# Patient Record
Sex: MALE | Race: WHITE | Employment: OTHER | URBAN - METROPOLITAN AREA
[De-identification: names, ages, dates, MRNs, and addresses within clinical notes are randomized per-mention and may not be internally consistent; named-entity substitution may affect disease eponyms.]

---

## 2017-01-11 PROBLEM — J30.1 ALLERGIC RHINITIS DUE TO POLLEN: Status: ACTIVE | Noted: 2017-01-11

## 2017-01-12 PROBLEM — Z00.00 ROUTINE HEALTH MAINTENANCE: Status: ACTIVE | Noted: 2017-01-12

## 2017-01-19 ENCOUNTER — APPOINTMENT (OUTPATIENT)
Dept: GENERAL RADIOLOGY | Age: 67
End: 2017-01-19
Payer: COMMERCIAL

## 2017-01-19 ENCOUNTER — HOSPITAL ENCOUNTER (EMERGENCY)
Age: 67
Discharge: HOME | End: 2017-01-19
Payer: COMMERCIAL

## 2017-01-19 VITALS
SYSTOLIC BLOOD PRESSURE: 124 MMHG | TEMPERATURE: 98.4 F | HEIGHT: 66 IN | BODY MASS INDEX: 23.95 KG/M2 | OXYGEN SATURATION: 98 % | RESPIRATION RATE: 18 BRPM | WEIGHT: 149 LBS | HEART RATE: 77 BPM | DIASTOLIC BLOOD PRESSURE: 74 MMHG

## 2017-01-19 DIAGNOSIS — I10 ESSENTIAL HYPERTENSION, BENIGN: ICD-10-CM

## 2017-01-19 DIAGNOSIS — I48.0 PAROXYSMAL ATRIAL FIBRILLATION WITH RVR (HCC): Primary | ICD-10-CM

## 2017-01-19 PROBLEM — R91.1 LUNG NODULE, SOLITARY: Status: ACTIVE | Noted: 2017-01-19

## 2017-01-19 LAB
ALBUMIN SERPL BCP-MCNC: 4 G/DL (ref 3.2–4.6)
ALBUMIN/GLOB SERPL: 1.3 {RATIO} (ref 1.2–3.5)
ALP SERPL-CCNC: 52 U/L (ref 50–136)
ALT SERPL-CCNC: 35 U/L (ref 12–65)
ANION GAP BLD CALC-SCNC: 10 MMOL/L (ref 7–16)
AST SERPL W P-5'-P-CCNC: 30 U/L (ref 15–37)
ATRIAL RATE: 357 BPM
BASOPHILS # BLD AUTO: 0 K/UL (ref 0–0.2)
BASOPHILS # BLD: 0 % (ref 0–2)
BILIRUB SERPL-MCNC: 0.6 MG/DL (ref 0.2–1.1)
BUN SERPL-MCNC: 12 MG/DL (ref 8–23)
CALCIUM SERPL-MCNC: 8.6 MG/DL (ref 8.3–10.4)
CALCULATED P AXIS, ECG09: -98 DEGREES
CALCULATED R AXIS, ECG10: -27 DEGREES
CALCULATED T AXIS, ECG11: 55 DEGREES
CHLORIDE SERPL-SCNC: 105 MMOL/L (ref 98–107)
CO2 SERPL-SCNC: 28 MMOL/L (ref 21–32)
CREAT SERPL-MCNC: 1.1 MG/DL (ref 0.8–1.5)
DIAGNOSIS, 93000: NORMAL
DIASTOLIC BP, ECG02: NORMAL MMHG
DIFFERENTIAL METHOD BLD: ABNORMAL
EOSINOPHIL # BLD: 0.1 K/UL (ref 0–0.8)
EOSINOPHIL NFR BLD: 1 % (ref 0.5–7.8)
ERYTHROCYTE [DISTWIDTH] IN BLOOD BY AUTOMATED COUNT: 14.1 % (ref 11.9–14.6)
GLOBULIN SER CALC-MCNC: 3 G/DL (ref 2.3–3.5)
GLUCOSE SERPL-MCNC: 98 MG/DL (ref 65–100)
HCT VFR BLD AUTO: 54 % (ref 41.1–50.3)
HGB BLD-MCNC: 18.6 G/DL (ref 13.6–17.2)
IMM GRANULOCYTES # BLD: 0 K/UL (ref 0–0.5)
IMM GRANULOCYTES NFR BLD AUTO: 0.1 % (ref 0–5)
LYMPHOCYTES # BLD AUTO: 13 % (ref 13–44)
LYMPHOCYTES # BLD: 0.9 K/UL (ref 0.5–4.6)
MAGNESIUM SERPL-MCNC: 2.1 MG/DL (ref 1.8–2.4)
MCH RBC QN AUTO: 31.4 PG (ref 26.1–32.9)
MCHC RBC AUTO-ENTMCNC: 34.4 G/DL (ref 31.4–35)
MCV RBC AUTO: 91.2 FL (ref 79.6–97.8)
MONOCYTES # BLD: 0.6 K/UL (ref 0.1–1.3)
MONOCYTES NFR BLD AUTO: 8 % (ref 4–12)
NEUTS SEG # BLD: 5.8 K/UL (ref 1.7–8.2)
NEUTS SEG NFR BLD AUTO: 78 % (ref 43–78)
P-R INTERVAL, ECG05: NORMAL MS
PLATELET # BLD AUTO: 135 K/UL (ref 150–450)
PMV BLD AUTO: 10.8 FL (ref 10.8–14.1)
POTASSIUM SERPL-SCNC: 3.7 MMOL/L (ref 3.5–5.1)
PROT SERPL-MCNC: 7 G/DL (ref 6.3–8.2)
Q-T INTERVAL, ECG07: 280 MS
QRS DURATION, ECG06: 90 MS
QTC CALCULATION (BEZET), ECG08: 392 MS
RBC # BLD AUTO: 5.92 M/UL (ref 4.23–5.67)
SODIUM SERPL-SCNC: 143 MMOL/L (ref 136–145)
SYSTOLIC BP, ECG01: NORMAL MMHG
TROPONIN I SERPL-MCNC: <0.02 NG/ML (ref 0.02–0.05)
VENTRICULAR RATE, ECG03: 118 BPM
WBC # BLD AUTO: 7.5 K/UL (ref 4.3–11.1)

## 2017-01-19 PROCEDURE — 85025 COMPLETE CBC W/AUTO DIFF WBC: CPT

## 2017-01-19 PROCEDURE — 74011000258 HC RX REV CODE- 258

## 2017-01-19 PROCEDURE — 74011000250 HC RX REV CODE- 250

## 2017-01-19 PROCEDURE — 83735 ASSAY OF MAGNESIUM: CPT

## 2017-01-19 PROCEDURE — 99285 EMERGENCY DEPT VISIT HI MDM: CPT

## 2017-01-19 PROCEDURE — 96365 THER/PROPH/DIAG IV INF INIT: CPT

## 2017-01-19 PROCEDURE — 96366 THER/PROPH/DIAG IV INF ADDON: CPT

## 2017-01-19 PROCEDURE — 80053 COMPREHEN METABOLIC PANEL: CPT

## 2017-01-19 PROCEDURE — 74011250637 HC RX REV CODE- 250/637

## 2017-01-19 PROCEDURE — 71010 XR CHEST PORT: CPT

## 2017-01-19 PROCEDURE — 84484 ASSAY OF TROPONIN QUANT: CPT

## 2017-01-19 PROCEDURE — 93005 ELECTROCARDIOGRAM TRACING: CPT

## 2017-01-19 RX ORDER — DILTIAZEM HYDROCHLORIDE 5 MG/ML
20 INJECTION INTRAVENOUS
Status: DISCONTINUED | OUTPATIENT
Start: 2017-01-19 | End: 2017-01-19 | Stop reason: HOSPADM

## 2017-01-19 RX ORDER — METOPROLOL TARTRATE 25 MG/1
25 TABLET, FILM COATED ORAL
Status: COMPLETED | OUTPATIENT
Start: 2017-01-19 | End: 2017-01-19

## 2017-01-19 RX ORDER — DILTIAZEM HYDROCHLORIDE 5 MG/ML
INJECTION INTRAVENOUS
Status: COMPLETED
Start: 2017-01-19 | End: 2017-01-19

## 2017-01-19 RX ORDER — METOPROLOL SUCCINATE 25 MG/1
25 TABLET, EXTENDED RELEASE ORAL DAILY
Qty: 30 TAB | Refills: 5 | Status: SHIPPED | OUTPATIENT
Start: 2017-01-19 | End: 2017-02-06

## 2017-01-19 RX ADMIN — METOPROLOL TARTRATE 25 MG: 25 TABLET ORAL at 14:48

## 2017-01-19 RX ADMIN — SODIUM CHLORIDE 7.5 MG/HR: 900 INJECTION, SOLUTION INTRAVENOUS at 13:03

## 2017-01-19 RX ADMIN — APIXABAN 5 MG: 5 TABLET, FILM COATED ORAL at 14:49

## 2017-01-19 RX ADMIN — DILTIAZEM HYDROCHLORIDE 20 MG: 5 INJECTION INTRAVENOUS at 13:01

## 2017-01-19 NOTE — ED NOTES
While patient was urinating patient hr increased to 170 contacted MD Myles for 20mg cardizem and cardizem drip,

## 2017-01-19 NOTE — ED TRIAGE NOTES
Per ems patient was being evaluated by MD Srivastava Fees pain\" patient felt his heart rate increase, and a fluttering in his chest. Patient denies chest pain/sob/discomfort found hr 161, vagal maneuvers unsuccessful at Md office. Patient A+Ox4 at this time, patient appears to be in no distress at this time hr is 130 irregular.        12 adenosine  20 Cardizem, a-flutter to 82

## 2017-02-14 ENCOUNTER — APPOINTMENT (OUTPATIENT)
Dept: MRI IMAGING | Age: 67
End: 2017-02-14
Attending: SURGERY
Payer: MEDICARE

## 2017-02-14 ENCOUNTER — HOSPITAL ENCOUNTER (OUTPATIENT)
Dept: MRI IMAGING | Age: 67
Discharge: HOME | End: 2017-02-14
Attending: SURGERY
Payer: MEDICARE

## 2017-02-14 DIAGNOSIS — R22.41 MASS OF THIGH, RIGHT: ICD-10-CM

## 2017-02-14 DIAGNOSIS — M25.551 PAIN OF RIGHT HIP JOINT: ICD-10-CM

## 2017-02-14 PROCEDURE — A9577 INJ MULTIHANCE: HCPCS | Performed by: SURGERY

## 2017-02-14 PROCEDURE — 74011250636 HC RX REV CODE- 250/636: Performed by: SURGERY

## 2017-02-14 PROCEDURE — 73720 MRI LWR EXTREMITY W/O&W/DYE: CPT

## 2017-02-14 RX ORDER — SODIUM CHLORIDE 0.9 % (FLUSH) 0.9 %
10 SYRINGE (ML) INJECTION
Status: COMPLETED | OUTPATIENT
Start: 2017-02-14 | End: 2017-02-14

## 2017-02-14 RX ADMIN — GADOBENATE DIMEGLUMINE 8 ML: 529 INJECTION, SOLUTION INTRAVENOUS at 18:06

## 2017-02-14 RX ADMIN — Medication 10 ML: at 18:06

## 2017-02-22 PROBLEM — I48.0 PAROXYSMAL ATRIAL FIBRILLATION (HCC): Status: ACTIVE | Noted: 2017-02-22

## 2017-03-03 PROBLEM — Z92.89 H/O DIAGNOSTIC TESTS: Status: ACTIVE | Noted: 2017-03-03

## 2017-08-02 PROCEDURE — 88305 TISSUE EXAM BY PATHOLOGIST: CPT | Performed by: FAMILY MEDICINE

## 2017-08-03 ENCOUNTER — HOSPITAL ENCOUNTER (OUTPATIENT)
Dept: LAB | Age: 67
Discharge: HOME | End: 2017-08-03

## 2017-08-06 PROBLEM — D72.810 LYMPHOPENIA: Status: ACTIVE | Noted: 2017-08-06

## 2017-08-11 ENCOUNTER — HOSPITAL ENCOUNTER (OUTPATIENT)
Dept: ULTRASOUND IMAGING | Age: 67
Discharge: HOME | End: 2017-08-11
Attending: FAMILY MEDICINE
Payer: MEDICARE

## 2017-08-11 DIAGNOSIS — Z13.6 ENCOUNTER FOR ABDOMINAL AORTIC ANEURYSM (AAA) SCREENING: ICD-10-CM

## 2017-08-11 DIAGNOSIS — Z87.891 FORMER HEAVY CIGARETTE SMOKER (20-39 PER DAY): ICD-10-CM

## 2017-08-11 PROCEDURE — 93978 VASCULAR STUDY: CPT

## 2017-09-01 PROBLEM — R80.9 MICROALBUMINURIA: Status: ACTIVE | Noted: 2017-09-01

## 2018-01-05 ENCOUNTER — HOSPITAL ENCOUNTER (OUTPATIENT)
Dept: CT IMAGING | Age: 68
Discharge: HOME | End: 2018-01-05
Attending: INTERNAL MEDICINE
Payer: MEDICARE

## 2018-01-05 DIAGNOSIS — I48.0 PAROXYSMAL ATRIAL FIBRILLATION (HCC): ICD-10-CM

## 2018-01-05 DIAGNOSIS — I48.91 ATRIAL FIBRILLATION (HCC): ICD-10-CM

## 2018-01-05 LAB — CREAT BLD-MCNC: NORMAL MG/DL

## 2018-01-05 PROCEDURE — 75572 CT HRT W/3D IMAGE: CPT

## 2018-01-05 PROCEDURE — 74011000258 HC RX REV CODE- 258: Performed by: INTERNAL MEDICINE

## 2018-01-05 PROCEDURE — 82565 ASSAY OF CREATININE: CPT | Performed by: INTERNAL MEDICINE

## 2018-01-05 PROCEDURE — 74011636320 HC RX REV CODE- 636/320: Performed by: INTERNAL MEDICINE

## 2018-01-05 RX ORDER — SODIUM CHLORIDE 0.9 % (FLUSH) 0.9 %
10 SYRINGE (ML) INJECTION
Status: COMPLETED | OUTPATIENT
Start: 2018-01-05 | End: 2018-01-05

## 2018-01-05 RX ADMIN — SODIUM CHLORIDE 100 ML: 900 INJECTION, SOLUTION INTRAVENOUS at 09:01

## 2018-01-05 RX ADMIN — Medication 10 ML: at 09:01

## 2018-01-05 RX ADMIN — IOPAMIDOL 125 ML: 755 INJECTION, SOLUTION INTRAVENOUS at 09:01

## 2018-01-11 PROBLEM — R74.8 ELEVATED CREATINE KINASE: Status: ACTIVE | Noted: 2018-01-11

## 2018-01-11 PROBLEM — J30.89 CHRONIC NONSEASONAL ALLERGIC RHINITIS DUE TO POLLEN: Status: ACTIVE | Noted: 2017-01-11

## 2018-01-18 ENCOUNTER — HOSPITAL ENCOUNTER (OUTPATIENT)
Dept: MRI IMAGING | Age: 68
Discharge: HOME | End: 2018-01-18
Attending: FAMILY MEDICINE
Payer: MEDICARE

## 2018-01-18 DIAGNOSIS — M47.22 CERVICAL SPONDYLOSIS WITH MYELOPATHY AND RADICULOPATHY: ICD-10-CM

## 2018-01-18 DIAGNOSIS — M47.12 CERVICAL SPONDYLOSIS WITH MYELOPATHY AND RADICULOPATHY: ICD-10-CM

## 2018-01-18 PROBLEM — M50.30 DDD (DEGENERATIVE DISC DISEASE), CERVICAL: Status: ACTIVE | Noted: 2018-01-18

## 2018-01-18 PROCEDURE — 72141 MRI NECK SPINE W/O DYE: CPT

## 2018-01-23 ENCOUNTER — APPOINTMENT (OUTPATIENT)
Dept: GENERAL RADIOLOGY | Age: 68
DRG: 310 | End: 2018-01-23
Attending: EMERGENCY MEDICINE
Payer: MEDICARE

## 2018-01-23 ENCOUNTER — HOSPITAL ENCOUNTER (INPATIENT)
Age: 68
LOS: 3 days | Discharge: HOME | DRG: 310 | End: 2018-01-26
Attending: EMERGENCY MEDICINE | Admitting: INTERNAL MEDICINE
Payer: MEDICARE

## 2018-01-23 DIAGNOSIS — I48.0 PAROXYSMAL ATRIAL FIBRILLATION (HCC): ICD-10-CM

## 2018-01-23 DIAGNOSIS — I48.3 TYPICAL ATRIAL FLUTTER (HCC): Primary | ICD-10-CM

## 2018-01-23 DIAGNOSIS — I48.4 ATYPICAL ATRIAL FLUTTER (HCC): ICD-10-CM

## 2018-01-23 PROBLEM — I48.92 ATRIAL FLUTTER (HCC): Status: ACTIVE | Noted: 2018-01-23

## 2018-01-23 LAB
ALBUMIN SERPL-MCNC: 3.1 G/DL (ref 3.2–4.6)
ALBUMIN/GLOB SERPL: 0.9 {RATIO} (ref 1.2–3.5)
ALP SERPL-CCNC: 50 U/L (ref 50–136)
ALT SERPL-CCNC: 30 U/L (ref 12–65)
ANION GAP SERPL CALC-SCNC: 10 MMOL/L (ref 7–16)
AST SERPL-CCNC: 32 U/L (ref 15–37)
BASOPHILS # BLD: 0 K/UL (ref 0–0.2)
BASOPHILS NFR BLD: 0 % (ref 0–2)
BILIRUB SERPL-MCNC: 0.8 MG/DL (ref 0.2–1.1)
BUN SERPL-MCNC: 16 MG/DL (ref 8–23)
CALCIUM SERPL-MCNC: 7.9 MG/DL (ref 8.3–10.4)
CHLORIDE SERPL-SCNC: 105 MMOL/L (ref 98–107)
CO2 SERPL-SCNC: 24 MMOL/L (ref 21–32)
CREAT SERPL-MCNC: 1.07 MG/DL (ref 0.8–1.5)
DIFFERENTIAL METHOD BLD: ABNORMAL
EOSINOPHIL # BLD: 0 K/UL (ref 0–0.8)
EOSINOPHIL NFR BLD: 0 % (ref 0.5–7.8)
ERYTHROCYTE [DISTWIDTH] IN BLOOD BY AUTOMATED COUNT: 14 % (ref 11.9–14.6)
FLUAV AG NPH QL IA: NEGATIVE
FLUBV AG NPH QL IA: NEGATIVE
GLOBULIN SER CALC-MCNC: 3.6 G/DL (ref 2.3–3.5)
GLUCOSE SERPL-MCNC: 95 MG/DL (ref 65–100)
HCT VFR BLD AUTO: 48 % (ref 41.1–50.3)
HGB BLD-MCNC: 16.8 G/DL (ref 13.6–17.2)
IMM GRANULOCYTES # BLD: 0.1 K/UL (ref 0–0.5)
IMM GRANULOCYTES NFR BLD AUTO: 0 % (ref 0–5)
LACTATE BLD-SCNC: 1.4 MMOL/L (ref 0.5–1.9)
LYMPHOCYTES # BLD: 0.6 K/UL (ref 0.5–4.6)
LYMPHOCYTES NFR BLD: 4 % (ref 13–44)
MCH RBC QN AUTO: 32.2 PG (ref 26.1–32.9)
MCHC RBC AUTO-ENTMCNC: 35 G/DL (ref 31.4–35)
MCV RBC AUTO: 92 FL (ref 79.6–97.8)
MONOCYTES # BLD: 1.3 K/UL (ref 0.1–1.3)
MONOCYTES NFR BLD: 9 % (ref 4–12)
NEUTS SEG # BLD: 13.1 K/UL (ref 1.7–8.2)
NEUTS SEG NFR BLD: 87 % (ref 43–78)
PLATELET # BLD AUTO: 144 K/UL (ref 150–450)
PMV BLD AUTO: 10 FL (ref 10.8–14.1)
POTASSIUM SERPL-SCNC: 5.1 MMOL/L (ref 3.5–5.1)
PROCALCITONIN SERPL-MCNC: 0.2 NG/ML
PROT SERPL-MCNC: 6.7 G/DL (ref 6.3–8.2)
RBC # BLD AUTO: 5.22 M/UL (ref 4.23–5.67)
SODIUM SERPL-SCNC: 139 MMOL/L (ref 136–145)
TROPONIN I BLD-MCNC: 0.02 NG/ML (ref 0.02–0.05)
WBC # BLD AUTO: 15.1 K/UL (ref 4.3–11.1)

## 2018-01-23 PROCEDURE — 93005 ELECTROCARDIOGRAM TRACING: CPT | Performed by: EMERGENCY MEDICINE

## 2018-01-23 PROCEDURE — 74011250637 HC RX REV CODE- 250/637: Performed by: EMERGENCY MEDICINE

## 2018-01-23 PROCEDURE — 87804 INFLUENZA ASSAY W/OPTIC: CPT | Performed by: EMERGENCY MEDICINE

## 2018-01-23 PROCEDURE — 80053 COMPREHEN METABOLIC PANEL: CPT | Performed by: EMERGENCY MEDICINE

## 2018-01-23 PROCEDURE — 83605 ASSAY OF LACTIC ACID: CPT

## 2018-01-23 PROCEDURE — 71045 X-RAY EXAM CHEST 1 VIEW: CPT

## 2018-01-23 PROCEDURE — 84145 PROCALCITONIN (PCT): CPT | Performed by: EMERGENCY MEDICINE

## 2018-01-23 PROCEDURE — 85025 COMPLETE CBC W/AUTO DIFF WBC: CPT | Performed by: EMERGENCY MEDICINE

## 2018-01-23 PROCEDURE — 84484 ASSAY OF TROPONIN QUANT: CPT

## 2018-01-23 PROCEDURE — 74011000250 HC RX REV CODE- 250: Performed by: INTERNAL MEDICINE

## 2018-01-23 PROCEDURE — 65660000000 HC RM CCU STEPDOWN

## 2018-01-23 PROCEDURE — 74011000258 HC RX REV CODE- 258: Performed by: INTERNAL MEDICINE

## 2018-01-23 PROCEDURE — 74011250637 HC RX REV CODE- 250/637: Performed by: INTERNAL MEDICINE

## 2018-01-23 PROCEDURE — 74011250636 HC RX REV CODE- 250/636: Performed by: EMERGENCY MEDICINE

## 2018-01-23 PROCEDURE — 99285 EMERGENCY DEPT VISIT HI MDM: CPT | Performed by: EMERGENCY MEDICINE

## 2018-01-23 RX ORDER — IBUTILIDE FUMARATE 0.1 MG/ML
1 INJECTION, SOLUTION INTRAVENOUS ONCE
Status: COMPLETED | OUTPATIENT
Start: 2018-01-23 | End: 2018-01-23

## 2018-01-23 RX ORDER — SODIUM CHLORIDE 0.9 % (FLUSH) 0.9 %
5-10 SYRINGE (ML) INJECTION EVERY 8 HOURS
Status: DISCONTINUED | OUTPATIENT
Start: 2018-01-23 | End: 2018-01-26 | Stop reason: HOSPADM

## 2018-01-23 RX ORDER — ACETAMINOPHEN 325 MG/1
650 TABLET ORAL
Status: COMPLETED | OUTPATIENT
Start: 2018-01-23 | End: 2018-01-23

## 2018-01-23 RX ORDER — SODIUM CHLORIDE 0.9 % (FLUSH) 0.9 %
5-10 SYRINGE (ML) INJECTION AS NEEDED
Status: DISCONTINUED | OUTPATIENT
Start: 2018-01-23 | End: 2018-01-26 | Stop reason: HOSPADM

## 2018-01-23 RX ORDER — LISINOPRIL 5 MG/1
10 TABLET ORAL DAILY
Status: DISCONTINUED | OUTPATIENT
Start: 2018-01-24 | End: 2018-01-26 | Stop reason: HOSPADM

## 2018-01-23 RX ORDER — DILTIAZEM HYDROCHLORIDE 5 MG/ML
10 INJECTION INTRAVENOUS ONCE
Status: COMPLETED | OUTPATIENT
Start: 2018-01-23 | End: 2018-01-23

## 2018-01-23 RX ADMIN — APIXABAN 5 MG: 5 TABLET, FILM COATED ORAL at 21:21

## 2018-01-23 RX ADMIN — DILTIAZEM HYDROCHLORIDE 10 MG: 5 INJECTION INTRAVENOUS at 22:25

## 2018-01-23 RX ADMIN — IBUTILIDE FUMARATE 1 MG: 0.1 INJECTION, SOLUTION INTRAVENOUS at 22:12

## 2018-01-23 RX ADMIN — DILTIAZEM HYDROCHLORIDE 2.5 MG/HR: 5 INJECTION INTRAVENOUS at 23:18

## 2018-01-23 RX ADMIN — Medication 5 ML: at 22:00

## 2018-01-23 RX ADMIN — ACETAMINOPHEN 650 MG: 325 TABLET, FILM COATED ORAL at 19:05

## 2018-01-23 NOTE — ED TRIAGE NOTES
Pt went to PCP office not feeling well, for fever and body aches, did EKG after pt's vitals were taken at 165bpm, saw he was in SVT, pt has hx of SVT. Has an ablation sched for 2/2/18. EMS gave 12mg of adenocard, pt rate went down to 70bpm then back up, gave another 12 of adenocard, went down and back up to 150, then gave 20mg of cardizem. Pt arrives in aflutter at 115.

## 2018-01-23 NOTE — ED PROVIDER NOTES
HPI Comments: 57-year-old male w/ PMHx of SVT who presents from PCP office with \"not feeling well\" with complaint of subjective fever and body aches since yesterday. EKG obtained at the PCPs office with heart rate 165 and patient reportedly in SVT. Patient has ablation scheduled for 2/2/2018. EMS gave 12mg of Adenocard, w/ HR decreasing down to 70bpm then back up. Pt given another 12mg of Adenocard, HR went down and back up to 150, then gave 20mg IV Cardizem. Pt arrives in aflutter w/ HR of 115. Denies CP, SOB, nausea, vomiting, sore throat, back pain, neck pain, dysuria, hematuria, diarrhea, productive cough. Patient is a 74 y.o. male presenting with SVT. The history is provided by the patient. No  was used. SVT    This is a new problem. The current episode started 1 to 2 hours ago. The problem has not changed since onset. The problem occurs constantly. The problem is associated with nothing. Associated symptoms include a fever and lower extremity edema. Pertinent negatives include no diaphoresis, no malaise/fatigue, no numbness, no chest pain, no exertional chest pressure, no irregular heartbeat, no near-syncope, no orthopnea, no PND, no syncope, no abdominal pain, no nausea, no vomiting, no headaches, no back pain, no leg pain, no dizziness, no weakness, no cough, no hemoptysis, no shortness of breath and no sputum production. Risk factors include hypertension and male gender. Treatments tried: Adenosine 24mg, Diltiazem 20mg  The treatment provided no relief. Past Medical History:   Diagnosis Date    Asthma, moderate persistent, well-controlled 5/30/2013    Chronic left shoulder pain 5/30/2013    Pain radiates from his neck    DDD (degenerative disc disease), cervical 1/18/2018    MRI: 1/2018 - multilevel DDD and facet arthritis w/ moderate bilateral foraminal narrowing at C6-7 and moderate right at C5-6.     Essential hypertension, benign 5/30/2013    Hypercholesteremia 12/7/2015    Hypogonadism, testicular 5/30/2013    Removal of right testicle on 11/1/2010[facility]    Lumbosacral radiculopathy at L5 4/17/2016    Lung nodule, solitary 1/19/2017    Chronic - benign w/u    Lymphopenia 8/6/2017    Microalbuminuria 9/1/2017    Prediabetes 04/17/2016    Vitamin D deficiency 6/2/2015       Past Surgical History:   Procedure Laterality Date    HX COLONOSCOPY  4/03    normal    HX LUMBAR DISKECTOMY  1989    L5 discectomy    HX LUMBAR DISKECTOMY  1983    L4    HX UROLOGICAL  11/1/2010    testicular, right removed d/t  severe infection         Family History:   Problem Relation Age of Onset    Cancer Father      leukemia    Hypertension Mother     Cancer Mother      breast    Cancer Sister      colon       Social History     Social History    Marital status:      Spouse name: N/A    Number of children: N/A    Years of education: N/A     Occupational History    Not on file. Social History Main Topics    Smoking status: Former Smoker    Smokeless tobacco: Never Used      Comment: smoked cigerattes from age 27 to 47    Alcohol use Yes      Comment: occ drinks wine    Drug use: Not on file    Sexual activity: Not on file     Other Topics Concern    Not on file     Social History Narrative         ALLERGIES: Review of patient's allergies indicates no known allergies. Review of Systems   Constitutional: Positive for chills and fever. Negative for diaphoresis and malaise/fatigue. HENT: Negative for congestion, facial swelling and sore throat. Respiratory: Negative for cough, hemoptysis, sputum production and shortness of breath. Cardiovascular: Negative for chest pain, palpitations, orthopnea, leg swelling, syncope, PND and near-syncope. Gastrointestinal: Negative for abdominal pain, constipation, nausea and vomiting. Genitourinary: Negative for dysuria and hematuria. Musculoskeletal: Positive for myalgias. Negative for back pain, joint swelling and neck pain. Skin: Negative for rash and wound. Neurological: Negative for dizziness, weakness, numbness and headaches. Psychiatric/Behavioral: Negative for confusion. Vitals:    01/23/18 1808   BP: 149/78   Pulse: (!) 115   Resp: 18   Temp: 100.2 °F (37.9 °C)   SpO2: 93%   Weight: 71.2 kg (157 lb)   Height: 5' 9\" (1.753 m)            Physical Exam   Constitutional: He is oriented to person, place, and time. He appears well-developed and well-nourished. HENT:   Head: Normocephalic. Mouth/Throat: Oropharynx is clear and moist. No oropharyngeal exudate. Eyes: Conjunctivae and EOM are normal. Pupils are equal, round, and reactive to light. Neck: Normal range of motion. No JVD present. No tracheal deviation present. Cardiovascular: Regular rhythm, normal heart sounds and intact distal pulses. Exam reveals no friction rub. No murmur heard. Tachycardic. Radial pulses 2+ and equal bilaterally. Pulmonary/Chest: Effort normal and breath sounds normal. No respiratory distress. He has no wheezes. He has no rales. He exhibits no tenderness. Abdominal: Soft. Bowel sounds are normal. He exhibits no distension. There is no tenderness. There is no rebound. Musculoskeletal: Normal range of motion. He exhibits no edema, tenderness or deformity. Neurological: He is alert and oriented to person, place, and time. No cranial nerve deficit. Coordination normal.   Skin: Skin is warm and dry. Nursing note and vitals reviewed. MDM  Number of Diagnoses or Management Options  Typical atrial flutter [facility]): new and requires workup  Diagnosis management comments: Pt given 1 mg Corvert IV w/ no improvement. Pt remains in [address] [facility] [address]. Cards to admit.        Amount and/or Complexity of Data Reviewed  Clinical lab tests: ordered and reviewed  Tests in the radiology section of CPT®: ordered and reviewed  Tests in the medicine section of CPT®: ordered and reviewed  Review and summarize past medical records: yes  Independent visualization of images, tracings, or specimens: yes    Risk of Complications, Morbidity, and/or Mortality  Presenting problems: moderate  Diagnostic procedures: moderate  Management options: moderate    Patient Progress  Patient progress: stable    ED Course   Comment By Time   CXR IMPRESSION: No acute abnormality. Gary Snell MD 01/23 7941   Cards contacted. Recommend administration of Corvert 1mg IV and reassess.  Gary Snell MD 01/23 9870       EKG  Date/Time: 1/23/2018 6:16 PM  Performed by: Francisca Bourgeois, [address] [place] [address]  Authorized by: Alfa Hinkle     ECG reviewed by ED Physician in the absence of a cardiologist: yes    Rate:     ECG rate:  117    ECG rate assessment: tachycardic    Rhythm:     Rhythm: atrial flutter    Ectopy:     Ectopy: none    QRS:     QRS axis:  Normal    QRS intervals:  Normal  Conduction:     Conduction: normal    ST segments:     ST segments:  Normal  T waves:     T waves: normal

## 2018-01-23 NOTE — IP AVS SNAPSHOT
[address] [facility] 
[phone] Patient: Isaura Ibarra MRN: [id number] XAY:4[birth year]6 A check jessica indicates which time of day the medication should be taken. My Medications START taking these medications Instructions Each Dose to Equal  
 Morning Noon Evening Bedtime  
 amiodarone 200 mg tablet Commonly known as:  CORDARONE Your next dose is:  1- Take 1 Tab by mouth two (2) times a day. 200 mg  
    
   
   
  
   
  
 metoprolol succinate 25 mg XL tablet Commonly known as:  TOPROL-XL Your next dose is:  1- Take 1 Tab by mouth daily. 25 mg CONTINUE taking these medications Instructions Each Dose to Equal  
 Morning Noon Evening Bedtime  
 apixaban 5 mg tablet Commonly known as:  Sebastian Pore Your next dose is:  1- Take 1 Tab by mouth two (2) times a day. 5 mg DEPO-TESTOSTERONE 200 mg/mL injection Generic drug:  testosterone cypionate Your next dose is:  As at home 440 mg by IntraMUSCular route Once every 2 weeks. 440 mg  
    
   
   
   
  
 dilTIAZem  mg ER capsule Commonly known as:  CARDIZEM CD Your next dose is:  1- Take 1 Cap by mouth daily. 180 mg  
    
  
   
   
   
  
 fluticasone 50 mcg/actuation nasal spray Commonly known as:  Olaf Cushing 2 Sprays by Both Nostrils route daily. 2 Spray  
    
   
   
   
  
 fluticasone-salmeterol 250-50 mcg/dose diskus inhaler Commonly known as:  ADVAIR DISKUS INHALE 1 DOSE BY MOUTH TWICE DAILY. RINSE MOUTH AFTER USE  
     
   
   
   
  
 lisinopril 10 mg tablet Commonly known as:  Rheta Isidro Your next dose is:  1- TAKE ONE TABLET BY MOUTH ONE TIME DAILY MULTIPLE VITAMIN PO Take  by mouth. OMEGA 3 PO Take  by mouth. PROAIR HFA 90 mcg/actuation inhaler Generic drug:  albuterol Take  by inhalation. sildenafil (antihypertensive) 20 mg tablet Commonly known as:  REVATIO Your next dose is:  As neded Take 20 mg by mouth daily as needed. 20 mg  
    
   
   
   
  
 ZyrTEC 10 mg Cap Generic drug:  Cetirizine Take  by mouth. STOP taking these medications   
 dronedarone Tab tablet Commonly known as:  Alberto Bell Where to Get Your Medications Information on where to get these meds will be given to you by the nurse or doctor. ! Ask your nurse or doctor about these medications  
  amiodarone 200 mg tablet  
 metoprolol succinate 25 mg XL tablet

## 2018-01-23 NOTE — IP AVS SNAPSHOT
[address] [facility] 
 
 
 [address] [place] [address] 
[phone] Patient: Joe Rousseau MRN: [id number] CDA:1[birth year]001 About your hospitalization You were admitted on:  January 23, 2018 You last received care in the:  [facility] 3 TELEMETRY You were discharged on:  January 26, 2018 Why you were hospitalized Your primary diagnosis was:  Atrial Flutter (Hcc) Your diagnoses also included:  Fever, Unknown Origin Follow-up Information Follow up With Details Comments Contact Info Ross Jenkins MD  A referal was sent to this Rheumatologist. call if you do not hear from them, thanks. [address] [facility] [address] 
[phone] Sergei Ventura MD  As needed [address] 187 [facility] 
[phone] [facility] CARDIOLOGY  Ablation scheduled for 2/2 [address] [place] [address] [id number] [facility] 
[phone] Your Scheduled Appointments Friday February 02, 2018  8:00 AM EST  
SC A-FIB ABLATION with [facility] EP/CATH 4 ALL EVENTS, [facility] EP/CATH EVELYN  
D CARDIAC CATH LAB ([address] [place] [address]) [address]  
[phone] Friday February 02, 2018 CARDIAC ABLATION with Blaise Torres MD  
[facility] SURGERY ([address] [place] [address]) [address] [place] [address]  
[phone] Discharge Orders None A check jessica indicates which time of day the medication should be taken. My Medications START taking these medications Instructions Each Dose to Equal  
 Morning Noon Evening Bedtime  
 amiodarone 200 mg tablet Commonly known as:  CORDARONE Your next dose is:  1- Take 1 Tab by mouth two (2) times a day. 200 mg  
    
   
   
  
   
  
 metoprolol succinate 25 mg XL tablet Commonly known as:  TOPROL-XL Your next dose is:  1- Take 1 Tab by mouth daily. 25 mg CONTINUE taking these medications Instructions Each Dose to Equal  
 Morning Noon Evening Bedtime  
 apixaban 5 mg tablet Commonly known as:  Jennifer Spence Your next dose is:  1- Take 1 Tab by mouth two (2) times a day. 5 mg DEPO-TESTOSTERONE 200 mg/mL injection Generic drug:  testosterone cypionate Your next dose is:  As at home 440 mg by IntraMUSCular route Once every 2 weeks. 440 mg  
    
   
   
   
  
 dilTIAZem  mg ER capsule Commonly known as:  CARDIZEM CD Your next dose is:  1- Take 1 Cap by mouth daily. 180 mg  
    
  
   
   
   
  
 fluticasone 50 mcg/actuation nasal spray Commonly known as:  Boyd Oats 2 Sprays by Both Nostrils route daily. 2 Spray  
    
   
   
   
  
 fluticasone-salmeterol 250-50 mcg/dose diskus inhaler Commonly known as:  ADVAIR DISKUS INHALE 1 DOSE BY MOUTH TWICE DAILY. RINSE MOUTH AFTER USE  
     
   
   
   
  
 lisinopril 10 mg tablet Commonly known as:  Freddy Laith Your next dose is:  1- TAKE ONE TABLET BY MOUTH ONE TIME DAILY MULTIPLE VITAMIN PO Take  by mouth. OMEGA 3 PO Take  by mouth. PROAIR HFA 90 mcg/actuation inhaler Generic drug:  albuterol Take  by inhalation. sildenafil (antihypertensive) 20 mg tablet Commonly known as:  REVATIO Your next dose is:  As neded Take 20 mg by mouth daily as needed. 20 mg  
    
   
   
   
  
 ZyrTEC 10 mg Cap Generic drug:  Cetirizine Take  by mouth. STOP taking these medications   
 dronedarone Tab tablet Commonly known as:  Isela José Where to Get Your Medications Information on where to get these meds will be given to you by the nurse or doctor. ! Ask your nurse or doctor about these medications  
  amiodarone 200 mg tablet  
 metoprolol succinate 25 mg XL tablet Discharge Instructions Electrophysiology Study and Catheter Ablation: Before Your Procedure What is an electrophysiology study and catheter ablation? An electrophysiology study is a test to see if there is a problem with your heart rhythm and to find out how to fix it. It is also called an EPS. Sometimes a procedure called catheter ablation is done during an EPS. This procedure destroys (ablates) small areas of your heart that are causing your heart rhythm problem. The doctor puts plastic tubes called catheters into blood vessels in your groin, arm, or neck. He or she then uses an X-ray machine to guide long wires through the tubes to your heart. The doctor can use these wires to record your heart's electrical signals. If the doctor thinks your problem can be fixed with ablation, he or she can use the wires to destroy a small part of your heart tissue. This is most often done with radio waves. You will probably be awake during the procedure. But you could be asleep. The doctor will give you medicines to help you feel relaxed and to numb the areas where the catheters go in. An EPS and ablation can take 2 to 6 hours. In rare cases, it can take longer. If you have EPS only and you do not need more treatment, you may go home the same day. But if you also have ablation, you may stay overnight in the hospital. How long you stay in the hospital depends on the type of ablation you have. Do not exercise hard or lift anything heavy for a week. You may be able to go back to work and to your normal routine in 1 or 2 days. Follow-up care is a key part of your treatment and safety. Be sure to make and go to all appointments, and call your doctor if you are having problems. It's also a good idea to know your test results and keep a list of the medicines you take. What happens before the procedure? ?Preparing for the procedure ? · Understand exactly what procedure is planned, along with the risks, benefits, and other options. · Tell your doctors ALL the medicines, vitamins, supplements, and herbal remedies you take. Some of these can increase the risk of bleeding or interact with anesthesia. ? · If you take blood thinners, such as warfarin (Coumadin), clopidogrel (Plavix), or aspirin, be sure to talk to your doctor. He or she will tell you if you should stop taking these medicines before your procedure. Make sure that you understand exactly what your doctor wants you to do.  
? · Your doctor will tell you which medicines to take or stop before your procedure. You may need to stop taking certain medicines a week or more before the procedure. So talk to your doctor as soon as you can.  
? · If you have an advance directive, let your doctor know. It may include a living will and a durable power of  for health care. Bring a copy to the hospital. If you don't have one, you may want to prepare one. It lets your doctor and loved ones know your health care wishes. Doctors advise that everyone prepare these papers before any type of surgery or procedure. Procedures can be stressful. This information will help you understand what you can expect. And it will help you safely prepare for your procedure. What happens on the day of the procedure? · Follow the instructions exactly about when to stop eating and drinking. If you don't, your procedure may be canceled. If your doctor told you to take your medicines on the day of the procedure, take them with only a sip of water. ? · Take a bath or shower before you come in for your procedure. Do not apply lotions, perfumes, deodorants, or nail polish. ? · Take off all jewelry and piercings. And take out contact lenses, if you wear them. ? At the hospital or surgery center · Bring a picture ID. ? · You will be kept comfortable and safe by your anesthesia provider. The anesthesia may make you sleep. Or it may just numb the area being worked on. ? · EPS by itself may take 1 to 3 hours. But if you also have ablation, the whole procedure may take 2 to 6 hours. ? · After the procedure, pressure will be applied to the area where the catheter was put in your blood vessel. Then the area may be covered with a bandage or a compression device. This will prevent bleeding. ? · Nurses will check your heart rate and blood pressure. The nurse will also check the catheter site for bleeding. ? · If the catheter was put in your groin, you will need to lie still and keep your leg straight for several hours. The nurse may put a weighted bag on your leg to keep it still. ? · If the catheter was put in your arm, you may be able to sit up and get out of bed right away. But you will need to keep your arm still for at least 1 hour. ? · You may have a bruise or a small lump where the catheter was put in your blood vessel. This is normal and will go away. Going home · Be sure you have someone to drive you home. Anesthesia and pain medicine make it unsafe for you to drive. ? · You will be given more specific instructions about recovering from your procedure. They will cover things like diet, wound care, follow-up care, driving, and getting back to your normal routine. When should you call your doctor? · You have questions or concerns. ? · You don't understand how to prepare for your procedure. ? · You become ill before the procedure (such as fever, flu, or a cold). ? · You need to reschedule or have changed your mind about having the procedure. Where can you learn more? Go to http://jose juan-trudi.info/. Enter P943 in the search box to learn more about \"Electrophysiology Study and Catheter Ablation: Before Your Procedure. \" Current as of: September 21, 2016 Content Version: 11.4 © 9254-3960 [employer]. Care instructions adapted under license by [employer] (which disclaims liability or warranty for this information). If you have questions about a medical condition or this instruction, always ask your healthcare professional. [address] any warranty or liability for your use of this information. Cardiac Arrhythmia: Care Instructions Your Care Instructions A cardiac arrhythmia is a change in the normal rhythm of the heart. Your heart may beat too fast or too slow or beat with an irregular or skipping rhythm. A change in the heart's rhythm may feel like a really strong heartbeat or a fluttering in your chest. A severe heart rhythm problem can keep the body from getting the blood it needs. This can result in shortness of breath, lightheadedness, and fainting. You may take medicine to treat your condition. Your doctor may recommend a pacemaker or recommend catheter ablation to destroy small parts of the heart that are causing a rhythm problem. Another possible treatment is an implantable cardioverter-defibrillator (ICD). An ICD is a device that gives the heart a shock to return the heart to a normal rhythm. Follow-up care is a key part of your treatment and safety. Be sure to make and go to all appointments, and call your doctor if you are having problems. It's also a good idea to know your test results and keep a list of the medicines you take. How can you care for yourself at home? ?General care ? · Be safe with medicines. Take your medicines exactly as prescribed. Call your doctor if you think you are having a problem with your medicine. You will get more details on the specific medicines your doctor prescribes. ? · If you received a pacemaker or an ICD, you will get a fact sheet about it. ? · Wear medical alert jewelry that says you have an abnormal heart rhythm. You can buy this at most drugstores. ? Lifestyle changes ? · Eat a heart-healthy diet. ? · Stay at a healthy weight. Lose weight if you need to. ? · Avoid nicotine, too much alcohol, and illegal drugs (meth, speed, and cocaine). Also, get enough sleep and do not overeat. ? · Ask your doctor whether you can take over-the-counter medicines (such as decongestants). These can make your heart beat fast.  
? · Talk to your doctor about any limits to activities, such as driving, or tasks where you use power tools or ladders. Activity ? · Start light exercise if your doctor says you can. Even a small amount will help you get stronger, have more energy, and manage your stress. ? · Get regular exercise. Try for 30 minutes on most days of the week. Ask your doctor what level of exercise is safe for you. If activity is not likely to cause health problems, you probably do not have limits on the type or level of activity that you can do. You may want to walk, swim, bike, or do other activities. ? · When you exercise, watch for signs that your heart is working too hard. You are pushing too hard if you cannot talk while you exercise. If you become short of breath or dizzy or have chest pain, sit down and rest.  
? · Check your pulse daily. Place two fingers on the artery at the palm side of your wrist, in line with your thumb. If your heartbeat seems uneven, talk to your doctor. When should you call for help? Call 911 anytime you think you may need emergency care. For example, call if: 
? · You have symptoms of sudden heart failure. These may include: ¨ Severe trouble breathing. ¨ A fast or irregular heartbeat. ¨ Coughing up pink, foamy mucus. ¨ You passed out. ? · You have signs of a stroke. These include: 
¨ Sudden numbness, paralysis, or weakness in your face, arm, or leg, especially on only one side of your body. ¨ New problems with walking or balance. ¨ Sudden vision changes. ¨ Drooling or slurred speech. ¨ New problems speaking or understanding simple statements, or feeling confused. ¨ A sudden, severe headache that is different from past headaches. ?Call your doctor now or seek immediate medical care if: 
? · You have new or changed symptoms of heart failure, such as: ¨ New or increased shortness of breath. ¨ New or worse swelling in your legs, ankles, or feet. ¨ Sudden weight gain, such as more than 2 to 3 pounds in a day or 5 pounds in a week. (Your doctor may suggest a different range of weight gain.) ¨ Feeling dizzy or lightheaded or like you may faint. ¨ Feeling so tired or weak that you cannot do your usual activities. ¨ Not sleeping well. Shortness of breath wakes you at night. You need extra pillows to prop yourself up to breathe easier. ? Watch closely for changes in your health, and be sure to contact your doctor if: 
? · You do not get better as expected. Where can you learn more? Go to http://jose juan-trudi.info/. Enter F812 in the search box to learn more about \"Cardiac Arrhythmia: Care Instructions. \" Current as of: September 21, 2016 Content Version: 11.4 © 2790-5856 [employer]. Care instructions adapted under license by [employer] (which disclaims liability or warranty for this information). If you have questions about a medical condition or this instruction, always ask your healthcare professional. [place] [address] any warranty or liability for your use of this information. [employer] Announcement We are excited to announce that we are making your provider's discharge notes available to you in [employer]. You will see these notes when they are completed and signed by the physician that discharged you from your recent hospital stay.   If you have any questions or concerns about any information you see in [employer], please call the [employer] Department where you were seen or reach out to your Primary Care Provider for more information about your plan of care. Introducing [facility] & HEALTH SERVICES! Dear Rose Crocker: Thank you for requesting a [employer] account. Our records indicate that you already have an active [employer] account. You can access your account anytime at [email or web address] Did you know that you can access your hospital and ER discharge instructions at any time in [employer]? You can also review all of your test results from your hospital stay or ER visit. Additional Information If you have questions, please visit the Frequently Asked Questions section of the [employer] website at [email or web address] Remember, [employer] is NOT to be used for urgent needs. For medical emergencies, dial 911. Now available from your iPhone and Android! Unresulted Labs-Please follow up with your PCP about these lab tests Order Current Status JAK2 MUTATION ANALYSIS In process CULTURE, BLOOD Preliminary result CULTURE, BLOOD Preliminary result Providers Seen During Your Hospitalization Provider Specialty Primary office phone [facility] MD [facility] [phone] Edna Tapia MD Cardiology [phone] Your Primary Care Physician (PCP) Primary Care Physician Office Phone Office Fax John More [phone] You are allergic to the following No active allergies Recent Documentation Height Weight BMI Smoking Status 1.753 m 67.9 kg 22.12 kg/m2 Former Smoker Emergency Contacts Name Discharge Info Relation Home Work Mobile [address] [facility] CAREGIVER [3] Spouse [3] [phone] Patient Belongings  The following personal items are in your possession at time of discharge: 
  Dental Appliances: Partials, Lowers, Uppers  Visual Aid: Glasses Home Medications: None   Jewelry: Watch, Ring, With patient  Clothing: Belt, Footwear, Shirt, Pants    Other Valuables: Wallet, With patient Please provide this summary of care documentation to your next provider. Signatures-by signing, you are acknowledging that this After Visit Summary has been reviewed with you and you have received a copy. Patient Signature:  ____________________________________________________________ Date:  ____________________________________________________________  
  
Ani Mealing Provider Signature:  ____________________________________________________________ Date:  ____________________________________________________________

## 2018-01-24 ENCOUNTER — APPOINTMENT (OUTPATIENT)
Dept: GENERAL RADIOLOGY | Age: 68
DRG: 310 | End: 2018-01-24
Attending: INTERNAL MEDICINE
Payer: MEDICARE

## 2018-01-24 PROBLEM — R50.9 FEVER, UNKNOWN ORIGIN: Status: ACTIVE | Noted: 2018-01-24

## 2018-01-24 LAB
APPEARANCE UR: CLEAR
ATRIAL RATE: 312 BPM
ATRIAL RATE: 312 BPM
ATRIAL RATE: 80 BPM
BASOPHILS # BLD: 0.1 K/UL (ref 0–0.2)
BASOPHILS NFR BLD: 0 % (ref 0–2)
BILIRUB UR QL: NEGATIVE
CALCULATED P AXIS, ECG09: -91 DEGREES
CALCULATED P AXIS, ECG09: -99 DEGREES
CALCULATED P AXIS, ECG09: 49 DEGREES
CALCULATED R AXIS, ECG10: -3 DEGREES
CALCULATED R AXIS, ECG10: 11 DEGREES
CALCULATED R AXIS, ECG10: 5 DEGREES
CALCULATED T AXIS, ECG11: 20 DEGREES
CALCULATED T AXIS, ECG11: 31 DEGREES
CALCULATED T AXIS, ECG11: 9 DEGREES
COLOR UR: YELLOW
CRP SERPL HS-MCNC: 96.2 MG/L
DIAGNOSIS, 93000: NORMAL
DIFFERENTIAL METHOD BLD: ABNORMAL
EOSINOPHIL # BLD: 0.1 K/UL (ref 0–0.8)
EOSINOPHIL NFR BLD: 1 % (ref 0.5–7.8)
ERYTHROCYTE [DISTWIDTH] IN BLOOD BY AUTOMATED COUNT: 14.2 % (ref 11.9–14.6)
ERYTHROCYTE [SEDIMENTATION RATE] IN BLOOD: 12 MM/HR (ref 0–20)
GLUCOSE UR STRIP.AUTO-MCNC: NEGATIVE MG/DL
HCT VFR BLD AUTO: 50.4 % (ref 41.1–50.3)
HGB BLD-MCNC: 17.2 G/DL (ref 13.6–17.2)
HGB UR QL STRIP: NEGATIVE
IMM GRANULOCYTES # BLD: 0.1 K/UL (ref 0–0.5)
IMM GRANULOCYTES NFR BLD AUTO: 0 % (ref 0–5)
KETONES UR QL STRIP.AUTO: NEGATIVE MG/DL
LEUKOCYTE ESTERASE UR QL STRIP.AUTO: NEGATIVE
LYMPHOCYTES # BLD: 0.7 K/UL (ref 0.5–4.6)
LYMPHOCYTES NFR BLD: 5 % (ref 13–44)
MCH RBC QN AUTO: 31.6 PG (ref 26.1–32.9)
MCHC RBC AUTO-ENTMCNC: 34.1 G/DL (ref 31.4–35)
MCV RBC AUTO: 92.6 FL (ref 79.6–97.8)
MONOCYTES # BLD: 1.2 K/UL (ref 0.1–1.3)
MONOCYTES NFR BLD: 9 % (ref 4–12)
NEUTS SEG # BLD: 10.7 K/UL (ref 1.7–8.2)
NEUTS SEG NFR BLD: 85 % (ref 43–78)
NITRITE UR QL STRIP.AUTO: NEGATIVE
P-R INTERVAL, ECG05: 156 MS
PH UR STRIP: 7.5 [PH] (ref 5–9)
PLATELET # BLD AUTO: 144 K/UL (ref 150–450)
PMV BLD AUTO: 9.5 FL (ref 10.8–14.1)
PROT UR STRIP-MCNC: ABNORMAL MG/DL
Q-T INTERVAL, ECG07: 274 MS
Q-T INTERVAL, ECG07: 350 MS
Q-T INTERVAL, ECG07: 406 MS
QRS DURATION, ECG06: 114 MS
QRS DURATION, ECG06: 94 MS
QRS DURATION, ECG06: 96 MS
QTC CALCULATION (BEZET), ECG08: 382 MS
QTC CALCULATION (BEZET), ECG08: 399 MS
QTC CALCULATION (BEZET), ECG08: 468 MS
RBC # BLD AUTO: 5.44 M/UL (ref 4.23–5.67)
SP GR UR REFRACTOMETRY: 1.01 (ref 1–1.02)
UROBILINOGEN UR QL STRIP.AUTO: 1 EU/DL (ref 0.2–1)
VENTRICULAR RATE, ECG03: 117 BPM
VENTRICULAR RATE, ECG03: 78 BPM
VENTRICULAR RATE, ECG03: 80 BPM
WBC # BLD AUTO: 12.8 K/UL (ref 4.3–11.1)

## 2018-01-24 PROCEDURE — 77010033678 HC OXYGEN DAILY

## 2018-01-24 PROCEDURE — 85652 RBC SED RATE AUTOMATED: CPT | Performed by: NURSE PRACTITIONER

## 2018-01-24 PROCEDURE — 85025 COMPLETE CBC W/AUTO DIFF WBC: CPT | Performed by: NURSE PRACTITIONER

## 2018-01-24 PROCEDURE — 93005 ELECTROCARDIOGRAM TRACING: CPT | Performed by: INTERNAL MEDICINE

## 2018-01-24 PROCEDURE — 86141 C-REACTIVE PROTEIN HS: CPT | Performed by: NURSE PRACTITIONER

## 2018-01-24 PROCEDURE — 81003 URINALYSIS AUTO W/O SCOPE: CPT | Performed by: INTERNAL MEDICINE

## 2018-01-24 PROCEDURE — 86038 ANTINUCLEAR ANTIBODIES: CPT | Performed by: NURSE PRACTITIONER

## 2018-01-24 PROCEDURE — 87040 BLOOD CULTURE FOR BACTERIA: CPT | Performed by: NURSE PRACTITIONER

## 2018-01-24 PROCEDURE — 74011000250 HC RX REV CODE- 250: Performed by: INTERNAL MEDICINE

## 2018-01-24 PROCEDURE — 94760 N-INVAS EAR/PLS OXIMETRY 1: CPT

## 2018-01-24 PROCEDURE — 74011250637 HC RX REV CODE- 250/637: Performed by: INTERNAL MEDICINE

## 2018-01-24 PROCEDURE — 81270 JAK2 GENE: CPT | Performed by: INTERNAL MEDICINE

## 2018-01-24 PROCEDURE — 65660000000 HC RM CCU STEPDOWN

## 2018-01-24 PROCEDURE — 71046 X-RAY EXAM CHEST 2 VIEWS: CPT

## 2018-01-24 PROCEDURE — 82668 ASSAY OF ERYTHROPOIETIN: CPT | Performed by: INTERNAL MEDICINE

## 2018-01-24 PROCEDURE — 36415 COLL VENOUS BLD VENIPUNCTURE: CPT | Performed by: INTERNAL MEDICINE

## 2018-01-24 PROCEDURE — 74011000258 HC RX REV CODE- 258: Performed by: INTERNAL MEDICINE

## 2018-01-24 PROCEDURE — 94640 AIRWAY INHALATION TREATMENT: CPT

## 2018-01-24 PROCEDURE — 74011250637 HC RX REV CODE- 250/637: Performed by: NURSE PRACTITIONER

## 2018-01-24 PROCEDURE — 87040 BLOOD CULTURE FOR BACTERIA: CPT | Performed by: INTERNAL MEDICINE

## 2018-01-24 RX ORDER — METOPROLOL TARTRATE 50 MG/1
50 TABLET ORAL 2 TIMES DAILY
Status: DISCONTINUED | OUTPATIENT
Start: 2018-01-24 | End: 2018-01-25

## 2018-01-24 RX ORDER — ACETAMINOPHEN 325 MG/1
650 TABLET ORAL
Status: DISCONTINUED | OUTPATIENT
Start: 2018-01-24 | End: 2018-01-26 | Stop reason: HOSPADM

## 2018-01-24 RX ORDER — BUDESONIDE 0.5 MG/2ML
500 INHALANT ORAL
Status: DISCONTINUED | OUTPATIENT
Start: 2018-01-24 | End: 2018-01-26 | Stop reason: HOSPADM

## 2018-01-24 RX ORDER — LEVALBUTEROL INHALATION SOLUTION 1.25 MG/3ML
1.25 SOLUTION RESPIRATORY (INHALATION)
Status: DISCONTINUED | OUTPATIENT
Start: 2018-01-24 | End: 2018-01-26 | Stop reason: HOSPADM

## 2018-01-24 RX ORDER — DILTIAZEM HYDROCHLORIDE 180 MG/1
180 CAPSULE, COATED, EXTENDED RELEASE ORAL DAILY
Status: DISCONTINUED | OUTPATIENT
Start: 2018-01-24 | End: 2018-01-26 | Stop reason: HOSPADM

## 2018-01-24 RX ORDER — ALBUTEROL SULFATE 0.83 MG/ML
2.5 SOLUTION RESPIRATORY (INHALATION)
Status: DISCONTINUED | OUTPATIENT
Start: 2018-01-24 | End: 2018-01-24

## 2018-01-24 RX ORDER — DILTIAZEM HYDROCHLORIDE 180 MG/1
180 CAPSULE, COATED, EXTENDED RELEASE ORAL DAILY
Status: DISCONTINUED | OUTPATIENT
Start: 2018-01-25 | End: 2018-01-24

## 2018-01-24 RX ADMIN — DRONEDARONE 400 MG: 400 TABLET, FILM COATED ORAL at 09:09

## 2018-01-24 RX ADMIN — BUDESONIDE 500 MCG: 0.5 INHALANT RESPIRATORY (INHALATION) at 22:01

## 2018-01-24 RX ADMIN — ACETAMINOPHEN 650 MG: 325 TABLET, FILM COATED ORAL at 11:48

## 2018-01-24 RX ADMIN — APIXABAN 5 MG: 5 TABLET, FILM COATED ORAL at 21:48

## 2018-01-24 RX ADMIN — ACETAMINOPHEN 650 MG: 325 TABLET, FILM COATED ORAL at 21:48

## 2018-01-24 RX ADMIN — Medication 10 ML: at 09:09

## 2018-01-24 RX ADMIN — LEVALBUTEROL HYDROCHLORIDE 1.25 MG: 1.25 SOLUTION RESPIRATORY (INHALATION) at 22:01

## 2018-01-24 RX ADMIN — ACETAMINOPHEN 650 MG: 325 TABLET, FILM COATED ORAL at 02:45

## 2018-01-24 RX ADMIN — DILTIAZEM HYDROCHLORIDE 5 MG/HR: 5 INJECTION INTRAVENOUS at 12:50

## 2018-01-24 RX ADMIN — DRONEDARONE 400 MG: 400 TABLET, FILM COATED ORAL at 17:59

## 2018-01-24 RX ADMIN — LEVALBUTEROL HYDROCHLORIDE 1.25 MG: 1.25 SOLUTION RESPIRATORY (INHALATION) at 13:29

## 2018-01-24 RX ADMIN — DILTIAZEM HYDROCHLORIDE 180 MG: 180 CAPSULE, COATED, EXTENDED RELEASE ORAL at 11:48

## 2018-01-24 RX ADMIN — LISINOPRIL 10 MG: 5 TABLET ORAL at 09:08

## 2018-01-24 RX ADMIN — APIXABAN 5 MG: 5 TABLET, FILM COATED ORAL at 09:08

## 2018-01-24 RX ADMIN — METOPROLOL TARTRATE 50 MG: 50 TABLET ORAL at 17:59

## 2018-01-24 NOTE — CONSULTS
Consult   NAME:  Trace Senior   Age:  74 y.o.  :   1950   MRN:   [id number]  PCP: Tacos Anthony MD  Treatment Team: Attending Provider: Maya Kauffman MD; Consulting Provider: Whitney Sewell MD  HPI:   Mr. Michel Pineda is a 73 yo male who was admitted by Cardiology for aflutter. He initially presented to his PCP office for evaluation of fever and suspected flu. He was found to be tachycardic, EKG performed which showed a-flutter. Is currently on Cardizem gtt. Plans for Ablation in the near future. Hospitalist consulted for fever. Work up in ED 18 revealed flu negative, lactic acid 1.4, leukocytosis with WBC 15.6, CXR without acute findings. Pt reports that approx 1 1/2 weeks ago he came home from work with rigors and chills, went to bed and woke up in the AM \"perfectly fine\". He also reports 2 week hx of joint pain, muscle pain, \"my skin hurting when I touch it. \".  C/O urinary urgency and diarrhea X4 days (4-5 loose stools daily). Denies cough, SOB, CP, n/v, constipation, abd pain, nasal congestion, sore throat, HA, ear pain. Today afebrile. Last dose of tylenol last night 1900. Results summary of Diagnostic Studies/Procedures copied from within [facility] EMR:         Complete ROS done and is as stated in HPI or otherwise negative  Past Medical History:   Diagnosis Date    Asthma, moderate persistent, well-controlled 2013    Chronic left shoulder pain 2013    Pain radiates from his neck    DDD (degenerative disc disease), cervical 2018    MRI: 2018 - multilevel DDD and facet arthritis w/ moderate bilateral foraminal narrowing at C6-7 and moderate right at C5-6.     Essential hypertension, benign 2013    Hypercholesteremia 2015    Hypogonadism, testicular 2013    Removal of right testicle on 2010; urologist Alfredito Jefferson of [facility], gives testosterone injections    Lumbosacral radiculopathy at L5 2016    Lung nodule, solitary 2017    Chronic - benign w/u    Lymphopenia 2017    Microalbuminuria 2017    Prediabetes 2016    Vitamin D deficiency 2015      Past Surgical History:   Procedure Laterality Date    HX COLONOSCOPY      normal    HX LUMBAR DISKECTOMY      L5 discectomy    HX LUMBAR DISKECTOMY      L4    HX UROLOGICAL  2010    testicular, right removed d/t  severe infection      No Known Allergies   Social History   Substance Use Topics    Smoking status: Former Smoker    Smokeless tobacco: Never Used      Comment: smoked cigerattes from age 27 to 47    Alcohol use Yes      Comment: occ drinks wine      Family History   Problem Relation Age of Onset    Cancer Father      leukemia    Hypertension Mother     Cancer Mother      breast    Cancer Sister      colon        Objective:     Visit Vitals    /71 (BP 1 Location: Right arm, BP Patient Position: At rest)    Pulse 81    Temp 98.5 °F (36.9 °C)    Resp 18    Ht 5' 9\" (1.753 m)    Wt 70.4 kg (155 lb 4.8 oz)    SpO2 94%    BMI 22.93 kg/m2      Temp (24hrs), Av.5 °F (37.5 °C), Min:98.5 °F (36.9 °C), Max:100.8 °F (38.2 °C)    Oxygen Therapy  O2 Sat (%): 94 % (18 0440)  Pulse via Oximetry: 78 beats per minute (18 2223)  O2 Device: Room air (18 2300)  Physical Exam:  General:    Alert, cooperative, no distress, appears stated age. Head:   Normocephalic, without obvious abnormality, atraumatic. Nose:  Nares normal. No drainage or sinus tenderness. Lungs:   CTA, resp even and nonlabored  Heart:  Irregular rhythm, normal rate. No edema  Abdomen:   Soft, non-tender. Not distended. Bowel sounds normal. No masses  Extremities: No cyanosis. Moves ext spontaneously  Skin:     Texture, turgor normal. No rashes or lesions. Not Jaundiced  Neurologic: Alert and oriented X4.  No focal deficits      Data Review:   Recent Results (from the past 24 hour(s))   AMB POC COMPLETE CBC,AUTOMATED ENTER    Collection Time: 01/23/18  4:14 PM   Result Value Ref Range    WBC (POC) 18.5 (A) 4.5 - 11.0 10^3/ul    RBC (POC) 5.49 4.60 - 6.20 10^6/ul    HGB (POC) 16.9 13.5 - 18.0 g/dL    HCT (POC) 49.0 40.0 - 54.0 %    MCV (POC) 89.2 80.0 - 94.0 fL    MCH (POC) 30.7 26.0 - 33.0 pg    MCHC (POC) 34.5 31.0 - 36.0 g/dL    RDW (POC) 14.3 11.0 - 16.0 %    PLATELET (POC) 252 579 - 440 10^3/ul    MPV (POC) 6.9 (A) 7.4 - 10.4 fL    LYMPHOCYTES (POC) 9.1 (A) 20.0 - 45.0 %    ABS. LYMPHS (POC) 1.7 1.2 - 3.4 10^3/ul    MONOCYTES (POC) 4.8 1.0 - 13.0 %    ABS. MONOS (POC) 0.9 (A) 0.1 - 0.6 10^3/ul    GRANULOCYTES (POC) 86.1 (A) 42.2 - 75.2 %    ABS. GRANS (POC) 15.9 (A) 1.4 - 6.5 10^3/ul   EKG, 12 LEAD, INITIAL    Collection Time: 01/23/18  6:09 PM   Result Value Ref Range    Ventricular Rate 117 BPM    Atrial Rate 312 BPM    QRS Duration 96 ms    Q-T Interval 274 ms    QTC Calculation (Bezet) 382 ms    Calculated P Axis -91 degrees    Calculated R Axis -3 degrees    Calculated T Axis 20 degrees    Diagnosis       !! AGE AND GENDER SPECIFIC ECG ANALYSIS !!   Atrial flutter with variable A-V block  Possible Anterior infarct , age undetermined  Abnormal ECG  When compared with ECG of 19-JAN-2017 12:53,  Borderline criteria for Anterior infarct are now Present  ST elevation now present in Inferior leads  ST elevation has replaced ST depression in Anterior leads  T wave inversion now evident in Inferior leads  Confirmed by [facility] CHEYENNE AGUAYO (), LIYA SANCHEZ ([id number]) on 1/24/2018 6:55:33 AM     CBC WITH AUTOMATED DIFF    Collection Time: 01/23/18  6:14 PM   Result Value Ref Range    WBC 15.1 (H) 4.3 - 11.1 K/uL    RBC 5.22 4.23 - 5.67 M/uL    HGB 16.8 13.6 - 17.2 g/dL    HCT 48.0 41.1 - 50.3 %    MCV 92.0 79.6 - 97.8 FL    MCH 32.2 26.1 - 32.9 PG    MCHC 35.0 31.4 - 35.0 g/dL    RDW 14.0 11.9 - 14.6 %    PLATELET 004 (L) 159 - 450 K/uL    MPV 10.0 (L) 10.8 - 14.1 FL    DF AUTOMATED      NEUTROPHILS 87 (H) 43 - 78 %    LYMPHOCYTES 4 (L) 13 - 44 %    MONOCYTES 9 4.0 - 12.0 %    EOSINOPHILS 0 (L) 0.5 - 7.8 %    BASOPHILS 0 0.0 - 2.0 %    IMMATURE GRANULOCYTES 0 0.0 - 5.0 %    ABS. NEUTROPHILS 13.1 (H) 1.7 - 8.2 K/UL    ABS. LYMPHOCYTES 0.6 0.5 - 4.6 K/UL    ABS. MONOCYTES 1.3 0.1 - 1.3 K/UL    ABS. EOSINOPHILS 0.0 0.0 - 0.8 K/UL    ABS. BASOPHILS 0.0 0.0 - 0.2 K/UL    ABS. IMM. GRANS. 0.1 0.0 - 0.5 K/UL   METABOLIC PANEL, COMPREHENSIVE    Collection Time: 01/23/18  6:14 PM   Result Value Ref Range    Sodium 139 136 - 145 mmol/L    Potassium 5.1 3.5 - 5.1 mmol/L    Chloride 105 98 - 107 mmol/L    CO2 24 21 - 32 mmol/L    Anion gap 10 7 - 16 mmol/L    Glucose 95 65 - 100 mg/dL    BUN 16 8 - 23 MG/DL    Creatinine 1.07 0.8 - 1.5 MG/DL    GFR est AA >60 >60 ml/min/1.73m2    GFR est non-AA >60 >60 ml/min/1.73m2    Calcium 7.9 (L) 8.3 - 10.4 MG/DL    Bilirubin, total 0.8 0.2 - 1.1 MG/DL    ALT (SGPT) 30 12 - 65 U/L    AST (SGOT) 32 15 - 37 U/L    Alk.  phosphatase 50 50 - 136 U/L    Protein, total 6.7 6.3 - 8.2 g/dL    Albumin 3.1 (L) 3.2 - 4.6 g/dL    Globulin 3.6 (H) 2.3 - 3.5 g/dL    A-G Ratio 0.9 (L) 1.2 - 3.5     PROCALCITONIN    Collection Time: 01/23/18  6:14 PM   Result Value Ref Range    Procalcitonin 0.2 ng/mL   INFLUENZA A & B AG (RAPID TEST)    Collection Time: 01/23/18  6:15 PM   Result Value Ref Range    Influenza A Ag NEGATIVE  NEG      Influenza B Ag NEGATIVE  NEG     POC TROPONIN-I    Collection Time: 01/23/18  6:16 PM   Result Value Ref Range    Troponin-I (POC) 0.02 0.02 - 0.05 ng/ml   POC LACTIC ACID    Collection Time: 01/23/18  6:18 PM   Result Value Ref Range    Lactic Acid (POC) 1.4 0.5 - 1.9 mmol/L   EKG, 12 LEAD, SUBSEQUENT    Collection Time: 01/23/18 10:12 PM   Result Value Ref Range    Ventricular Rate 80 BPM    Atrial Rate 80 BPM    P-R Interval 156 ms    QRS Duration 94 ms    Q-T Interval 406 ms    QTC Calculation (Bezet) 468 ms    Calculated P Axis 49 degrees    Calculated R Axis 11 degrees    Calculated T Axis 31 degrees Diagnosis       !! AGE AND GENDER SPECIFIC ECG ANALYSIS !! Sinus rhythm with occasional Premature ventricular complexes and Possible   Premature atrial complexes with Aberrant  conduction  Possible Anterior infarct (cited on or before 23-JAN-2018)  Abnormal ECG  When compared with ECG of 23-JAN-2018 18:09,  Sinus rhythm has replaced Atrial flutter  ST no longer elevated in Inferior leads  Confirmed by Lindsay Crane MD (), LIYA SANCHEZ ([id number]) on 1/24/2018 6:58:04 AM         Assessment and Plan: Active Hospital Problems    Diagnosis Date Noted    Atrial flutter ([address] 01/23/2018     Aflutter: On Cardizem gtt. Cardiology primary. Plans for ablation in the near future    Fever of unknown origin: check UA, BC X2, CBC, CXR. Influenza negative in ED on admission. Notes, labs, VS, diagnostic testing reviewed  Time spent with pt 20 min  Case discussed with pt, care team, wife at bedside, Dr. Edward Hdz  Thank you for this consult. We will follow along with you.        Jean-Pierre Fox NP

## 2018-01-24 NOTE — H&P
[address]  HISTORY AND PHYSICAL      Name:Emilia HARRIS  MR#: [id number]  : 1950  ACCOUNT #: [de-identified]   ADMIT DATE: 2018    PRIMARY CARE PHYSICIAN:  DR. Cuauhtemoc MARIE    CHIEF COMPLAINT:  Diffuse body aches, pain, chills, fever, headache. HISTORY OF PRESENT ILLNESS:  The patient is a 72-year-old  male with history of paroxysmal atrial fibrillation/flutter, who has recently been evaluated by Dr. Latoya Castro and cardiac ablation has been scheduled in the near future. The patient presented to his family [de-identified] office today for evaluation of suspected influenza. The patient describes new onset fever, chills, malaise, fatigue, headache. He states his symptoms began approximately 6:00 p.m. on the evening of 2018. He states he was checked for influenza, which was negative in his physician's office, but he was noted to have tachycardia with a resting pulse of 150. EKG suggested atrial flutter and the patient was transported by EMS for evaluation. En route the patient was given adenosine for suspected SVT. In the emergency room department, atrial flutter was diagnosed. The patient has been given IV Cardizem and Corvert but he remains in atrial flutter with 2-3:1 AV conduction. Patient is unaware of his tachycardia. He denies any associated chest pain, nausea, vomiting or excessive dyspnea. The patient has been seen in our office by Dr. Elwyn Boas.  He recently saw Dr. Stefanie Sacks on 01/15/2018. The patient had been seen in the emergency room for atypical atrial flutter. Apparently, he converted to a sinus rhythm and was referred for further EP evaluation. ADDITIONAL PAST MEDICAL HISTORY:  1. Atrial fibrillation. 2.  Atrial flutter. 3.  Event monitor, 2017, dominant atrial fibrillation with occasional atypical atrial flutter. 4.  Two-week eCardio, 2007, episodes of atrial fibrillation with rapid ventricular response.   Atrial fib burden was 19%. 5.  History of a cardiac CT scan in 2017 - showed no acute findings within the chest.      ADDITIONAL PAST MEDICAL HISTORY   1.  Vitamin D deficiency. 2.  Chronic bilateral shoulder and arm pain. 3.  History of hypogonadism with right orchiectomy. 4.  History of hypertension. 5.  History of asthma. 6.  History of benign solitary lung nodule. ADDITIONAL SURGICAL HISTORY:  Colonoscopy, lumbar diskectomy x2, orchiectomy. FAMILY HISTORY:  Father  of leukemia. Mother  of complications of course of cancer and hypertension. SOCIAL HISTORY:  The patient is a former smoker. He denies smokeless tobacco.  He has occasional glass of wine consumption. PRESENT HOME MEDICATIONS:  Cardizem 180 mg daily, Prinivil 10 mg daily, Multaq 400 mg twice a day, Eliquis 5 mg twice a day. REVIEW OF SYSTEMS:    SKIN:  The patient denies rash. NEUROLOGIC:  The patient denies stroke. PSYCHIATRIC:  The patient denies anxiety or depression. HEENT:  The patient complains of mild headache. PULMONARY:  The patient denies cough, fever, chills. GASTROINTESTINAL:  The patient denies diarrhea, melena, hematochezia. GENITOURINARY:  The patient denies hematuria, dysuria. MUSCULOSKELETAL:  The patient complains of diffuse myalgia and arthralgia. In addition, the patient does complain of bilateral shoulder and arm pain responsive to steroids. ENDOCRINE:  The patient denies diabetes or thyroid disease. HEMATOLOGIC:  Patient denies DVT, thrombo or pulmonary emboli. PHYSICAL EXAMINATION:  VITAL SIGNS:  Temperature is 100.2, pulse is 116, respirations 15, blood pressure 130/58, O2 saturation on room air is 96%. GENERAL:  The patient is a pleasant, middle-aged  male in no acute distress. SKIN:  Warm and dry. NEUROLOGIC:  Alert and oriented. PSYCHIATRIC:  Normal mood and affect. HEENT:  Normocephalic, atraumatic. Pupils reactive.   NECK:  Supple, 2+ carotid upstrokes without bruits. CHEST:  Reveals minimal anterior scattered rhonchi. CARDIAC:  Reveals a rapid regular rate and rhythm. I could not clearly hear a significant murmur, rub or gallop. ABDOMEN:  Soft, nondistended, without masses. No masses were noted. EXTREMITIES:  Shows no significant edema, cyanosis or clubbing. Pulses 2-3+ in the upper and lower extremities. AVAILABLE LABORATORY DATA:  EKG:  Atrial flutter with variable AV conduction. Poor anterior R-wave progression, cannot exclude old anterior myocardial infarction. CBC:  WBC is 15.1, hemoglobin 16.0, hematocrit 48.0, platelet count is 337,436. Electrolytes:  Sodium is 139, potassium is 5.1, chloride 105, CO2 is 24, creatinine is 1.07, BUN is 16, glucose is 95. Chest x-ray shows no acute abnormalities. IMPRESSION AND PLAN:  1. Paroxysmal atrial flutter/fibrillation:  The patient has recurrent atrial flutter. He has had this problem in the recent past, seemed to be refractory to Multaq and oral Cardizem. The patient has been recently scheduled by Dr. Becky Gillespie for catheter-based ablation of atrial fibrillation/flutter. Patient will be admitted to an inpatient telemetry bed. IV Cardizem infusion and bolus will be started. Will continue his home medications including Eliquis, lisinopril and Multaq. Patient will subsequently be seen by electrophysiologist and additional recommendations regarding future catheter-based ablation will be forthcoming based upon their evaluation. 2.  Systemic hypertension:  Will continue lisinopril. IV Cardizem has been started to control the patient's atrial flutter. 3.  Suspect new onset viral illness:  Patient presents with systemic symptoms of arthralgias, myalgia and low-grade fever. Will check for influenza. Will continue supportive therapy with Tylenol as needed and additional recommendations will be forthcoming based upon clinical findings and response to treatment.       Markell Marcelo MD OMAIRA  D: 01/23/2018 21:34     T: 01/23/2018 23:23  JOB #: [id number]

## 2018-01-24 NOTE — PROGRESS NOTES
S- Pt converted to NSR in 90's. B- Pt admitted with a-flutter, on cardizem gtt at 5mg/hr. A- Pt resting in bed, no complaints at this time. HR 90's NSR. R- Will continue to monitor and assess.

## 2018-01-24 NOTE — PROGRESS NOTES
TRANSFER - IN REPORT:    Verbal report received from Suraj RN(name) on Keila Maldonado  being received from ER(unit) for routine progression of care      Report consisted of patients Situation, Background, Assessment and   Recommendations(SBAR). Information from the following report(s) SBAR, Kardex and MAR was reviewed with the receiving nurse. Opportunity for questions and clarification was provided. Assessment completed upon patients arrival to unit and care assumed. Dual RN skin assessment completed. Pt skin is dry and intact with no breakdown noted.

## 2018-01-24 NOTE — PROGRESS NOTES
Bedside and Verbal shift change report given to Andrew Peter RN (oncoming nurse) by self (offgoing nurse). Report included the following information SBAR, Kardex and MAR. Cardizem gtt verified.

## 2018-01-24 NOTE — PROGRESS NOTES
Patient request to take his Advair while here. Dr. Nolan Ley stated it would be ok. Called RT. Lon Celestin RT stated that they do not carry Advair but to order Pulmicort 500 mg bid and Albuterol 2.5 mg q 6 nebulizer will be the standing order for it. Will order those for patient for later.

## 2018-01-24 NOTE — PROGRESS NOTES
Hospitalist Attending  I agree with the documentation as recorded in NP [facility] note.  BC pending but so far negative infectious workup so far, has prior polycythemia in the setting of testosterone use, check erythropoietin and HELLEN 2 levels, followup CBC and BC, check cdiff if has recurrent loose bowels,  pending JARROD and Rheumatoid factor for noninfectious etiologies of fever, followup fever curse, consider CT C/AP if no etiology found and continues with fever and symptoms    Leonard Johnson MD

## 2018-01-24 NOTE — ROUTINE PROCESS
TRANSFER - OUT REPORT:    Verbal report given to Misty Bales RN on Ilan Trujillo  being transferred to  for routine progression of care       Report consisted of patients Situation, Background, Assessment and   Recommendations(SBAR). Information from the following report(s) ED Summary was reviewed with the receiving nurse. Opportunity for questions and clarification was provided.       Patient transported with:   Monitor

## 2018-01-24 NOTE — PROGRESS NOTES
[facility] CARDIOLOGY PROGRESS NOTE           1/24/2018 7:14 AM    Admit Date: 1/23/2018    Admit Diagnosis: Atrial flutter ([address]      Subjective:   Patient has been cardiac stable. He presented to his PCP with Fever/chills and not feeling well. Objective:     Vitals:    01/23/18 2223 01/23/18 2258 01/24/18 0058 01/24/18 0440   BP: 115/68 114/64 105/66 114/71   Pulse: 77 89 90 81   Resp: 20 18 16 18   Temp:  98.5 °F (36.9 °C) 99.5 °F (37.5 °C) 98.5 °F (36.9 °C)   SpO2: 95% 94% 94% 94%   Weight:  156 lb 1.6 oz (70.8 kg)  155 lb 4.8 oz (70.4 kg)   Height:           Physical Exam:  General-Well Developed, Well Nourished, No Acute Distress, Alert & Oriented x 3, appropriate mood. Neck- supple, no JVD  CV- regular rate and rhythm no MRG  Lung- clear bilaterally  Abd- soft, nontender, nondistended  Ext- no edema bilaterally.   Skin- warm and dry    Current Facility-Administered Medications   Medication Dose Route Frequency    acetaminophen (TYLENOL) tablet 650 mg  650 mg Oral Q6H PRN    apixaban (ELIQUIS) tablet 5 mg  5 mg Oral BID    dronedarone (MULTAQ) tablet tab 400 mg  400 mg Oral BID WITH MEALS    lisinopril (PRINIVIL, ZESTRIL) tablet 10 mg  10 mg Oral DAILY    sodium chloride (NS) flush 5-10 mL  5-10 mL IntraVENous Q8H    sodium chloride (NS) flush 5-10 mL  5-10 mL IntraVENous PRN    dilTIAZem (CARDIZEM) 125 mg in dextrose 5% 125 mL infusion  0-15 mg/hr IntraVENous TITRATE     Data Review:   Recent Results (from the past 24 hour(s))   AMB POC COMPLETE CBC,AUTOMATED ENTER    Collection Time: 01/23/18  4:14 PM   Result Value Ref Range    WBC (POC) 18.5 (A) 4.5 - 11.0 10^3/ul    RBC (POC) 5.49 4.60 - 6.20 10^6/ul    HGB (POC) 16.9 13.5 - 18.0 g/dL    HCT (POC) 49.0 40.0 - 54.0 %    MCV (POC) 89.2 80.0 - 94.0 fL    MCH (POC) 30.7 26.0 - 33.0 pg    MCHC (POC) 34.5 31.0 - 36.0 g/dL    RDW (POC) 14.3 11.0 - 16.0 %    PLATELET (POC) 571 492 - 440 10^3/ul    MPV (POC) 6.9 (A) 7.4 - 10.4 fL LYMPHOCYTES (POC) 9.1 (A) 20.0 - 45.0 %    ABS. LYMPHS (POC) 1.7 1.2 - 3.4 10^3/ul    MONOCYTES (POC) 4.8 1.0 - 13.0 %    ABS. MONOS (POC) 0.9 (A) 0.1 - 0.6 10^3/ul    GRANULOCYTES (POC) 86.1 (A) 42.2 - 75.2 %    ABS. GRANS (POC) 15.9 (A) 1.4 - 6.5 10^3/ul   EKG, 12 LEAD, INITIAL    Collection Time: 01/23/18  6:09 PM   Result Value Ref Range    Ventricular Rate 117 BPM    Atrial Rate 312 BPM    QRS Duration 96 ms    Q-T Interval 274 ms    QTC Calculation (Bezet) 382 ms    Calculated P Axis -91 degrees    Calculated R Axis -3 degrees    Calculated T Axis 20 degrees    Diagnosis       !! AGE AND GENDER SPECIFIC ECG ANALYSIS !! Atrial flutter with variable A-V block  Possible Anterior infarct , age undetermined  Abnormal ECG  When compared with ECG of 19-JAN-2017 12:53,  Borderline criteria for Anterior infarct are now Present  ST elevation now present in Inferior leads  ST elevation has replaced ST depression in Anterior leads  T wave inversion now evident in Inferior leads  Confirmed by Abebe Norton MD (), LIYA SANCHEZ ([id number]) on 1/24/2018 6:55:33 AM     CBC WITH AUTOMATED DIFF    Collection Time: 01/23/18  6:14 PM   Result Value Ref Range    WBC 15.1 (H) 4.3 - 11.1 K/uL    RBC 5.22 4.23 - 5.67 M/uL    HGB 16.8 13.6 - 17.2 g/dL    HCT 48.0 41.1 - 50.3 %    MCV 92.0 79.6 - 97.8 FL    MCH 32.2 26.1 - 32.9 PG    MCHC 35.0 31.4 - 35.0 g/dL    RDW 14.0 11.9 - 14.6 %    PLATELET 814 (L) 952 - 450 K/uL    MPV 10.0 (L) 10.8 - 14.1 FL    DF AUTOMATED      NEUTROPHILS 87 (H) 43 - 78 %    LYMPHOCYTES 4 (L) 13 - 44 %    MONOCYTES 9 4.0 - 12.0 %    EOSINOPHILS 0 (L) 0.5 - 7.8 %    BASOPHILS 0 0.0 - 2.0 %    IMMATURE GRANULOCYTES 0 0.0 - 5.0 %    ABS. NEUTROPHILS 13.1 (H) 1.7 - 8.2 K/UL    ABS. LYMPHOCYTES 0.6 0.5 - 4.6 K/UL    ABS. MONOCYTES 1.3 0.1 - 1.3 K/UL    ABS. EOSINOPHILS 0.0 0.0 - 0.8 K/UL    ABS. BASOPHILS 0.0 0.0 - 0.2 K/UL    ABS. IMM.  GRANS. 0.1 0.0 - 0.5 K/UL   METABOLIC PANEL, COMPREHENSIVE    Collection Time: 01/23/18 6:14 PM   Result Value Ref Range    Sodium 139 136 - 145 mmol/L    Potassium 5.1 3.5 - 5.1 mmol/L    Chloride 105 98 - 107 mmol/L    CO2 24 21 - 32 mmol/L    Anion gap 10 7 - 16 mmol/L    Glucose 95 65 - 100 mg/dL    BUN 16 8 - 23 MG/DL    Creatinine 1.07 0.8 - 1.5 MG/DL    GFR est AA >60 >60 ml/min/1.73m2    GFR est non-AA >60 >60 ml/min/1.73m2    Calcium 7.9 (L) 8.3 - 10.4 MG/DL    Bilirubin, total 0.8 0.2 - 1.1 MG/DL    ALT (SGPT) 30 12 - 65 U/L    AST (SGOT) 32 15 - 37 U/L    Alk. phosphatase 50 50 - 136 U/L    Protein, total 6.7 6.3 - 8.2 g/dL    Albumin 3.1 (L) 3.2 - 4.6 g/dL    Globulin 3.6 (H) 2.3 - 3.5 g/dL    A-G Ratio 0.9 (L) 1.2 - 3.5     PROCALCITONIN    Collection Time: 01/23/18  6:14 PM   Result Value Ref Range    Procalcitonin 0.2 ng/mL   INFLUENZA A & B AG (RAPID TEST)    Collection Time: 01/23/18  6:15 PM   Result Value Ref Range    Influenza A Ag NEGATIVE  NEG      Influenza B Ag NEGATIVE  NEG     POC TROPONIN-I    Collection Time: 01/23/18  6:16 PM   Result Value Ref Range    Troponin-I (POC) 0.02 0.02 - 0.05 ng/ml   POC LACTIC ACID    Collection Time: 01/23/18  6:18 PM   Result Value Ref Range    Lactic Acid (POC) 1.4 0.5 - 1.9 mmol/L   EKG, 12 LEAD, SUBSEQUENT    Collection Time: 01/23/18 10:12 PM   Result Value Ref Range    Ventricular Rate 80 BPM    Atrial Rate 80 BPM    P-R Interval 156 ms    QRS Duration 94 ms    Q-T Interval 406 ms    QTC Calculation (Bezet) 468 ms    Calculated P Axis 49 degrees    Calculated R Axis 11 degrees    Calculated T Axis 31 degrees    Diagnosis       !! AGE AND GENDER SPECIFIC ECG ANALYSIS !!   Sinus rhythm with occasional Premature ventricular complexes and Possible   Premature atrial complexes with Aberrant  conduction  Possible Anterior infarct (cited on or before 23-ALINE-2018)  Abnormal ECG  When compared with ECG of 23-JAN-2018 18:09,  Sinus rhythm has replaced Atrial flutter  ST no longer elevated in Inferior leads  Confirmed by [facility] CHEYENNE AGUAYO (), LIYA SANCHEZ ([id number]) on 1/24/2018 6:58:04 AM       Assessment:     Active Problems:    Atrial flutter ([address] (1/23/2018)      Plan:   1. Fevers/Chills Increased WBCs - Neg flu work up in [facility] 19. Will need sepsis work up. Check U/A, Blood Cultures, CXR. Consult Hospitalist service for management/work up  2. A. Fib/flutter - back in NSR. Scheduled for ablation next week. Edis Perkins M.D., F.A.C.C, F.H.R.S.   Cardiology/Electrophysiology

## 2018-01-24 NOTE — PROGRESS NOTES
Problem: Falls - Risk of  Goal: *Absence of Falls  Document Ana Fall Risk and appropriate interventions in the flowsheet. Outcome: Progressing Towards Goal  Fall Risk Interventions:     Pt progressing towards goal. No falls since admission. Bed low and locked. Call light within reach. Side rails x 2. Gripper socks applied. Personal belongings within reach. Pt verbalizes understanding to call for assistance.         Medication Interventions: Evaluate medications/consider consulting pharmacy, Patient to call before getting OOB, Teach patient to arise slowly

## 2018-01-25 LAB
ANA SER QL: NEGATIVE
BASOPHILS # BLD: 0.1 K/UL (ref 0–0.2)
BASOPHILS NFR BLD: 1 % (ref 0–2)
CK SERPL-CCNC: 41 U/L (ref 21–215)
DIFFERENTIAL METHOD BLD: ABNORMAL
EOSINOPHIL # BLD: 0.2 K/UL (ref 0–0.8)
EOSINOPHIL NFR BLD: 3 % (ref 0.5–7.8)
EPO SERPL-ACNC: 25.8 MIU/ML (ref 2.6–18.5)
ERYTHROCYTE [DISTWIDTH] IN BLOOD BY AUTOMATED COUNT: 14.3 % (ref 11.9–14.6)
HCT VFR BLD AUTO: 48.5 % (ref 41.1–50.3)
HGB BLD-MCNC: 16.5 G/DL (ref 13.6–17.2)
HIV1 P24 AG SERPL QL IA: NONREACTIVE
HIV1+2 AB SERPL QL IA: NONREACTIVE
IMM GRANULOCYTES # BLD: 0 K/UL (ref 0–0.5)
IMM GRANULOCYTES NFR BLD AUTO: 0 % (ref 0–5)
LYMPHOCYTES # BLD: 1.3 K/UL (ref 0.5–4.6)
LYMPHOCYTES NFR BLD: 15 % (ref 13–44)
MCH RBC QN AUTO: 31.7 PG (ref 26.1–32.9)
MCHC RBC AUTO-ENTMCNC: 34 G/DL (ref 31.4–35)
MCV RBC AUTO: 93.1 FL (ref 79.6–97.8)
MONOCYTES # BLD: 1 K/UL (ref 0.1–1.3)
MONOCYTES NFR BLD: 11 % (ref 4–12)
NEUTS SEG # BLD: 6.1 K/UL (ref 1.7–8.2)
NEUTS SEG NFR BLD: 70 % (ref 43–78)
PLATELET # BLD AUTO: 139 K/UL (ref 150–450)
PMV BLD AUTO: 9.9 FL (ref 10.8–14.1)
RBC # BLD AUTO: 5.21 M/UL (ref 4.23–5.67)
SEE BELOW:, 164879: NORMAL
TSH SERPL DL<=0.005 MIU/L-ACNC: 2 UIU/ML (ref 0.36–3.74)
WBC # BLD AUTO: 8.8 K/UL (ref 4.3–11.1)

## 2018-01-25 PROCEDURE — 74011250637 HC RX REV CODE- 250/637: Performed by: NURSE PRACTITIONER

## 2018-01-25 PROCEDURE — 84443 ASSAY THYROID STIM HORMONE: CPT | Performed by: INTERNAL MEDICINE

## 2018-01-25 PROCEDURE — 74011250637 HC RX REV CODE- 250/637: Performed by: INTERNAL MEDICINE

## 2018-01-25 PROCEDURE — 80074 ACUTE HEPATITIS PANEL: CPT | Performed by: INTERNAL MEDICINE

## 2018-01-25 PROCEDURE — 94640 AIRWAY INHALATION TREATMENT: CPT

## 2018-01-25 PROCEDURE — 74011000250 HC RX REV CODE- 250: Performed by: INTERNAL MEDICINE

## 2018-01-25 PROCEDURE — 36415 COLL VENOUS BLD VENIPUNCTURE: CPT | Performed by: INTERNAL MEDICINE

## 2018-01-25 PROCEDURE — 87389 HIV-1 AG W/HIV-1&-2 AB AG IA: CPT | Performed by: INTERNAL MEDICINE

## 2018-01-25 PROCEDURE — 65660000000 HC RM CCU STEPDOWN

## 2018-01-25 PROCEDURE — 82550 ASSAY OF CK (CPK): CPT | Performed by: INTERNAL MEDICINE

## 2018-01-25 PROCEDURE — 94760 N-INVAS EAR/PLS OXIMETRY 1: CPT

## 2018-01-25 PROCEDURE — 85025 COMPLETE CBC W/AUTO DIFF WBC: CPT | Performed by: INTERNAL MEDICINE

## 2018-01-25 RX ORDER — METOPROLOL TARTRATE 25 MG/1
25 TABLET, FILM COATED ORAL EVERY 12 HOURS
Status: DISCONTINUED | OUTPATIENT
Start: 2018-01-25 | End: 2018-01-26 | Stop reason: HOSPADM

## 2018-01-25 RX ORDER — AMIODARONE HYDROCHLORIDE 200 MG/1
200 TABLET ORAL 2 TIMES DAILY
Status: DISCONTINUED | OUTPATIENT
Start: 2018-01-25 | End: 2018-01-26 | Stop reason: HOSPADM

## 2018-01-25 RX ADMIN — AMIODARONE HYDROCHLORIDE 200 MG: 200 TABLET ORAL at 09:28

## 2018-01-25 RX ADMIN — Medication 5 ML: at 11:00

## 2018-01-25 RX ADMIN — LEVALBUTEROL HYDROCHLORIDE 1.25 MG: 1.25 SOLUTION RESPIRATORY (INHALATION) at 15:22

## 2018-01-25 RX ADMIN — BUDESONIDE 500 MCG: 0.5 INHALANT RESPIRATORY (INHALATION) at 21:16

## 2018-01-25 RX ADMIN — ACETAMINOPHEN 650 MG: 325 TABLET, FILM COATED ORAL at 23:10

## 2018-01-25 RX ADMIN — LEVALBUTEROL HYDROCHLORIDE 1.25 MG: 1.25 SOLUTION RESPIRATORY (INHALATION) at 07:30

## 2018-01-25 RX ADMIN — DILTIAZEM HYDROCHLORIDE 180 MG: 180 CAPSULE, COATED, EXTENDED RELEASE ORAL at 09:28

## 2018-01-25 RX ADMIN — AMIODARONE HYDROCHLORIDE 200 MG: 200 TABLET ORAL at 18:06

## 2018-01-25 RX ADMIN — ACETAMINOPHEN 650 MG: 325 TABLET, FILM COATED ORAL at 13:31

## 2018-01-25 RX ADMIN — APIXABAN 5 MG: 5 TABLET, FILM COATED ORAL at 21:34

## 2018-01-25 RX ADMIN — APIXABAN 5 MG: 5 TABLET, FILM COATED ORAL at 09:28

## 2018-01-25 RX ADMIN — LISINOPRIL 10 MG: 5 TABLET ORAL at 09:28

## 2018-01-25 RX ADMIN — LEVALBUTEROL HYDROCHLORIDE 1.25 MG: 1.25 SOLUTION RESPIRATORY (INHALATION) at 21:16

## 2018-01-25 RX ADMIN — BUDESONIDE 500 MCG: 0.5 INHALANT RESPIRATORY (INHALATION) at 07:30

## 2018-01-25 RX ADMIN — METOPROLOL TARTRATE 50 MG: 50 TABLET ORAL at 09:28

## 2018-01-25 RX ADMIN — Medication 10 ML: at 21:34

## 2018-01-25 RX ADMIN — METOPROLOL TARTRATE 25 MG: 25 TABLET ORAL at 21:33

## 2018-01-25 NOTE — PROGRESS NOTES
SBP<100    50mg metoprolol re-timed to 2100 to see if he can tolerate then    Discussed with Dipti Ryan, LELAND    Dose decreased to 25mg bid due to marginal bps

## 2018-01-25 NOTE — PROGRESS NOTES
Hospitalist Progress Note     Admit Date:  2018  6:09 PM   Name:  Maria Guadalupe Gr   Age:  74 y.o.  :  1950   MRN:  [id number]   PCP:  Bill Mcneil MD  Treatment Team: Attending Provider: Yoel Weaver MD; Consulting Provider: Guillermo Sharma MD; Utilization Review: Agatha Garg    Subjective:     Mr. Esteban Olivarez is a 73 yo male with PMH of pafib on eliquis admitted with afib with RVR and fever. hospitalists are consulted for infectious workup which has been unrevealing to date.    BC NGTD, UA and CXR negative, flu negative  .2, leukocytosis has resolved  He takes testosterone and has likely resultant polycythemia- PCV workup ordered with pending EPO and HELLEN-2  CRP elevated, sed rate negative   CPK mildly elevated  JARROD, RF pending  Has neck pain chronically, MRI cspine with DJD  Loose BM resolved    Describes decreased indurance, UE pain/weakness, low grade headache, foot pain, no vision changes, had  [facility] admit for possible LYME s/p LP and took 2 courses of doxycycline, denies seeing ID, no rash and no recent travel     Objective:   Patient Vitals for the past 24 hrs:   Temp Pulse Resp BP SpO2   18 0733 - - - - 93 %   18 0726 - 80 17 112/59 -   18 0501 98.1 °F (36.7 °C) 89 18 98/64 96 %   18 0105 97.3 °F (36.3 °C) 78 18 94/59 96 %   18 2204 - - - - 94 %   18 2103 97.1 °F (36.2 °C) 60 18 90/49 94 %   18 1759 - 82 - 96/62 -   18 1635 - 78 20 94/55 98 %   18 1634 98.2 °F (36.8 °C) 78 16 111/61 98 %   18 1332 - - - - 98 %   18 1316 98.9 °F (37.2 °C) 83 18 117/54 97 %   18 1224 99 °F (37.2 °C) - - - -   18 1148 - 91 - 102/52 -   18 0953 99.4 °F (37.4 °C) 92 18 112/62 95 %     Oxygen Therapy  O2 Sat (%): 93 % (18)  Pulse via Oximetry: 80 beats per minute (18)  O2 Device: Room air (18)  O2 Flow Rate (L/min): 1 l/min (18 1332)  FIO2 (%): 21 % (18 4462)    Intake/Output Summary (Last 24 hours) at 01/25/18 0960  Last data filed at 01/25/18 0502   Gross per 24 hour   Intake              820 ml   Output             1673 ml   Net             -853 ml         General:    Well nourished. Alert. CV:   RRR. No murmur, rub, or gallop. Lungs:   CTAB. No wheezing, rhonchi, or rales. Abdomen:   Soft, nontender, nondistended. Extremities: Warm and dry. No cyanosis or edema. Skin:     No rashes or jaundice. Data Review:  I have reviewed all labs, meds, telemetry events, and studies from the last 24 hours.     Recent Results (from the past 24 hour(s))   CRP, HIGH SENSITIVITY    Collection Time: 01/24/18 10:58 AM   Result Value Ref Range    CRP, High sensitivity 96.2 mg/L   SED RATE, AUTOMATED    Collection Time: 01/24/18 10:58 AM   Result Value Ref Range    Sed rate, automated 12 0 - 20 mm/hr   EKG, 12 LEAD, SUBSEQUENT    Collection Time: 01/24/18 12:28 PM   Result Value Ref Range    Ventricular Rate 78 BPM    Atrial Rate 312 BPM    QRS Duration 114 ms    Q-T Interval 350 ms    QTC Calculation (Bezet) 399 ms    Calculated P Axis -99 degrees    Calculated R Axis 5 degrees    Calculated T Axis 9 degrees    Diagnosis       Atrial flutter with 4:1 A-V conduction  T wave abnormality, consider inferior ischemia  Abnormal ECG  When compared with ECG of 23-JAN-2018 22:12,  Atrial flutter has replaced Sinus rhythm  QT has shortened  Confirmed by ERIAC AGUAYO (), BEENA LEE ([id number]) on 1/24/2018 12:57:43 PM     CBC WITH AUTOMATED DIFF    Collection Time: 01/25/18  4:11 AM   Result Value Ref Range    WBC 8.8 4.3 - 11.1 K/uL    RBC 5.21 4.23 - 5.67 M/uL    HGB 16.5 13.6 - 17.2 g/dL    HCT 48.5 41.1 - 50.3 %    MCV 93.1 79.6 - 97.8 FL    MCH 31.7 26.1 - 32.9 PG    MCHC 34.0 31.4 - 35.0 g/dL    RDW 14.3 11.9 - 14.6 %    PLATELET 583 (L) 081 - 450 K/uL    MPV 9.9 (L) 10.8 - 14.1 FL    DF AUTOMATED      NEUTROPHILS 70 43 - 78 %    LYMPHOCYTES 15 13 - 44 %    MONOCYTES 11 4.0 - 12.0 %    EOSINOPHILS 3 0.5 - 7.8 %    BASOPHILS 1 0.0 - 2.0 %    IMMATURE GRANULOCYTES 0 0.0 - 5.0 %    ABS. NEUTROPHILS 6.1 1.7 - 8.2 K/UL    ABS. LYMPHOCYTES 1.3 0.5 - 4.6 K/UL    ABS. MONOCYTES 1.0 0.1 - 1.3 K/UL    ABS. EOSINOPHILS 0.2 0.0 - 0.8 K/UL    ABS. BASOPHILS 0.1 0.0 - 0.2 K/UL    ABS. IMM. GRANS. 0.0 0.0 - 0.5 K/UL        All Micro Results     Procedure Component Value Units Date/Time    CULTURE, BLOOD [[id number]] Collected:  01/24/18 0903    Order Status:  Completed Specimen:  Blood from Blood Updated:  01/25/18 0908     Special Requests: --        RIGHT  Antecubital       Culture result: NO GROWTH AFTER 23 HOURS       CULTURE, BLOOD [[id number]] Collected:  01/24/18 0911    Order Status:  Completed Specimen:  Blood from Blood Updated:  01/25/18 0908     Special Requests: --        RIGHT  HAND       Culture result: NO GROWTH AFTER 23 HOURS       C. DIFFICILE/EPI PCR [[id number]]     Order Status:  Sent Specimen:  Stool     INFLUENZA A & B AG (RAPID TEST) [[id number]] Collected:  01/23/18 1815    Order Status:  Completed Specimen:  Nasopharyngeal from Nasal washing Updated:  01/23/18 1833     Influenza A Ag NEGATIVE          NEGATIVE FOR THE PRESENCE OF INFLUENZA A ANTIGEN  INFECTION DUE TO INFLUENZA A CANNOT BE RULED OUT. BECAUSE THE ANTIGEN PRESENT IN THE SAMPLE MAY BE BELOW  THE DETECTION LIMIT OF THE TEST. A NEGATIVE TEST IS PRESUMPTIVE AND IT IS RECOMMENDED THAT THESE RESULTS BE CONFIRMED BY VIRAL CULTURE OR AN FDA-CLEARED INFLUENZA A AND B MOLECULAR ASSAY. Influenza B Ag NEGATIVE          NEGATIVE FOR THE PRESENCE OF INFLUENZA B ANTIGEN  INFECTION DUE TO INFLUENZA B CANNOT BE RULED OUT. BECAUSE THE ANTIGEN PRESENT IN THE SAMPLE MAY BE BELOW  THE DETECTION LIMIT OF THE TEST. A NEGATIVE TEST IS PRESUMPTIVE AND IT IS RECOMMENDED THAT THESE RESULTS BE CONFIRMED BY VIRAL CULTURE OR AN FDA-CLEARED INFLUENZA A AND B MOLECULAR ASSAY.                Current Meds:  Current Facility-Administered Medications   Medication Dose Route Frequency    amiodarone (CORDARONE) tablet 200 mg  200 mg Oral BID    acetaminophen (TYLENOL) tablet 650 mg  650 mg Oral Q6H PRN    dilTIAZem CD (CARDIZEM CD) capsule 180 mg  180 mg Oral DAILY    budesonide (PULMICORT) 500 mcg/2 ml nebulizer suspension  500 mcg Nebulization BID RT    levalbuterol (XOPENEX) nebulizer soln 1.25 mg/3 mL  1.25 mg Nebulization Q6H RT    metoprolol tartrate (LOPRESSOR) tablet 50 mg  50 mg Oral BID    apixaban (ELIQUIS) tablet 5 mg  5 mg Oral BID    lisinopril (PRINIVIL, ZESTRIL) tablet 10 mg  10 mg Oral DAILY    sodium chloride (NS) flush 5-10 mL  5-10 mL IntraVENous Q8H    sodium chloride (NS) flush 5-10 mL  5-10 mL IntraVENous PRN       Other Studies (last 24 hours):  No results found.     Assessment and Plan:     Hospital Problems as of 1/25/2018  Date Reviewed: 1/23/2018          Codes Class Noted - Resolved POA    Fever, unknown origin ICD-10-CM: R50.9  ICD-9-CM: 780.60  1/24/2018 - Present Unknown        * (Principal)[facility] flutter ([facility] ICD-10-CM: I48.92  ICD-9-CM: 427.32  1/23/2018 - Present Unknown              PLAN:    · Add on TSH, repeat CPK, HIV and hepatitis labs  · followup JARROD, RF and PCV workup  · Needs outpatient rheum consult  · Proceed with ID consult while inpatient   · IM will sign off, needs ongoing outpatient followup with PCP and rheum for send out lab followup   · Thanks, please call as needed     DC planning/Dispo:    DVT ppx:  eliquis     Signed:  Valerio Cox MD

## 2018-01-25 NOTE — CONSULTS
Infectious Disease Consult    Today's Date: 1/25/2018   Admit Date: 1/23/2018    Impression:   · Diffuse body aches since 2011 - chronic and persistent, no rash, no joint edema, primarily in shoulders and arms, also involves ankles, bottom of feet, occasionally knees. Worse at night. , CRP 96, ESR 2. No recent tick exposure, no travel, no new sexual partners. With migratory arthritis disseminated gonococcal infection is a consideration and would obtain urine G/C but overall very low suspicion for infectious arthritis. · Low grade fever - short lived and now resolved, no other associated sx other than the chronic body aches. BC NGTD, UA negative, flu negative, no travel hx, no new sexual partners, no LAD, no peripheral stigmata of culture negative IE. HIV negative. · Aflutter   · Hypogonadism s/p right orchiectomy   · Former smoker    Plan:   · Agree with evaluation per medicine, hepatitis panel pending as well as TSH, JAK2, epo, JARROD  · No abx indicated  · No concerns for Lyme disease  · Add urine gonorrhea    Anti-infectives:     Inpat Anti-Infectives     None        Subjective:   Date of Consultation:  January 25, 2018  Referring Physician: Serg Edmond  \"prior concern for LYME [facility], nonspecific myalgias/fever/ elukocytosis- negative workup to date\"    Patient is a 74 y.o. male with afib/flutter, hypogonadism with right orchiectomy former smoker. He was admitted by cardiology for aflutter -- currently on amiodarone, dilt and Eliquis. The hospitalist team was consulted for fevers and body aches. Flu A/B negative, 1/24 BC pending, UA negative. CXR unremarkable (old granuloma noted). HIV, TSH pending. He was not started on abx. He was febrile to 38.2 on admission but has been afebrile since. Mild leukocytosis has resolved. He states that in 2011 he found an attached tick - went to ED and treated with 2 rounds of abx.  Since then notes intermittent to now persistent migratory arthritis and distal weakness but no rash, no joint edema, no fevers, no other sx. He had a recent fever but no other symptoms - no ENT sx, no GI symptoms -- ROS only positive for possible increased urinary frequency. No recent travel, no new sexual partners. Patient Active Problem List   Diagnosis Code    Chronic left shoulder pain M25.512, G89.29    Hypogonadism, testicular E29.1    Essential hypertension, benign I10    Moderate persistent asthma without complication O93.52    Vitamin D deficiency E55.9    Chronic nonseasonal allergic rhinitis due to pollen J30.89    Routine health maintenance Z00.00    Lung nodule, solitary R91.1    Paroxysmal atrial fibrillation (HCC) I48.0    Diagnostic tests Z92.89    Lymphopenia D72.810    Microalbuminuria R80.9    Elevated creatine kinase R74.8    DDD (degenerative disc disease), cervical M50.30    Atrial flutter (HCC) I48.92    Fever, unknown origin R50.9     Past Medical History:   Diagnosis Date    Asthma, moderate persistent, well-controlled 5/30/2013    Chronic left shoulder pain 5/30/2013    Pain radiates from his neck    DDD (degenerative disc disease), cervical 1/18/2018    MRI: 1/2018 - multilevel DDD and facet arthritis w/ moderate bilateral foraminal narrowing at C6-7 and moderate right at C5-6.     Essential hypertension, benign 5/30/2013    Hypercholesteremia 12/7/2015    Hypogonadism, testicular 5/30/2013    Removal of right testicle on 11/1/2010; urologist Lexus Valentine of [facility], gives testosterone injections    Lumbosacral radiculopathy at L5 4/17/2016    Lung nodule, solitary 1/19/2017    Chronic - benign w/u    Lymphopenia 8/6/2017    Microalbuminuria 9/1/2017    Prediabetes 04/17/2016    Vitamin D deficiency 6/2/2015      Family History   Problem Relation Age of Onset    Cancer Father      leukemia    Hypertension Mother     Cancer Mother      breast    Cancer Sister      colon      Social History   Substance Use Topics    Smoking status: Former Smoker    Smokeless tobacco: Never Used      Comment: smoked cigerattes from age 27 to 47    Alcohol use Yes      Comment: occ drinks wine     Past Surgical History:   Procedure Laterality Date    HX COLONOSCOPY  4/03    normal    HX LUMBAR DISKECTOMY  1989    L5 discectomy    HX LUMBAR DISKECTOMY  1983    L4    HX UROLOGICAL  11/1/2010    testicular, right removed d/t  severe infection      Prior to Admission medications    Medication Sig Start Date End Date Taking? Authorizing Provider   lisinopril (PRINIVIL, ZESTRIL) 10 mg tablet TAKE ONE TABLET BY MOUTH ONE TIME DAILY 1/15/18  Yes Adams Blum MD   dilTIAZem CD (CARDIZEM CD) 180 mg ER capsule Take 1 Cap by mouth daily. 1/15/18  Yes Adams Blum MD   dronedarone (MULTAQ) tab tablet Take 1 Tab by mouth two (2) times daily (with meals). 1/15/18  Yes Adams Blum MD   apixaban (ELIQUIS) 5 mg tablet Take 1 Tab by mouth two (2) times a day. 1/15/18  Yes Adams Blum MD   fluticasone-salmeterol (ADVAIR DISKUS) 250-50 mcg/dose diskus inhaler INHALE 1 DOSE BY MOUTH TWICE DAILY. RINSE MOUTH AFTER USE 7/31/17  Yes Andrew Pradhan MD   albuterol [facility]) 90 mcg/actuation inhaler Take  by inhalation. Yes Historical Provider   sildenafil (REVATIO) 20 mg tablet Take 20 mg by mouth daily as needed. Yes Historical Provider   Cetirizine (ZYRTEC) 10 mg cap Take  by mouth. Yes Historical Provider   testosterone cypionate (DEPO-TESTOSTERONE) 200 mg/mL injection 440 mg by IntraMUSCular route Once every 2 weeks. Yes Historical Provider   fluticasone (FLONASE) 50 mcg/actuation nasal spray 2 Sprays by Both Nostrils route daily. 12/21/16  Yes Andrew Pradhan MD   FLAXSEED OIL (OMEGA 3 PO) Take  by mouth. Yes Historical Provider   MULTIVITAMIN (MULTIPLE VITAMIN PO) Take  by mouth.    Yes Historical Provider       No Known Allergies     Review of Systems:  A comprehensive review of systems was negative except for that written in the History of Present Illness. Objective:     Visit Vitals    /59 (BP 1 Location: Left arm, BP Patient Position: Sitting)    Pulse 80    Temp 98.1 °F (36.7 °C)    Resp 17    Ht 5' 9\" (1.753 m)    Wt 68.6 kg (151 lb 3.2 oz)    SpO2 93%    BMI 22.33 kg/m2     Temp (24hrs), Av.3 °F (36.8 °C), Min:97.1 °F (36.2 °C), Max:99.4 °F (37.4 °C)       Lines: PIVs - left infiltrated with mild edema, line removed    Physical Exam:    General:  Alert, cooperative, well noursished, well developed, appears stated age   Eyes:  Sclera anicteric. Pupils equally round and reactive to light. Mouth/Throat: Mucous membranes normal, oral pharynx clear   Neck: Supple   Lungs:   Clear to auscultation bilaterally, good effort   CV:  Regular rate and rhythm,no murmur, click, rub or gallop   Abdomen:   Soft, non-tender.  bowel sounds normal. non-distended   Extremities: No cyanosis or edema   Skin: Skin color, texture, turgor normal. no acute rash or lesions   Lymph nodes: Cervical and supraclavicular normal   Musculoskeletal: No swelling or deformity   Lines/Devices:  Intact, no erythema, drainage or tenderness   Psych: Alert and oriented, normal mood affect given the setting       Data Review:     CBC:  Recent Labs      18   0411  18   0903  18   WBC  8.8  12.8*  15.1*   GRANS  70  85*  87*   MONOS  11  9  9   EOS  3  1  0*   ANEU  6.1  10.7*  13.1*   ABL  1.3  0.7  0.6   HGB  16.5  17.2  16.8   HCT  48.5  50.4*  48.0   PLT  139*  144*  144*       BMP:  Recent Labs      18   CREA  1.07   BUN  16   NA  139   K  5.1   CL  105   CO2  24   AGAP  10   GLU  95       LFTS:  Recent Labs      18   TBILI  0.8   ALT  30   SGOT  32   AP  50   TP  6.7   ALB  3.1*       Microbiology:     All Micro Results     Procedure Component Value Units Date/Time    CULTURE, BLOOD [[id number]] Collected:  18    Order Status:  Completed Specimen:  Blood from Blood Updated:  18     Special Requests: --        RIGHT  Antecubital       Culture result: NO GROWTH AFTER 23 HOURS       CULTURE, BLOOD [[id number]] Collected:  01/24/18 0911    Order Status:  Completed Specimen:  Blood from Blood Updated:  01/25/18 0908     Special Requests: --        RIGHT  HAND       Culture result: NO GROWTH AFTER 23 HOURS       C. DIFFICILE/EPI PCR [[id number]]     Order Status:  Sent Specimen:  Stool     INFLUENZA A & B AG (RAPID TEST) [[id number]] Collected:  01/23/18 1815    Order Status:  Completed Specimen:  Nasopharyngeal from Nasal washing Updated:  01/23/18 1833     Influenza A Ag NEGATIVE          NEGATIVE FOR THE PRESENCE OF INFLUENZA A ANTIGEN  INFECTION DUE TO INFLUENZA A CANNOT BE RULED OUT. BECAUSE THE ANTIGEN PRESENT IN THE SAMPLE MAY BE BELOW  THE DETECTION LIMIT OF THE TEST. A NEGATIVE TEST IS PRESUMPTIVE AND IT IS RECOMMENDED THAT THESE RESULTS BE CONFIRMED BY VIRAL CULTURE OR AN FDA-CLEARED INFLUENZA A AND B MOLECULAR ASSAY. Influenza B Ag NEGATIVE          NEGATIVE FOR THE PRESENCE OF INFLUENZA B ANTIGEN  INFECTION DUE TO INFLUENZA B CANNOT BE RULED OUT. BECAUSE THE ANTIGEN PRESENT IN THE SAMPLE MAY BE BELOW  THE DETECTION LIMIT OF THE TEST. A NEGATIVE TEST IS PRESUMPTIVE AND IT IS RECOMMENDED THAT THESE RESULTS BE CONFIRMED BY VIRAL CULTURE OR AN FDA-CLEARED INFLUENZA A AND B MOLECULAR ASSAY. Imaging:   Findings:  Normal cardiomediastinal silhouette. There appears to be a large  calcified granuloma in the right lower lung, unchanged. Persistent low lung  volumes. Minimal dependent subsegmental atelectasis bilateral lung bases. No  evidence of pneumothorax, pleural effusion, or air space opacity. Visualized  soft tissue and osseous structures otherwise unremarkable.       IMPRESSION  Impression:    No significant interval change.     Signed By: Case Quinn MD     January 25, 2018

## 2018-01-25 NOTE — PROGRESS NOTES
Problem: Falls - Risk of  Goal: *Absence of Falls  Document Ana Fall Risk and appropriate interventions in the flowsheet. Outcome: Progressing Towards Goal  Pt progressing towards goal. No falls since admission. Bed low and locked. Call light within reach. Side rails x 2. Gripper socks applied. Personal belongings within reach. Pt verbalizes understanding to call for assistance.      Fall Risk Interventions:    Medication Interventions: Bed/chair exit alarm, Patient to call before getting OOB

## 2018-01-25 NOTE — PROGRESS NOTES
Pt alert and oriented, resting in bed with hob elevated, breaths unlabored. Pt denies pain and would like coffee. cardizem infusing see MAR. bp cycling q hr: 112/59. afib rate controlled: 80bpm. Pt instructed to stay in bed and use call bell for assistance out of bed. Coffee provided.  Assessment completed

## 2018-01-25 NOTE — PROGRESS NOTES
[facility] CARDIOLOGY PROGRESS NOTE           1/25/2018 7:14 AM    Admit Date: 1/23/2018    Admit Diagnosis: Atrial flutter ([address]      Subjective:   Patient remains in A. Flutter with stable rates. Objective:     Vitals:    01/25/18 0105 01/25/18 0501 01/25/18 0726 01/25/18 0733   BP: 94/59 98/64 112/59    Pulse: 78 89 80    Resp: 18 18 17    Temp: 97.3 °F (36.3 °C) 98.1 °F (36.7 °C)     SpO2: 96% 96%  93%   Weight:  151 lb 3.2 oz (68.6 kg)     Height:           Physical Exam:  General-Well Developed, Well Nourished, No Acute Distress, Alert & Oriented x 3, appropriate mood. Neck- supple, no JVD  CV- regular rate and rhythm no MRG  Lung- clear bilaterally  Abd- soft, nontender, nondistended  Ext- no edema bilaterally.   Skin- warm and dry    Current Facility-Administered Medications   Medication Dose Route Frequency    acetaminophen (TYLENOL) tablet 650 mg  650 mg Oral Q6H PRN    dilTIAZem CD (CARDIZEM CD) capsule 180 mg  180 mg Oral DAILY    budesonide (PULMICORT) 500 mcg/2 ml nebulizer suspension  500 mcg Nebulization BID RT    dilTIAZem (CARDIZEM) 125 mg in 0.9% sodium chloride 125 mL infusion  0-15 mg/hr IntraVENous TITRATE    levalbuterol (XOPENEX) nebulizer soln 1.25 mg/3 mL  1.25 mg Nebulization Q6H RT    metoprolol tartrate (LOPRESSOR) tablet 50 mg  50 mg Oral BID    apixaban (ELIQUIS) tablet 5 mg  5 mg Oral BID    dronedarone (MULTAQ) tablet tab 400 mg  400 mg Oral BID WITH MEALS    lisinopril (PRINIVIL, ZESTRIL) tablet 10 mg  10 mg Oral DAILY    sodium chloride (NS) flush 5-10 mL  5-10 mL IntraVENous Q8H    sodium chloride (NS) flush 5-10 mL  5-10 mL IntraVENous PRN     Data Review:   Recent Results (from the past 24 hour(s))   URINALYSIS W/ RFLX MICROSCOPIC    Collection Time: 01/24/18  9:00 AM   Result Value Ref Range    Color YELLOW      Appearance CLEAR      Specific gravity 1.015 1.001 - 1.023      pH (UA) 7.5 5.0 - 9.0      Protein TRACE (A) NEG mg/dL    Glucose NEGATIVE  NEG mg/dL    Ketone NEGATIVE  NEG mg/dL    Bilirubin NEGATIVE  NEG      Blood NEGATIVE  NEG      Urobilinogen 1.0 0.2 - 1.0 EU/dL    Nitrites NEGATIVE  NEG      Leukocyte Esterase NEGATIVE  NEG     CULTURE, BLOOD    Collection Time: 01/24/18  9:03 AM   Result Value Ref Range    Special Requests: RIGHT  Antecubital        Culture result: NO GROWTH AFTER 20 HOURS     CBC WITH AUTOMATED DIFF    Collection Time: 01/24/18  9:03 AM   Result Value Ref Range    WBC 12.8 (H) 4.3 - 11.1 K/uL    RBC 5.44 4.23 - 5.67 M/uL    HGB 17.2 13.6 - 17.2 g/dL    HCT 50.4 (H) 41.1 - 50.3 %    MCV 92.6 79.6 - 97.8 FL    MCH 31.6 26.1 - 32.9 PG    MCHC 34.1 31.4 - 35.0 g/dL    RDW 14.2 11.9 - 14.6 %    PLATELET 211 (L) 107 - 450 K/uL    MPV 9.5 (L) 10.8 - 14.1 FL    DF AUTOMATED      NEUTROPHILS 85 (H) 43 - 78 %    LYMPHOCYTES 5 (L) 13 - 44 %    MONOCYTES 9 4.0 - 12.0 %    EOSINOPHILS 1 0.5 - 7.8 %    BASOPHILS 0 0.0 - 2.0 %    IMMATURE GRANULOCYTES 0 0.0 - 5.0 %    ABS. NEUTROPHILS 10.7 (H) 1.7 - 8.2 K/UL    ABS. LYMPHOCYTES 0.7 0.5 - 4.6 K/UL    ABS. MONOCYTES 1.2 0.1 - 1.3 K/UL    ABS. EOSINOPHILS 0.1 0.0 - 0.8 K/UL    ABS. BASOPHILS 0.1 0.0 - 0.2 K/UL    ABS. IMM.  GRANS. 0.1 0.0 - 0.5 K/UL   CULTURE, BLOOD    Collection Time: 01/24/18  9:11 AM   Result Value Ref Range    Special Requests: RIGHT  HAND        Culture result: NO GROWTH AFTER 20 HOURS     CRP, HIGH SENSITIVITY    Collection Time: 01/24/18 10:58 AM   Result Value Ref Range    CRP, High sensitivity 96.2 mg/L   SED RATE, AUTOMATED    Collection Time: 01/24/18 10:58 AM   Result Value Ref Range    Sed rate, automated 12 0 - 20 mm/hr   EKG, 12 LEAD, SUBSEQUENT    Collection Time: 01/24/18 12:28 PM   Result Value Ref Range    Ventricular Rate 78 BPM    Atrial Rate 312 BPM    QRS Duration 114 ms    Q-T Interval 350 ms    QTC Calculation (Bezet) 399 ms    Calculated P Axis -99 degrees    Calculated R Axis 5 degrees    Calculated T Axis 9 degrees    Diagnosis       Atrial flutter with 4:1 A-V conduction  T wave abnormality, consider inferior ischemia  Abnormal ECG  When compared with ECG of 23-JAN-2018 22:12,  Atrial flutter has replaced Sinus rhythm  QT has shortened  Confirmed by ERICA AGUAYO (), BEENA LEE ([id number]) on 1/24/2018 12:57:43 PM     CBC WITH AUTOMATED DIFF    Collection Time: 01/25/18  4:11 AM   Result Value Ref Range    WBC 8.8 4.3 - 11.1 K/uL    RBC 5.21 4.23 - 5.67 M/uL    HGB 16.5 13.6 - 17.2 g/dL    HCT 48.5 41.1 - 50.3 %    MCV 93.1 79.6 - 97.8 FL    MCH 31.7 26.1 - 32.9 PG    MCHC 34.0 31.4 - 35.0 g/dL    RDW 14.3 11.9 - 14.6 %    PLATELET 378 (L) 623 - 450 K/uL    MPV 9.9 (L) 10.8 - 14.1 FL    DF AUTOMATED      NEUTROPHILS 70 43 - 78 %    LYMPHOCYTES 15 13 - 44 %    MONOCYTES 11 4.0 - 12.0 %    EOSINOPHILS 3 0.5 - 7.8 %    BASOPHILS 1 0.0 - 2.0 %    IMMATURE GRANULOCYTES 0 0.0 - 5.0 %    ABS. NEUTROPHILS 6.1 1.7 - 8.2 K/UL    ABS. LYMPHOCYTES 1.3 0.5 - 4.6 K/UL    ABS. MONOCYTES 1.0 0.1 - 1.3 K/UL    ABS. EOSINOPHILS 0.2 0.0 - 0.8 K/UL    ABS. BASOPHILS 0.1 0.0 - 0.2 K/UL    ABS. IMM. GRANS. 0.0 0.0 - 0.5 K/UL     Assessment:     Principal Problem:    Atrial flutter ([address] (1/23/2018)    Active Problems:    Fever, unknown origin (1/24/2018)      Plan:   1. Fevers/Chills Increased WBCs - Neg flu work up in [facility]. Neg sepsis work up. Consulted Hospitalist service for management/work up. Noted to have significantly increased CRP. Symptoms are suggestive of PMR. Will see if Int Med agrees. 2. A. Fib/flutter - Scheduled for ablation next week. Currently rate controlled. Change Multaq to Amio. Stop Dilt drip  3. Discharge planning - Monitor rates today. If rates remain stable then will be cardiac stable for discharge. Awaiting Hospitalist work up for increased CRP, possible infection    Oscar Burgos. Imelda Desai M.D., F.A.C.C, F.H.R.S.   Cardiology/Electrophysiology

## 2018-01-25 NOTE — PROGRESS NOTES
Patient ambulated in [place] with this RN    Rhythm remains aflutter, no rates >90bpm noted with ambulation    Pt denies shortness of breath, does report tiring easily.

## 2018-01-25 NOTE — PROGRESS NOTES
Bedside and Verbal shift change report given to self (oncoming nurse) by BUCKY Edwards (offgoing nurse). Report included the following information SBAR, Kardex, MAR and Recent Results.

## 2018-01-26 VITALS
HEIGHT: 69 IN | WEIGHT: 149.8 LBS | OXYGEN SATURATION: 96 % | HEART RATE: 81 BPM | BODY MASS INDEX: 22.19 KG/M2 | RESPIRATION RATE: 16 BRPM | SYSTOLIC BLOOD PRESSURE: 99 MMHG | DIASTOLIC BLOOD PRESSURE: 65 MMHG | TEMPERATURE: 98.1 F

## 2018-01-26 LAB
ATRIAL RATE: 80 BPM
CALCULATED P AXIS, ECG09: 52 DEGREES
CALCULATED R AXIS, ECG10: 13 DEGREES
CALCULATED T AXIS, ECG11: 28 DEGREES
DIAGNOSIS, 93000: NORMAL
HAV IGM SERPL QL IA: NEGATIVE
HBV CORE IGM SERPL QL IA: NEGATIVE
HBV SURFACE AG SERPL QL IA: NEGATIVE
HCV AB S/CO SERPL IA: <0.1 S/CO RATIO (ref 0–0.9)
P-R INTERVAL, ECG05: 160 MS
Q-T INTERVAL, ECG07: 370 MS
QRS DURATION, ECG06: 92 MS
QTC CALCULATION (BEZET), ECG08: 426 MS
VENTRICULAR RATE, ECG03: 80 BPM

## 2018-01-26 PROCEDURE — 93005 ELECTROCARDIOGRAM TRACING: CPT | Performed by: INTERNAL MEDICINE

## 2018-01-26 PROCEDURE — 74011250637 HC RX REV CODE- 250/637: Performed by: NURSE PRACTITIONER

## 2018-01-26 PROCEDURE — 94640 AIRWAY INHALATION TREATMENT: CPT

## 2018-01-26 PROCEDURE — 74011250637 HC RX REV CODE- 250/637: Performed by: INTERNAL MEDICINE

## 2018-01-26 PROCEDURE — 74011000250 HC RX REV CODE- 250: Performed by: INTERNAL MEDICINE

## 2018-01-26 PROCEDURE — 94760 N-INVAS EAR/PLS OXIMETRY 1: CPT

## 2018-01-26 RX ORDER — AMIODARONE HYDROCHLORIDE 200 MG/1
200 TABLET ORAL 2 TIMES DAILY
Qty: 60 TAB | Refills: 1 | Status: SHIPPED | OUTPATIENT
Start: 2018-01-26 | End: 2018-04-30 | Stop reason: SDUPTHER

## 2018-01-26 RX ORDER — METOPROLOL SUCCINATE 25 MG/1
25 TABLET, EXTENDED RELEASE ORAL DAILY
Qty: 30 TAB | Refills: 1 | Status: SHIPPED | OUTPATIENT
Start: 2018-01-26 | End: 2018-10-18 | Stop reason: SDUPTHER

## 2018-01-26 RX ADMIN — BUDESONIDE 500 MCG: 0.5 INHALANT RESPIRATORY (INHALATION) at 07:44

## 2018-01-26 RX ADMIN — LISINOPRIL 10 MG: 5 TABLET ORAL at 07:45

## 2018-01-26 RX ADMIN — Medication 10 ML: at 06:00

## 2018-01-26 RX ADMIN — DILTIAZEM HYDROCHLORIDE 180 MG: 180 CAPSULE, COATED, EXTENDED RELEASE ORAL at 07:50

## 2018-01-26 RX ADMIN — APIXABAN 5 MG: 5 TABLET, FILM COATED ORAL at 07:49

## 2018-01-26 RX ADMIN — AMIODARONE HYDROCHLORIDE 200 MG: 200 TABLET ORAL at 07:49

## 2018-01-26 RX ADMIN — METOPROLOL TARTRATE 25 MG: 25 TABLET ORAL at 07:50

## 2018-01-26 RX ADMIN — LEVALBUTEROL HYDROCHLORIDE 1.25 MG: 1.25 SOLUTION RESPIRATORY (INHALATION) at 07:44

## 2018-01-26 NOTE — PROGRESS NOTES
Discharge instructions reviewed with kinsey. Prescriptions given for amiodarone and metoprolol and med info sheets provided for all new medications. Opportunity for questions provided. Patient voiced understanding of all discharge instructions. Carson limon and montior off by primary RN.

## 2018-01-26 NOTE — PROGRESS NOTES
[facility] CARDIOLOGY PROGRESS NOTE           1/26/2018 7:23 AM    Admit Date: 1/23/2018    Admit Diagnosis: Atrial flutter ([address]      Subjective:   No complaints this AM, no chest pain or shortness of breath    Interval History: (History of pertinent interval events obtained from nursing staff)    ROS:  GEN:  No fever or chills  Cardiovascular:  As noted above  Pulmonary:  As noted above  Neuro:  No new focal motor or sensory loss      Objective:     Vitals:    01/25/18 2028 01/25/18 2116 01/26/18 0033 01/26/18 0434   BP: 126/86  95/63 96/64   Pulse: 78  76 73   Resp: 18  16 18   Temp: 98.3 °F (36.8 °C)  97.9 °F (36.6 °C) 97.4 °F (36.3 °C)   SpO2: 93% 97% 97% 95%   Weight:    67.9 kg (149 lb 12.8 oz)   Height:           Physical Exam:  General-Well Developed, Well Nourished, No Acute Distress, Alert & Oriented x 3, appropriate mood. Neck- supple, no JVD  CV- regular rate and rhythm, frequent ectopy, no MRG  Lung- clear bilaterally  Abd- soft, nontender, nondistended  Ext- no edema bilaterally.   Skin- warm and dry    Current Facility-Administered Medications   Medication Dose Route Frequency    amiodarone (CORDARONE) tablet 200 mg  200 mg Oral BID    metoprolol tartrate (LOPRESSOR) tablet 25 mg  25 mg Oral Q12H    acetaminophen (TYLENOL) tablet 650 mg  650 mg Oral Q6H PRN    dilTIAZem CD (CARDIZEM CD) capsule 180 mg  180 mg Oral DAILY    budesonide (PULMICORT) 500 mcg/2 ml nebulizer suspension  500 mcg Nebulization BID RT    levalbuterol (XOPENEX) nebulizer soln 1.25 mg/3 mL  1.25 mg Nebulization Q6H RT    apixaban (ELIQUIS) tablet 5 mg  5 mg Oral BID    lisinopril (PRINIVIL, ZESTRIL) tablet 10 mg  10 mg Oral DAILY    sodium chloride (NS) flush 5-10 mL  5-10 mL IntraVENous Q8H    sodium chloride (NS) flush 5-10 mL  5-10 mL IntraVENous PRN     Data Review:   Recent Results (from the past 24 hour(s))   CK    Collection Time: 01/25/18 11:24 AM   Result Value Ref Range    CK 41 21 - 215 U/L   TSH 3RD GENERATION    Collection Time: 01/25/18 11:24 AM   Result Value Ref Range    TSH 2.000 0.358 - 3.740 uIU/mL   HEPATITIS PANEL, ACUTE    Collection Time: 01/25/18 11:24 AM   Result Value Ref Range    Hepatitis A Ab, IgM NEGATIVE  NEGATIVE      Hep B surface Ag screen NEGATIVE  NEGATIVE      Hep B Core Ab, IgM NEGATIVE  NEGATIVE      Hep C Virus Ab <0.1 0.0 - 0.9 s/co ratio   HIV-1,2 P24 AG/AB SCREEN    Collection Time: 01/25/18 11:24 AM   Result Value Ref Range    p24 Antigen NONREACTIVE NR      HIV-1,2 Ab NONREACTIVE NR         EKG:  (EKG has been independently visualized by me with interpretation below)  Assessment:     Principal Problem:    Atrial flutter ([address] (1/23/2018)    Active Problems:    Fever, unknown origin (1/24/2018)      Plan:        1. Fevers/Chills Increased WBCs - Neg flu work up in [facility] 19. Neg sepsis work up. Appreciate hospitalist and ID consultation, work up ongoing. 2. A. Fib/flutter - Scheduled for ablation next week. Telemetry suggestive of NSR, check ECG this AM  3. Discharge planning - consider D/C today if OK by ID and hospitalist      Elysia Kelley.  Conrado AGUAYO  Cardiology/Electrophysiology

## 2018-01-26 NOTE — DISCHARGE SUMMARY
[facility] Cardiology Discharge Summary     Patient ID:  Jason Lindquist  [id number]  43 y.o.  1950    Admit date: 1/23/2018    Discharge date and time:  1-    Admitting Physician: Wess Goodell, MD     Discharge Physician: Lilli Sutton PA-C/Dr. Maylin Knight    Admission Diagnoses: Atrial flutter [facility])    Discharge Diagnoses:   Patient Active Problem List    Diagnosis Date Noted    Fever, unknown origin 01/24/2018    Atrial flutter ([address] 01/23/2018    DDD (degenerative disc disease), cervical 01/18/2018    Elevated creatine kinase 01/11/2018    Microalbuminuria 09/01/2017    Lymphopenia 08/06/2017    Diagnostic tests 03/03/2017    Paroxysmal atrial fibrillation [address] [address] 02/22/2017    Lung nodule, solitary 01/19/2017    Routine health maintenance 01/12/2017    Chronic nonseasonal allergic rhinitis due to pollen 01/11/2017    Vitamin D deficiency 06/02/2015    Chronic left shoulder pain 05/30/2013    Hypogonadism, testicular 05/30/2013    Essential hypertension, benign 05/30/2013    Moderate persistent asthma without complication 82/14/8746     Consults: ID and Hospitalist    Hospital Course: Pt is a 68-year-old  male with history of paroxysmal atrial fibrillation/flutter, who has recently been evaluated by Dr. Carmella Sr and cardiac ablation has been scheduled for 2/2. The patient presented to his family [de-identified] office for evaluation of suspected influenza. The patient describes new onset fever, chills, malaise, fatigue, headache. He states his symptoms began approximately 6:00 p.m. on the evening of 01/22/2018. He states he was checked for influenza, which was negative in his physician's office, but he was noted to have tachycardia with a resting pulse of 150. EKG suggested atrial flutter and the patient was transported by EMS for evaluation. En route the patient was given adenosine for suspected SVT. In the emergency room department, atrial flutter was diagnosed. The patient has been given IV Cardizem and Corvert but he remains in atrial flutter with 2-3:1 AV conduction. Patient is unaware of his tachycardia. He denies any associated chest pain, nausea, vomiting or excessive dyspnea. Pt was admitted and seen by hospitalist for suspected viral illness. Pt also seen by ID with labs sent but no definite diagnosis. Pt was started on BB and Amiodarone until ablation next week. Pt had BB decreased due to some slow rates and will be DC on Toprol XL 25 mg daily. Pt was up feeling well without any complaints of CP, SOB, palpitations or LE swelling. Pt was seen and examined by Dr. Robert Guadalupe and determined stable and ready for discharge. Pt was instructed on the importance of taking meds as prescribed. Hospitalist signed off and some labs are pending ordered by ID. Dr. Robert Guadalupe felt Pt ready for DC. Pt is instructed to call [facility] Cardiology at [phone]. DISPOSITION: The patient is being discharged home in stable condition on a low saturated fat, low cholesterol and low salt diet. Pt is instructed to follow a fluid restricted diet with no more than 2 liters per day. Pt is instructed to advance activities as tolerated limited to fatigue or shortness of breath. Pt is instructed to call office or return to ER for immediate evaluation of any shortness of breath or chest pain. Follow up with [facility] Cardiology ablation scheduled for 2/2  Follow up with PCP Dr. Powers in 1 week    Discharge Exam:   Visit Vitals    /65    Pulse 79    Temp 97.4 °F (36.3 °C)    Resp 18    Ht 5' 9\" (1.753 m)    Wt 67.9 kg (149 lb 12.8 oz)    SpO2 95%    BMI 22.12 kg/m2     Pt has been seen by Dr. Robert Guadalupe: see his progress note for exam details.     Recent Results (from the past 24 hour(s))   CK    Collection Time: 01/25/18 11:24 AM   Result Value Ref Range    CK 41 21 - 215 U/L   TSH 3RD GENERATION    Collection Time: 01/25/18 11:24 AM   Result Value Ref Range    TSH 2.000 0.358 - 3.740 uIU/mL   HEPATITIS PANEL, ACUTE    Collection Time: 01/25/18 11:24 AM   Result Value Ref Range    Hepatitis A Ab, IgM NEGATIVE  NEGATIVE      Hep B surface Ag screen NEGATIVE  NEGATIVE      Hep B Core Ab, IgM NEGATIVE  NEGATIVE      Hep C Virus Ab <0.1 0.0 - 0.9 s/co ratio   HIV-1,2 P24 AG/AB SCREEN    Collection Time: 01/25/18 11:24 AM   Result Value Ref Range    p24 Antigen NONREACTIVE NR      HIV-1,2 Ab NONREACTIVE NR     EKG, 12 LEAD, INITIAL    Collection Time: 01/26/18  7:36 AM   Result Value Ref Range    Ventricular Rate 80 BPM    Atrial Rate 80 BPM    P-R Interval 160 ms    QRS Duration 92 ms    Q-T Interval 370 ms    QTC Calculation (Bezet) 426 ms    Calculated P Axis 52 degrees    Calculated R Axis 13 degrees    Calculated T Axis 28 degrees    Diagnosis       Normal sinus rhythm  Normal ECG  When compared with ECG of 24-JAN-2018 12:28,  Sinus rhythm has replaced Atrial flutter  Questionable change in QRS duration  Confirmed by NALINI AGUAYO (), Jesse Britt ([id number]) on 1/26/2018 8:06:44 AM           Patient Instructions:   Current Discharge Medication List      START taking these medications    Details   amiodarone (CORDARONE) 200 mg tablet Take 1 Tab by mouth two (2) times a day. Qty: 60 Tab, Refills: 1    Associated Diagnoses: Atypical atrial flutter (HCC)      metoprolol succinate (TOPROL-XL) 25 mg XL tablet Take 1 Tab by mouth daily. Qty: 30 Tab, Refills: 1         CONTINUE these medications which have NOT CHANGED    Details   lisinopril (PRINIVIL, ZESTRIL) 10 mg tablet TAKE ONE TABLET BY MOUTH ONE TIME DAILY  Qty: 30 Tab, Refills: 11      dilTIAZem CD (CARDIZEM CD) 180 mg ER capsule Take 1 Cap by mouth daily. Qty: 30 Cap, Refills: 11      apixaban (ELIQUIS) 5 mg tablet Take 1 Tab by mouth two (2) times a day. Qty: 60 Tab, Refills: 11      fluticasone-salmeterol (ADVAIR DISKUS) 250-50 mcg/dose diskus inhaler INHALE 1 DOSE BY MOUTH TWICE DAILY.  RINSE MOUTH AFTER USE  Qty: 1 Inhaler, Refills: 5    Associated Diagnoses: Asthma, moderate persistent, well-controlled      albuterol (PROAIR HFA) 90 mcg/actuation inhaler Take  by inhalation. sildenafil (REVATIO) 20 mg tablet Take 20 mg by mouth daily as needed. Cetirizine (ZYRTEC) 10 mg cap Take  by mouth. testosterone cypionate (DEPO-TESTOSTERONE) 200 mg/mL injection 440 mg by IntraMUSCular route Once every 2 weeks. fluticasone (FLONASE) 50 mcg/actuation nasal spray 2 Sprays by Both Nostrils route daily. Qty: 1 Bottle, Refills: 5    Associated Diagnoses: Allergic rhinitis, unspecified allergic rhinitis trigger, unspecified rhinitis seasonality      FLAXSEED OIL (OMEGA 3 PO) Take  by mouth. Associated Diagnoses: Essential hypertension, benign      MULTIVITAMIN (MULTIPLE VITAMIN PO) Take  by mouth.          STOP taking these medications       dronedarone (MULTAQ) tab tablet Comments:   Reason for Stopping:               Signed:  Terence Hood PA-C  1/26/2018  8:26 AM

## 2018-01-26 NOTE — PROGRESS NOTES
Care Management Interventions  PCP Verified by CM: Yes  Mode of Transport at Discharge: Other (see comment) (wife )  Transition of Care Consult (CM Consult): Discharge Planning  Current Support Network: Lives with Spouse  Confirm Follow Up Transport: Self  Plan discussed with Pt/Family/Caregiver: Yes  Freedom of Choice Offered: Yes  Discharge Location  Discharge Placement: Home  Met with patient for d/c planning. Patient alert and oriented x 3, independent of ADL's and lives with his wife. Patient requires no DME and is still able to drive. He has prescription drug plan and is able to afford his medications. He voices no concerns or needs. D/C plan is home with wife.

## 2018-01-26 NOTE — PROGRESS NOTES
Problem: Falls - Risk of  Goal: *Absence of Falls  Document Ana Fall Risk and appropriate interventions in the flowsheet. Outcome: Progressing Towards Goal  Pt progressing towards goal. No falls since admission. Bed low and locked. Call light within reach. Side rails x 2. Gripper socks applied. Personal belongings within reach. Pt verbalizes understanding to call for assistance.      Fall Risk Interventions:            Medication Interventions: Patient to call before getting OOB

## 2018-01-26 NOTE — DISCHARGE INSTRUCTIONS
Electrophysiology Study and Catheter Ablation: Before Your Procedure  What is an electrophysiology study and catheter ablation? An electrophysiology study is a test to see if there is a problem with your heart rhythm and to find out how to fix it. It is also called an EPS. Sometimes a procedure called catheter ablation is done during an EPS. This procedure destroys (ablates) small areas of your heart that are causing your heart rhythm problem. The doctor puts plastic tubes called catheters into blood vessels in your groin, arm, or neck. He or she then uses an X-ray machine to guide long wires through the tubes to your heart. The doctor can use these wires to record your heart's electrical signals. If the doctor thinks your problem can be fixed with ablation, he or she can use the wires to destroy a small part of your heart tissue. This is most often done with radio waves. You will probably be awake during the procedure. But you could be asleep. The doctor will give you medicines to help you feel relaxed and to numb the areas where the catheters go in. An EPS and ablation can take 2 to 6 hours. In rare cases, it can take longer. If you have EPS only and you do not need more treatment, you may go home the same day. But if you also have ablation, you may stay overnight in the hospital. How long you stay in the hospital depends on the type of ablation you have. Do not exercise hard or lift anything heavy for a week. You may be able to go back to work and to your normal routine in 1 or 2 days. Follow-up care is a key part of your treatment and safety. Be sure to make and go to all appointments, and call your doctor if you are having problems. It's also a good idea to know your test results and keep a list of the medicines you take. What happens before the procedure? ?Preparing for the procedure  ? · Understand exactly what procedure is planned, along with the risks, benefits, and other options.     · Tell your doctors ALL the medicines, vitamins, supplements, and herbal remedies you take. Some of these can increase the risk of bleeding or interact with anesthesia. ? · If you take blood thinners, such as warfarin (Coumadin), clopidogrel (Plavix), or aspirin, be sure to talk to your doctor. He or she will tell you if you should stop taking these medicines before your procedure. Make sure that you understand exactly what your doctor wants you to do.   ? · Your doctor will tell you which medicines to take or stop before your procedure. You may need to stop taking certain medicines a week or more before the procedure. So talk to your doctor as soon as you can.   ? · If you have an advance directive, let your doctor know. It may include a living will and a durable power of  for health care. Bring a copy to the hospital. If you don't have one, you may want to prepare one. It lets your doctor and loved ones know your health care wishes. Doctors advise that everyone prepare these papers before any type of surgery or procedure. Procedures can be stressful. This information will help you understand what you can expect. And it will help you safely prepare for your procedure. What happens on the day of the procedure? · Follow the instructions exactly about when to stop eating and drinking. If you don't, your procedure may be canceled. If your doctor told you to take your medicines on the day of the procedure, take them with only a sip of water. ? · Take a bath or shower before you come in for your procedure. Do not apply lotions, perfumes, deodorants, or nail polish. ? · Take off all jewelry and piercings. And take out contact lenses, if you wear them. ? At the hospital or surgery center   · Bring a picture ID. ? · You will be kept comfortable and safe by your anesthesia provider. The anesthesia may make you sleep. Or it may just numb the area being worked on. ? · EPS by itself may take 1 to 3 hours.  But if you also have ablation, the whole procedure may take 2 to 6 hours. ? · After the procedure, pressure will be applied to the area where the catheter was put in your blood vessel. Then the area may be covered with a bandage or a compression device. This will prevent bleeding. ? · Nurses will check your heart rate and blood pressure. The nurse will also check the catheter site for bleeding. ? · If the catheter was put in your groin, you will need to lie still and keep your leg straight for several hours. The nurse may put a weighted bag on your leg to keep it still. ? · If the catheter was put in your arm, you may be able to sit up and get out of bed right away. But you will need to keep your arm still for at least 1 hour. ? · You may have a bruise or a small lump where the catheter was put in your blood vessel. This is normal and will go away. Going home   · Be sure you have someone to drive you home. Anesthesia and pain medicine make it unsafe for you to drive. ? · You will be given more specific instructions about recovering from your procedure. They will cover things like diet, wound care, follow-up care, driving, and getting back to your normal routine. When should you call your doctor? · You have questions or concerns. ? · You don't understand how to prepare for your procedure. ? · You become ill before the procedure (such as fever, flu, or a cold). ? · You need to reschedule or have changed your mind about having the procedure. Where can you learn more? Go to http://jose juan-trudi.info/. Enter C785 in the search box to learn more about \"Electrophysiology Study and Catheter Ablation: Before Your Procedure. \"  Current as of: September 21, 2016  Content Version: 11.4  © 5757-4513 [employer]. Care instructions adapted under license by [employer] (which disclaims liability or warranty for this information).  If you have questions about a medical condition or this instruction, always ask your healthcare professional. [address] any warranty or liability for your use of this information. Cardiac Arrhythmia: Care Instructions  Your Care Instructions    A cardiac arrhythmia is a change in the normal rhythm of the heart. Your heart may beat too fast or too slow or beat with an irregular or skipping rhythm. A change in the heart's rhythm may feel like a really strong heartbeat or a fluttering in your chest. A severe heart rhythm problem can keep the body from getting the blood it needs. This can result in shortness of breath, lightheadedness, and fainting. You may take medicine to treat your condition. Your doctor may recommend a pacemaker or recommend catheter ablation to destroy small parts of the heart that are causing a rhythm problem. Another possible treatment is an implantable cardioverter-defibrillator (ICD). An ICD is a device that gives the heart a shock to return the heart to a normal rhythm. Follow-up care is a key part of your treatment and safety. Be sure to make and go to all appointments, and call your doctor if you are having problems. It's also a good idea to know your test results and keep a list of the medicines you take. How can you care for yourself at home? ?General care  ? · Be safe with medicines. Take your medicines exactly as prescribed. Call your doctor if you think you are having a problem with your medicine. You will get more details on the specific medicines your doctor prescribes. ? · If you received a pacemaker or an ICD, you will get a fact sheet about it. ? · Wear medical alert jewelry that says you have an abnormal heart rhythm. You can buy this at most drugstores. ? Lifestyle changes  ? · Eat a heart-healthy diet. ? · Stay at a healthy weight. Lose weight if you need to. ? · Avoid nicotine, too much alcohol, and illegal drugs (meth, speed, and cocaine).  Also, get enough sleep and do not overeat. ? · Ask your doctor whether you can take over-the-counter medicines (such as decongestants). These can make your heart beat fast.   ? · Talk to your doctor about any limits to activities, such as driving, or tasks where you use power tools or ladders. Activity  ? · Start light exercise if your doctor says you can. Even a small amount will help you get stronger, have more energy, and manage your stress. ? · Get regular exercise. Try for 30 minutes on most days of the week. Ask your doctor what level of exercise is safe for you. If activity is not likely to cause health problems, you probably do not have limits on the type or level of activity that you can do. You may want to walk, swim, bike, or do other activities. ? · When you exercise, watch for signs that your heart is working too hard. You are pushing too hard if you cannot talk while you exercise. If you become short of breath or dizzy or have chest pain, sit down and rest.   ? · Check your pulse daily. Place two fingers on the artery at the palm side of your wrist, in line with your thumb. If your heartbeat seems uneven, talk to your doctor. When should you call for help? Call 911 anytime you think you may need emergency care. For example, call if:  ? · You have symptoms of sudden heart failure. These may include:  ¨ Severe trouble breathing. ¨ A fast or irregular heartbeat. ¨ Coughing up pink, foamy mucus. ¨ You passed out. ? · You have signs of a stroke. These include:  ¨ Sudden numbness, paralysis, or weakness in your face, arm, or leg, especially on only one side of your body. ¨ New problems with walking or balance. ¨ Sudden vision changes. ¨ Drooling or slurred speech. ¨ New problems speaking or understanding simple statements, or feeling confused. ¨ A sudden, severe headache that is different from past headaches.    ?Call your doctor now or seek immediate medical care if:  ? · You have new or changed symptoms of heart failure, such as:  ¨ New or increased shortness of breath. ¨ New or worse swelling in your legs, ankles, or feet. ¨ Sudden weight gain, such as more than 2 to 3 pounds in a day or 5 pounds in a week. (Your doctor may suggest a different range of weight gain.)  ¨ Feeling dizzy or lightheaded or like you may faint. ¨ Feeling so tired or weak that you cannot do your usual activities. ¨ Not sleeping well. Shortness of breath wakes you at night. You need extra pillows to prop yourself up to breathe easier. ? Watch closely for changes in your health, and be sure to contact your doctor if:  ? · You do not get better as expected. Where can you learn more? Go to http://jose juan-trudi.info/. Enter X229 in the search box to learn more about \"Cardiac Arrhythmia: Care Instructions. \"  Current as of: September 21, 2016  Content Version: 11.4  © 5183-9480 [employer]. Care instructions adapted under license by [employer] (which disclaims liability or warranty for this information). If you have questions about a medical condition or this instruction, always ask your healthcare professional. [place] [address] any warranty or liability for your use of this information.

## 2018-01-26 NOTE — PROGRESS NOTES
Bedside and Verbal shift change report given to BUCKY Spears (oncoming nurse) by self (offgoing nurse). Report included the following information SBAR, Kardex, Intake/Output, MAR and Recent Results.

## 2018-01-29 LAB
BACKGROUND: 489207: NORMAL
BACTERIA SPEC CULT: NORMAL
BACTERIA SPEC CULT: NORMAL
DIRECTOR REVIEW: 489204: NORMAL
JAK2 P.V617F BLD/T QL: NORMAL
SERVICE CMNT-IMP: NORMAL
SERVICE CMNT-IMP: NORMAL

## 2018-02-01 ENCOUNTER — ANESTHESIA EVENT (OUTPATIENT)
Dept: SURGERY | Age: 68
End: 2018-02-01
Payer: MEDICARE

## 2018-02-01 NOTE — PROGRESS NOTES
Patient pre-assessment complete for Atrial fib ablation with Dr Carla Champion scheduled for 18 at 8am, arrival time 6am. Patient verified using . Patient instructed to bring all home medications in labeled bottles on the day of procedure. NPO status reinforced. Patient instructed to HOLD ELIQUIS x 2 days (last dose 18) & Hold lisinopril the day of procedure. Instructed they can take all other medications excluding vitamins & supplements. Patient verbalizes understanding of all instructions & denies any questions at this time.

## 2018-02-02 ENCOUNTER — APPOINTMENT (OUTPATIENT)
Dept: CARDIAC CATH/INVASIVE PROCEDURES | Age: 68
End: 2018-02-02
Payer: MEDICARE

## 2018-02-02 ENCOUNTER — HOSPITAL ENCOUNTER (OUTPATIENT)
Age: 68
Setting detail: OBSERVATION
Discharge: HOME | End: 2018-02-03
Attending: INTERNAL MEDICINE | Admitting: INTERNAL MEDICINE
Payer: MEDICARE

## 2018-02-02 ENCOUNTER — ANESTHESIA (OUTPATIENT)
Dept: SURGERY | Age: 68
End: 2018-02-02
Payer: MEDICARE

## 2018-02-02 DIAGNOSIS — I48.0 PAROXYSMAL ATRIAL FIBRILLATION (HCC): Primary | ICD-10-CM

## 2018-02-02 PROBLEM — I48.91 ATRIAL FIBRILLATION (HCC): Status: ACTIVE | Noted: 2018-02-02

## 2018-02-02 LAB
ACT BLD: 131 SECS (ref 70–128)
ACT BLD: 285 SECS (ref 70–128)
ACT BLD: 296 SECS (ref 70–128)
ACT BLD: 301 SECS (ref 70–128)
ANION GAP SERPL CALC-SCNC: 5 MMOL/L (ref 7–16)
ATRIAL RATE: 65 BPM
ATRIAL RATE: 69 BPM
BUN SERPL-MCNC: 15 MG/DL (ref 8–23)
CALCIUM SERPL-MCNC: 8.4 MG/DL (ref 8.3–10.4)
CALCULATED P AXIS, ECG09: 58 DEGREES
CALCULATED P AXIS, ECG09: 65 DEGREES
CALCULATED R AXIS, ECG10: 32 DEGREES
CALCULATED R AXIS, ECG10: 41 DEGREES
CALCULATED T AXIS, ECG11: 40 DEGREES
CALCULATED T AXIS, ECG11: 42 DEGREES
CHLORIDE SERPL-SCNC: 106 MMOL/L (ref 98–107)
CO2 SERPL-SCNC: 29 MMOL/L (ref 21–32)
CREAT SERPL-MCNC: 1.08 MG/DL (ref 0.8–1.5)
DIAGNOSIS, 93000: NORMAL
DIAGNOSIS, 93000: NORMAL
ERYTHROCYTE [DISTWIDTH] IN BLOOD BY AUTOMATED COUNT: 13.8 % (ref 11.9–14.6)
GLUCOSE SERPL-MCNC: 101 MG/DL (ref 65–100)
HCT VFR BLD AUTO: 46.4 % (ref 41.1–50.3)
HGB BLD-MCNC: 15.6 G/DL (ref 13.6–17.2)
INR PPP: 1.1
MAGNESIUM SERPL-MCNC: 2.2 MG/DL (ref 1.8–2.4)
MCH RBC QN AUTO: 30.6 PG (ref 26.1–32.9)
MCHC RBC AUTO-ENTMCNC: 33.6 G/DL (ref 31.4–35)
MCV RBC AUTO: 91.2 FL (ref 79.6–97.8)
P-R INTERVAL, ECG05: 168 MS
P-R INTERVAL, ECG05: 170 MS
PLATELET # BLD AUTO: 172 K/UL (ref 150–450)
PMV BLD AUTO: 9.5 FL (ref 10.8–14.1)
POTASSIUM SERPL-SCNC: 4.2 MMOL/L (ref 3.5–5.1)
PROTHROMBIN TIME: 13.3 SEC (ref 11.5–14.5)
Q-T INTERVAL, ECG07: 406 MS
Q-T INTERVAL, ECG07: 414 MS
QRS DURATION, ECG06: 102 MS
QRS DURATION, ECG06: 104 MS
QTC CALCULATION (BEZET), ECG08: 422 MS
QTC CALCULATION (BEZET), ECG08: 443 MS
RBC # BLD AUTO: 5.09 M/UL (ref 4.23–5.67)
SODIUM SERPL-SCNC: 140 MMOL/L (ref 136–145)
VENTRICULAR RATE, ECG03: 65 BPM
VENTRICULAR RATE, ECG03: 69 BPM
WBC # BLD AUTO: 5.4 K/UL (ref 4.3–11.1)

## 2018-02-02 PROCEDURE — C1730 CATH, EP, 19 OR FEW ELECT: HCPCS

## 2018-02-02 PROCEDURE — 74011000250 HC RX REV CODE- 250

## 2018-02-02 PROCEDURE — 76060000039 HC ANESTHESIA 4 TO 4.5 HR: Performed by: INTERNAL MEDICINE

## 2018-02-02 PROCEDURE — 80048 BASIC METABOLIC PNL TOTAL CA: CPT | Performed by: INTERNAL MEDICINE

## 2018-02-02 PROCEDURE — 74011250636 HC RX REV CODE- 250/636

## 2018-02-02 PROCEDURE — 93613 INTRACARDIAC EPHYS 3D MAPG: CPT

## 2018-02-02 PROCEDURE — C1766 INTRO/SHEATH,STRBLE,NON-PEEL: HCPCS

## 2018-02-02 PROCEDURE — 99218 HC RM OBSERVATION: CPT

## 2018-02-02 PROCEDURE — 74011000250 HC RX REV CODE- 250: Performed by: INTERNAL MEDICINE

## 2018-02-02 PROCEDURE — 77030026818 HC NDL TRNSPTL BRK STJU -C

## 2018-02-02 PROCEDURE — C1759 CATH, INTRA ECHOCARDIOGRAPHY: HCPCS

## 2018-02-02 PROCEDURE — 76210000006 HC OR PH I REC 0.5 TO 1 HR: Performed by: INTERNAL MEDICINE

## 2018-02-02 PROCEDURE — 74011250636 HC RX REV CODE- 250/636: Performed by: INTERNAL MEDICINE

## 2018-02-02 PROCEDURE — 83735 ASSAY OF MAGNESIUM: CPT | Performed by: INTERNAL MEDICINE

## 2018-02-02 PROCEDURE — C1733 CATH, EP, OTHR THAN COOL-TIP: HCPCS

## 2018-02-02 PROCEDURE — 77030008477 HC STYL SATN SLP COVD -A: Performed by: ANESTHESIOLOGY

## 2018-02-02 PROCEDURE — C8929 TTE W OR WO FOL WCON,DOPPLER: HCPCS

## 2018-02-02 PROCEDURE — 93005 ELECTROCARDIOGRAM TRACING: CPT | Performed by: INTERNAL MEDICINE

## 2018-02-02 PROCEDURE — 77030008703 HC TU ET UNCUF COVD -A: Performed by: ANESTHESIOLOGY

## 2018-02-02 PROCEDURE — C1732 CATH, EP, DIAG/ABL, 3D/VECT: HCPCS

## 2018-02-02 PROCEDURE — 93656 COMPRE EP EVAL ABLTJ ATR FIB: CPT

## 2018-02-02 PROCEDURE — 85610 PROTHROMBIN TIME: CPT | Performed by: INTERNAL MEDICINE

## 2018-02-02 PROCEDURE — 94640 AIRWAY INHALATION TREATMENT: CPT

## 2018-02-02 PROCEDURE — 74011250637 HC RX REV CODE- 250/637: Performed by: INTERNAL MEDICINE

## 2018-02-02 PROCEDURE — 77030029359 HC PRB ESOPH TEMP CATH ANTM -F

## 2018-02-02 PROCEDURE — 76210000020 HC REC RM PH II FIRST 0.5 HR: Performed by: INTERNAL MEDICINE

## 2018-02-02 PROCEDURE — 94760 N-INVAS EAR/PLS OXIMETRY 1: CPT

## 2018-02-02 PROCEDURE — 85027 COMPLETE CBC AUTOMATED: CPT | Performed by: INTERNAL MEDICINE

## 2018-02-02 PROCEDURE — 74011250636 HC RX REV CODE- 250/636: Performed by: ANESTHESIOLOGY

## 2018-02-02 PROCEDURE — 93655 ICAR CATH ABLTJ DSCRT ARRHYT: CPT

## 2018-02-02 PROCEDURE — 93662 INTRACARDIAC ECG (ICE): CPT

## 2018-02-02 PROCEDURE — 77030018733 HC ELECTRD KT ENSITE STJU -G

## 2018-02-02 PROCEDURE — 93623 PRGRMD STIMJ&PACG IV RX NFS: CPT

## 2018-02-02 PROCEDURE — 93622 COMP EP EVAL L VENTR PAC&REC: CPT

## 2018-02-02 PROCEDURE — 77030016570 HC BLNKT BAIR HGGR 3M -B: Performed by: ANESTHESIOLOGY

## 2018-02-02 PROCEDURE — C1894 INTRO/SHEATH, NON-LASER: HCPCS

## 2018-02-02 PROCEDURE — 93657 TX L/R ATRIAL FIB ADDL: CPT

## 2018-02-02 PROCEDURE — 85347 COAGULATION TIME ACTIVATED: CPT

## 2018-02-02 PROCEDURE — C1731 CATH, EP, 20 OR MORE ELEC: HCPCS

## 2018-02-02 PROCEDURE — 77030005515 HC CATH URETH FOL14 BARD -B

## 2018-02-02 PROCEDURE — 77030013794 HC KT TRNSDUC BLD EDWD -B

## 2018-02-02 PROCEDURE — 74011250637 HC RX REV CODE- 250/637

## 2018-02-02 RX ORDER — SODIUM CHLORIDE 0.9 % (FLUSH) 0.9 %
5-10 SYRINGE (ML) INJECTION EVERY 8 HOURS
Status: DISCONTINUED | OUTPATIENT
Start: 2018-02-02 | End: 2018-02-03 | Stop reason: HOSPADM

## 2018-02-02 RX ORDER — VECURONIUM BROMIDE FOR INJECTION 1 MG/ML
INJECTION, POWDER, LYOPHILIZED, FOR SOLUTION INTRAVENOUS AS NEEDED
Status: DISCONTINUED | OUTPATIENT
Start: 2018-02-02 | End: 2018-02-02 | Stop reason: HOSPADM

## 2018-02-02 RX ORDER — GLYCOPYRROLATE 0.2 MG/ML
INJECTION INTRAMUSCULAR; INTRAVENOUS AS NEEDED
Status: DISCONTINUED | OUTPATIENT
Start: 2018-02-02 | End: 2018-02-02 | Stop reason: HOSPADM

## 2018-02-02 RX ORDER — AMIODARONE HYDROCHLORIDE 200 MG/1
200 TABLET ORAL 2 TIMES DAILY
Status: DISCONTINUED | OUTPATIENT
Start: 2018-02-02 | End: 2018-02-03 | Stop reason: HOSPADM

## 2018-02-02 RX ORDER — ALBUTEROL SULFATE 90 UG/1
2 AEROSOL, METERED RESPIRATORY (INHALATION)
Status: DISCONTINUED | OUTPATIENT
Start: 2018-02-02 | End: 2018-02-03 | Stop reason: HOSPADM

## 2018-02-02 RX ORDER — OXYCODONE AND ACETAMINOPHEN 5; 325 MG/1; MG/1
1 TABLET ORAL
Status: DISCONTINUED | OUTPATIENT
Start: 2018-02-02 | End: 2018-02-03 | Stop reason: HOSPADM

## 2018-02-02 RX ORDER — ACETAMINOPHEN 500 MG
500 TABLET ORAL ONCE
Status: DISCONTINUED | OUTPATIENT
Start: 2018-02-02 | End: 2018-02-02 | Stop reason: HOSPADM

## 2018-02-02 RX ORDER — ALBUTEROL SULFATE 0.83 MG/ML
2.5 SOLUTION RESPIRATORY (INHALATION)
Status: DISCONTINUED | OUTPATIENT
Start: 2018-02-02 | End: 2018-02-03 | Stop reason: HOSPADM

## 2018-02-02 RX ORDER — GUAIFENESIN 100 MG/5ML
81 LIQUID (ML) ORAL DAILY
Status: DISCONTINUED | OUTPATIENT
Start: 2018-02-03 | End: 2018-02-03 | Stop reason: HOSPADM

## 2018-02-02 RX ORDER — ASPIRIN 325 MG
TABLET ORAL
Status: COMPLETED
Start: 2018-02-02 | End: 2018-02-02

## 2018-02-02 RX ORDER — ROCURONIUM BROMIDE 10 MG/ML
INJECTION, SOLUTION INTRAVENOUS AS NEEDED
Status: DISCONTINUED | OUTPATIENT
Start: 2018-02-02 | End: 2018-02-02 | Stop reason: HOSPADM

## 2018-02-02 RX ORDER — OXYCODONE HYDROCHLORIDE 5 MG/1
5 TABLET ORAL
Status: DISCONTINUED | OUTPATIENT
Start: 2018-02-02 | End: 2018-02-02 | Stop reason: HOSPADM

## 2018-02-02 RX ORDER — SODIUM CHLORIDE 0.9 % (FLUSH) 0.9 %
5-10 SYRINGE (ML) INJECTION EVERY 8 HOURS
Status: DISCONTINUED | OUTPATIENT
Start: 2018-02-02 | End: 2018-02-02 | Stop reason: HOSPADM

## 2018-02-02 RX ORDER — BUDESONIDE 0.5 MG/2ML
2 INHALANT ORAL 2 TIMES DAILY
Status: DISCONTINUED | OUTPATIENT
Start: 2018-02-02 | End: 2018-02-02

## 2018-02-02 RX ORDER — DEXAMETHASONE SODIUM PHOSPHATE 4 MG/ML
INJECTION, SOLUTION INTRA-ARTICULAR; INTRALESIONAL; INTRAMUSCULAR; INTRAVENOUS; SOFT TISSUE AS NEEDED
Status: DISCONTINUED | OUTPATIENT
Start: 2018-02-02 | End: 2018-02-02 | Stop reason: HOSPADM

## 2018-02-02 RX ORDER — LIDOCAINE HYDROCHLORIDE 10 MG/ML
0.1 INJECTION INFILTRATION; PERINEURAL AS NEEDED
Status: DISCONTINUED | OUTPATIENT
Start: 2018-02-02 | End: 2018-02-02 | Stop reason: HOSPADM

## 2018-02-02 RX ORDER — HYDROMORPHONE HYDROCHLORIDE 2 MG/ML
0.5 INJECTION, SOLUTION INTRAMUSCULAR; INTRAVENOUS; SUBCUTANEOUS
Status: DISCONTINUED | OUTPATIENT
Start: 2018-02-02 | End: 2018-02-02 | Stop reason: HOSPADM

## 2018-02-02 RX ORDER — ALBUTEROL SULFATE 0.83 MG/ML
2.5 SOLUTION RESPIRATORY (INHALATION)
Status: DISCONTINUED | OUTPATIENT
Start: 2018-02-02 | End: 2018-02-02

## 2018-02-02 RX ORDER — METOPROLOL SUCCINATE 25 MG/1
25 TABLET, EXTENDED RELEASE ORAL DAILY
Status: DISCONTINUED | OUTPATIENT
Start: 2018-02-03 | End: 2018-02-03 | Stop reason: HOSPADM

## 2018-02-02 RX ORDER — MIDAZOLAM HYDROCHLORIDE 1 MG/ML
2 INJECTION, SOLUTION INTRAMUSCULAR; INTRAVENOUS
Status: DISCONTINUED | OUTPATIENT
Start: 2018-02-02 | End: 2018-02-02 | Stop reason: HOSPADM

## 2018-02-02 RX ORDER — ONDANSETRON 2 MG/ML
INJECTION INTRAMUSCULAR; INTRAVENOUS AS NEEDED
Status: DISCONTINUED | OUTPATIENT
Start: 2018-02-02 | End: 2018-02-02 | Stop reason: HOSPADM

## 2018-02-02 RX ORDER — ASPIRIN 325 MG
325 TABLET ORAL ONCE
Status: COMPLETED | OUTPATIENT
Start: 2018-02-02 | End: 2018-02-02

## 2018-02-02 RX ORDER — SODIUM CHLORIDE, SODIUM LACTATE, POTASSIUM CHLORIDE, CALCIUM CHLORIDE 600; 310; 30; 20 MG/100ML; MG/100ML; MG/100ML; MG/100ML
1000 INJECTION, SOLUTION INTRAVENOUS CONTINUOUS
Status: DISCONTINUED | OUTPATIENT
Start: 2018-02-02 | End: 2018-02-02 | Stop reason: HOSPADM

## 2018-02-02 RX ORDER — BUDESONIDE 0.5 MG/2ML
2 INHALANT ORAL 2 TIMES DAILY
Status: DISCONTINUED | OUTPATIENT
Start: 2018-02-02 | End: 2018-02-03 | Stop reason: HOSPADM

## 2018-02-02 RX ORDER — SODIUM CHLORIDE 0.9 % (FLUSH) 0.9 %
5-10 SYRINGE (ML) INJECTION AS NEEDED
Status: DISCONTINUED | OUTPATIENT
Start: 2018-02-02 | End: 2018-02-02 | Stop reason: HOSPADM

## 2018-02-02 RX ORDER — ONDANSETRON 2 MG/ML
4 INJECTION INTRAMUSCULAR; INTRAVENOUS ONCE
Status: DISCONTINUED | OUTPATIENT
Start: 2018-02-02 | End: 2018-02-02 | Stop reason: HOSPADM

## 2018-02-02 RX ORDER — NALOXONE HYDROCHLORIDE 0.4 MG/ML
0.1 INJECTION, SOLUTION INTRAMUSCULAR; INTRAVENOUS; SUBCUTANEOUS AS NEEDED
Status: DISCONTINUED | OUTPATIENT
Start: 2018-02-02 | End: 2018-02-02 | Stop reason: HOSPADM

## 2018-02-02 RX ORDER — FENTANYL CITRATE 50 UG/ML
INJECTION, SOLUTION INTRAMUSCULAR; INTRAVENOUS AS NEEDED
Status: DISCONTINUED | OUTPATIENT
Start: 2018-02-02 | End: 2018-02-02 | Stop reason: HOSPADM

## 2018-02-02 RX ORDER — SODIUM CHLORIDE, SODIUM LACTATE, POTASSIUM CHLORIDE, CALCIUM CHLORIDE 600; 310; 30; 20 MG/100ML; MG/100ML; MG/100ML; MG/100ML
75 INJECTION, SOLUTION INTRAVENOUS CONTINUOUS
Status: DISCONTINUED | OUTPATIENT
Start: 2018-02-02 | End: 2018-02-02 | Stop reason: HOSPADM

## 2018-02-02 RX ORDER — PANTOPRAZOLE SODIUM 40 MG/1
40 TABLET, DELAYED RELEASE ORAL
Status: DISCONTINUED | OUTPATIENT
Start: 2018-02-02 | End: 2018-02-03 | Stop reason: HOSPADM

## 2018-02-02 RX ORDER — HEPARIN SODIUM 1000 [USP'U]/ML
INJECTION, SOLUTION INTRAVENOUS; SUBCUTANEOUS AS NEEDED
Status: DISCONTINUED | OUTPATIENT
Start: 2018-02-02 | End: 2018-02-02 | Stop reason: HOSPADM

## 2018-02-02 RX ORDER — DIPHENHYDRAMINE HYDROCHLORIDE 50 MG/ML
12.5 INJECTION, SOLUTION INTRAMUSCULAR; INTRAVENOUS ONCE
Status: DISCONTINUED | OUTPATIENT
Start: 2018-02-02 | End: 2018-02-02 | Stop reason: HOSPADM

## 2018-02-02 RX ORDER — PROTAMINE SULFATE 10 MG/ML
INJECTION, SOLUTION INTRAVENOUS AS NEEDED
Status: DISCONTINUED | OUTPATIENT
Start: 2018-02-02 | End: 2018-02-02 | Stop reason: HOSPADM

## 2018-02-02 RX ORDER — DILTIAZEM HYDROCHLORIDE 180 MG/1
180 CAPSULE, COATED, EXTENDED RELEASE ORAL DAILY
Status: DISCONTINUED | OUTPATIENT
Start: 2018-02-03 | End: 2018-02-03

## 2018-02-02 RX ORDER — LIDOCAINE HYDROCHLORIDE 20 MG/ML
INJECTION, SOLUTION EPIDURAL; INFILTRATION; INTRACAUDAL; PERINEURAL AS NEEDED
Status: DISCONTINUED | OUTPATIENT
Start: 2018-02-02 | End: 2018-02-02 | Stop reason: HOSPADM

## 2018-02-02 RX ORDER — EPHEDRINE SULFATE 50 MG/ML
INJECTION, SOLUTION INTRAVENOUS AS NEEDED
Status: DISCONTINUED | OUTPATIENT
Start: 2018-02-02 | End: 2018-02-02 | Stop reason: HOSPADM

## 2018-02-02 RX ORDER — FAMOTIDINE 20 MG/1
20 TABLET, FILM COATED ORAL 2 TIMES DAILY
Status: DISCONTINUED | OUTPATIENT
Start: 2018-02-02 | End: 2018-02-03 | Stop reason: HOSPADM

## 2018-02-02 RX ORDER — LISINOPRIL 5 MG/1
10 TABLET ORAL DAILY
Status: DISCONTINUED | OUTPATIENT
Start: 2018-02-03 | End: 2018-02-03 | Stop reason: HOSPADM

## 2018-02-02 RX ORDER — SODIUM CHLORIDE 0.9 % (FLUSH) 0.9 %
5-10 SYRINGE (ML) INJECTION AS NEEDED
Status: DISCONTINUED | OUTPATIENT
Start: 2018-02-02 | End: 2018-02-03 | Stop reason: HOSPADM

## 2018-02-02 RX ORDER — OXYCODONE HYDROCHLORIDE 5 MG/1
10 TABLET ORAL
Status: DISCONTINUED | OUTPATIENT
Start: 2018-02-02 | End: 2018-02-02 | Stop reason: HOSPADM

## 2018-02-02 RX ORDER — HEPARIN SODIUM 5000 [USP'U]/100ML
INJECTION, SOLUTION INTRAVENOUS
Status: DISCONTINUED | OUTPATIENT
Start: 2018-02-02 | End: 2018-02-02 | Stop reason: HOSPADM

## 2018-02-02 RX ORDER — IBUPROFEN 400 MG/1
400 TABLET ORAL
Status: DISCONTINUED | OUTPATIENT
Start: 2018-02-02 | End: 2018-02-03 | Stop reason: HOSPADM

## 2018-02-02 RX ORDER — NEOSTIGMINE METHYLSULFATE 1 MG/ML
INJECTION INTRAVENOUS AS NEEDED
Status: DISCONTINUED | OUTPATIENT
Start: 2018-02-02 | End: 2018-02-02 | Stop reason: HOSPADM

## 2018-02-02 RX ORDER — DILTIAZEM HYDROCHLORIDE 180 MG/1
180 CAPSULE, COATED, EXTENDED RELEASE ORAL DAILY
Status: DISCONTINUED | OUTPATIENT
Start: 2018-02-03 | End: 2018-02-03 | Stop reason: HOSPADM

## 2018-02-02 RX ORDER — PROPOFOL 10 MG/ML
INJECTION, EMULSION INTRAVENOUS AS NEEDED
Status: DISCONTINUED | OUTPATIENT
Start: 2018-02-02 | End: 2018-02-02 | Stop reason: HOSPADM

## 2018-02-02 RX ORDER — FLUTICASONE PROPIONATE 50 MCG
2 SPRAY, SUSPENSION (ML) NASAL DAILY
Status: DISCONTINUED | OUTPATIENT
Start: 2018-02-03 | End: 2018-02-03 | Stop reason: HOSPADM

## 2018-02-02 RX ORDER — FENTANYL CITRATE 50 UG/ML
100 INJECTION, SOLUTION INTRAMUSCULAR; INTRAVENOUS AS NEEDED
Status: DISCONTINUED | OUTPATIENT
Start: 2018-02-02 | End: 2018-02-02 | Stop reason: HOSPADM

## 2018-02-02 RX ADMIN — PROTAMINE SULFATE 50 MG: 10 INJECTION, SOLUTION INTRAVENOUS at 11:07

## 2018-02-02 RX ADMIN — EPHEDRINE SULFATE 5 MG: 50 INJECTION, SOLUTION INTRAVENOUS at 10:23

## 2018-02-02 RX ADMIN — FAMOTIDINE 20 MG: 20 TABLET, FILM COATED ORAL at 19:38

## 2018-02-02 RX ADMIN — VECURONIUM BROMIDE FOR INJECTION 1 MG: 1 INJECTION, POWDER, LYOPHILIZED, FOR SOLUTION INTRAVENOUS at 08:52

## 2018-02-02 RX ADMIN — VECURONIUM BROMIDE FOR INJECTION 2 MG: 1 INJECTION, POWDER, LYOPHILIZED, FOR SOLUTION INTRAVENOUS at 09:12

## 2018-02-02 RX ADMIN — LIDOCAINE HYDROCHLORIDE 40 MG: 20 INJECTION, SOLUTION EPIDURAL; INFILTRATION; INTRACAUDAL; PERINEURAL at 07:47

## 2018-02-02 RX ADMIN — VECURONIUM BROMIDE FOR INJECTION 1 MG: 1 INJECTION, POWDER, LYOPHILIZED, FOR SOLUTION INTRAVENOUS at 10:15

## 2018-02-02 RX ADMIN — HEPARIN SODIUM 40 UNITS/KG/HR: 5000 INJECTION, SOLUTION INTRAVENOUS at 08:38

## 2018-02-02 RX ADMIN — VECURONIUM BROMIDE FOR INJECTION 2 MG: 1 INJECTION, POWDER, LYOPHILIZED, FOR SOLUTION INTRAVENOUS at 09:01

## 2018-02-02 RX ADMIN — PERFLUTREN 1 ML: 6.52 INJECTION, SUSPENSION INTRAVENOUS at 13:00

## 2018-02-02 RX ADMIN — VECURONIUM BROMIDE FOR INJECTION 2 MG: 1 INJECTION, POWDER, LYOPHILIZED, FOR SOLUTION INTRAVENOUS at 08:31

## 2018-02-02 RX ADMIN — HEPARIN SODIUM 1000 UNITS: 1000 INJECTION, SOLUTION INTRAVENOUS; SUBCUTANEOUS at 10:54

## 2018-02-02 RX ADMIN — FENTANYL CITRATE 50 MCG: 50 INJECTION, SOLUTION INTRAMUSCULAR; INTRAVENOUS at 07:47

## 2018-02-02 RX ADMIN — VECURONIUM BROMIDE FOR INJECTION 1 MG: 1 INJECTION, POWDER, LYOPHILIZED, FOR SOLUTION INTRAVENOUS at 10:00

## 2018-02-02 RX ADMIN — Medication 325 MG: at 12:13

## 2018-02-02 RX ADMIN — HEPARIN SODIUM 14000 UNITS: 1000 INJECTION, SOLUTION INTRAVENOUS; SUBCUTANEOUS at 08:38

## 2018-02-02 RX ADMIN — Medication 5 ML: at 21:38

## 2018-02-02 RX ADMIN — SODIUM CHLORIDE, SODIUM LACTATE, POTASSIUM CHLORIDE, AND CALCIUM CHLORIDE: 600; 310; 30; 20 INJECTION, SOLUTION INTRAVENOUS at 07:20

## 2018-02-02 RX ADMIN — PANTOPRAZOLE SODIUM 40 MG: 40 TABLET, DELAYED RELEASE ORAL at 14:08

## 2018-02-02 RX ADMIN — AMIODARONE HYDROCHLORIDE 200 MG: 200 TABLET ORAL at 21:33

## 2018-02-02 RX ADMIN — VECURONIUM BROMIDE FOR INJECTION 1 MG: 1 INJECTION, POWDER, LYOPHILIZED, FOR SOLUTION INTRAVENOUS at 09:38

## 2018-02-02 RX ADMIN — EPHEDRINE SULFATE 5 MG: 50 INJECTION, SOLUTION INTRAVENOUS at 10:34

## 2018-02-02 RX ADMIN — OXYCODONE HYDROCHLORIDE AND ACETAMINOPHEN 1 TABLET: 5; 325 TABLET ORAL at 14:08

## 2018-02-02 RX ADMIN — DEXAMETHASONE SODIUM PHOSPHATE 4 MG: 4 INJECTION, SOLUTION INTRA-ARTICULAR; INTRALESIONAL; INTRAMUSCULAR; INTRAVENOUS; SOFT TISSUE at 08:04

## 2018-02-02 RX ADMIN — GLYCOPYRROLATE 0.4 MG: 0.2 INJECTION INTRAMUSCULAR; INTRAVENOUS at 11:19

## 2018-02-02 RX ADMIN — NEOSTIGMINE METHYLSULFATE 3 MG: 1 INJECTION INTRAVENOUS at 11:19

## 2018-02-02 RX ADMIN — SODIUM CHLORIDE, SODIUM LACTATE, POTASSIUM CHLORIDE, AND CALCIUM CHLORIDE: 600; 310; 30; 20 INJECTION, SOLUTION INTRAVENOUS at 08:51

## 2018-02-02 RX ADMIN — ONDANSETRON 4 MG: 2 INJECTION INTRAMUSCULAR; INTRAVENOUS at 11:08

## 2018-02-02 RX ADMIN — VECURONIUM BROMIDE FOR INJECTION 1 MG: 1 INJECTION, POWDER, LYOPHILIZED, FOR SOLUTION INTRAVENOUS at 11:00

## 2018-02-02 RX ADMIN — PROPOFOL 200 MG: 10 INJECTION, EMULSION INTRAVENOUS at 07:48

## 2018-02-02 RX ADMIN — ALBUTEROL SULFATE 2.5 MG: 2.5 SOLUTION RESPIRATORY (INHALATION) at 20:17

## 2018-02-02 RX ADMIN — MIDAZOLAM HYDROCHLORIDE 2 MG: 1 INJECTION, SOLUTION INTRAMUSCULAR; INTRAVENOUS at 07:39

## 2018-02-02 RX ADMIN — FENTANYL CITRATE 25 MCG: 50 INJECTION, SOLUTION INTRAMUSCULAR; INTRAVENOUS at 11:22

## 2018-02-02 RX ADMIN — ASPIRIN 325 MG ORAL TABLET 325 MG: 325 PILL ORAL at 12:13

## 2018-02-02 RX ADMIN — VECURONIUM BROMIDE FOR INJECTION 1 MG: 1 INJECTION, POWDER, LYOPHILIZED, FOR SOLUTION INTRAVENOUS at 10:28

## 2018-02-02 RX ADMIN — VECURONIUM BROMIDE FOR INJECTION 1 MG: 1 INJECTION, POWDER, LYOPHILIZED, FOR SOLUTION INTRAVENOUS at 10:45

## 2018-02-02 RX ADMIN — Medication 5 ML: at 14:10

## 2018-02-02 RX ADMIN — VECURONIUM BROMIDE FOR INJECTION 2 MG: 1 INJECTION, POWDER, LYOPHILIZED, FOR SOLUTION INTRAVENOUS at 08:05

## 2018-02-02 RX ADMIN — EPHEDRINE SULFATE 5 MG: 50 INJECTION, SOLUTION INTRAVENOUS at 08:45

## 2018-02-02 RX ADMIN — ROCURONIUM BROMIDE 50 MG: 10 INJECTION, SOLUTION INTRAVENOUS at 07:48

## 2018-02-02 RX ADMIN — FENTANYL CITRATE 25 MCG: 50 INJECTION, SOLUTION INTRAMUSCULAR; INTRAVENOUS at 09:07

## 2018-02-02 RX ADMIN — BUDESONIDE 500 MCG: 0.5 INHALANT RESPIRATORY (INHALATION) at 20:17

## 2018-02-02 RX ADMIN — EPHEDRINE SULFATE 5 MG: 50 INJECTION, SOLUTION INTRAVENOUS at 09:44

## 2018-02-02 NOTE — IP AVS SNAPSHOT
[address] [facility] 
 
 
 [address] [facility] [address] 
[phone] Patient: Kermitt Dance MRN: [id number] [facility]:4/5/[birth year]43 About your hospitalization You were admitted on:  February 2, 2018 You last received care in the:  [facility] 3 CLINICAL OBSERVATION You were discharged on:  February 3, 2018 Why you were hospitalized Your primary diagnosis was:  Not on File Your diagnoses also included:  Atrial Fibrillation (Hcc) Follow-up Information Follow up With Details Comments Contact Info Leticia Bell MD On 2/28/2018 Wednesday, Feb 28 at 2:45pm-- [facility] office  [address] [facility] [address] 
[phone] Salvador Ortiz MD  As needed [address] [place] [address] 
[phone] Your Scheduled Appointments Wednesday February 28, 2018  2:45 PM [facility] HOSPITAL FOLLOW-UP with Leticia Bell MD  
[facility] CARDIOLOGY [place] OFF[facility] ([address] [place] [address]) [address] [place] [address]  
[phone] Discharge Orders None A check jessica indicates which time of day the medication should be taken. My Medications START taking these medications Instructions Each Dose to Equal  
 Morning Noon Evening Bedtime  
 aspirin 81 mg chewable tablet Take 1 Tab by mouth daily. 81 mg  
    
  
   
   
   
  
 famotidine 20 mg tablet Commonly known as:  PEPCID Take 1 Tab by mouth two (2) times a day. 20 mg  
    
   
   
   
  
  
 pantoprazole 40 mg tablet Commonly known as:  PROTONIX Take 1 Tab by mouth Daily (before breakfast). 40 mg CONTINUE taking these medications Instructions Each Dose to Equal  
 Morning Noon Evening Bedtime  
 amiodarone 200 mg tablet Commonly known as:  CORDARONE Take 1 Tab by mouth two (2) times a day. 200 mg  
    
  
   
   
   
  
  
 apixaban 5 mg tablet Commonly known as:  Phil Ramirez Take 1 Tab by mouth two (2) times a day. 5 mg DEPO-TESTOSTERONE 200 mg/mL injection Generic drug:  testosterone cypionate Your next dose is:  AS DIRECTED.   
   
 440 mg by IntraMUSCular route Once every 2 weeks. 440 mg  
    
   
   
   
  
 dilTIAZem  mg ER capsule Commonly known as:  CARDIZEM CD Take 1 Cap by mouth daily. 180 mg  
    
  
   
   
   
  
 fluticasone 50 mcg/actuation nasal spray Commonly known as:  Reymundo Speller 2 Sprays by Both Nostrils route daily. 2 Spray  
    
  
   
   
   
  
 fluticasone-salmeterol 250-50 mcg/dose diskus inhaler Commonly known as:  ADVAIR DISKUS INHALE 1 DOSE BY MOUTH TWICE DAILY. RINSE MOUTH AFTER USE  
     
  
   
   
   
  
  
 lisinopril 10 mg tablet Commonly known as:  PRINIVIL, ZESTRIL  
   
 TAKE ONE TABLET BY MOUTH ONE TIME DAILY  
     
  
   
   
   
  
 metoprolol succinate 25 mg XL tablet Commonly known as:  TOPROL-XL Take 1 Tab by mouth daily. 25 mg  
    
  
   
   
   
  
 MULTIPLE VITAMIN PO Take  by mouth. OMEGA 3 PO Take  by mouth daily. PROAIR HFA 90 mcg/actuation inhaler Generic drug:  albuterol Your next dose is:   AS NEEDED Take 2 Puffs by inhalation every six (6) hours as needed. 2 Puff  
    
   
   
   
  
 sildenafil (antihypertensive) 20 mg tablet Commonly known as:  REVATIO Your next dose is:  [address] Take 20 mg by mouth daily as needed. 20 mg  
    
   
   
   
  
 ZyrTEC 10 mg Cap Generic drug:  Cetirizine Take 10 mg by mouth nightly. 10 mg Where to Get Your Medications Information on where to get these meds will be given to you by the nurse or doctor. ! Ask your nurse or doctor about these medications  
  famotidine 20 mg tablet pantoprazole 40 mg tablet Discharge Instructions Catheter Ablation Discharge Instructions *Check the puncture site frequently for swelling or bleeding. If you see any bleeding, lie down and apply pressure over the area with a clean town or washcloth. Notify your doctor for any redness, swelling, drainage or oozing from the puncture site. Notify your doctor for any fever or chills. *If the leg with the puncture becomes cold, numb or painful, call [address] [facility] [address] Cardiology at [phone] *Activity should be limited for the next 48 hours. Climb stairs as little as possible and avoid any stooping, bending or strenuous activity for 48 hours. No heavy lifting (anything over 10 pounds) for three days. *Do not drive for 48 hours. *You may resume your usual diet. Drink more fluids than usual. 
 
*Have a responsible person drive you home and stay with you for at least 24 hours after your heart catheterization/angiography. *You may remove the bandage from your Right and Left Groins in 24 hours. You may shower in 24 hours. No tub baths, hot tubs or swimming for one week. Do not place any lotions, creams, powders, ointments over the puncture site for one week. You may place a clean band-aid over the puncture site each day for 5 days. Change this daily. Electrophysiology Study and Catheter Ablation: What to Expect at [facility] Your Recovery You had an electrophysiology study for a problem with your heartbeat. You may also have had a catheter ablation to try to correct the problem. You may have swelling, bruising, or a small lump around the site where the catheters went into your body. These should go away in 3 to 4 weeks. Do not exercise hard or lift anything heavy for a week. You may be able to go back to work and to your normal routine in 1 or 2 days.  
This care sheet gives you a general idea about how long it will take for you to recover. But each person recovers at a different pace. Follow the steps below to get better as quickly as possible. How can you care for yourself at home? Activity ? · For 1 week, do not lift anything that would make you strain. This may include heavy grocery bags and milk containers, a heavy briefcase or backpack, cat litter or dog food bags, a vacuum , or a child. ? · For 1 week, do not exercise hard or do any activity that could strain your blood vessels or the site where the catheters went into your body. ? · Ask your doctor when it is okay to have sex. ? · You may shower 24 to 48 hours after the procedure, if your doctor okays it. Pat the incision dry. Do not take a bath for 1 week, or until your doctor tells you it isokay. Diet ? · You can eat your normal diet. If your stomach is upset, try bland, low-fat foods like plain rice, broiled chicken, toast, and yogurt. ? · Drink plenty of fluids (unless your doctor tells you not to). Medicines ? · Your doctor will tell you if and when you can restart your medicines. He or she will also give you instructions about taking any new medicines. ? · If you take blood thinners, such as warfarin (Coumadin), clopidogrel (Plavix), or aspirin, be sure to talk to your doctor. He or she will tell you if and when to start taking those medicines again. Make sure that you understand exactly what your doctor wants you to do. ? · Ask your doctor if you can take acetaminophen (Tylenol) for pain. Do not take aspirin for 3 days, unless your doctor says it is okay. ? · Check with your doctor before you take aspirin or anti-inflammatory medicines to reduce pain and swelling. These include ibuprofen (Advil, Motrin) and naproxen (Aleve). ? · Make sure you know which heart medicines to continue and which ones to stop. Ask your doctor if you are not sure. ?Catheter site care ? · You can remove your bandages the day after the procedure. Follow-up care is a key part of your treatment and safety. Be sure to make and go to all appointments, and call your doctor if you are having problems. It's also a good idea to know your test results and keep a list of the medicines you take. When should you call for help? Call 911 anytime you think you may need emergency care. For example, call if: 
? · You passed out (lost consciousness). ? · You have symptoms of a heart attack. These may include: ¨ Chest pain or pressure, or a strange feeling in the chest. 
¨ Sweating. ¨ Shortness of breath. ¨ Nausea or vomiting. ¨ Pain, pressure, or a strange feeling in the back, neck, jaw, or upper belly, or in one or both shoulders or arms. ¨ Lightheadedness or sudden weakness. ¨ A fast or irregular heartbeat. After you call 911, the  may tell you to chew 1 adult-strength or 2 to 4 low-dose aspirin. Wait for an ambulance. Do not try to drive yourself. ? · You have symptoms of a stroke. These may include: 
¨ Sudden numbness, tingling, weakness, or loss of movement in your face, arm, or leg, especially on suleiman side of your body. ¨ Sudden vision changes. ¨ Sudden trouble speaking. ¨ Sudden confusion or trouble understanding simple statements. ¨ Sudden problems with walking or balance. ¨ A sudden, severe headache that is different from past headaches. ?Call your doctor now or seek immediate medical care if: 
? · You are bleeding from the area where the catheter was put in your artery. ? · You have a fast-growing, painful lump at the catheter site. ? · You have signs of infection, such as: 
¨ Increased pain, swelling, warmth, or redness. ¨ Red streaks leading from the catheter site. ¨ Pus draining from the catheter site. ¨ A fever. ? · Your leg or arm looks blue or feels cold, numb, or tingly. ? Watch closely for any changes in your health, and be sure to contact your doctor if you have any problems. Where can you learn more? Go to http://jose juan-trudi.info/. Enter [phone] in the search box to learn more about \"Electrophysiology Study and Catheter Ablation: What to Expect at Home. \" Current as of: September 21, 2016 Content Version: 11.4 © 7523-3995 [employer]. Care instructions adapted under license by Vtap (which disclaims liability or warranty for this information). If you have questions about a medical condition or this instruction, always ask your healthcare professional. [place] [address] any warranty or liability for your use of this information. Atrial Fibrillation: Care Instructions Your Care Instructions Atrial fibrillation is an irregular and often fast heartbeat. Treating this condition is important for several reasons. It can cause blood clots, which can travel from your heart to your brain and cause a stroke. If you have a fast heartbeat, you may feel lightheaded, dizzy, and weak. An irregular heartbeat can also increase your risk for heart failure. Atrial fibrillation is often the result of another heart condition, such as high blood pressure or coronary artery disease. Making changes to improve your heart condition will help you stay healthy and active. Follow-up care is a key part of your treatment and safety. Be sure to make and go to all appointments, and call your doctor if you are having problems. It's also a good idea to know your test results and keep a list of the medicines you take. How can you care for yourself at home? Medicines ? · Take your medicines exactly as prescribed. Call your doctor if you think you are having a problem with your medicine. You will get more details on the specific medicines your doctor prescribes. ? · If your doctor has given you a blood thinner to prevent a stroke, be sure you get instructions about how to take your medicine safely. Blood thinners can cause serious bleeding problems. ? · Do not take any vitamins, over-the-counter drugs, or herbal products without talking to your doctor first. ? Lifestyle changes ? · Do not smoke. Smoking can increase your chance of a stroke and heart attack. If you need help quitting, talk to your doctor about stop-smoking programs and medicines. These can increase your chances of quitting for good. ? · Eat a heart-healthy diet. ? · Stay at a healthy weight. Lose weight if you need to.  
? · Limit alcohol to 2 drinks a day for men and 1 drink a day for women. Too much alcohol can cause health problems. ? · Avoid colds and flu. Get a pneumococcal vaccine shot. If you have had one before, ask your doctor whether you need another dose. Get a flu shot every year. If you must be around people with colds or flu, wash your hands often. Activity ? · If your doctor recommends it, get more exercise. Walking is a good choice. Bit by bit, increase the amount you walk every day. Try for at least 30 minutes on most days of the week. You also may want to swim, bike, or do other activities. Your doctor may suggest that you join a cardiac rehabilitation program so that you can have help increasing your physical activity safely. ? · Start light exercise if your doctor says it is okay. Even a small amount will help you get stronger, have more energy, and manage stress. Walking is an easy way to get exercise. Start out by walking a little more than you did in the hospital. Gradually increase the amount you walk. ? · When you exercise, watch for signs that your heart is working too hard. You are pushing too hard if you cannot talk while you are exercising. If you become short of breath or dizzy or have chest pain, sit down and rest immediately. ? · Check your pulse regularly. Place two fingers on the artery at the palm side of your wrist, in line with your thumb. If your heartbeat seems uneven or fast, talk to your doctor. When should you call for help? Call 911 anytime you think you may need emergency care. For example, call if: 
? · You have symptoms of a heart attack. These may include: ¨ Chest pain or pressure, or a strange feeling in the chest. 
¨ Sweating. ¨ Shortness of breath. ¨ Nausea or vomiting. ¨ Pain, pressure, or a strange feeling in the back, neck, jaw, or upper belly or in one or both shoulders or arms. ¨ Lightheadedness or sudden weakness. ¨ A fast or irregular heartbeat. After you call 911, the  may tell you to chew 1 adult-strength or 2 to 4 low-dose aspirin. Wait for an ambulance. Do not try to drive yourself. ? · You have symptoms of a stroke. These may include: 
¨ Sudden numbness, tingling, weakness, or loss of movement in your face, arm, or leg, especially on only one side of your body. ¨ Sudden vision changes. ¨ Sudden trouble speaking. ¨ Sudden confusion or trouble understanding simple statements. ¨ Sudden problems with walking or balance. ¨ A sudden, severe headache that is different from past headaches. ? · You passed out (lost consciousness). ?Call your doctor now or seek immediate medical care if: 
? · You have new or increased shortness of breath. ? · You feel dizzy or lightheaded, or you feel like you may faint. ? · Your heart rate becomes irregular. ? · You can feel your heart flutter in your chest or skip heartbeats. Tell your doctor if these symptoms are new or worse. ? Watch closely for changes in your health, and be sure to contact your doctor if you have any problems. Where can you learn more? Go to http://jose juan-trudi.info/. Enter U020 in the search box to learn more about \"Atrial Fibrillation: Care Instructions. \" Current as of: September 21, 2016 Content Version: 11.4 © 2694-6066 SpectraFluidics.  Care instructions adapted under license by Giveit100 (which disclaims liability or warranty for this information). If you have questions about a medical condition or this instruction, always ask your healthcare professional. [address] any warranty or liability for your use of this information. DISCHARGE SUMMARY from Nurse PATIENT INSTRUCTIONS: 
 
 
F-face looks uneven A-arms unable to move or move unevenly S-speech slurred or non-existent T-time-call 911 as soon as signs and symptoms begin-DO NOT go Back to bed or wait to see if you get better-TIME IS BRAIN. Warning Signs of HEART ATTACK Call 911 if you have these symptoms: 
? Chest discomfort. Most heart attacks involve discomfort in the center of the chest that lasts more than a few minutes, or that goes away and comes back. It can feel like uncomfortable pressure, squeezing, fullness, or pain. ? Discomfort in other areas of the upper body. Symptoms can include pain or discomfort in one or both arms, the back, neck, jaw, or stomach. ? Shortness of breath with or without chest discomfort. ? Other signs may include breaking out in a cold sweat, nausea, or lightheadedness. Don't wait more than five minutes to call [address]! Fast action can save your life. Calling 911 is almost always the fastest way to get lifesaving treatment. Emergency Medical Services staff can begin treatment when they arrive  up to an hour sooner than if someone gets to the hospital by car. The discharge information has been reviewed with the patient. The patient verbalized understanding. Discharge medications reviewed with the patient and appropriate educational materials and side effects teaching were provided. ___________________________________________________________________________________________________________________________________ [employer] Announcement We are excited to announce that we are making your provider's discharge notes available to you in MUJIN. You will see these notes when they are completed and signed by the physician that discharged you from your recent hospital stay. If you have any questions or concerns about any information you see in MUJIN, please call the Health Information Department where you were seen or reach out to your Primary Care Provider for more information about your plan of care. Introducing [facility]! Dear Jocelyn Marx: Thank you for requesting a MUJIN account. Our records indicate that you already have an active MUJIN account. You can access your account anytime at [email or web address]. [email or web address] [email or web address]/Kwan Mobile Did you know that you can access your hospital and ER discharge instructions at any time in MUJIN? You can also review all of your test results from your hospital stay or ER visit. Additional Information If you have questions, please visit the Frequently Asked Questions section of the MUJIN website at [email or web address] Remember, MUJIN is NOT to be used for urgent needs. For medical emergencies, dial 911. Now available from your iPhone and Android! Providers Seen During Your Hospitalization Provider Specialty Primary office phone Mary Padilla MD Cardiology [phone] Your Primary Care Physician (PCP) Primary Care Physician Office Phone Office Fax Gilbert Montoya [phone] You are allergic to the following No active allergies Recent Documentation Height Weight BMI Smoking Status 1.727 m 69.9 kg 23.43 kg/m2 Former Smoker Emergency Contacts Name Discharge Info Relation Home Work Mobile [address] [facility] CAREGIVER [3] Spouse [3] [phone] Patient Belongings The following personal items are in your possession at time of discharge: 
  Dental Appliances: Partials, Lowers, Uppers, With patient  Visual Aid: Glasses   Hearing Aids/Status: At home  Home Medications: Sent to pharmacy   Jewelry: Ring, Watch, With patient  Clothing: Footwear, Shirt, Pants, With patient    Other Valuables: Cell Phone, Estuardo Guild, With patient Please provide this summary of care documentation to your next provider. Signatures-by signing, you are acknowledging that this After Visit Summary has been reviewed with you and you have received a copy. Patient Signature:  ____________________________________________________________ Date:  ____________________________________________________________  
  
[facility] Officer Provider Signature:  ____________________________________________________________ Date:  ____________________________________________________________

## 2018-02-02 NOTE — IP AVS SNAPSHOT
[address] [place] [id number] 
[phone] Patient: Samantha Carrasco MRN: [id number] OVK:[birth year] A check jessica indicates which time of day the medication should be taken. My Medications START taking these medications Instructions Each Dose to Equal  
 Morning Noon Evening Bedtime  
 aspirin 81 mg chewable tablet Take 1 Tab by mouth daily. 81 mg  
    
  
   
   
   
  
 famotidine 20 mg tablet Commonly known as:  PEPCID Take 1 Tab by mouth two (2) times a day. 20 mg  
    
   
   
   
  
  
 pantoprazole 40 mg tablet Commonly known as:  PROTONIX Take 1 Tab by mouth Daily (before breakfast). 40 mg CONTINUE taking these medications Instructions Each Dose to Equal  
 Morning Noon Evening Bedtime  
 amiodarone 200 mg tablet Commonly known as:  CORDARONE Take 1 Tab by mouth two (2) times a day. 200 mg  
    
  
   
   
   
  
  
 apixaban 5 mg tablet Commonly known as:  José [place] Take 1 Tab by mouth two (2) times a day. 5 mg DEPO-TESTOSTERONE 200 mg/mL injection Generic drug:  testosterone cypionate Your next dose is:  AS DIRECTED.   
   
 440 mg by IntraMUSCular route Once every 2 weeks. 440 mg  
    
   
   
   
  
 dilTIAZem  mg ER capsule Commonly known as:  CARDIZEM CD Take 1 Cap by mouth daily. 180 mg  
    
  
   
   
   
  
 fluticasone 50 mcg/actuation nasal spray Commonly known as:  Sandra Medin 2 Sprays by Both Nostrils route daily. 2 Spray  
    
  
   
   
   
  
 fluticasone-salmeterol 250-50 mcg/dose diskus inhaler Commonly known as:  ADVAIR DISKUS INHALE 1 DOSE BY MOUTH TWICE DAILY. RINSE MOUTH AFTER USE  
     
  
   
   
   
  
  
 lisinopril 10 mg tablet Commonly known as:  PRINIVIL, ZESTRIL  
   
 TAKE ONE TABLET BY MOUTH ONE TIME DAILY metoprolol succinate 25 mg XL tablet Commonly known as:  TOPROL-XL Take 1 Tab by mouth daily. 25 mg  
    
  
   
   
   
  
 MULTIPLE VITAMIN PO Take  by mouth. OMEGA 3 PO Take  by mouth daily. PROAIR HFA 90 mcg/actuation inhaler Generic drug:  albuterol Your next dose is:   AS NEEDED Take 2 Puffs by inhalation every six (6) hours as needed. 2 Puff  
    
   
   
   
  
 sildenafil (antihypertensive) 20 mg tablet Commonly known as:  REVATIO Your next dose is:  [address] Take 20 mg by mouth daily as needed. 20 mg  
    
   
   
   
  
 ZyrTEC 10 mg Cap Generic drug:  Cetirizine Take 10 mg by mouth nightly. 10 mg Where to Get Your Medications Information on where to get these meds will be given to you by the nurse or doctor. ! Ask your nurse or doctor about these medications  
  famotidine 20 mg tablet  
 pantoprazole 40 mg tablet

## 2018-02-02 NOTE — PROGRESS NOTES
Patient received to [address] [place] [address] room # 3  Ambulatory from [facility]. Patient scheduled for AFib Ablation today with Dr Carloz Perkins. Procedure reviewed & questions answered, voiced good understanding consent obtained & placed on chart. All medications and medical history reviewed. Will prep patient per orders. Patient & family updated on plan of care.

## 2018-02-02 NOTE — PERIOP NOTES
TRANSFER - OUT REPORT:    Verbal report given to Lauren LAWLER(name) on Ten Bruno  being transferred to [facility](unit) for routine post - op       Report consisted of patients Situation, Background, Assessment and   Recommendations(SBAR). Information from the following report(s) SBAR, Kardex, OR Summary, Procedure Summary, Intake/Output and MAR was reviewed with the receiving nurse. Lines:   Peripheral IV 02/02/18 Left Antecubital (Active)   Site Assessment Clean, dry, & intact 2/2/2018 11:46 AM   Phlebitis Assessment 0 2/2/2018 11:46 AM   Infiltration Assessment 0 2/2/2018 11:46 AM   Dressing Status Clean, dry, & intact 2/2/2018 11:46 AM   Dressing Type Transparent;Tape 2/2/2018 11:46 AM   Hub Color/Line Status Infusing 2/2/2018 11:46 AM        Opportunity for questions and clarification was provided. Patient transported with:   Monitor  Registered Nurse    VTE prophylaxis orders have not been written for Ten Bruno. Patient and family given floor number and nurses name. Family updated re: pt status after security code verified.

## 2018-02-02 NOTE — PROCEDURES
PRE-ELECTROPHYSIOLOGY STUDY DIAGNOSES  1. Recurrent atrial fibrillation and atrial flutter  2. Atrial fibrillation that has failed antiarrhythmic therapy.       PROCEDURE PERFORMED  1. Electrophysiology testing with atrial fibrillation ablation, and pulmonary vein isolation. 2. Left ventricle pacing and recording. 3. Intracardiac 3D mapping with SOPHIE NavX. 4. Intracardiac echo. 5. Ablation of second arrhythmia consistent with right-sided atrial flutter. 6. Additional set of linear lines for treatment of atrial fibrillation across the anterior roof of the left atrium. Anesthesia: General     Estimated Blood Loss: Less than 10 mL     Specimens: * No specimens in log *      PROCEDURE IN DETAIL: The patient was brought into the EP lab in a fasting  state. The right and left groins and left shoulder area were prepped and  draped in the usual sterile fashion. Access was obtained in the left  subclavian vein via the Seldinger technique, over which a 7-[place] sheath  was placed. Through this 7-[place] sheath, a coronary sinus mapping  catheter was placed in the coronary sinus with pacing and recording from  the left atrium. This lead was then secured down. Access was obtained in  the right femoral vein via the Seldinger technique, over which 8 and  5-[place] sheaths were placed. Access was obtained in the left femoral  vein via the Seldinger technique, over which an 8-[place] sheath was  placed. The 8-[place] sheaths were then exchanged out for [place] [place] Agilis  sheaths. The sheaths were then flushed and irrigated every 10 minutes. Intracardiac echo was placed in the left-sided Agilis sheath and used to  visualize the right atrium, interatrial septum, and left atrium. Through  the right-sided Agilis sheath, the [address] [place] [address] transseptal needle and dilator  were placed. A transseptal heart cath was performed. The patient was  noted to have mild right and left atrial enlargement.  The needle and dilator were able to be advanced to the left atrium, followed by sheath as needle and dilator were retracted  back. A heparin bolus was then given, followed by a drip. ACTs remained  therapeutic throughout the extent of the case.     Through the Agilis sheath in the left atrium, a multipole AFocus mapping  catheter was placed. Prior to the ablation, a 3D CT scan had been  performed, showed the patient had 4 distinct veins. After full  reconstruction of the left atrium using intracardiac 3D mapping, the  AFocus catheter was removed. A standard curved 8 mm Blazer II ablation  catheter was then placed into the left atrium. Further reconstruction of  the left atrium was performed. With recordings of the left ventricle at  the mitral valve annulus with pacing and recording from the left  ventricle area. After full reconstruction of the left atrium, the patient  then underwent pulmonary vein isolation through standard ablation  techniques. Prior to the first ablation being placed, a multipolar  transesophageal temperature probe was placed with recordings of the  esophagus throughout the extent of the case. After full isolation of the  Veins, entry and exit block was achieved in 4 veins. The atrial fibrillation did remain, and additional sets of linear lines was placed across the anterior roof. A right sided A. Flutter developed and was mapped/ablated. The patient then had a formal electrophysiology test performed. An RV catheter was placed out the RV apex. The His bundle was mapped out, and a formal electrophysiology test was performed.     1. Baseline rhythm showed Sinus heart rhythm   2. R-R interval of 768  3. IA interval of 163  4. QRS of 103  5. QT of 414  6. AH of 82  7. HV of 54    8. The sinus node recovery time at 600 was 1132, at 400 was 1059.   9. The AV Wenckebach cycle length was 500.   10. The AV node refractory period at 600 was 450,  11. The VA Wenckebach cycle length was >600. 12. The VA node refractory period at 600 was 0.   13.  The ventricular effective refractory period at 600 was 240, at 400 was 200. Protamine was then given to reverse the effects of heparin. Sheaths were pulled when ACT was less than 180. The patient was recovered from his sedation and transferred  from the lab in a stable condition.     IMPRESSION  1. Successful isolation of 4 out of 4 veins, with ablation of right sided A. Flutter  2. Normal electrophysiology testing seen post ablation.

## 2018-02-02 NOTE — ANESTHESIA POSTPROCEDURE EVALUATION
Post-Anesthesia Evaluation and Assessment    Patient: Karin Reyes MRN: [id number]  SSN: [id number]    YOB: 1950  Age: 74 y.o. Sex: male       Cardiovascular Function/Vital Signs  Visit Vitals    /58    Pulse 66    Temp 36.7 °C (98.1 °F)    Resp 16    Ht 5' 8\" (1.727 m)    Wt 67.6 kg (149 lb)    SpO2 99%    BMI 22.66 kg/m2       Patient is status post general anesthesia for Procedure(s):  AFIB  ABLATION. Nausea/Vomiting: None    Postoperative hydration reviewed and adequate. Pain:  Pain Scale 1: Numeric (0 - 10) (02/02/18 1146)  Pain Intensity 1: 0 (02/02/18 1146)   Managed    Neurological Status:   Neuro (WDL): Exceptions to WDL (02/02/18 1146)  Neuro  Neurologic State: Drowsy (02/02/18 1146)  LUE Motor Response: Purposeful (02/02/18 1146)  LLE Motor Response: Purposeful (02/02/18 1146)  RUE Motor Response: Purposeful (02/02/18 1146)  RLE Motor Response: Purposeful (02/02/18 1146)   At baseline    Mental Status and Level of Consciousness: Arousable    Pulmonary Status:   O2 Device: Nasal cannula (02/02/18 1146)   Adequate oxygenation and airway patent    Complications related to anesthesia: None    Post-anesthesia assessment completed.  No concerns    Signed By: Gerri Chen MD     February 2, 2018

## 2018-02-02 NOTE — PROGRESS NOTES
Patient to tele room via stretcher from pacu. Patient transferred to bed via sheet slide. Patient placed in gown and monitor leads applied. [place] at bedside with BP cycling every 15 minutes. Pt is alert and oriented x4, calm, cooperative and follows commands appropriately. Patient presently without CP, SOB, or n/v. No present complaints reported from patient. LS clear. Breathing even, regular, and non-labored. SR via telemetry, without ectopy. Negative for edema in extremities. VSS. No distress noted. gtts running: none    bilateral venous groins site CDI. Sites dry and intact without hematoma, bleeding, or bruising. Left subclavian site dry and intact without hematoma. No bleeding. Palpable pulses. Reviewed bedrest instructions. Bed rest until 7pm. Patient instructed to keep legs straight and still and head on pillow. Patient verbalizes understanding. Call light in reach. [place] to be d/c at end of bed rest.    Dual skin assessment with secondary RN. Skin is intact with exception to ablation sites noted above. Unable to assess sacral area due to bilateral groin sites and bed rest. Pt repositioned in bed and turns self appropriately. Admission database completed shortly after arrival. Patient oriented to room and floor, no questions voiced at this time. Plan of care reviewed. Patient voiced understanding to use call light to communicate needs.

## 2018-02-02 NOTE — ANESTHESIA PREPROCEDURE EVALUATION
Anesthetic History   No history of anesthetic complications            Review of Systems / Medical History  Patient summary reviewed and pertinent labs reviewed    Pulmonary            Asthma : well controlled       Neuro/Psych   Within defined limits           Cardiovascular    Hypertension        Dysrhythmias : atrial fibrillation      Exercise tolerance: >4 METS     GI/Hepatic/Renal  Within defined limits              Endo/Other        Arthritis     Other Findings   Comments: Lumbar surgery  DDD    5.1         Physical Exam    Airway  Mallampati: II  TM Distance: 4 - 6 cm  Neck ROM: normal range of motion   Mouth opening: Normal     Cardiovascular    Rhythm: regular  Rate: normal         Dental    Dentition: Lower partial plate and Upper partial plate     Pulmonary  Breath sounds clear to auscultation               Abdominal  GI exam deferred       Other Findings            Anesthetic Plan    ASA: 3  Anesthesia type: general          Induction: Intravenous  Anesthetic plan and risks discussed with: Patient

## 2018-02-02 NOTE — PROGRESS NOTES
Pt admitted at Observation status. Pt instructed on home medication policy; pt voices understanding. Pt provided copies of the following: Admission pamphlet with Observation insert and Medicare FAQ's. Home meds ordered per MD, verified with [facility] and supplied by patient. No narcotic meds. Medications verified and labeled per pharmacy and placed in locked box in patient's room. The medications received from the patient include flonase, toprol, cardizem, lisinopril, amio, and elquis.

## 2018-02-03 VITALS
DIASTOLIC BLOOD PRESSURE: 59 MMHG | WEIGHT: 154.1 LBS | BODY MASS INDEX: 23.36 KG/M2 | SYSTOLIC BLOOD PRESSURE: 116 MMHG | OXYGEN SATURATION: 96 % | TEMPERATURE: 98.5 F | HEIGHT: 68 IN | RESPIRATION RATE: 16 BRPM | HEART RATE: 90 BPM

## 2018-02-03 LAB
ANION GAP SERPL CALC-SCNC: 11 MMOL/L (ref 7–16)
BUN SERPL-MCNC: 14 MG/DL (ref 8–23)
CALCIUM SERPL-MCNC: 8 MG/DL (ref 8.3–10.4)
CHLORIDE SERPL-SCNC: 106 MMOL/L (ref 98–107)
CO2 SERPL-SCNC: 25 MMOL/L (ref 21–32)
CREAT SERPL-MCNC: 0.96 MG/DL (ref 0.8–1.5)
GLUCOSE SERPL-MCNC: 106 MG/DL (ref 65–100)
MAGNESIUM SERPL-MCNC: 2.1 MG/DL (ref 1.8–2.4)
POTASSIUM SERPL-SCNC: 4.2 MMOL/L (ref 3.5–5.1)
SODIUM SERPL-SCNC: 142 MMOL/L (ref 136–145)

## 2018-02-03 PROCEDURE — 74011250637 HC RX REV CODE- 250/637: Performed by: INTERNAL MEDICINE

## 2018-02-03 PROCEDURE — 80048 BASIC METABOLIC PNL TOTAL CA: CPT | Performed by: INTERNAL MEDICINE

## 2018-02-03 PROCEDURE — 94640 AIRWAY INHALATION TREATMENT: CPT

## 2018-02-03 PROCEDURE — 99218 HC RM OBSERVATION: CPT

## 2018-02-03 PROCEDURE — 36415 COLL VENOUS BLD VENIPUNCTURE: CPT | Performed by: INTERNAL MEDICINE

## 2018-02-03 PROCEDURE — 74011000250 HC RX REV CODE- 250: Performed by: INTERNAL MEDICINE

## 2018-02-03 PROCEDURE — 83735 ASSAY OF MAGNESIUM: CPT | Performed by: INTERNAL MEDICINE

## 2018-02-03 RX ORDER — PANTOPRAZOLE SODIUM 40 MG/1
40 TABLET, DELAYED RELEASE ORAL
Qty: 30 TAB | Refills: 0 | Status: SHIPPED | OUTPATIENT
Start: 2018-02-03 | End: 2018-06-01

## 2018-02-03 RX ORDER — FAMOTIDINE 20 MG/1
20 TABLET, FILM COATED ORAL 2 TIMES DAILY
Qty: 60 TAB | Refills: 0 | Status: SHIPPED | OUTPATIENT
Start: 2018-02-03 | End: 2018-06-01

## 2018-02-03 RX ORDER — GUAIFENESIN 100 MG/5ML
81 LIQUID (ML) ORAL DAILY
Status: SHIPPED | COMMUNITY
Start: 2018-02-03 | End: 2018-06-01

## 2018-02-03 RX ADMIN — ALBUTEROL SULFATE 2.5 MG: 2.5 SOLUTION RESPIRATORY (INHALATION) at 08:41

## 2018-02-03 RX ADMIN — Medication 2 SPRAY: at 09:23

## 2018-02-03 RX ADMIN — LISINOPRIL 10 MG: 5 TABLET ORAL at 09:18

## 2018-02-03 RX ADMIN — METOPROLOL SUCCINATE 25 MG: 25 TABLET, EXTENDED RELEASE ORAL at 09:23

## 2018-02-03 RX ADMIN — DILTIAZEM HYDROCHLORIDE 180 MG: 180 CAPSULE, COATED, EXTENDED RELEASE ORAL at 09:22

## 2018-02-03 RX ADMIN — Medication 5 ML: at 05:36

## 2018-02-03 RX ADMIN — AMIODARONE HYDROCHLORIDE 200 MG: 200 TABLET ORAL at 09:21

## 2018-02-03 RX ADMIN — ASPIRIN 81 MG 81 MG: 81 TABLET ORAL at 09:18

## 2018-02-03 NOTE — PROGRESS NOTES
Verbal bedside report given to Gerhardt Corners, oncoming RN. Patient's situation, background, assessment and recommendations provided. Kardex, Mar, and recent results also reviewed. Opportunity for questions provided. Oncoming RN assumed care of patient.

## 2018-02-03 NOTE — PROGRESS NOTES
Bedside and Verbal shift change report given to self (oncoming nurse) by Phoebe Babcock RN (offgoing nurse). Report included the following information SBAR, Kardex, MAR and Recent Results.

## 2018-02-03 NOTE — PROGRESS NOTES
[facility] CARDIOLOGY PROGRESS NOTE           2/3/2018 9:12 AM    Admit Date: 2/2/2018    Admit Diagnosis: Paroxysmal atrial fibrillation (HCC) [I48.0]      Subjective:   No complaints this AM, no chest pain or shortness of breath    Interval History: (History of pertinent interval events obtained from nursing staff)  S/p cardiac ablation yesterday without immediate complications    ROS:  GEN:  No fever or chills  Cardiovascular:  As noted above  Pulmonary:  As noted above  Neuro:  No new focal motor or sensory loss      Objective:     Vitals:    02/02/18 2110 02/03/18 0134 02/03/18 0452 02/03/18 0852   BP: 109/63 100/60 114/64    Pulse: 89 74 77    Resp: 18 20 18    Temp: 98.2 °F (36.8 °C) 97.8 °F (36.6 °C) 98.2 °F (36.8 °C)    SpO2: 96% 99% 97% 97%   Weight:   69.9 kg (154 lb 1.6 oz)    Height:           Physical Exam:  General-Well Developed, Well Nourished, No Acute Distress, Alert & Oriented x 3, appropriate mood.   Neck- supple, no JVD  CV- regular rate and rhythm no MRG  Lung- clear bilaterally  Abd- soft, nontender, nondistended  Ext- no edema bilaterally, B/L femoral access sites without hematoma or bruits  Skin- warm and dry    Current Facility-Administered Medications   Medication Dose Route Frequency    albuterol (PROVENTIL HFA, VENTOLIN HFA, PROAIR HFA) inhaler 2 Puff  2 Puff Inhalation Q6H PRN    amiodarone (CORDARONE) tablet 200 mg - Patient own Medication (Patient Supplied)  200 mg Oral BID    apixaban (ELIQUIS) tablet 5 mg (Patient Supplied)  5 mg Oral BID    dilTIAZem CD (CARDIZEM CD/ Cartia XT) capsule 180 mg - Patient own Medication (Patient Supplied)  180 mg Oral DAILY    fluticasone (FLONASE) 50 mcg/actuation nasal spray - Patient own Medication (Patient Supplied)  2 [place] Both Nostrils DAILY    lisinopril (PRINIVIL, ZESTRIL) tablet 10 mg - Patient own Medication (Patient Supplied)  10 mg Oral DAILY    metoprolol succinate (TOPROL-XL) XL tablet 25 mg - Patient own Medication (Patient Supplied)  25 mg Oral DAILY    sodium chloride (NS) flush 5-10 mL  5-10 mL IntraVENous Q8H    sodium chloride (NS) flush 5-10 mL  5-10 mL IntraVENous PRN    aspirin chewable tablet 81 mg  81 mg Oral DAILY    ibuprofen (MOTRIN) tablet 400 mg  400 mg Oral Q4H PRN    oxyCODONE-acetaminophen (PERCOCET) 5-325 mg per tablet 1 Tab  1 Tab Oral Q4H PRN    famotidine (PEPCID) tablet 20 mg  20 mg Oral BID    pantoprazole (PROTONIX) tablet 40 mg  40 mg Oral ACB    albuterol (PROVENTIL VENTOLIN) nebulizer solution 2.5 mg  2.5 mg Nebulization Q6H RT    budesonide (PULMICORT) 500 mcg/2 ml nebulizer suspension  2 mL Nebulization BID    dilTIAZem CD (CARDIZEM CD) capsule 180 mg  180 mg Oral DAILY     Data Review:   Recent Results (from the past 24 hour(s))   POC ACTIVATED CLOTTING TIME    Collection Time: 02/02/18  9:30 AM   Result Value Ref Range    Activated Clotting Time (POC) 301 (H) 70 - 128 SECS   POC ACTIVATED CLOTTING TIME    Collection Time: 02/02/18 10:14 AM   Result Value Ref Range    Activated Clotting Time (POC) 285 (H) 70 - 128 SECS   POC ACTIVATED CLOTTING TIME    Collection Time: 02/02/18 11:20 AM   Result Value Ref Range    Activated Clotting Time (POC) 131 (H) 70 - 128 SECS   EKG, 12 LEAD, INITIAL    Collection Time: 02/02/18 11:56 AM   Result Value Ref Range    Ventricular Rate 69 BPM    Atrial Rate 69 BPM    P-R Interval 168 ms    QRS Duration 104 ms    Q-T Interval 414 ms    QTC Calculation (Bezet) 443 ms    Calculated P Axis 65 degrees    Calculated R Axis 32 degrees    Calculated T Axis 40 degrees    Diagnosis       Normal sinus rhythm  Normal ECG  When compared with ECG of 02-FEB-2018 06:59,  No significant change was found  Confirmed by NALINI AGUAYO (), Carey Montgomery ([id number]) on 2/2/2018 1:57:88 PM     METABOLIC PANEL, BASIC    Collection Time: 02/03/18  4:00 AM   Result Value Ref Range    Sodium 142 136 - 145 mmol/L    Potassium 4.2 3.5 - 5.1 mmol/L    Chloride 106 98 - 107 mmol/L    CO2 25 21 - 32 mmol/L    Anion gap 11 7 - 16 mmol/L    Glucose 106 (H) 65 - 100 mg/dL    BUN 14 8 - 23 MG/DL    Creatinine 0.96 0.8 - 1.5 MG/DL    GFR est AA >60 >60 ml/min/1.73m2    GFR est non-AA >60 >60 ml/min/1.73m2    Calcium 8.0 (L) 8.3 - 10.4 MG/DL   MAGNESIUM    Collection Time: 02/03/18  4:00 AM   Result Value Ref Range    Magnesium 2.1 1.8 - 2.4 mg/dL       EKG:  (EKG has been independently visualized by me with interpretation below)  Assessment:     Active Problems:    Atrial fibrillation ([address] (2/2/2018)      Plan:   1. afib:  Pt underwent cardiac ablation for afib yesterday without immediate complication, no questions or concerns this AM, ready and stable for discharge with appropriate follow up. Cont amiodarone, eliquis. 2. Dispo: home today with follow up with Dr. Carla Champion. [facility]  Conrado AGUAYO  Cardiology/Electrophysiology

## 2018-02-03 NOTE — PROGRESS NOTES
Received shift report from BUCKY Galvez at patients bedside. Assumed patient care. Pt resting in bed with no needs at this time.

## 2018-02-03 NOTE — PROGRESS NOTES
Discharge instructions reviewed with patient and wife. Prescriptions given for protonix and pepcid and med info sheets provided for all new medications. Opportunity for questions provided. Patient and wife voiced understanding of all discharge instructions. Medication returned to patient.   IV and tele box removed by primary RN

## 2018-02-03 NOTE — PROGRESS NOTES
Bedside and Verbal shift change report given to John Petty RN (oncoming nurse) by self Jose pineda). Report included the following information SBAR, Kardex, MAR and Recent Results.

## 2018-02-03 NOTE — DISCHARGE INSTRUCTIONS
Catheter Ablation Discharge Instructions    *Check the puncture site frequently for swelling or bleeding. If you see any bleeding, lie down and apply pressure over the area with a clean town or washcloth. Notify your doctor for any redness, swelling, drainage or oozing from the puncture site. Notify your doctor for any fever or chills. *If the leg with the puncture becomes cold, numb or painful, call [facility] at [phone]    *Activity should be limited for the next 48 hours. Climb stairs as little as possible and avoid any stooping, bending or strenuous activity for 48 hours. No heavy lifting (anything over 10 pounds) for three days. *Do not drive for 48 hours. *You may resume your usual diet. Drink more fluids than usual.    *Have a responsible person drive you home and stay with you for at least 24 hours after your heart catheterization/angiography. *You may remove the bandage from your Right and Left Groins in 24 hours. You may shower in 24 hours. No tub baths, hot tubs or swimming for one week. Do not place any lotions, creams, powders, ointments over the puncture site for one week. You may place a clean band-aid over the puncture site each day for 5 days. Change this daily. Electrophysiology Study and Catheter Ablation: What to Expect at [address] [place] [address] had an electrophysiology study for a problem with your heartbeat. You may also have had a catheter ablation to try to correct the problem. You may have swelling, bruising, or a small lump around the site where the catheters went into your body. These should go away in 3 to 4 weeks. Do not exercise hard or lift anything heavy for a week. You may be able to go back to work and to your normal routine in 1 or 2 days. This care sheet gives you a general idea about how long it will take for you to recover. But each person recovers at a different pace.  Follow the steps below to get better as quickly as possible. How can you care for yourself at home? Activity  ? · For 1 week, do not lift anything that would make you strain. This may include heavy grocery bags and milk containers, a heavy briefcase or backpack, cat litter or dog food bags, a vacuum , or a child. ? · For 1 week, do not exercise hard or do any activity that could strain your blood vessels or the site where the catheters went into your body. ? · Ask your doctor when it is okay to have sex. ? · You may shower 24 to 48 hours after the procedure, if your doctor okays it. Pat the incision dry. Do not take a bath for 1 week, or until your doctor tells you it isokay. Diet  ? · You can eat your normal diet. If your stomach is upset, try bland, low-fat foods like plain rice, broiled chicken, toast, and yogurt. ? · Drink plenty of fluids (unless your doctor tells you not to). Medicines  ? · Your doctor will tell you if and when you can restart your medicines. He or she will also give you instructions about taking any new medicines. ? · If you take blood thinners, such as warfarin (Coumadin), clopidogrel (Plavix), or aspirin, be sure to talk to your doctor. He or she will tell you if and when to start taking those medicines again. Make sure that you understand exactly what your doctor wants you to do. ? · Ask your doctor if you can take acetaminophen (Tylenol) for pain. Do not take aspirin for 3 days, unless your doctor says it is okay. ? · Check with your doctor before you take aspirin or anti-inflammatory medicines to reduce pain and swelling. These include ibuprofen (Advil, Motrin) and naproxen (Aleve). ? · Make sure you know which heart medicines to continue and which ones to stop. Ask your doctor if you are not sure. ?Catheter site care  ? · You can remove your bandages the day after the procedure. Follow-up care is a key part of your treatment and safety.  Be sure to make and go to all appointments, and call your doctor if you are having problems. It's also a good idea to know your test results and keep a list of the medicines you take. When should you call for help? Call 911 anytime you think you may need emergency care. For example, call if:  ? · You passed out (lost consciousness). ? · You have symptoms of a heart attack. These may include:  ¨ Chest pain or pressure, or a strange feeling in the chest.  ¨ Sweating. ¨ Shortness of breath. ¨ Nausea or vomiting. ¨ Pain, pressure, or a strange feeling in the back, neck, jaw, or upper belly, or in one or both shoulders or arms. ¨ Lightheadedness or sudden weakness. ¨ A fast or irregular heartbeat. After you call 911, the  may tell you to chew 1 adult-strength or 2 to 4 low-dose aspirin. Wait for an ambulance. Do not try to drive yourself. ? · You have symptoms of a stroke. These may include:  ¨ Sudden numbness, tingling, weakness, or loss of movement in your face, arm, or leg, especially on suleiman side of your body. ¨ Sudden vision changes. ¨ Sudden trouble speaking. ¨ Sudden confusion or trouble understanding simple statements. ¨ Sudden problems with walking or balance. ¨ A sudden, severe headache that is different from past headaches. ?Call your doctor now or seek immediate medical care if:  ? · You are bleeding from the area where the catheter was put in your artery. ? · You have a fast-growing, painful lump at the catheter site. ? · You have signs of infection, such as:  ¨ Increased pain, swelling, warmth, or redness. ¨ Red streaks leading from the catheter site. ¨ Pus draining from the catheter site. ¨ A fever. ? · Your leg or arm looks blue or feels cold, numb, or tingly. ? Watch closely for any changes in your health, and be sure to contact your doctor if you have any problems. Where can you learn more? Go to http://jose juan-trudi.info/.   Enter [phone] in the search box to learn more about \"Electrophysiology Study and Catheter Ablation: What to Expect at Home. \"  Current as of: September 21, 2016  Content Version: 11.4  © 3296-7359 [employer]. Care instructions adapted under license by [employer] (which disclaims liability or warranty for this information). If you have questions about a medical condition or this instruction, always ask your healthcare professional. [address] any warranty or liability for your use of this information. Atrial Fibrillation: Care Instructions  Your Care Instructions    Atrial fibrillation is an irregular and often fast heartbeat. Treating this condition is important for several reasons. It can cause blood clots, which can travel from your heart to your brain and cause a stroke. If you have a fast heartbeat, you may feel lightheaded, dizzy, and weak. An irregular heartbeat can also increase your risk for heart failure. Atrial fibrillation is often the result of another heart condition, such as high blood pressure or coronary artery disease. Making changes to improve your heart condition will help you stay healthy and active. Follow-up care is a key part of your treatment and safety. Be sure to make and go to all appointments, and call your doctor if you are having problems. It's also a good idea to know your test results and keep a list of the medicines you take. How can you care for yourself at home? Medicines  ? · Take your medicines exactly as prescribed. Call your doctor if you think you are having a problem with your medicine. You will get more details on the specific medicines your doctor prescribes. ? · If your doctor has given you a blood thinner to prevent a stroke, be sure you get instructions about how to take your medicine safely. Blood thinners can cause serious bleeding problems. ? · Do not take any vitamins, over-the-counter drugs, or herbal products without talking to your doctor first.   ? Lifestyle changes  ? · Do not smoke.  Smoking can increase your chance of a stroke and heart attack. If you need help quitting, talk to your doctor about stop-smoking programs and medicines. These can increase your chances of quitting for good. ? · Eat a heart-healthy diet. ? · Stay at a healthy weight. Lose weight if you need to.   ? · Limit alcohol to 2 drinks a day for men and 1 drink a day for women. Too much alcohol can cause health problems. ? · Avoid colds and flu. Get a pneumococcal vaccine shot. If you have had one before, ask your doctor whether you need another dose. Get a flu shot every year. If you must be around people with colds or flu, wash your hands often. Activity  ? · If your doctor recommends it, get more exercise. Walking is a good choice. Bit by bit, increase the amount you walk every day. Try for at least 30 minutes on most days of the week. You also may want to swim, bike, or do other activities. Your doctor may suggest that you join a cardiac rehabilitation program so that you can have help increasing your physical activity safely. ? · Start light exercise if your doctor says it is okay. Even a small amount will help you get stronger, have more energy, and manage stress. Walking is an easy way to get exercise. Start out by walking a little more than you did in the hospital. Gradually increase the amount you walk. ? · When you exercise, watch for signs that your heart is working too hard. You are pushing too hard if you cannot talk while you are exercising. If you become short of breath or dizzy or have chest pain, sit down and rest immediately. ? · Check your pulse regularly. Place two fingers on the artery at the palm side of your wrist, in line with your thumb. If your heartbeat seems uneven or fast, talk to your doctor. When should you call for help? Call 911 anytime you think you may need emergency care. For example, call if:  ? · You have symptoms of a heart attack.  These may include:  ¨ Chest pain or pressure, or a strange feeling in the chest.  ¨ Sweating. ¨ Shortness of breath. ¨ Nausea or vomiting. ¨ Pain, pressure, or a strange feeling in the back, neck, jaw, or upper belly or in one or both shoulders or arms. ¨ Lightheadedness or sudden weakness. ¨ A fast or irregular heartbeat. After you call 911, the  may tell you to chew 1 adult-strength or 2 to 4 low-dose aspirin. Wait for an ambulance. Do not try to drive yourself. ? · You have symptoms of a stroke. These may include:  ¨ Sudden numbness, tingling, weakness, or loss of movement in your face, arm, or leg, especially on only one side of your body. ¨ Sudden vision changes. ¨ Sudden trouble speaking. ¨ Sudden confusion or trouble understanding simple statements. ¨ Sudden problems with walking or balance. ¨ A sudden, severe headache that is different from past headaches. ? · You passed out (lost consciousness). ?Call your doctor now or seek immediate medical care if:  ? · You have new or increased shortness of breath. ? · You feel dizzy or lightheaded, or you feel like you may faint. ? · Your heart rate becomes irregular. ? · You can feel your heart flutter in your chest or skip heartbeats. Tell your doctor if these symptoms are new or worse. ? Watch closely for changes in your health, and be sure to contact your doctor if you have any problems. Where can you learn more? Go to http://jose juan-trudi.info/. Enter U020 in the search box to learn more about \"Atrial Fibrillation: Care Instructions. \"  Current as of: September 21, 2016  Content Version: 11.4  © 9864-4070 [employer]. Care instructions adapted under license by [employer] (which disclaims liability or warranty for this information).  If you have questions about a medical condition or this instruction, always ask your healthcare professional. [place] [address] any warranty or liability for your use of this information. DISCHARGE SUMMARY from Nurse    PATIENT INSTRUCTIONS:    After general anesthesia or intravenous sedation, for 24 hours or while taking prescription Narcotics:  · Limit your activities  · Do not drive and operate hazardous machinery  · Do not make important personal or business decisions  · Do  not drink alcoholic beverages  · If you have not urinated within 8 hours after discharge, please contact your surgeon on call. Report the following to your surgeon:  · Excessive pain, swelling, redness or odor of or around the surgical area  · Temperature over 100.5  · Nausea and vomiting lasting longer than 4 hours or if unable to take medications  · Any signs of decreased circulation or nerve impairment to extremity: change in color, persistent  numbness, tingling, coldness or increase pain  · Any questions    What to do at Home:  Recommended activity: No lifting or Strenuous exercise for 3 days    If you experience any of the following symptoms of unrelieved chest pain or increased shortness of breath, please follow up with [address] [place] [address] [address] [facility] at [phone]. *  Please give a list of your current medications to your Primary Care Provider. *  Please update this list whenever your medications are discontinued, doses are      changed, or new medications (including over-the-counter products) are added. *  Please carry medication information at all times in case of emergency situations. These are general instructions for a healthy lifestyle:    No smoking/ No tobacco products/ Avoid exposure to second hand smoke  Surgeon General's Warning:  Quitting smoking now greatly reduces serious risk to your health.     Obesity, smoking, and sedentary lifestyle greatly increases your risk for illness    A healthy diet, regular physical exercise & weight monitoring are important for maintaining a healthy lifestyle    You may be retaining fluid if you have a history of heart failure or if you experience any of the following symptoms:  Weight gain of 3 pounds or more overnight or 5 pounds in a week, increased swelling in our hands or feet or shortness of breath while lying flat in bed. Please call your doctor as soon as you notice any of these symptoms; do not wait until your next office visit. Recognize signs and symptoms of STROKE:    F-face looks uneven    A-arms unable to move or move unevenly    S-speech slurred or non-existent    T-time-call 911 as soon as signs and symptoms begin-DO NOT go       Back to bed or wait to see if you get better-TIME IS BRAIN. Warning Signs of HEART ATTACK     Call 911 if you have these symptoms:   Chest discomfort. Most heart attacks involve discomfort in the center of the chest that lasts more than a few minutes, or that goes away and comes back. It can feel like uncomfortable pressure, squeezing, fullness, or pain.  Discomfort in other areas of the upper body. Symptoms can include pain or discomfort in one or both arms, the back, neck, jaw, or stomach.  Shortness of breath with or without chest discomfort.  Other signs may include breaking out in a cold sweat, nausea, or lightheadedness. Don't wait more than five minutes to call 911 - MINUTES MATTER! Fast action can save your life. Calling 911 is almost always the fastest way to get lifesaving treatment. Emergency Medical Services staff can begin treatment when they arrive -- up to an hour sooner than if someone gets to the hospital by car. The discharge information has been reviewed with the patient. The patient verbalized understanding. Discharge medications reviewed with the patient and appropriate educational materials and side effects teaching were provided.   ___________________________________________________________________________________________________________________________________

## 2018-02-03 NOTE — PROGRESS NOTES
Problem: Falls - Risk of  Goal: *Absence of Falls  Document Ana Fall Risk and appropriate interventions in the flowsheet. Outcome: Progressing Towards Goal  Fall Risk Interventions:    Pt progressing towards goal. No falls since admission. Bed low and locked. Call light within reach. Side rails x 2. Gripper socks applied. Personal belongings within reach. Pt verbalizes understanding to call for assistance.             Medication Interventions: Patient to call before getting OOB

## 2018-02-03 NOTE — DISCHARGE SUMMARY
[facility]iology Discharge Summary     Patient ID:  Jensen Torres  [id number]  27 y.o.  1950    Admit date: 2/2/2018    Discharge date:  2/3/2018    Admitting Physician: Cammie Parson MD     Discharge Physician: Dr. Dre Moss    Admission Diagnoses: Paroxysmal atrial fibrillation [facility]) [I48.0]    Discharge Diagnoses:    Diagnosis    Atrial flutter ([address]    DDD (degenerative disc disease), cervical    Paroxysmal atrial fibrillation [address] [address]    Essential hypertension, benign    Moderate persistent asthma without complication       Cardiology Procedures this admission:  EPS with atrial fibrillation ablation  Consults: None    Hospital Course: Patient was seen at the office of [facility] Cardiology by Dr. Orlando Paz with AF and flutter and was for scheduled EPS and ablation at [facility] on 2/2/18. Patient was taken to the EP lab by Dr. Orlando Paz and underwent EPS with ablation of atrial fibrillation and ablation of right sided A. Flutter. Patient tolerated the procedure well and was taken to the telemetry floor for recovery. Pt will continue home Amiodarone, Toprol and Eliquis. The following morning patient was up feeling well without any complaints of chest pain or shortness of breath. Patient's right and left groin and left shoulder cath sites were clean, dry and intact without hematoma or bruit. Patient's labs were stable. Patient was seen and examined by Dr. Dre Moss and determined stable and ready for discharge. Patient was instructed on the importance of medication compliance including taking Eliquis everyday without missing a dose. The patient will follow up with [facility] Cardiology Dr. Orlando Paz on 2/28/18 @2:45 PM in the [facility] office. DISPOSITION: The patient is being discharged home in stable condition on a low saturated fat, low cholesterol and low salt diet. The patient is instructed to advance activities as tolerated to the limit of fatigue or shortness of breath.  The patient is instructed to avoid all heavy lifting, straining, stooping or squatting for 3-5 days. The patient is instructed to watch the cath site for bleeding/oozing; if seen, the patient is instructed to apply firm pressure with a clean cloth and call [address] Cardiology at [phone]. The patient is instructed to watch for signs of infection which include: increasing area of redness, fever/hot to touch or purulent drainage at the catheterization site. The patient is instructed not to soak in a bathtub for 7-10 days, but is cleared to shower. The patient is instructed to call the office or return to the ER for immediate evaluation for any shortness of breath or chest pain not relieved by NTG. Discharge Exam:   Visit Vitals    /64 (BP 1 Location: Left arm, BP Patient Position: At rest)    Pulse 77    Temp 98.2 °F (36.8 °C)    Resp 18    Ht 5' 8\" (1.727 m)    Wt 69.9 kg (154 lb 1.6 oz)    SpO2 97%    BMI 23.43 kg/m2       Patient has been seen by Dr. Elle Presley: see his progress note for exam details.     Recent Results (from the past 24 hour(s))   CBC W/O DIFF    Collection Time: 02/02/18  6:45 AM   Result Value Ref Range    WBC 5.4 4.3 - 11.1 K/uL    RBC 5.09 4.23 - 5.67 M/uL    HGB 15.6 13.6 - 17.2 g/dL    HCT 46.4 41.1 - 50.3 %    MCV 91.2 79.6 - 97.8 FL    MCH 30.6 26.1 - 32.9 PG    MCHC 33.6 31.4 - 35.0 g/dL    RDW 13.8 11.9 - 14.6 %    PLATELET 927 118 - 645 K/uL    MPV 9.5 (L) 10.8 - 14.1 FL   MAGNESIUM    Collection Time: 02/02/18  6:45 AM   Result Value Ref Range    Magnesium 2.2 1.8 - 2.4 mg/dL   METABOLIC PANEL, BASIC    Collection Time: 02/02/18  6:45 AM   Result Value Ref Range    Sodium 140 136 - 145 mmol/L    Potassium 4.2 3.5 - 5.1 mmol/L    Chloride 106 98 - 107 mmol/L    CO2 29 21 - 32 mmol/L    Anion gap 5 (L) 7 - 16 mmol/L    Glucose 101 (H) 65 - 100 mg/dL    BUN 15 8 - 23 MG/DL    Creatinine 1.08 0.8 - 1.5 MG/DL    GFR est AA >60 >60 ml/min/1.73m2    GFR est non-AA >60 >60 ml/min/1.73m2 Calcium 8.4 8.3 - 10.4 MG/DL   PROTHROMBIN TIME + INR    Collection Time: 02/02/18  6:45 AM   Result Value Ref Range    Prothrombin time 13.3 11.5 - 14.5 sec    INR 1.1     EKG, 12 LEAD, INITIAL    Collection Time: 02/02/18  6:59 AM   Result Value Ref Range    Ventricular Rate 65 BPM    Atrial Rate 65 BPM    P-R Interval 170 ms    QRS Duration 102 ms    Q-T Interval 406 ms    QTC Calculation (Bezet) 422 ms    Calculated P Axis 58 degrees    Calculated R Axis 41 degrees    Calculated T Axis 42 degrees    Diagnosis       Normal sinus rhythm  Normal ECG  When compared with ECG of 26-JAN-2018 07:36,  No significant change was found  Confirmed by NALINI AGUAYO ()ADRI ([id number]) on 2/2/2018 8:01:53 AM     POC ACTIVATED CLOTTING TIME    Collection Time: 02/02/18  9:01 AM   Result Value Ref Range    Activated Clotting Time (POC) 296 (H) 70 - 128 SECS   POC ACTIVATED CLOTTING TIME    Collection Time: 02/02/18  9:30 AM   Result Value Ref Range    Activated Clotting Time (POC) 301 (H) 70 - 128 SECS   POC ACTIVATED CLOTTING TIME    Collection Time: 02/02/18 10:14 AM   Result Value Ref Range    Activated Clotting Time (POC) 285 (H) 70 - 128 SECS   POC ACTIVATED CLOTTING TIME    Collection Time: 02/02/18 11:20 AM   Result Value Ref Range    Activated Clotting Time (POC) 131 (H) 70 - 128 SECS   EKG, 12 LEAD, INITIAL    Collection Time: 02/02/18 11:56 AM   Result Value Ref Range    Ventricular Rate 69 BPM    Atrial Rate 69 BPM    P-R Interval 168 ms    QRS Duration 104 ms    Q-T Interval 414 ms    QTC Calculation (Bezet) 443 ms    Calculated P Axis 65 degrees    Calculated R Axis 32 degrees    Calculated T Axis 40 degrees    Diagnosis       Normal sinus rhythm  Normal ECG  When compared with ECG of 02-FEB-2018 06:59,  No significant change was found  Confirmed by NALINI AGUAYO ()ADRI ([id number]) on 2/2/2018 0:16:56 PM     METABOLIC PANEL, BASIC    Collection Time: 02/03/18  4:00 AM   Result Value Ref Range    Sodium 142 136 - 145 mmol/L Potassium 4.2 3.5 - 5.1 mmol/L    Chloride 106 98 - 107 mmol/L    CO2 25 21 - 32 mmol/L    Anion gap 11 7 - 16 mmol/L    Glucose 106 (H) 65 - 100 mg/dL    BUN 14 8 - 23 MG/DL    Creatinine 0.96 0.8 - 1.5 MG/DL    GFR est AA >60 >60 ml/min/1.73m2    GFR est non-AA >60 >60 ml/min/1.73m2    Calcium 8.0 (L) 8.3 - 10.4 MG/DL   MAGNESIUM    Collection Time: 02/03/18  4:00 AM   Result Value Ref Range    Magnesium 2.1 1.8 - 2.4 mg/dL         Patient Instructions:   Current Discharge Medication List      START taking these medications    Details   famotidine (PEPCID) 20 mg tablet Take 1 Tab by mouth two (2) times a day. Qty: 60 Tab, Refills: 0    Associated Diagnoses: Paroxysmal atrial fibrillation (HCC)      pantoprazole (PROTONIX) 40 mg tablet Take 1 Tab by mouth Daily (before breakfast). Qty: 30 Tab, Refills: 0    Associated Diagnoses: Paroxysmal atrial fibrillation (HCC)      aspirin 81 mg chewable tablet Take 1 Tab by mouth daily. Associated Diagnoses: Paroxysmal atrial fibrillation (HCC)         CONTINUE these medications which have NOT CHANGED    Details   amiodarone (CORDARONE) 200 mg tablet Take 1 Tab by mouth two (2) times a day. Qty: 60 Tab, Refills: 1    Associated Diagnoses: Atypical atrial flutter (HCC)      metoprolol succinate (TOPROL-XL) 25 mg XL tablet Take 1 Tab by mouth daily. Qty: 30 Tab, Refills: 1      lisinopril (PRINIVIL, ZESTRIL) 10 mg tablet TAKE ONE TABLET BY MOUTH ONE TIME DAILY  Qty: 30 Tab, Refills: 11      dilTIAZem CD (CARDIZEM CD) 180 mg ER capsule Take 1 Cap by mouth daily. Qty: 30 Cap, Refills: 11      apixaban (ELIQUIS) 5 mg tablet Take 1 Tab by mouth two (2) times a day. Qty: 60 Tab, Refills: 11      fluticasone-salmeterol (ADVAIR DISKUS) 250-50 mcg/dose diskus inhaler INHALE 1 DOSE BY MOUTH TWICE DAILY.  RINSE MOUTH AFTER USE  Qty: 1 Inhaler, Refills: 5    Associated Diagnoses: Asthma, moderate persistent, well-controlled      albuterol (PROAIR HFA) 90 mcg/actuation inhaler Take 2 Puffs by inhalation every six (6) hours as needed. Cetirizine (ZYRTEC) 10 mg cap Take 10 mg by mouth nightly. testosterone cypionate (DEPO-TESTOSTERONE) 200 mg/mL injection 440 mg by IntraMUSCular route Once every 2 weeks. fluticasone (FLONASE) 50 mcg/actuation nasal spray 2 Sprays by Both Nostrils route daily. Qty: 1 Bottle, Refills: 5    Associated Diagnoses: Allergic rhinitis, unspecified allergic rhinitis trigger, unspecified rhinitis seasonality      FLAXSEED OIL (OMEGA 3 PO) Take  by mouth daily. Associated Diagnoses: Essential hypertension, benign      MULTIVITAMIN (MULTIPLE VITAMIN PO) Take  by mouth.      sildenafil (REVATIO) 20 mg tablet Take 20 mg by mouth daily as needed.

## 2018-02-21 PROBLEM — R50.9 FEVER, UNKNOWN ORIGIN: Status: RESOLVED | Noted: 2018-01-24 | Resolved: 2018-02-21

## 2018-02-21 PROBLEM — M35.3 PMR (POLYMYALGIA RHEUMATICA) (HCC): Status: ACTIVE | Noted: 2018-02-21

## 2018-12-24 PROBLEM — I48.0 PAROXYSMAL ATRIAL FIBRILLATION (HCC): Status: RESOLVED | Noted: 2017-02-22 | Resolved: 2018-12-24

## 2019-01-15 PROBLEM — R29.2 BABINSKI REFLEX: Status: ACTIVE | Noted: 2019-01-15

## 2019-01-15 PROBLEM — R26.81 UNSTEADY GAIT: Status: ACTIVE | Noted: 2019-01-15

## 2019-01-15 PROBLEM — G62.9 AXONAL POLYNEUROPATHY: Status: ACTIVE | Noted: 2019-01-15

## 2019-01-24 ENCOUNTER — HOSPITAL ENCOUNTER (OUTPATIENT)
Dept: MRI IMAGING | Age: 69
Discharge: HOME | End: 2019-01-24
Attending: PSYCHIATRY & NEUROLOGY
Payer: MEDICARE

## 2019-01-24 DIAGNOSIS — R26.81 UNSTEADY GAIT: ICD-10-CM

## 2019-01-24 DIAGNOSIS — R29.2 BABINSKI REFLEX: ICD-10-CM

## 2019-01-24 PROCEDURE — 70551 MRI BRAIN STEM W/O DYE: CPT

## 2019-02-04 ENCOUNTER — HOSPITAL ENCOUNTER (EMERGENCY)
Age: 69
Discharge: HOME | End: 2019-02-04
Attending: EMERGENCY MEDICINE | Admitting: EMERGENCY MEDICINE
Payer: MEDICARE

## 2019-02-04 VITALS
OXYGEN SATURATION: 97 % | RESPIRATION RATE: 16 BRPM | BODY MASS INDEX: 25.76 KG/M2 | HEIGHT: 68 IN | HEART RATE: 90 BPM | TEMPERATURE: 98 F | WEIGHT: 170 LBS | SYSTOLIC BLOOD PRESSURE: 125 MMHG | DIASTOLIC BLOOD PRESSURE: 73 MMHG

## 2019-02-04 DIAGNOSIS — I48.0 PAROXYSMAL ATRIAL FIBRILLATION (HCC): Primary | ICD-10-CM

## 2019-02-04 LAB
ALBUMIN SERPL-MCNC: 3.7 G/DL (ref 3.2–4.6)
ALBUMIN/GLOB SERPL: 1.1 {RATIO} (ref 1.2–3.5)
ALP SERPL-CCNC: 59 U/L (ref 50–136)
ALT SERPL-CCNC: 32 U/L (ref 12–65)
ANION GAP SERPL CALC-SCNC: 6 MMOL/L (ref 7–16)
AST SERPL-CCNC: 31 U/L (ref 15–37)
ATRIAL RATE: 157 BPM
ATRIAL RATE: 277 BPM
BASOPHILS # BLD: 0.1 K/UL (ref 0–0.2)
BASOPHILS NFR BLD: 2 % (ref 0–2)
BILIRUB SERPL-MCNC: 0.5 MG/DL (ref 0.2–1.1)
BNP SERPL-MCNC: 21 PG/ML
BUN SERPL-MCNC: 13 MG/DL (ref 8–23)
CALCIUM SERPL-MCNC: 8.5 MG/DL (ref 8.3–10.4)
CALCULATED P AXIS, ECG09: -120 DEGREES
CALCULATED P AXIS, ECG09: 56 DEGREES
CALCULATED R AXIS, ECG10: -29 DEGREES
CALCULATED R AXIS, ECG10: 2 DEGREES
CALCULATED T AXIS, ECG11: -13 DEGREES
CALCULATED T AXIS, ECG11: 18 DEGREES
CHLORIDE SERPL-SCNC: 106 MMOL/L (ref 98–107)
CO2 SERPL-SCNC: 28 MMOL/L (ref 21–32)
CREAT SERPL-MCNC: 1.36 MG/DL (ref 0.8–1.5)
DIAGNOSIS, 93000: NORMAL
DIAGNOSIS, 93000: NORMAL
DIFFERENTIAL METHOD BLD: ABNORMAL
EOSINOPHIL # BLD: 0.1 K/UL (ref 0–0.8)
EOSINOPHIL NFR BLD: 3 % (ref 0.5–7.8)
ERYTHROCYTE [DISTWIDTH] IN BLOOD BY AUTOMATED COUNT: 14 % (ref 11.9–14.6)
GLOBULIN SER CALC-MCNC: 3.5 G/DL (ref 2.3–3.5)
GLUCOSE SERPL-MCNC: 122 MG/DL (ref 65–100)
HCT VFR BLD AUTO: 54.2 % (ref 41.1–50.3)
HGB BLD-MCNC: 18.1 G/DL (ref 13.6–17.2)
IMM GRANULOCYTES # BLD AUTO: 0 K/UL (ref 0–0.5)
IMM GRANULOCYTES NFR BLD AUTO: 0 % (ref 0–5)
LYMPHOCYTES # BLD: 1.1 K/UL (ref 0.5–4.6)
LYMPHOCYTES NFR BLD: 24 % (ref 13–44)
MAGNESIUM SERPL-MCNC: 2.1 MG/DL (ref 1.8–2.4)
MCH RBC QN AUTO: 30.6 PG (ref 26.1–32.9)
MCHC RBC AUTO-ENTMCNC: 33.4 G/DL (ref 31.4–35)
MCV RBC AUTO: 91.7 FL (ref 79.6–97.8)
MONOCYTES # BLD: 0.9 K/UL (ref 0.1–1.3)
MONOCYTES NFR BLD: 20 % (ref 4–12)
NEUTS SEG # BLD: 2.5 K/UL (ref 1.7–8.2)
NEUTS SEG NFR BLD: 52 % (ref 43–78)
NRBC # BLD: 0 K/UL (ref 0–0.2)
P-R INTERVAL, ECG05: 126 MS
PLATELET # BLD AUTO: 166 K/UL (ref 150–450)
PMV BLD AUTO: 9.8 FL (ref 9.4–12.3)
POTASSIUM SERPL-SCNC: 4.4 MMOL/L (ref 3.5–5.1)
PROT SERPL-MCNC: 7.2 G/DL (ref 6.3–8.2)
Q-T INTERVAL, ECG07: 234 MS
Q-T INTERVAL, ECG07: 330 MS
QRS DURATION, ECG06: 80 MS
QRS DURATION, ECG06: 94 MS
QTC CALCULATION (BEZET), ECG08: 378 MS
QTC CALCULATION (BEZET), ECG08: 425 MS
RBC # BLD AUTO: 5.91 M/UL (ref 4.23–5.6)
SODIUM SERPL-SCNC: 140 MMOL/L (ref 136–145)
TROPONIN I BLD-MCNC: 0.01 NG/ML (ref 0.02–0.05)
TROPONIN I BLD-MCNC: 0.02 NG/ML (ref 0.02–0.05)
VENTRICULAR RATE, ECG03: 100 BPM
VENTRICULAR RATE, ECG03: 157 BPM
WBC # BLD AUTO: 4.7 K/UL (ref 4.3–11.1)

## 2019-02-04 PROCEDURE — 93005 ELECTROCARDIOGRAM TRACING: CPT | Performed by: EMERGENCY MEDICINE

## 2019-02-04 PROCEDURE — 83880 ASSAY OF NATRIURETIC PEPTIDE: CPT

## 2019-02-04 PROCEDURE — 85025 COMPLETE CBC W/AUTO DIFF WBC: CPT

## 2019-02-04 PROCEDURE — 83735 ASSAY OF MAGNESIUM: CPT

## 2019-02-04 PROCEDURE — 96374 THER/PROPH/DIAG INJ IV PUSH: CPT | Performed by: EMERGENCY MEDICINE

## 2019-02-04 PROCEDURE — 84484 ASSAY OF TROPONIN QUANT: CPT

## 2019-02-04 PROCEDURE — 99285 EMERGENCY DEPT VISIT HI MDM: CPT | Performed by: EMERGENCY MEDICINE

## 2019-02-04 PROCEDURE — 74011250637 HC RX REV CODE- 250/637: Performed by: EMERGENCY MEDICINE

## 2019-02-04 PROCEDURE — 74011250636 HC RX REV CODE- 250/636: Performed by: EMERGENCY MEDICINE

## 2019-02-04 PROCEDURE — 80053 COMPREHEN METABOLIC PANEL: CPT

## 2019-02-04 PROCEDURE — 74011250636 HC RX REV CODE- 250/636

## 2019-02-04 RX ORDER — LABETALOL HCL 20 MG/4 ML
20 SYRINGE (ML) INTRAVENOUS
Status: COMPLETED | OUTPATIENT
Start: 2019-02-04 | End: 2019-02-04

## 2019-02-04 RX ORDER — GUAIFENESIN 100 MG/5ML
324 LIQUID (ML) ORAL
Status: COMPLETED | OUTPATIENT
Start: 2019-02-04 | End: 2019-02-04

## 2019-02-04 RX ORDER — METOPROLOL SUCCINATE 50 MG/1
50 TABLET, EXTENDED RELEASE ORAL DAILY
Status: DISCONTINUED | OUTPATIENT
Start: 2019-02-05 | End: 2019-02-04

## 2019-02-04 RX ORDER — METOPROLOL SUCCINATE 50 MG/1
50 TABLET, EXTENDED RELEASE ORAL
Status: COMPLETED | OUTPATIENT
Start: 2019-02-04 | End: 2019-02-04

## 2019-02-04 RX ORDER — LABETALOL HCL 20 MG/4 ML
SYRINGE (ML) INTRAVENOUS
Status: COMPLETED
Start: 2019-02-04 | End: 2019-02-04

## 2019-02-04 RX ADMIN — SODIUM CHLORIDE 1000 ML: 900 INJECTION, SOLUTION INTRAVENOUS at 17:51

## 2019-02-04 RX ADMIN — METOPROLOL SUCCINATE 50 MG: 50 TABLET, EXTENDED RELEASE ORAL at 19:09

## 2019-02-04 RX ADMIN — LABETALOL HYDROCHLORIDE 20 MG: 5 INJECTION, SOLUTION INTRAVENOUS at 17:50

## 2019-02-04 RX ADMIN — ASPIRIN 81 MG 324 MG: 81 TABLET ORAL at 17:50

## 2019-02-04 RX ADMIN — APIXABAN 5 MG: 5 TABLET, FILM COATED ORAL at 18:37

## 2019-02-04 RX ADMIN — Medication 20 MG: at 17:50

## 2019-02-04 NOTE — ED PROVIDER NOTES
Presents with complaints of tachycardia. Patient states he is in A. Fib about 50% of time. Today he had a stress test and so did not take his metoprolol or L request.  About a half an hour after leaving the office his iPhone alerted him that he had a heart rate in the 160s. He states its been like that fairly consistently. He called the office and they told him to come here. He did not take his home meds. The history is provided by the patient. Rapid Heart Rate This is a new problem. Episode onset: 1400. The problem occurs constantly. The problem has not changed since onset. Associated symptoms include chest pain. Pertinent negatives include no abdominal pain, no headaches and no shortness of breath. Nothing aggravates the symptoms. Nothing relieves the symptoms. He has tried nothing for the symptoms. Past Medical History:  
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  Essential hypertension, benign 5/30/2013  Hypercholesteremia 12/7/2015  Hypogonadism, testicular 5/30/2013 Removal of right testicle on 11/1/2010; urologist Laverne Curry of [facility], gives testosterone injections  Lumbosacral radiculopathy at L5 4/17/2016  Lung nodule, solitary 1/19/2017 Chronic - benign w/u  Lymphopenia 8/6/2017  Microalbuminuria 9/1/2017  PMR (polymyalgia rheumatica) (HCC) 2/21/2018 Followed by [facility] rheumatology  Prediabetes 04/17/2016  Unsteady gait 1/15/2019  Vitamin D deficiency 6/2/2015 Past Surgical History:  
Procedure Laterality Date  HX AFIB ABLATION  02/2018  HX COLONOSCOPY  4/03  
 normal  
 HX LUMBAR DISKECTOMY  1989 L5 discectomy  HX LUMBAR DISKECTOMY  [id number] L4  
 HX UROLOGICAL  11/1/2010  
 testicular, right removed d/t  severe infection Family History:  
Problem Relation Age of Onset  Cancer Father   
     leukemia  Hypertension Mother  Cancer Mother   
     breast  
 Cancer Sister   
     colon Social History Socioeconomic History  Marital status:  Spouse name: Not on file  Number of children: Not on file  Years of education: Not on file  Highest education level: Not on file Social Needs  Financial resource strain: Not on file  Food insecurity - worry: Not on file  Food insecurity - inability: Not on file  Transportation needs - medical: Not on file  Transportation needs - non-medical: Not on file Occupational History  Not on file Tobacco Use  Smoking status: Former Smoker  Smokeless tobacco: Never Used  Tobacco comment: smoked cigerattes from age 27 to 47 Substance and Sexual Activity  Alcohol use: Yes Comment: occ drinks wine  Drug use: No  
 Sexual activity: Not on file Other Topics Concern  Not on file Social History Narrative  Not on file ALLERGIES: Patient has no known allergies. Review of Systems Respiratory: Negative for shortness of breath. Cardiovascular: Positive for chest pain. Gastrointestinal: Negative for abdominal pain. Neurological: Negative for headaches. All other systems reviewed and are negative. Vitals:  
 02/04/19 1623 02/04/19 1746 BP: 156/85 128/80 Pulse: (!) 142 (!) 127 Resp: 16 Temp: 98 °F (36.7 °C) SpO2: 97% Weight: 77.1 kg (170 lb) Height: 5' 8\" (1.727 m) Physical Exam  
Constitutional: He is oriented to person, place, and time. He appears well-developed and well-nourished. No distress. HENT:  
Head: Normocephalic and atraumatic. Eyes: Conjunctivae are normal. Right eye exhibits no discharge. Left eye exhibits no discharge. Neck: Normal range of motion. Neck supple. Cardiovascular: An irregularly irregular rhythm present. Tachycardia present. Pulmonary/Chest: Effort normal and breath sounds normal. No respiratory distress. Abdominal: Soft. He exhibits no distension. There is no tenderness. Musculoskeletal: Normal range of motion. He exhibits no edema. Neurological: He is alert and oriented to person, place, and time. No cranial nerve deficit. Skin: Skin is warm and dry. Capillary refill takes less than 2 seconds. He is not diaphoretic. Psychiatric: He has a normal mood and affect. His behavior is normal.  
Nursing note and vitals reviewed. MDM Number of Diagnoses or Management Options Paroxysmal atrial fibrillation [facility]):  
Diagnosis management comments: Patient likely slightly dry given his elevated hemoglobin and creatinine. He was given a bolus, eliquis, aspirin, and labetalol. His HR stayed in the 90s-100s. Spoke with [address] Can't get results of stress until am so if pt concerned can call in am.  Will 2 set and sent home to take his home meds. Patient feels fine no chest pain no shortness of breath no nausea or vomiting RESOLVED with DECREASE IN HIS HEART RATE. atient will be discharged to follow up with his cardiologist and has a ablation on the 15th. Amount and/or Complexity of Data Reviewed Clinical lab tests: ordered and reviewed Review and summarize past medical records: yes Discuss the patient with other providers: yes Independent visualization of images, tracings, or specimens: yes Risk of Complications, Morbidity, and/or Mortality Presenting problems: high Diagnostic procedures: moderate Management options: high Patient Progress Patient progress: improved ED Course as of Feb 04 1824 Mon Feb 04, 2019 1823 6:23 PM 
Spoke with [address] Cardiology  [DZ] ED Course User Index [DZ] Johanna Nunez MD  
 
 
Procedures

## 2019-02-04 NOTE — ED TRIAGE NOTES
Pt reports he had a stress test today, states since he left his heart rate has been 160s. Pt states he has a hx of a fib. Pt takes eliquis daily. States some chest discomfort as well.

## 2019-02-05 NOTE — ED NOTES
I have reviewed discharge instructions with the patient and spouse. The patient and spouse verbalized understanding. Patient left ED via Discharge Method: ambulatory to Home with spouse. Opportunity for questions and clarification provided. Patient given 0 scripts. No e-sign To continue your aftercare when you leave the hospital, you may receive an automated call from our care team to check in on how you are doing. This is a free service and part of our promise to provide the best care and service to meet your aftercare needs.  If you have questions, or wish to unsubscribe from this service please call [phone]. Thank you for Choosing our [facility] Emergency Department.

## 2019-02-14 ENCOUNTER — ANESTHESIA EVENT (OUTPATIENT)
Dept: SURGERY | Age: 69
End: 2019-02-14
Payer: MEDICARE

## 2019-02-14 RX ORDER — CHOLECALCIFEROL TAB 125 MCG (5000 UNIT) 125 MCG
5000 TAB ORAL DAILY
COMMUNITY
End: 2022-03-17

## 2019-02-14 RX ORDER — PSEUDOEPHEDRINE HCL 30 MG
1000 TABLET ORAL DAILY
COMMUNITY

## 2019-02-14 NOTE — PROGRESS NOTES
Patient pre-assessment complete for Atrial fib ablation with Dr Bettie Patel scheduled for 2/15/19 at 7:30am, arrival time 6am. Patient verified using . Patient instructed to bring all home medications in labeled bottles on the day of procedure. NPO status reinforced. Patient instructed to HOLD eliquis & lisinopril the am of procedure. Instructed they can take all other medications excluding vitamins & supplements. Patient verbalizes understanding of all instructions & denies any questions at this time.

## 2019-02-15 ENCOUNTER — ANESTHESIA (OUTPATIENT)
Dept: SURGERY | Age: 69
End: 2019-02-15
Payer: MEDICARE

## 2019-02-15 ENCOUNTER — HOSPITAL ENCOUNTER (OUTPATIENT)
Age: 69
Setting detail: OUTPATIENT SURGERY
Discharge: HOME | End: 2019-02-15
Attending: INTERNAL MEDICINE | Admitting: INTERNAL MEDICINE
Payer: MEDICARE

## 2019-02-15 ENCOUNTER — APPOINTMENT (OUTPATIENT)
Dept: CARDIAC CATH/INVASIVE PROCEDURES | Age: 69
End: 2019-02-15
Payer: MEDICARE

## 2019-02-15 VITALS
HEART RATE: 114 BPM | TEMPERATURE: 98.3 F | WEIGHT: 170 LBS | RESPIRATION RATE: 18 BRPM | BODY MASS INDEX: 26.68 KG/M2 | HEIGHT: 67 IN | SYSTOLIC BLOOD PRESSURE: 119 MMHG | OXYGEN SATURATION: 94 % | DIASTOLIC BLOOD PRESSURE: 70 MMHG

## 2019-02-15 DIAGNOSIS — I48.3 TYPICAL ATRIAL FLUTTER (HCC): Primary | ICD-10-CM

## 2019-02-15 DIAGNOSIS — I48.91 ATRIAL FIBRILLATION, UNSPECIFIED TYPE (HCC): ICD-10-CM

## 2019-02-15 LAB
ANION GAP SERPL CALC-SCNC: 6 MMOL/L (ref 7–16)
ATRIAL RATE: 110 BPM
ATRIAL RATE: 300 BPM
ATRIAL RATE: 82 BPM
BUN SERPL-MCNC: 15 MG/DL (ref 8–23)
CALCIUM SERPL-MCNC: 8.8 MG/DL (ref 8.3–10.4)
CALCULATED P AXIS, ECG09: -110 DEGREES
CALCULATED P AXIS, ECG09: 53 DEGREES
CALCULATED P AXIS, ECG09: 58 DEGREES
CALCULATED R AXIS, ECG10: -10 DEGREES
CALCULATED R AXIS, ECG10: 10 DEGREES
CALCULATED R AXIS, ECG10: 27 DEGREES
CALCULATED T AXIS, ECG11: 13 DEGREES
CALCULATED T AXIS, ECG11: 26 DEGREES
CALCULATED T AXIS, ECG11: 9 DEGREES
CHLORIDE SERPL-SCNC: 108 MMOL/L (ref 98–107)
CO2 SERPL-SCNC: 28 MMOL/L (ref 21–32)
CREAT SERPL-MCNC: 1.2 MG/DL (ref 0.8–1.5)
DIAGNOSIS, 93000: NORMAL
ERYTHROCYTE [DISTWIDTH] IN BLOOD BY AUTOMATED COUNT: 14 % (ref 11.9–14.6)
GLUCOSE SERPL-MCNC: 89 MG/DL (ref 65–100)
HCT VFR BLD AUTO: 53.2 % (ref 41.1–50.3)
HGB BLD-MCNC: 17.9 G/DL (ref 13.6–17.2)
INR PPP: 1.1
MAGNESIUM SERPL-MCNC: 2 MG/DL (ref 1.8–2.4)
MCH RBC QN AUTO: 30.3 PG (ref 26.1–32.9)
MCHC RBC AUTO-ENTMCNC: 33.6 G/DL (ref 31.4–35)
MCV RBC AUTO: 90 FL (ref 79.6–97.8)
NRBC # BLD: 0 K/UL (ref 0–0.2)
P-R INTERVAL, ECG05: 166 MS
P-R INTERVAL, ECG05: 168 MS
PLATELET # BLD AUTO: 154 K/UL (ref 150–450)
PMV BLD AUTO: 10.1 FL (ref 9.4–12.3)
POTASSIUM SERPL-SCNC: 4 MMOL/L (ref 3.5–5.1)
PROTHROMBIN TIME: 13.8 SEC (ref 11.7–14.5)
Q-T INTERVAL, ECG07: 314 MS
Q-T INTERVAL, ECG07: 344 MS
Q-T INTERVAL, ECG07: 366 MS
QRS DURATION, ECG06: 92 MS
QRS DURATION, ECG06: 96 MS
QRS DURATION, ECG06: 96 MS
QTC CALCULATION (BEZET), ECG08: 423 MS
QTC CALCULATION (BEZET), ECG08: 424 MS
QTC CALCULATION (BEZET), ECG08: 427 MS
RBC # BLD AUTO: 5.91 M/UL (ref 4.23–5.6)
SODIUM SERPL-SCNC: 142 MMOL/L (ref 136–145)
VENTRICULAR RATE, ECG03: 110 BPM
VENTRICULAR RATE, ECG03: 82 BPM
VENTRICULAR RATE, ECG03: 91 BPM
WBC # BLD AUTO: 8.1 K/UL (ref 4.3–11.1)

## 2019-02-15 PROCEDURE — 77030039425 HC BLD LARYNG TRULITE DISP TELE -A: Performed by: ANESTHESIOLOGY

## 2019-02-15 PROCEDURE — 74011000250 HC RX REV CODE- 250

## 2019-02-15 PROCEDURE — 93005 ELECTROCARDIOGRAM TRACING: CPT | Performed by: INTERNAL MEDICINE

## 2019-02-15 PROCEDURE — 77030013794 HC KT TRNSDUC BLD EDWD -B

## 2019-02-15 PROCEDURE — 77030013794 HC KT TRNSDUC BLD EDWD -B: Performed by: ANESTHESIOLOGY

## 2019-02-15 PROCEDURE — 93613 INTRACARDIAC EPHYS 3D MAPG: CPT

## 2019-02-15 PROCEDURE — 93609 INTRA-VNTR MAPG TCHYCAR SITE: CPT

## 2019-02-15 PROCEDURE — 77030013292 HC BOWL MX PRSM J&J -A: Performed by: ANESTHESIOLOGY

## 2019-02-15 PROCEDURE — C1894 INTRO/SHEATH, NON-LASER: HCPCS

## 2019-02-15 PROCEDURE — 93312 ECHO TRANSESOPHAGEAL: CPT

## 2019-02-15 PROCEDURE — C1893 INTRO/SHEATH, FIXED,NON-PEEL: HCPCS

## 2019-02-15 PROCEDURE — 74011250636 HC RX REV CODE- 250/636

## 2019-02-15 PROCEDURE — 85610 PROTHROMBIN TIME: CPT

## 2019-02-15 PROCEDURE — 93653 COMPRE EP EVAL TX SVT: CPT

## 2019-02-15 PROCEDURE — 77030020506 HC NDL TRNSPTL NRG BAYL -F

## 2019-02-15 PROCEDURE — 77030020782 HC GWN BAIR PAWS FLX 3M -B: Performed by: ANESTHESIOLOGY

## 2019-02-15 PROCEDURE — 76060000037 HC ANESTHESIA 3 TO 3.5 HR: Performed by: INTERNAL MEDICINE

## 2019-02-15 PROCEDURE — 93662 INTRACARDIAC ECG (ICE): CPT

## 2019-02-15 PROCEDURE — 77030019908 HC STETH ESOPH SIMS -A: Performed by: ANESTHESIOLOGY

## 2019-02-15 PROCEDURE — 80048 BASIC METABOLIC PNL TOTAL CA: CPT

## 2019-02-15 PROCEDURE — C1766 INTRO/SHEATH,STRBLE,NON-PEEL: HCPCS

## 2019-02-15 PROCEDURE — 76210000016 HC OR PH I REC 1 TO 1.5 HR: Performed by: INTERNAL MEDICINE

## 2019-02-15 PROCEDURE — 74011250636 HC RX REV CODE- 250/636: Performed by: ANESTHESIOLOGY

## 2019-02-15 PROCEDURE — C1732 CATH, EP, DIAG/ABL, 3D/VECT: HCPCS

## 2019-02-15 PROCEDURE — 85027 COMPLETE CBC AUTOMATED: CPT

## 2019-02-15 PROCEDURE — C1759 CATH, INTRA ECHOCARDIOGRAPHY: HCPCS

## 2019-02-15 PROCEDURE — 77030027107 HC PTCH EXT REF CARTO3 J&J -F

## 2019-02-15 PROCEDURE — 77030035291 HC TBNG PMP SMARTABLATE J&J -B

## 2019-02-15 PROCEDURE — 77030005401 HC CATH RAD ARRO -A: Performed by: ANESTHESIOLOGY

## 2019-02-15 PROCEDURE — 83735 ASSAY OF MAGNESIUM: CPT

## 2019-02-15 PROCEDURE — 93621 COMP EP EVL L PAC&REC C SINS: CPT

## 2019-02-15 PROCEDURE — 77030013687 HC GD NDL BARD -B

## 2019-02-15 PROCEDURE — 77030037088 HC TUBE ENDOTRACH ORAL NSL COVD-A: Performed by: ANESTHESIOLOGY

## 2019-02-15 RX ORDER — LIDOCAINE HYDROCHLORIDE 20 MG/ML
INJECTION, SOLUTION EPIDURAL; INFILTRATION; INTRACAUDAL; PERINEURAL AS NEEDED
Status: DISCONTINUED | OUTPATIENT
Start: 2019-02-15 | End: 2019-02-15 | Stop reason: HOSPADM

## 2019-02-15 RX ORDER — SODIUM CHLORIDE 0.9 % (FLUSH) 0.9 %
5-40 SYRINGE (ML) INJECTION AS NEEDED
Status: DISCONTINUED | OUTPATIENT
Start: 2019-02-15 | End: 2019-02-15 | Stop reason: HOSPADM

## 2019-02-15 RX ORDER — OXYCODONE AND ACETAMINOPHEN 5; 325 MG/1; MG/1
1 TABLET ORAL AS NEEDED
Status: DISCONTINUED | OUTPATIENT
Start: 2019-02-15 | End: 2019-02-15 | Stop reason: HOSPADM

## 2019-02-15 RX ORDER — FENTANYL CITRATE 50 UG/ML
25 INJECTION, SOLUTION INTRAMUSCULAR; INTRAVENOUS ONCE
Status: DISCONTINUED | OUTPATIENT
Start: 2019-02-15 | End: 2019-02-15 | Stop reason: SDUPTHER

## 2019-02-15 RX ORDER — LIDOCAINE HYDROCHLORIDE 10 MG/ML
0.1 INJECTION INFILTRATION; PERINEURAL AS NEEDED
Status: DISCONTINUED | OUTPATIENT
Start: 2019-02-15 | End: 2019-02-15 | Stop reason: HOSPADM

## 2019-02-15 RX ORDER — ROCURONIUM BROMIDE 10 MG/ML
INJECTION, SOLUTION INTRAVENOUS AS NEEDED
Status: DISCONTINUED | OUTPATIENT
Start: 2019-02-15 | End: 2019-02-15 | Stop reason: HOSPADM

## 2019-02-15 RX ORDER — MIDAZOLAM HYDROCHLORIDE 1 MG/ML
2 INJECTION, SOLUTION INTRAMUSCULAR; INTRAVENOUS
Status: DISCONTINUED | OUTPATIENT
Start: 2019-02-15 | End: 2019-02-15 | Stop reason: HOSPADM

## 2019-02-15 RX ORDER — DIPHENHYDRAMINE HYDROCHLORIDE 50 MG/ML
12.5 INJECTION, SOLUTION INTRAMUSCULAR; INTRAVENOUS ONCE
Status: DISCONTINUED | OUTPATIENT
Start: 2019-02-15 | End: 2019-02-15 | Stop reason: HOSPADM

## 2019-02-15 RX ORDER — SODIUM CHLORIDE, SODIUM LACTATE, POTASSIUM CHLORIDE, CALCIUM CHLORIDE 600; 310; 30; 20 MG/100ML; MG/100ML; MG/100ML; MG/100ML
100 INJECTION, SOLUTION INTRAVENOUS CONTINUOUS
Status: DISCONTINUED | OUTPATIENT
Start: 2019-02-15 | End: 2019-02-15 | Stop reason: HOSPADM

## 2019-02-15 RX ORDER — FENTANYL CITRATE 50 UG/ML
INJECTION, SOLUTION INTRAMUSCULAR; INTRAVENOUS AS NEEDED
Status: DISCONTINUED | OUTPATIENT
Start: 2019-02-15 | End: 2019-02-15 | Stop reason: HOSPADM

## 2019-02-15 RX ORDER — ACETAMINOPHEN 325 MG/1
650 TABLET ORAL
Status: DISCONTINUED | OUTPATIENT
Start: 2019-02-15 | End: 2019-02-15 | Stop reason: HOSPADM

## 2019-02-15 RX ORDER — SODIUM CHLORIDE 0.9 % (FLUSH) 0.9 %
5-40 SYRINGE (ML) INJECTION EVERY 8 HOURS
Status: DISCONTINUED | OUTPATIENT
Start: 2019-02-15 | End: 2019-02-15 | Stop reason: HOSPADM

## 2019-02-15 RX ORDER — SODIUM CHLORIDE 9 MG/ML
50 INJECTION, SOLUTION INTRAVENOUS CONTINUOUS
Status: DISCONTINUED | OUTPATIENT
Start: 2019-02-15 | End: 2019-02-15 | Stop reason: HOSPADM

## 2019-02-15 RX ORDER — HYDROMORPHONE HYDROCHLORIDE 2 MG/ML
0.5 INJECTION, SOLUTION INTRAMUSCULAR; INTRAVENOUS; SUBCUTANEOUS
Status: DISCONTINUED | OUTPATIENT
Start: 2019-02-15 | End: 2019-02-15 | Stop reason: HOSPADM

## 2019-02-15 RX ORDER — ACETAMINOPHEN 500 MG
1000 TABLET ORAL ONCE
Status: DISCONTINUED | OUTPATIENT
Start: 2019-02-15 | End: 2019-02-15 | Stop reason: HOSPADM

## 2019-02-15 RX ORDER — OXYCODONE HYDROCHLORIDE 5 MG/1
10 TABLET ORAL
Status: DISCONTINUED | OUTPATIENT
Start: 2019-02-15 | End: 2019-02-15 | Stop reason: HOSPADM

## 2019-02-15 RX ORDER — HEPARIN SODIUM 1000 [USP'U]/ML
INJECTION, SOLUTION INTRAVENOUS; SUBCUTANEOUS AS NEEDED
Status: DISCONTINUED | OUTPATIENT
Start: 2019-02-15 | End: 2019-02-15 | Stop reason: HOSPADM

## 2019-02-15 RX ORDER — DIPHENHYDRAMINE HYDROCHLORIDE 50 MG/ML
12.5 INJECTION, SOLUTION INTRAMUSCULAR; INTRAVENOUS
Status: DISCONTINUED | OUTPATIENT
Start: 2019-02-15 | End: 2019-02-15 | Stop reason: HOSPADM

## 2019-02-15 RX ORDER — MIDAZOLAM HYDROCHLORIDE 1 MG/ML
2 INJECTION, SOLUTION INTRAMUSCULAR; INTRAVENOUS ONCE
Status: ACTIVE | OUTPATIENT
Start: 2019-02-15 | End: 2019-02-15

## 2019-02-15 RX ORDER — HYDROMORPHONE HYDROCHLORIDE 1 MG/ML
1 INJECTION, SOLUTION INTRAMUSCULAR; INTRAVENOUS; SUBCUTANEOUS
Status: DISCONTINUED | OUTPATIENT
Start: 2019-02-15 | End: 2019-02-15 | Stop reason: HOSPADM

## 2019-02-15 RX ORDER — SUCCINYLCHOLINE CHLORIDE 20 MG/ML
INJECTION INTRAMUSCULAR; INTRAVENOUS AS NEEDED
Status: DISCONTINUED | OUTPATIENT
Start: 2019-02-15 | End: 2019-02-15 | Stop reason: HOSPADM

## 2019-02-15 RX ORDER — OXYCODONE HYDROCHLORIDE 5 MG/1
5 TABLET ORAL
Status: DISCONTINUED | OUTPATIENT
Start: 2019-02-15 | End: 2019-02-15 | Stop reason: SDUPTHER

## 2019-02-15 RX ORDER — FLUMAZENIL 0.1 MG/ML
0.2 INJECTION INTRAVENOUS
Status: DISCONTINUED | OUTPATIENT
Start: 2019-02-15 | End: 2019-02-15 | Stop reason: HOSPADM

## 2019-02-15 RX ORDER — NALOXONE HYDROCHLORIDE 0.4 MG/ML
0.2 INJECTION, SOLUTION INTRAMUSCULAR; INTRAVENOUS; SUBCUTANEOUS AS NEEDED
Status: DISCONTINUED | OUTPATIENT
Start: 2019-02-15 | End: 2019-02-15 | Stop reason: HOSPADM

## 2019-02-15 RX ORDER — GLYCOPYRROLATE 0.2 MG/ML
INJECTION INTRAMUSCULAR; INTRAVENOUS AS NEEDED
Status: DISCONTINUED | OUTPATIENT
Start: 2019-02-15 | End: 2019-02-15 | Stop reason: HOSPADM

## 2019-02-15 RX ORDER — DEXAMETHASONE SODIUM PHOSPHATE 4 MG/ML
INJECTION, SOLUTION INTRA-ARTICULAR; INTRALESIONAL; INTRAMUSCULAR; INTRAVENOUS; SOFT TISSUE AS NEEDED
Status: DISCONTINUED | OUTPATIENT
Start: 2019-02-15 | End: 2019-02-15 | Stop reason: HOSPADM

## 2019-02-15 RX ORDER — FAMOTIDINE 20 MG/1
20 TABLET, FILM COATED ORAL ONCE
Status: ACTIVE | OUTPATIENT
Start: 2019-02-15 | End: 2019-02-15

## 2019-02-15 RX ORDER — HYDROCODONE BITARTRATE AND ACETAMINOPHEN 5; 325 MG/1; MG/1
1 TABLET ORAL
Status: DISCONTINUED | OUTPATIENT
Start: 2019-02-15 | End: 2019-02-15 | Stop reason: HOSPADM

## 2019-02-15 RX ORDER — PROPOFOL 10 MG/ML
INJECTION, EMULSION INTRAVENOUS AS NEEDED
Status: DISCONTINUED | OUTPATIENT
Start: 2019-02-15 | End: 2019-02-15 | Stop reason: HOSPADM

## 2019-02-15 RX ORDER — MIDAZOLAM HYDROCHLORIDE 1 MG/ML
2 INJECTION, SOLUTION INTRAMUSCULAR; INTRAVENOUS
Status: DISCONTINUED | OUTPATIENT
Start: 2019-02-15 | End: 2019-02-15 | Stop reason: SDUPTHER

## 2019-02-15 RX ORDER — OXYCODONE HYDROCHLORIDE 5 MG/1
5 TABLET ORAL
Status: DISCONTINUED | OUTPATIENT
Start: 2019-02-15 | End: 2019-02-15 | Stop reason: HOSPADM

## 2019-02-15 RX ORDER — MIDAZOLAM HYDROCHLORIDE 1 MG/ML
2 INJECTION, SOLUTION INTRAMUSCULAR; INTRAVENOUS ONCE
Status: DISCONTINUED | OUTPATIENT
Start: 2019-02-15 | End: 2019-02-15 | Stop reason: SDUPTHER

## 2019-02-15 RX ORDER — NEOSTIGMINE METHYLSULFATE 1 MG/ML
INJECTION INTRAVENOUS AS NEEDED
Status: DISCONTINUED | OUTPATIENT
Start: 2019-02-15 | End: 2019-02-15 | Stop reason: HOSPADM

## 2019-02-15 RX ORDER — ONDANSETRON 2 MG/ML
INJECTION INTRAMUSCULAR; INTRAVENOUS AS NEEDED
Status: DISCONTINUED | OUTPATIENT
Start: 2019-02-15 | End: 2019-02-15 | Stop reason: HOSPADM

## 2019-02-15 RX ORDER — FENTANYL CITRATE 50 UG/ML
100 INJECTION, SOLUTION INTRAMUSCULAR; INTRAVENOUS ONCE
Status: ACTIVE | OUTPATIENT
Start: 2019-02-15 | End: 2019-02-15

## 2019-02-15 RX ADMIN — FENTANYL CITRATE 100 MCG: 50 INJECTION, SOLUTION INTRAMUSCULAR; INTRAVENOUS at 07:56

## 2019-02-15 RX ADMIN — SODIUM CHLORIDE, SODIUM LACTATE, POTASSIUM CHLORIDE, AND CALCIUM CHLORIDE: 600; 310; 30; 20 INJECTION, SOLUTION INTRAVENOUS at 09:30

## 2019-02-15 RX ADMIN — ROCURONIUM BROMIDE 20 MG: 10 INJECTION, SOLUTION INTRAVENOUS at 08:56

## 2019-02-15 RX ADMIN — SUCCINYLCHOLINE CHLORIDE 120 MG: 20 INJECTION INTRAMUSCULAR; INTRAVENOUS at 07:56

## 2019-02-15 RX ADMIN — LIDOCAINE HYDROCHLORIDE 60 MG: 20 INJECTION, SOLUTION EPIDURAL; INFILTRATION; INTRACAUDAL; PERINEURAL at 07:56

## 2019-02-15 RX ADMIN — PROPOFOL 200 MG: 10 INJECTION, EMULSION INTRAVENOUS at 07:56

## 2019-02-15 RX ADMIN — NEOSTIGMINE METHYLSULFATE 3 MG: 1 INJECTION INTRAVENOUS at 10:25

## 2019-02-15 RX ADMIN — SODIUM CHLORIDE, SODIUM LACTATE, POTASSIUM CHLORIDE, AND CALCIUM CHLORIDE: 600; 310; 30; 20 INJECTION, SOLUTION INTRAVENOUS at 07:44

## 2019-02-15 RX ADMIN — ROCURONIUM BROMIDE 5 MG: 10 INJECTION, SOLUTION INTRAVENOUS at 07:56

## 2019-02-15 RX ADMIN — ONDANSETRON 4 MG: 2 INJECTION INTRAMUSCULAR; INTRAVENOUS at 10:25

## 2019-02-15 RX ADMIN — HYDROMORPHONE HYDROCHLORIDE 0.5 MG: 2 INJECTION, SOLUTION INTRAMUSCULAR; INTRAVENOUS; SUBCUTANEOUS at 13:09

## 2019-02-15 RX ADMIN — GLYCOPYRROLATE 0.4 MG: 0.2 INJECTION INTRAMUSCULAR; INTRAVENOUS at 10:25

## 2019-02-15 RX ADMIN — DEXAMETHASONE SODIUM PHOSPHATE 4 MG: 4 INJECTION, SOLUTION INTRA-ARTICULAR; INTRALESIONAL; INTRAMUSCULAR; INTRAVENOUS; SOFT TISSUE at 08:30

## 2019-02-15 RX ADMIN — HEPARIN SODIUM 3000 UNITS: 1000 INJECTION, SOLUTION INTRAVENOUS; SUBCUTANEOUS at 09:07

## 2019-02-15 NOTE — ANESTHESIA PREPROCEDURE EVALUATION
Anesthetic History No history of anesthetic complications Review of Systems / Medical History Patient summary reviewed and pertinent labs reviewed Pulmonary Asthma : well controlled Neuro/Psych Within defined limits Cardiovascular Hypertension Dysrhythmias : atrial fibrillation Exercise tolerance: >4 METS 
  
GI/Hepatic/Renal 
  
GERD: well controlled Endo/Other Arthritis Other Findings Comments: Lumbar surgery DDD Physical Exam 
 
Airway Mallampati: II 
TM Distance: 4 - 6 cm Neck ROM: normal range of motion Mouth opening: Normal 
 
 Cardiovascular Rhythm: irregular Rate: normal 
 
 
 
 Dental 
 
Dentition: Lower partial plate and Upper partial plate Pulmonary Breath sounds clear to auscultation Abdominal 
GI exam deferred Other Findings Anesthetic Plan ASA: 3 Anesthesia type: general 
 
 
 
 
Induction: Intravenous Anesthetic plan and risks discussed with: Patient

## 2019-02-15 NOTE — ANESTHESIA POSTPROCEDURE EVALUATION
Procedure(s): AFIB  ABLATION. Anesthesia Post Evaluation Multimodal analgesia: multimodal analgesia used between 6 hours prior to anesthesia start to PACU discharge Patient location during evaluation: PACU Patient participation: complete - patient participated Level of consciousness: awake and alert Pain management: adequate Airway patency: patent Anesthetic complications: no 
Cardiovascular status: acceptable and hemodynamically stable Respiratory status: acceptable Hydration status: acceptable Visit Vitals BP (!) 124/91 Pulse 83 Temp 36.8 °C (98.3 °F) Resp 18 Ht 5' 7\" (1.702 m) Wt 77.1 kg (170 lb) SpO2 98% BMI 26.63 kg/m²

## 2019-02-15 NOTE — PROCEDURES
Attending: Chetna Rollins. Neri Hodges MD    Referring: Christiana Rosario MD      Pre-Electrophysiology Diagnosis  1. Typical atrial flutter  2. Atrial fibrillation, persistent     Procedure Performed  1. Electrophysiology testing with right-sided atrial flutter ablation. 2. Left atrial pacing recording from the coronary sinus. 3. 3-D Electroanatomical mapping  4. Intracardiac Echocardiography  5. Transesophageal echo      Anesthesia: General     Estimated Blood Loss: Less than 10 mL     Specimens: * No specimens in log *    Complications: None    Fluoroscopy Time:      Procedure in Detail:  The patient was brought to the electrophysiology lab in the fasting state. A Ref-Star CARTO patch was placed, the patient was then prepped and draped in sterile fashion. A transesophageal echocardiogram was performed prior to the procedure and was negative for an STAR thrombus (see full report in the chart). Venous access was then obtained x4 using modified Seldinger technique under ultrasound guidance, with placement of 3 short sidearm sheaths into the right femoral vein; a 4th 10F sheath was placed in the LFV. One of the 8F sheaths in the RFV was exchanged over a long wire for an 8.5F SL-1 sheath to help guide ablation. An ICE catheter was then advanced through the 10F sheath and advanced into the right atrium and ventricle. A 3.5 mm [employer] Mccarthy porous irrigated [employer] ablation catheter was inserted into the SL-1 sheath and was used to create a 3D electroanatomical map of the right atrium focusing on the cavotricuspid isthmus and into the coronary sinus. The ablation catheter was used to record intracardiac electrograms along the lateral wall of the right atrium and the His electrogram.  A multipolar catheter was then inserted via an 8Fr sheath and positioned in the mid coronary sinus. Another multipolar catheter was placed along the lateral wall of the right atrium and the lateral portion of the CTI.       The patient presented to the EP lab in the clinical arrhythmia with pre-procedural concern for a right atrial flutter. After right atrial and coronary sinus electrograms were obtained, it was clear the majority of the cycle length was accounted for with a counterclockwise activation pattern consistent with CTI dependent atrial flutter. Entrainment was performed with proximal CS pacing and lateral TV pacing revealing concealed entrainment with a PPI-TCL of less than 30 msec. RF ablation was performed during the clinical tachycardia. Linear ablation across the cavo-tricuspid isthmus was performed starting with 1:2 A:V EGMs along the isthmus at 6pm Uzbek. During delivery of RF, the arrhythmia terminated and further ablation was performed to obtain bidirectional block. The local electrogram activation sequence, differential pacing maneuvers and electrogram timing was used to demonstrate bidirectional block along the cavotricuspid isthmus with further ablation. Tachycardia cycle length: 230 msec  Local double potential atrial electrograms: 90 msec  Trans-isthmus time post ablation: 140 msec    The coronary sinus multipolar catheter was used to pace the left atrium during the EP study. The LA CS electrograms were documented and interpreted during the procedure. A comprehensive EP study was performed with 1:1 AV decremental pacing, atrial extrastimuli and ventricular pacing to assess retrograde conduction. The patient did not have sustained slow pathway conduction or evidence of an accessory pathway. Ventricular pacing revealed retrograde VA conduction which was concentric and decremental.    Baseline Intervals    QRS duration: 91 msec  DE interval: 155 msec  RR interval: 853 msec  AH interval: 80 msec  HV interval: 56 msec    EP Study    AV Wenchebach: 380 msec  AV frank ERP: 600/320 msec  VA Wen[place]bach: 300 msec    Figures 1 and 2.  BOLAND and Uzbek projections of the 3D electroanatomic map of the right atrium and coronary sinus. At the completion of the final comprehensive EP study, all catheters were removed, and sheaths pulled. The patient tolerated the procedure well with no acute complications recognized. A post procedure ICE image demonstrated no significant pericardial effusion. Plan of care: The patient will be placed in observation on telemetry, 4 hour flat time, followed by ambulation as tolerated and will continue anticoagulation as prescribed pre-procedure. Summary:   1. Successful ablation of typical CTI-dependent RA flutter. 2. Creation of a line of bidirectional block at the cavotricuspid isthmus. 3.         Comprehensive EP study . 4. Pt tolerated the procedure well. 5. Family updated. Plan:  -Discharge home if stable. -Resume OAC and metoprolol. -EP follow up in 1 month. Asiya Villalobos.  Aracely Hsieh MD, [address]  Clinical Cardiac Electrophysiology  [facility]iology  2/15/2019  10:32 AM

## 2019-02-15 NOTE — DISCHARGE INSTRUCTIONS
ELECTROPHYSIOLOGY STUDY/ABLATION DISCHARGE INSTRUCTIONS    Your Recovery: You had an electrophysiology study for a problem with your heartbeat. You may also have had a catheter ablation to try to correct the problem. You may have swelling, bruising, or a small lump around the site where the catheters went into your body. These should go away in 3 to 4 weeks. Going home:    · Do not drive for 24 hours  · You will need someone to drive you home  · May remove bandage from both groins in 24hrs. · May shower in 24hrs but DO NOT take bath,hot tub or go swimming for 7 days. · Do not put any lotions,ointments,creams or powders over puncture sites. · Place clean band-aid over puncture sites for next 3 days,make sure to change daily. · No strenuous activity/heavy lifting for 1-2 weeks. · If you should develop a fever of 101degrees and puncture site is reddened,warm to the touch or there is [facility] at [phone]. · If you notice bleeding(a drop of blood on banage is ok)lay down and apply pressure. If bleeding does not stop after applying pressure call 911. · Call your doctor if you have any questions or concerns. After your ablation:    S/p ablation. Continue current medicines without changes. Call the cardiology clinic office if any concerns/questions. Follow up will be planned for 1 month with Dr. Margarita Norman. Relax for the next 72 hours and avoid heavy lifting/intense exercise/driving.

## 2019-02-15 NOTE — ANESTHESIA PROCEDURE NOTES
Arterial Line Placement Start time: 2/15/2019 8:05 AM 
End time: 2/15/2019 8:12 AM 
Performed by: Shanika Chambers MD 
Authorized by: Shanika Chambers MD  
 
Pre-Procedure Indications:  Arterial pressure monitoring and blood sampling Preanesthetic Checklist: patient identified, risks and benefits discussed, anesthesia consent, site marked, patient being monitored, timeout performed and patient being monitored Procedure:  
Prep:  Chlorhexidine Seldinger Technique?: Yes Orientation:  Left Location:  Radial artery Catheter size:  20 G Number of attempts:  2 Cont Cardiac Output Sensor: No   
 
Assessment:  
Post-procedure:  Line secured and sterile dressing applied Patient Tolerance:  Patient tolerated the procedure well with no immediate complications Comment:  
1st attempt line placed distal wrist - clearly in artery with + aspiration but not reading on monitor. 2nd attempt line placed more proximal with good reading and aspiration.

## 2019-02-15 NOTE — PROGRESS NOTES
TRANSFER - IN REPORT: 
 
Verbal report received from Travis(name) on Alexander Field  being received from PACU(unit) for routine progression of care Report consisted of patients Situation, Background, Assessment and  
Recommendations(SBAR). Information from the following report(s) SBAR and Procedure Summary was reviewed with the receiving nurse. Opportunity for questions and clarification was provided. Assessment completed upon patients arrival to unit and care assumed.

## 2019-02-15 NOTE — PERIOP NOTES
TRANSFER - OUT REPORT: 
 
Verbal report given to Joshua Antonio RN on Kain Aguero  being transferred to Cath Lab Prep and Recovery for routine post - op Report consisted of patients Situation, Background, Assessment and  
Recommendations(SBAR). Information from the following report(s) SBAR, OR Summary, Procedure Summary, Intake/Output and MAR was reviewed with the receiving nurse. Lines:  
Peripheral IV 02/15/19 Anterior;Right Forearm (Active) Site Assessment Clean, dry, & intact 2/15/2019 11:42 AM  
Phlebitis Assessment 0 2/15/2019 11:42 AM  
Infiltration Assessment 0 2/15/2019 11:42 AM  
Dressing Status Clean, dry, & intact; Occlusive 2/15/2019 11:42 AM  
Dressing Type Transparent;Tape 2/15/2019 11:42 AM  
Hub Color/Line Status Green;Capped 2/15/2019 11:42 AM  
Alcohol Cap Used No 2/15/2019 11:42 AM  
   
Peripheral IV 02/15/19 Anterior; Left Forearm (Active) Site Assessment Clean, dry, & intact 2/15/2019 11:42 AM  
Phlebitis Assessment 0 2/15/2019 11:42 AM  
Infiltration Assessment 0 2/15/2019 11:42 AM  
Dressing Status Clean, dry, & intact; Occlusive 2/15/2019 11:42 AM  
Dressing Type Transparent;Tape 2/15/2019 11:42 AM  
Hub Color/Line Status Pink; Infusing 2/15/2019 11:42 AM  
Alcohol Cap Used No 2/15/2019 11:42 AM  
  
 
Opportunity for questions and clarification was provided. Patient transported with: 
 O2 @ 2 liters Registered Nurse VTE prophylaxis orders have not been written for Kain Aguero. Patient and family given floor number and nurses name. Family updated re: pt status after security code verified.

## 2019-02-15 NOTE — PROGRESS NOTES
The patient has a fraility score of 3-MANAGING WELL, based on ability to complete ADLs without assistance, increased symptoms on exertion Pt arrived, ambulated to room with no visible problems, planned JOSÉ/Afib ablation for Dr Rishi Aguilar. Consent signed, Procedure discussed with pt all questions answered voiced understanding. Medications and history discussed with pt. Pt prepped per orders

## 2019-02-16 NOTE — PROGRESS NOTES
Assisted OOB & ambulated to BR & on unit. Tolerated activity without difficulty. Bilateral groin site without bleeding or hematoma

## 2019-04-10 ENCOUNTER — HOSPITAL ENCOUNTER (EMERGENCY)
Age: 69
Discharge: HOME | End: 2019-04-10
Attending: EMERGENCY MEDICINE
Payer: MEDICARE

## 2019-04-10 VITALS
DIASTOLIC BLOOD PRESSURE: 69 MMHG | HEART RATE: 87 BPM | BODY MASS INDEX: 28.41 KG/M2 | TEMPERATURE: 97.5 F | RESPIRATION RATE: 18 BRPM | OXYGEN SATURATION: 96 % | WEIGHT: 181 LBS | SYSTOLIC BLOOD PRESSURE: 117 MMHG | HEIGHT: 67 IN

## 2019-04-10 DIAGNOSIS — I48.3 TYPICAL ATRIAL FLUTTER (HCC): Primary | ICD-10-CM

## 2019-04-10 LAB
ALBUMIN SERPL-MCNC: 4.2 G/DL (ref 3.2–4.6)
ALBUMIN/GLOB SERPL: 1.2 {RATIO} (ref 1.2–3.5)
ALP SERPL-CCNC: 60 U/L (ref 50–136)
ALT SERPL-CCNC: 45 U/L (ref 12–65)
ANION GAP SERPL CALC-SCNC: 7 MMOL/L (ref 7–16)
AST SERPL-CCNC: 41 U/L (ref 15–37)
BASOPHILS # BLD: 0.1 K/UL (ref 0–0.2)
BASOPHILS NFR BLD: 1 % (ref 0–2)
BILIRUB SERPL-MCNC: 0.7 MG/DL (ref 0.2–1.1)
BUN SERPL-MCNC: 25 MG/DL (ref 8–23)
CALCIUM SERPL-MCNC: 9.5 MG/DL (ref 8.3–10.4)
CHLORIDE SERPL-SCNC: 108 MMOL/L (ref 98–107)
CO2 SERPL-SCNC: 24 MMOL/L (ref 21–32)
CREAT SERPL-MCNC: 1.56 MG/DL (ref 0.8–1.5)
DIFFERENTIAL METHOD BLD: ABNORMAL
EOSINOPHIL # BLD: 0.3 K/UL (ref 0–0.8)
EOSINOPHIL NFR BLD: 4 % (ref 0.5–7.8)
ERYTHROCYTE [DISTWIDTH] IN BLOOD BY AUTOMATED COUNT: 14.2 % (ref 11.9–14.6)
GLOBULIN SER CALC-MCNC: 3.4 G/DL (ref 2.3–3.5)
GLUCOSE SERPL-MCNC: 118 MG/DL (ref 65–100)
HCT VFR BLD AUTO: 55 % (ref 41.1–50.3)
HGB BLD-MCNC: 18.1 G/DL (ref 13.6–17.2)
IMM GRANULOCYTES # BLD AUTO: 0 K/UL (ref 0–0.5)
IMM GRANULOCYTES NFR BLD AUTO: 0 % (ref 0–5)
LYMPHOCYTES # BLD: 1.9 K/UL (ref 0.5–4.6)
LYMPHOCYTES NFR BLD: 21 % (ref 13–44)
MCH RBC QN AUTO: 29.5 PG (ref 26.1–32.9)
MCHC RBC AUTO-ENTMCNC: 32.9 G/DL (ref 31.4–35)
MCV RBC AUTO: 89.7 FL (ref 79.6–97.8)
MONOCYTES # BLD: 0.9 K/UL (ref 0.1–1.3)
MONOCYTES NFR BLD: 10 % (ref 4–12)
NEUTS SEG # BLD: 6 K/UL (ref 1.7–8.2)
NEUTS SEG NFR BLD: 64 % (ref 43–78)
NRBC # BLD: 0 K/UL (ref 0–0.2)
PLATELET # BLD AUTO: 176 K/UL (ref 150–450)
PMV BLD AUTO: 10.2 FL (ref 9.4–12.3)
POTASSIUM SERPL-SCNC: 4.8 MMOL/L (ref 3.5–5.1)
PROT SERPL-MCNC: 7.6 G/DL (ref 6.3–8.2)
RBC # BLD AUTO: 6.13 M/UL (ref 4.23–5.6)
SODIUM SERPL-SCNC: 139 MMOL/L (ref 136–145)
TROPONIN I BLD-MCNC: 0.02 NG/ML (ref 0.02–0.05)
WBC # BLD AUTO: 9.3 K/UL (ref 4.3–11.1)

## 2019-04-10 PROCEDURE — 85025 COMPLETE CBC W/AUTO DIFF WBC: CPT

## 2019-04-10 PROCEDURE — 74011250636 HC RX REV CODE- 250/636: Performed by: EMERGENCY MEDICINE

## 2019-04-10 PROCEDURE — 84484 ASSAY OF TROPONIN QUANT: CPT

## 2019-04-10 PROCEDURE — 74011000250 HC RX REV CODE- 250: Performed by: EMERGENCY MEDICINE

## 2019-04-10 PROCEDURE — 96374 THER/PROPH/DIAG INJ IV PUSH: CPT | Performed by: EMERGENCY MEDICINE

## 2019-04-10 PROCEDURE — 96361 HYDRATE IV INFUSION ADD-ON: CPT | Performed by: EMERGENCY MEDICINE

## 2019-04-10 PROCEDURE — 80053 COMPREHEN METABOLIC PANEL: CPT

## 2019-04-10 PROCEDURE — 93005 ELECTROCARDIOGRAM TRACING: CPT | Performed by: EMERGENCY MEDICINE

## 2019-04-10 PROCEDURE — 99285 EMERGENCY DEPT VISIT HI MDM: CPT | Performed by: EMERGENCY MEDICINE

## 2019-04-10 RX ORDER — FLECAINIDE ACETATE 100 MG/1
100 TABLET ORAL EVERY 12 HOURS
Status: DISCONTINUED | OUTPATIENT
Start: 2019-04-10 | End: 2019-04-10

## 2019-04-10 RX ORDER — DILTIAZEM HYDROCHLORIDE 5 MG/ML
10 INJECTION INTRAVENOUS ONCE
Status: COMPLETED | OUTPATIENT
Start: 2019-04-10 | End: 2019-04-10

## 2019-04-10 RX ADMIN — SODIUM CHLORIDE 1000 ML: 900 INJECTION, SOLUTION INTRAVENOUS at 22:02

## 2019-04-10 RX ADMIN — DILTIAZEM HYDROCHLORIDE 10 MG: 5 INJECTION, SOLUTION INTRAVENOUS at 22:09

## 2019-04-10 RX ADMIN — DILTIAZEM HYDROCHLORIDE 10 MG: 5 INJECTION INTRAVENOUS at 22:02

## 2019-04-10 RX ADMIN — SODIUM CHLORIDE 1000 ML: 900 INJECTION, SOLUTION INTRAVENOUS at 22:45

## 2019-04-11 LAB
ATRIAL RATE: 140 BPM
CALCULATED P AXIS, ECG09: -15 DEGREES
CALCULATED R AXIS, ECG10: 45 DEGREES
CALCULATED T AXIS, ECG11: 4 DEGREES
DIAGNOSIS, 93000: NORMAL
P-R INTERVAL, ECG05: 134 MS
Q-T INTERVAL, ECG07: 282 MS
QRS DURATION, ECG06: 108 MS
QTC CALCULATION (BEZET), ECG08: 430 MS
VENTRICULAR RATE, ECG03: 140 BPM

## 2019-04-11 NOTE — ED PROVIDER NOTES
82-year-old male with a history of paroxysmal atrial flutter presenting for rapid heart rate. He felt it started about 8 hours ago. He gets it all the time usually goes away after an hour or so. This time it has not gone away he felt like was time to be seen given that it had not resolved. He has had multiple ablations in the past.  Is on flecainide and has been taking as directed. He denies any other symptoms such as fevers, chills, nausea, or vomiting. The history is provided by the patient. Rapid Heart Rate This is a recurrent problem. The current episode started 6 to 12 hours ago. The problem occurs constantly. The problem has not changed since onset. Pertinent negatives include no chest pain, no abdominal pain, no headaches and no shortness of breath. Nothing aggravates the symptoms. Nothing relieves the symptoms. He has tried nothing for the symptoms. The treatment provided no relief. Past Medical History:  
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  Essential hypertension, benign 5/30/2013  Hypercholesteremia 12/7/2015  Hypogonadism, testicular 5/30/2013 Removal of right testicle on 11/1/2010; urologist Micaela Felder of [facility], gives testosterone injections  Lumbosacral radiculopathy at L5 4/17/2016  Lung nodule, solitary 1/19/2017 Chronic - benign w/u  Lymphopenia 8/6/2017  Microalbuminuria 9/1/2017  PMR (polymyalgia rheumatica) (HCC) 2/21/2018 Followed by [facility]  Prediabetes 04/17/2016  Unsteady gait 1/15/2019  Vitamin D deficiency 6/2/2015 Past Surgical History:  
Procedure Laterality Date  HX AFIB ABLATION  02/2018  HX COLONOSCOPY  4/03  
 normal  
 HX LUMBAR DISKECTOMY  1989 L5 discectomy  HX LUMBAR DISKECTOMY  [id number] L4  
 HX UROLOGICAL  11/1/2010  
 testicular, right removed d/t  severe infection Family History:  
Problem Relation Age of Onset  Cancer Father   
     leukemia  Hypertension Mother  Cancer Mother   
     breast  
 Cancer Sister   
     colon Social History Socioeconomic History  Marital status:  Spouse name: Not on file  Number of children: Not on file  Years of education: Not on file  Highest education level: Not on file Occupational History  Not on file Social Needs  Financial resource strain: Not on file  Food insecurity:  
  Worry: Not on file Inability: Not on file  Transportation needs:  
  Medical: Not on file Non-medical: Not on file Tobacco Use  Smoking status: Former Smoker  Smokeless tobacco: Never Used  Tobacco comment: smoked cigerattes from age 27 to 47 Substance and Sexual Activity  Alcohol use: Yes Comment: occ drinks wine  Drug use: No  
 Sexual activity: Not on file Lifestyle  Physical activity:  
  Days per week: Not on file Minutes per session: Not on file  Stress: Not on file Relationships  Social connections:  
  Talks on phone: Not on file Gets together: Not on file Attends Synagogue service: Not on file Active member of club or organization: Not on file Attends meetings of clubs or organizations: Not on file Relationship status: Not on file  Intimate partner violence:  
  Fear of current or ex partner: Not on file Emotionally abused: Not on file Physically abused: Not on file Forced sexual activity: Not on file Other Topics Concern  Not on file Social History Narrative  Not on file ALLERGIES: Patient has no known allergies. Review of Systems Respiratory: Negative for shortness of breath. Cardiovascular: Positive for palpitations. Negative for chest pain. Gastrointestinal: Negative for abdominal pain. Neurological: Negative for headaches. All other systems reviewed and are negative. Vitals:  
 04/10/19 2105 BP: 110/64 Pulse: (!) 140 Resp: 18 Temp: 97.5 °F (36.4 °C) SpO2: 96% Weight: 82.1 kg (181 lb) Height: 5' 7\" (1.702 m) Physical Exam  
Constitutional: He is oriented to person, place, and time. He appears well-developed and well-nourished. HENT:  
Head: Normocephalic and atraumatic. Eyes: Pupils are equal, round, and reactive to light. Conjunctivae and EOM are normal.  
Neck: Normal range of motion. Neck supple. Cardiovascular: Regular rhythm, normal heart sounds and intact distal pulses. Tachycardic at 140 and regular Pulmonary/Chest: Effort normal and breath sounds normal.  
Abdominal: Soft. Bowel sounds are normal.  
Musculoskeletal: Normal range of motion. He exhibits no deformity. Neurological: He is alert and oriented to person, place, and time. No cranial nerve deficit. Skin: Skin is warm and dry. Psychiatric: He has a normal mood and affect. His behavior is normal.  
Nursing note and vitals reviewed. MDM Number of Diagnoses or Management Options Typical atrial flutter [facility]):  
Diagnosis management comments: 61-year-old male with paroxysmal atrial flutter presenting for a heart rate of 140. EKG was performed and shows typical 21 flutter with a rate of 140. We will get basic labs, fluids, chest x-ray and give the patient diltiazem. He often spontaneously converts. He is also taking his blood thinner as directed so he is little to no increased risk of stroke. Amount and/or Complexity of Data Reviewed Clinical lab tests: ordered and reviewed Tests in the radiology section of CPT®: ordered and reviewed Tests in the medicine section of CPT®: ordered and reviewed Decide to obtain previous medical records or to obtain history from someone other than the patient: yes Independent visualization of images, tracings, or specimens: yes Risk of Complications, Morbidity, and/or Mortality Presenting problems: moderate Diagnostic procedures: moderate Management options: moderate General comments: Send this patient over the next physician to follow-up his rate. Cardiology consult is needed. I updated the patient of our findings and the plan to give him a repeat dose of flecainide and see if he converted back to normal sinus rhythm. I proposed the possibility that the patient may need admission if he does not return to normal sinus rhythm which he replied he would not be interested in this. Patient Progress Patient progress: stable Procedures

## 2019-04-11 NOTE — ED NOTES
I have reviewed discharge instructions with the patient. The patient verbalized understanding. Patient left ED via Discharge Method: ambulatory to Home with spouse. Opportunity for questions and clarification provided. Patient given 0 scripts. To continue your aftercare when you leave the hospital, you may receive an automated call from our care team to check in on how you are doing. This is a free service and part of our promise to provide the best care and service to meet your aftercare needs.  If you have questions, or wish to unsubscribe from this service please call [phone]. Thank you for Choosing our [facility].

## 2019-04-11 NOTE — ED NOTES
Care turned over to me pending observation. Patient has been observed for approximately 2 hours and has remained in a controlled atrial flutter rate. Patient is desiring to go home. At this time I do not feel it is necessary to keep him here for a dose of flecainide. He takes this at home already.

## 2019-04-11 NOTE — DISCHARGE INSTRUCTIONS
Patient Education        Atrial Flutter: Care Instructions  Your Care Instructions    Atrial flutter is a type of heartbeat problem (arrhythmia) that usually causes a fast heart rate. In atrial flutter, a problem with the heart's electrical system causes the two upper parts of the heart (the right atrium and the left atrium) to flutter, or beat very fast. Atrial flutter might be diagnosed using an an electrocardiogram (EKG). An EKG translates the heart's electrical activity into line tracings on paper. Treating atrial flutter is important for several reasons. The change in heartbeat can cause blood clots. The clots can travel from your heart to your brain and cause a stroke. A fast heartbeat can make you feel lightheaded, dizzy, and weak. And over time, it can also increase your risk for heart failure. Atrial flutter is often the result of another heart condition, such as coronary artery disease or some other heart rhythm problems. Making changes to improve your heart health will help you stay healthy and active. Your doctor may prescribe medicines to help slow down your heartbeat. You may also take medicine to help prevent a stroke. In some cases, a procedure called catheter ablation is done to stop atrial flutter. Follow-up care is a key part of your treatment and safety. Be sure to make and go to all appointments, and call your doctor if you are having problems. It's also a good idea to know your test results and keep a list of the medicines you take. How can you care for yourself at home? Medicines    · Take your medicines exactly as prescribed. Call your doctor if you think you are having a problem with your medicine. You will get more details on the specific medicines your doctor prescribes.     · If your doctor has given you a blood thinner to prevent a stroke, be sure you get instructions about how to take your medicine safely.  Blood thinners can cause serious bleeding problems.     · Do not take any vitamins, over-the-counter drugs, or herbal products without talking to your doctor first.    Lifestyle changes    · Do not smoke. Smoking can increase your chance of a stroke and heart attack. If you need help quitting, talk to your doctor about stop-smoking programs and medicines. These can increase your chances of quitting for good.     · Eat a heart-healthy diet.     · Stay at a healthy weight. Lose weight if you need to.     · Limit alcohol to 2 drinks a day for men and 1 drink a day for women. Too much alcohol can cause health problems.     · Avoid colds and flu. Get a pneumococcal vaccine shot. If you have had one before, ask your doctor whether you need another dose. Get a flu shot every year. If you must be around people with colds or flu, wash your hands often. Activity    · Talk to your doctor about what type and level of exercise is safe for you. Start light exercise if your doctor says it is okay. Walking is a good choice. Try for at least 30 minutes on most days of the week. You also may want to swim, bike, or do other activities.     · When you exercise, watch for signs that your heart is working too hard. You are pushing too hard if you can't talk while you exercise. If you become short of breath or dizzy or have chest pain, sit down and rest right away. When should you call for help? Call 911 anytime you think you may need emergency care. For example, call if:    · You have symptoms of a stroke. These may include:  ? Sudden numbness, tingling, weakness, or loss of movement in your face, arm, or leg, especially on only one side of your body. ? Sudden vision changes. ? Sudden trouble speaking. ? Sudden confusion or trouble understanding simple statements. ? Sudden problems with walking or balance.   ? A sudden, severe headache that is different from past headaches.     · You passed out (lost consciousness).    Call your doctor now or seek immediate medical care if:    · You have new or increased shortness of breath.     · You feel dizzy or lightheaded, or you feel like you may faint.     · Your heart rate becomes irregular.     · You can feel your heart flutter in your chest or skip heartbeats. Tell your doctor if these symptoms are new or worse.    Watch closely for changes in your health, and be sure to contact your doctor if you have any problems. Where can you learn more? Go to http://jose juan-trudi.info/. Enter J691 in the search box to learn more about \"Atrial Flutter: Care Instructions. \"  Current as of: July 22, 2018  Content Version: 11.9  © 0373-7750 [employer]. Care instructions adapted under license by [employer] (which disclaims liability or warranty for this information). If you have questions about a medical condition or this instruction, always ask your healthcare professional. [address] any warranty or liability for your use of this information.

## 2019-04-11 NOTE — ED TRIAGE NOTES
Pt arrives with c/o rapid heart rate [id number]. Reports waking pt at onset. Reports shortness of breath. Denies chest pain. Denies lightheadedness or dizziness. Denies n/v. Ablation 2/15. Followed by dr Navin Mcdonald. Currently taking eliquis. Asa 81mg x4 approx 1530.

## 2019-04-20 ENCOUNTER — HOSPITAL ENCOUNTER (OUTPATIENT)
Age: 69
Setting detail: OBSERVATION
Discharge: HOME | End: 2019-04-21
Attending: EMERGENCY MEDICINE | Admitting: INTERNAL MEDICINE
Payer: MEDICARE

## 2019-04-20 ENCOUNTER — APPOINTMENT (OUTPATIENT)
Dept: CT IMAGING | Age: 69
End: 2019-04-20
Attending: EMERGENCY MEDICINE
Payer: MEDICARE

## 2019-04-20 ENCOUNTER — APPOINTMENT (OUTPATIENT)
Dept: GENERAL RADIOLOGY | Age: 69
End: 2019-04-20
Attending: EMERGENCY MEDICINE
Payer: MEDICARE

## 2019-04-20 DIAGNOSIS — R74.8 CARDIAC ENZYMES ELEVATED: ICD-10-CM

## 2019-04-20 DIAGNOSIS — R06.02 SOB (SHORTNESS OF BREATH): Primary | ICD-10-CM

## 2019-04-20 DIAGNOSIS — R10.13 ABDOMINAL PAIN, EPIGASTRIC: ICD-10-CM

## 2019-04-20 PROBLEM — R07.9 CHEST PAIN: Status: ACTIVE | Noted: 2019-04-20

## 2019-04-20 LAB
ALBUMIN SERPL-MCNC: 4 G/DL (ref 3.2–4.6)
ALBUMIN/GLOB SERPL: 1.2 {RATIO} (ref 1.2–3.5)
ALP SERPL-CCNC: 56 U/L (ref 50–136)
ALT SERPL-CCNC: 42 U/L (ref 12–65)
ANION GAP SERPL CALC-SCNC: 8 MMOL/L (ref 7–16)
AST SERPL-CCNC: 40 U/L (ref 15–37)
BASOPHILS # BLD: 0.1 K/UL (ref 0–0.2)
BASOPHILS NFR BLD: 1 % (ref 0–2)
BILIRUB SERPL-MCNC: 0.8 MG/DL (ref 0.2–1.1)
BNP SERPL-MCNC: 45 PG/ML
BUN SERPL-MCNC: 14 MG/DL (ref 8–23)
CALCIUM SERPL-MCNC: 9.4 MG/DL (ref 8.3–10.4)
CHLORIDE SERPL-SCNC: 106 MMOL/L (ref 98–107)
CO2 SERPL-SCNC: 29 MMOL/L (ref 21–32)
CREAT SERPL-MCNC: 1.14 MG/DL (ref 0.8–1.5)
DIFFERENTIAL METHOD BLD: ABNORMAL
EOSINOPHIL # BLD: 0.1 K/UL (ref 0–0.8)
EOSINOPHIL NFR BLD: 1 % (ref 0.5–7.8)
ERYTHROCYTE [DISTWIDTH] IN BLOOD BY AUTOMATED COUNT: 14.5 % (ref 11.9–14.6)
GLOBULIN SER CALC-MCNC: 3.4 G/DL (ref 2.3–3.5)
GLUCOSE SERPL-MCNC: 94 MG/DL (ref 65–100)
HCT VFR BLD AUTO: 49 % (ref 41.1–50.3)
HGB BLD-MCNC: 15.9 G/DL (ref 13.6–17.2)
IMM GRANULOCYTES # BLD AUTO: 0.1 K/UL (ref 0–0.5)
IMM GRANULOCYTES NFR BLD AUTO: 1 % (ref 0–5)
LIPASE SERPL-CCNC: 757 U/L (ref 73–393)
LYMPHOCYTES # BLD: 0.9 K/UL (ref 0.5–4.6)
LYMPHOCYTES NFR BLD: 9 % (ref 13–44)
MCH RBC QN AUTO: 29.7 PG (ref 26.1–32.9)
MCHC RBC AUTO-ENTMCNC: 32.4 G/DL (ref 31.4–35)
MCV RBC AUTO: 91.6 FL (ref 79.6–97.8)
MONOCYTES # BLD: 0.9 K/UL (ref 0.1–1.3)
MONOCYTES NFR BLD: 10 % (ref 4–12)
NEUTS SEG # BLD: 7.6 K/UL (ref 1.7–8.2)
NEUTS SEG NFR BLD: 79 % (ref 43–78)
NRBC # BLD: 0 K/UL (ref 0–0.2)
PLATELET # BLD AUTO: 162 K/UL (ref 150–450)
PMV BLD AUTO: 9.7 FL (ref 9.4–12.3)
POTASSIUM SERPL-SCNC: 4.4 MMOL/L (ref 3.5–5.1)
PROT SERPL-MCNC: 7.4 G/DL (ref 6.3–8.2)
RBC # BLD AUTO: 5.35 M/UL (ref 4.23–5.6)
SODIUM SERPL-SCNC: 143 MMOL/L (ref 136–145)
TROPONIN I BLD-MCNC: 1.28 NG/ML (ref 0.02–0.05)
TROPONIN I SERPL-MCNC: 1.36 NG/ML (ref 0.02–0.05)
WBC # BLD AUTO: 9.6 K/UL (ref 4.3–11.1)

## 2019-04-20 PROCEDURE — 84484 ASSAY OF TROPONIN QUANT: CPT

## 2019-04-20 PROCEDURE — 74011636320 HC RX REV CODE- 636/320: Performed by: INTERNAL MEDICINE

## 2019-04-20 PROCEDURE — 94640 AIRWAY INHALATION TREATMENT: CPT

## 2019-04-20 PROCEDURE — 74011250637 HC RX REV CODE- 250/637: Performed by: EMERGENCY MEDICINE

## 2019-04-20 PROCEDURE — 71260 CT THORAX DX C+: CPT

## 2019-04-20 PROCEDURE — 96361 HYDRATE IV INFUSION ADD-ON: CPT

## 2019-04-20 PROCEDURE — 96375 TX/PRO/DX INJ NEW DRUG ADDON: CPT

## 2019-04-20 PROCEDURE — 74011250636 HC RX REV CODE- 250/636: Performed by: EMERGENCY MEDICINE

## 2019-04-20 PROCEDURE — 99284 EMERGENCY DEPT VISIT MOD MDM: CPT | Performed by: EMERGENCY MEDICINE

## 2019-04-20 PROCEDURE — 99218 HC RM OBSERVATION: CPT

## 2019-04-20 PROCEDURE — 96375 TX/PRO/DX INJ NEW DRUG ADDON: CPT | Performed by: EMERGENCY MEDICINE

## 2019-04-20 PROCEDURE — 74011250636 HC RX REV CODE- 250/636: Performed by: INTERNAL MEDICINE

## 2019-04-20 PROCEDURE — 36415 COLL VENOUS BLD VENIPUNCTURE: CPT

## 2019-04-20 PROCEDURE — 71046 X-RAY EXAM CHEST 2 VIEWS: CPT

## 2019-04-20 PROCEDURE — 96374 THER/PROPH/DIAG INJ IV PUSH: CPT | Performed by: EMERGENCY MEDICINE

## 2019-04-20 PROCEDURE — 74011000250 HC RX REV CODE- 250: Performed by: INTERNAL MEDICINE

## 2019-04-20 PROCEDURE — 74011000250 HC RX REV CODE- 250: Performed by: EMERGENCY MEDICINE

## 2019-04-20 PROCEDURE — 80053 COMPREHEN METABOLIC PANEL: CPT

## 2019-04-20 PROCEDURE — 74011250637 HC RX REV CODE- 250/637: Performed by: INTERNAL MEDICINE

## 2019-04-20 PROCEDURE — 85025 COMPLETE CBC W/AUTO DIFF WBC: CPT

## 2019-04-20 PROCEDURE — 83690 ASSAY OF LIPASE: CPT

## 2019-04-20 PROCEDURE — 74011000258 HC RX REV CODE- 258: Performed by: INTERNAL MEDICINE

## 2019-04-20 PROCEDURE — 93005 ELECTROCARDIOGRAM TRACING: CPT | Performed by: EMERGENCY MEDICINE

## 2019-04-20 PROCEDURE — 83880 ASSAY OF NATRIURETIC PEPTIDE: CPT

## 2019-04-20 RX ORDER — DILTIAZEM HYDROCHLORIDE 180 MG/1
180 CAPSULE, COATED, EXTENDED RELEASE ORAL DAILY
Status: DISCONTINUED | OUTPATIENT
Start: 2019-04-21 | End: 2019-04-21 | Stop reason: HOSPADM

## 2019-04-20 RX ORDER — LORATADINE 10 MG/1
10 TABLET ORAL EVERY EVENING
Status: DISCONTINUED | OUTPATIENT
Start: 2019-04-20 | End: 2019-04-21 | Stop reason: HOSPADM

## 2019-04-20 RX ORDER — SODIUM CHLORIDE 0.9 % (FLUSH) 0.9 %
5-40 SYRINGE (ML) INJECTION EVERY 8 HOURS
Status: DISCONTINUED | OUTPATIENT
Start: 2019-04-20 | End: 2019-04-21 | Stop reason: HOSPADM

## 2019-04-20 RX ORDER — FUROSEMIDE 10 MG/ML
40 INJECTION INTRAMUSCULAR; INTRAVENOUS ONCE
Status: COMPLETED | OUTPATIENT
Start: 2019-04-20 | End: 2019-04-20

## 2019-04-20 RX ORDER — METOPROLOL SUCCINATE 50 MG/1
50 TABLET, EXTENDED RELEASE ORAL DAILY
Status: DISCONTINUED | OUTPATIENT
Start: 2019-04-21 | End: 2019-04-20

## 2019-04-20 RX ORDER — HYOSCYAMINE SULFATE 0.12 MG/1
0.25 TABLET SUBLINGUAL
Status: COMPLETED | OUTPATIENT
Start: 2019-04-20 | End: 2019-04-20

## 2019-04-20 RX ORDER — SODIUM CHLORIDE 0.9 % (FLUSH) 0.9 %
5-40 SYRINGE (ML) INJECTION AS NEEDED
Status: DISCONTINUED | OUTPATIENT
Start: 2019-04-20 | End: 2019-04-21 | Stop reason: HOSPADM

## 2019-04-20 RX ORDER — LISINOPRIL 5 MG/1
10 TABLET ORAL DAILY
Status: DISCONTINUED | OUTPATIENT
Start: 2019-04-21 | End: 2019-04-21 | Stop reason: HOSPADM

## 2019-04-20 RX ORDER — HYDROCODONE BITARTRATE AND ACETAMINOPHEN 5; 325 MG/1; MG/1
1 TABLET ORAL
Status: DISCONTINUED | OUTPATIENT
Start: 2019-04-20 | End: 2019-04-21 | Stop reason: HOSPADM

## 2019-04-20 RX ORDER — ALBUTEROL SULFATE 0.83 MG/ML
2.5 SOLUTION RESPIRATORY (INHALATION)
Status: DISCONTINUED | OUTPATIENT
Start: 2019-04-20 | End: 2019-04-21 | Stop reason: HOSPADM

## 2019-04-20 RX ORDER — ATORVASTATIN CALCIUM 40 MG/1
40 TABLET, FILM COATED ORAL
Status: DISCONTINUED | OUTPATIENT
Start: 2019-04-20 | End: 2019-04-21 | Stop reason: HOSPADM

## 2019-04-20 RX ORDER — BUDESONIDE 0.5 MG/2ML
500 INHALANT ORAL
Status: DISCONTINUED | OUTPATIENT
Start: 2019-04-20 | End: 2019-04-21 | Stop reason: HOSPADM

## 2019-04-20 RX ORDER — PANTOPRAZOLE SODIUM 40 MG/1
40 TABLET, DELAYED RELEASE ORAL
Status: DISCONTINUED | OUTPATIENT
Start: 2019-04-21 | End: 2019-04-20 | Stop reason: CLARIF

## 2019-04-20 RX ORDER — GUAIFENESIN 100 MG/5ML
81 LIQUID (ML) ORAL DAILY
COMMUNITY
End: 2019-12-13 | Stop reason: CLARIF

## 2019-04-20 RX ORDER — GABAPENTIN 300 MG/1
600 CAPSULE ORAL 3 TIMES DAILY
Status: DISCONTINUED | OUTPATIENT
Start: 2019-04-20 | End: 2019-04-20

## 2019-04-20 RX ORDER — FLECAINIDE ACETATE 100 MG/1
100 TABLET ORAL 2 TIMES DAILY
Status: DISCONTINUED | OUTPATIENT
Start: 2019-04-20 | End: 2019-04-21 | Stop reason: HOSPADM

## 2019-04-20 RX ORDER — LORATADINE 10 MG/1
10 TABLET ORAL
COMMUNITY

## 2019-04-20 RX ORDER — GUAIFENESIN 100 MG/5ML
81 LIQUID (ML) ORAL DAILY
Status: DISCONTINUED | OUTPATIENT
Start: 2019-04-21 | End: 2019-04-21 | Stop reason: HOSPADM

## 2019-04-20 RX ORDER — DILTIAZEM HYDROCHLORIDE 180 MG/1
180 CAPSULE, COATED, EXTENDED RELEASE ORAL DAILY
Status: ON HOLD | COMMUNITY
End: 2020-06-23

## 2019-04-20 RX ORDER — ALBUTEROL SULFATE 90 UG/1
2 AEROSOL, METERED RESPIRATORY (INHALATION)
Status: DISCONTINUED | OUTPATIENT
Start: 2019-04-20 | End: 2019-04-20 | Stop reason: ALTCHOICE

## 2019-04-20 RX ORDER — ACETAMINOPHEN 325 MG/1
650 TABLET ORAL
Status: DISCONTINUED | OUTPATIENT
Start: 2019-04-20 | End: 2019-04-21 | Stop reason: HOSPADM

## 2019-04-20 RX ORDER — MORPHINE SULFATE 2 MG/ML
2 INJECTION, SOLUTION INTRAMUSCULAR; INTRAVENOUS
Status: COMPLETED | OUTPATIENT
Start: 2019-04-20 | End: 2019-04-20

## 2019-04-20 RX ORDER — MORPHINE SULFATE 2 MG/ML
2 INJECTION, SOLUTION INTRAMUSCULAR; INTRAVENOUS
Status: DISCONTINUED | OUTPATIENT
Start: 2019-04-20 | End: 2019-04-21 | Stop reason: HOSPADM

## 2019-04-20 RX ORDER — PSEUDOEPHEDRINE HCL 30 MG
1000 TABLET ORAL DAILY
Status: DISCONTINUED | OUTPATIENT
Start: 2019-04-21 | End: 2019-04-21 | Stop reason: HOSPADM

## 2019-04-20 RX ORDER — ACETAMINOPHEN 650 MG/1
650 SUPPOSITORY RECTAL
Status: DISCONTINUED | OUTPATIENT
Start: 2019-04-20 | End: 2019-04-20 | Stop reason: ALTCHOICE

## 2019-04-20 RX ORDER — SODIUM CHLORIDE 0.9 % (FLUSH) 0.9 %
10 SYRINGE (ML) INJECTION
Status: COMPLETED | OUTPATIENT
Start: 2019-04-20 | End: 2019-04-20

## 2019-04-20 RX ORDER — ONDANSETRON 2 MG/ML
4 INJECTION INTRAMUSCULAR; INTRAVENOUS
Status: COMPLETED | OUTPATIENT
Start: 2019-04-20 | End: 2019-04-20

## 2019-04-20 RX ORDER — OMEPRAZOLE 20 MG/1
20 CAPSULE, DELAYED RELEASE ORAL
Status: DISCONTINUED | OUTPATIENT
Start: 2019-04-20 | End: 2019-04-21 | Stop reason: HOSPADM

## 2019-04-20 RX ORDER — IPRATROPIUM BROMIDE AND ALBUTEROL SULFATE 2.5; .5 MG/3ML; MG/3ML
3 SOLUTION RESPIRATORY (INHALATION)
Status: COMPLETED | OUTPATIENT
Start: 2019-04-20 | End: 2019-04-20

## 2019-04-20 RX ADMIN — HYDROCODONE BITARTRATE AND ACETAMINOPHEN 1 TABLET: 5; 325 TABLET ORAL at 21:34

## 2019-04-20 RX ADMIN — MORPHINE SULFATE 2 MG: 2 INJECTION, SOLUTION INTRAMUSCULAR; INTRAVENOUS at 14:58

## 2019-04-20 RX ADMIN — Medication 5 ML: at 21:36

## 2019-04-20 RX ADMIN — Medication 5 ML: at 18:06

## 2019-04-20 RX ADMIN — FLECAINIDE ACETATE 100 MG: 100 TABLET ORAL at 18:04

## 2019-04-20 RX ADMIN — LORATADINE 10 MG: 10 TABLET ORAL at 18:04

## 2019-04-20 RX ADMIN — IPRATROPIUM BROMIDE AND ALBUTEROL SULFATE 3 ML: .5; 3 SOLUTION RESPIRATORY (INHALATION) at 15:05

## 2019-04-20 RX ADMIN — FUROSEMIDE 40 MG: 10 INJECTION, SOLUTION INTRAMUSCULAR; INTRAVENOUS at 18:44

## 2019-04-20 RX ADMIN — SODIUM CHLORIDE 100 ML: 900 INJECTION, SOLUTION INTRAVENOUS at 16:44

## 2019-04-20 RX ADMIN — HYOSCYAMINE SULFATE 0.25 MG: 0.12 TABLET ORAL at 14:58

## 2019-04-20 RX ADMIN — IOPAMIDOL 100 ML: 755 INJECTION, SOLUTION INTRAVENOUS at 16:44

## 2019-04-20 RX ADMIN — APIXABAN 5 MG: 5 TABLET, FILM COATED ORAL at 18:04

## 2019-04-20 RX ADMIN — Medication 10 ML: at 16:44

## 2019-04-20 RX ADMIN — ONDANSETRON 4 MG: 2 INJECTION INTRAMUSCULAR; INTRAVENOUS at 14:58

## 2019-04-20 NOTE — ROUTINE PROCESS
TRANSFER - OUT REPORT: 
 
Verbal report given to BUCKY Peña on Zeus Hopkins  being transferred to 3rd floor for routine progression of care Report consisted of patients Situation, Background, Assessment and  
Recommendations(SBAR). Information from the following report(s) ED Summary was reviewed with the receiving nurse. Lines:  
Peripheral IV 04/20/19 Left Antecubital (Active) Site Assessment Clean, dry, & intact 4/20/2019  4:29 PM  
Phlebitis Assessment 0 4/20/2019  4:29 PM  
Infiltration Assessment 0 4/20/2019  4:29 PM  
Dressing Status Clean, dry, & intact 4/20/2019  4:29 PM  
Hub Color/Line Status Blue 4/20/2019  4:29 PM  
Alcohol Cap Used Yes 4/20/2019  4:29 PM  
  
 
Opportunity for questions and clarification was provided. Patient transported with: 
 Monitor O2 @ 2 liters Registered Nurse

## 2019-04-20 NOTE — PROGRESS NOTES
TRANSFER - IN REPORT: 
 
Verbal report received from BUCKY Redman on Mercedes Neighbor being received from ER for routine progression of care Report consisted of patients Situation, Background, Assessment and Recommendations(SBAR). Information from the following report(s) SBAR, Kardex and Recent Results was reviewed with the receiving nurse. Opportunity for questions and clarification was provided. Assessment completed upon patients arrival to unit and care assumed. Patient arrived to floor via stretcher. Telemetry monitor applied. Patient educated to call for assistance when getting out of bed. Patient verbalized understanding of above education. Skin and admission assessment completed upon arrival to room. Bed low and locked. Call light within reach. Side rails X2. Skin assessment completed. Sacrum pink but blanchable. Heels are intact with no breakdown. No other skin issues noted at this time.

## 2019-04-20 NOTE — ED PROVIDER NOTES
Chief complaint : Dyspnea and chest pain HISTORY OF PRESENT ILLNESS : 
Location : Epigastric from right upper quadrant and left upper quadrant Quality : Upper abdominal discomfort described as tightness Quantity : Constant Timing : Yesterday Severity : Mild-to-moderate Alleviating / exacerbating factors : Patient just had a radiofrequency ablation for A. Fib, on Wednesday, April 17. 2 days prior to onset of discomfort. This was done in [place], one leg or the trip was flying, the other was driving Patient is already on Elequis coagulation therapy for his atrial fib/flutter. He has been having ongoing pain and difficulties with his left leg recently and arterial ultrasound was done patient states it revealed \"some blockage\". No history of DVT/PE. No history of CHF Associated Symptoms : No swelling in legs Past Medical History:  
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  Essential hypertension, benign 5/30/2013  Hypercholesteremia 12/7/2015  Hypogonadism, testicular 5/30/2013 Removal of right testicle on 11/1/2010; urologist Jasmina Valladares of [facility], gives testosterone injections  Lumbosacral radiculopathy at L5 4/17/2016  Lung nodule, solitary 1/19/2017 Chronic - benign w/u  Lymphopenia 8/6/2017  Microalbuminuria 9/1/2017  PMR (polymyalgia rheumatica) (HCC) 2/21/2018 [facility]  Prediabetes 04/17/2016  Unsteady gait 1/15/2019  Vitamin D deficiency 6/2/2015 Past Surgical History:  
Procedure Laterality Date  HX AFIB ABLATION  02/2018  HX COLONOSCOPY  4/03  
 normal  
 HX LUMBAR DISKECTOMY  1989 L5 discectomy  HX LUMBAR DISKECTOMY  [id number] L4  
 HX UROLOGICAL  11/1/2010  
 testicular, right removed d/t  severe infection Family History:  
Problem Relation Age of Onset  Cancer Father   
     leukemia  Hypertension Mother  Cancer Mother   
     breast  
 Cancer Sister   
     colon Social History Socioeconomic History  Marital status:  Spouse name: Not on file  Number of children: Not on file  Years of education: Not on file  Highest education level: Not on file Occupational History  Not on file Social Needs  Financial resource strain: Not on file  Food insecurity:  
  Worry: Not on file Inability: Not on file  Transportation needs:  
  Medical: Not on file Non-medical: Not on file Tobacco Use  Smoking status: Former Smoker  Smokeless tobacco: Never Used  Tobacco comment: smoked cigerattes from age 27 to 47 Substance and Sexual Activity  Alcohol use: Yes Comment: occ drinks wine  Drug use: No  
 Sexual activity: Not on file Lifestyle  Physical activity:  
  Days per week: Not on file Minutes per session: Not on file  Stress: Not on file Relationships  Social connections:  
  Talks on phone: Not on file Gets together: Not on file Attends Congregational service: Not on file Active member of club or organization: Not on file Attends meetings of clubs or organizations: Not on file Relationship status: Not on file  Intimate partner violence:  
  Fear of current or ex partner: Not on file Emotionally abused: Not on file Physically abused: Not on file Forced sexual activity: Not on file Other Topics Concern  Not on file Social History Narrative  Not on file ALLERGIES: Patient has no known allergies. Review of Systems Constitutional: Negative for chills and fever. HENT: Negative for rhinorrhea and sore throat. Eyes: Negative for discharge and redness. Respiratory: Positive for shortness of breath. Negative for cough. Cardiovascular: Positive for chest pain. Negative for palpitations. Gastrointestinal: Positive for abdominal pain. Negative for diarrhea, nausea and vomiting. Musculoskeletal: Negative for arthralgias and back pain. Left leg pain, possibly vascular in origin Skin: Negative for rash. Neurological: Positive for headaches. Negative for dizziness and weakness. All other systems reviewed and are negative. Vitals:  
 04/20/19 1314 BP: 143/84 Pulse: 86 Resp: 16 Temp: 98.2 °F (36.8 °C) SpO2: 92% Weight: 81.6 kg (180 lb) Height: 5' 7\" (1.702 m) Physical Exam  
Constitutional: He is oriented to person, place, and time. He appears well-developed and well-nourished. Non-toxic appearance. He does not appear ill. No distress. HENT:  
Head: Normocephalic and atraumatic. Eyes: Pupils are equal, round, and reactive to light. Conjunctivae are normal. Right eye exhibits no discharge. Left eye exhibits no discharge. No scleral icterus. Neck: Normal range of motion. Neck supple. Cardiovascular: Normal rate, regular rhythm and normal heart sounds. Exam reveals no gallop. No murmur heard. Pulmonary/Chest: Effort normal. No respiratory distress. He has no wheezes. He has rales in the right lower field and the left lower field. Abdominal: Soft. Bowel sounds are normal. There is tenderness in the right upper quadrant, epigastric area and left upper quadrant. There is no guarding. Musculoskeletal: Normal range of motion. He exhibits no edema. Neurological: He is alert and oriented to person, place, and time. He exhibits normal muscle tone. cni 2-12 grossly Skin: Skin is warm and dry. He is not diaphoretic. Psychiatric: He has a normal mood and affect. His behavior is normal.  
Nursing note and vitals reviewed. MDM Number of Diagnoses or Management Options Abdominal pain, epigastric:  
Cardiac enzymes elevated:  
SOB (shortness of breath):  
Diagnosis management comments: Medical decision making note: 
Epigastric abdominal/chest tightness and dyspnea. Elevated troponin on LEPPEN of care testing. Patient did just undergo cardiac ablation 3 days prior. EKG is benign, we will ask cardiology to see, Doubtful for PE as patient is already on anticoagulation therapy. I need to consider chest scan for other etiologies such as tracheal fistula related to ovulation This concludes the \"medical decision making note\" part of this emergency department visit note. Procedures

## 2019-04-20 NOTE — ED TRIAGE NOTES
Patient arrives ambulatory with complaint of chest pain. States he had a cardiac ablation on Wednesday at [facility]. States discharged Thursday and felt good. States now he feels bloated, can't breathe, and is having slight chest pain. States this was his third ablation for a fib/ a flutter. Patient speaking in complete sentences in triage.

## 2019-04-20 NOTE — H&P
[facility] History & Physical  
  
Date of  Admission: 4/20/2019  1:22 PM  
 
CC:  Dyspnea/abdominal fullness; recent ablation HPI:  Mack Preciado is a 81 y.o. male Prior history of atrial fibrillation/atrial flutter ablation with recurrence and underwent repeat PVI isolation/atypical atrial flutter ablation at the [facility] clinic on 4/17/19. No prior history of coronary disease with prior noted coronary CTA with no significant stenosis and plaque noted in the left main/LAD [1/18]. Also noted negative Cardiolite 2/19. Other history of neuropathy. Since his ablation, has had issues since discharge with abdominal fullness/dyspnea. States his dyspnea got better with trying some inhalers. Has not had much of an appetite. Denies any fevers or chills. No CVA like symptoms. Has had some headaches as well. No definite chest discomfort. On presentation to the ER, initial troponin noted to be elevated at 1.28 and cardiology consultation requested. No acute EKG changes. Past Medical History:  
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  Essential hypertension, benign 5/30/2013  Hypercholesteremia 12/7/2015  Hypogonadism, testicular 5/30/2013 Removal of right testicle on 11/1/2010; urologist Anali Brown of [facility], gives testosterone injections  Lumbosacral radiculopathy at L5 4/17/2016  Lung nodule, solitary 1/19/2017 Chronic - benign w/u  Lymphopenia 8/6/2017  Microalbuminuria 9/1/2017  PMR (polymyalgia rheumatica) (HCC) 2/21/2018 [facility]  Prediabetes 04/17/2016  Unsteady gait 1/15/2019  Vitamin D deficiency 6/2/2015 Past Surgical History:  
Procedure Laterality Date  HX AFIB ABLATION  02/2018  HX COLONOSCOPY  4/03  
 normal  
 HX LUMBAR DISKECTOMY  1989 L5 discectomy  HX LUMBAR DISKECTOMY  [id number] L4  
 HX UROLOGICAL  11/1/2010  
 testicular, right removed d/t  severe infection No Known Allergies Social History Socioeconomic History  Marital status:  Spouse name: Not on file  Number of children: Not on file  Years of education: Not on file  Highest education level: Not on file Occupational History  Not on file Social Needs  Financial resource strain: Not on file  Food insecurity:  
  Worry: Not on file Inability: Not on file  Transportation needs:  
  Medical: Not on file Non-medical: Not on file Tobacco Use  Smoking status: Former Smoker  Smokeless tobacco: Never Used  Tobacco comment: smoked cigerattes from age 27 to 47 Substance and Sexual Activity  Alcohol use: Yes Comment: occ drinks wine  Drug use: No  
 Sexual activity: Not on file Lifestyle  Physical activity:  
  Days per week: Not on file Minutes per session: Not on file  Stress: Not on file Relationships  Social connections:  
  Talks on phone: Not on file Gets together: Not on file Attends Latter-day service: Not on file Active member of club or organization: Not on file Attends meetings of clubs or organizations: Not on file Relationship status: Not on file  Intimate partner violence:  
  Fear of current or ex partner: Not on file Emotionally abused: Not on file Physically abused: Not on file Forced sexual activity: Not on file Other Topics Concern  Not on file Social History Narrative  Not on file Family History Problem Relation Age of Onset  Cancer Father   
     leukemia  Hypertension Mother  Cancer Mother   
     breast  
 Cancer Sister   
     colon No current facility-administered medications for this encounter. Current Outpatient Medications Medication Sig  
 lisinopril (PRINIVIL, ZESTRIL) 10 mg tablet TAKE ONE TABLET BY MOUTH ONE TIME DAILY  metoprolol succinate (TOPROL-XL) 50 mg XL tablet Take 1 Tab by mouth daily.  apixaban (ELIQUIS) 5 mg tablet Take 1 Tab by mouth two (2) times a day.  flecainide (TAMBOCOR) 100 mg tablet Take 1 Tab by mouth two (2) times a day.  cholecalciferol, VITAMIN D3, (VITAMIN D3) 5,000 unit tab tablet Take 5,000 Units by mouth daily.  calcium citrate tab tablet Take 1,000 mg by mouth daily.  omeprazole (PRILOSEC) 20 mg capsule Take 1 Cap by mouth two (2) times daily as needed.  atorvastatin (LIPITOR) 40 mg tablet Take 1 Tab by mouth nightly.  gabapentin (NEURONTIN) 300 mg capsule 1 po tid (Patient taking differently: Take 600 mg by mouth three (3) times daily.)  fluticasone-salmeterol (ADVAIR DISKUS) 250-50 mcg/dose diskus inhaler INHALE 1 DOSE BY MOUTH TWICE DAILY. RINSE MOUTH AFTER USE  albuterol (PROAIR HFA) 90 mcg/actuation inhaler Take 2 Puffs by inhalation every six (6) hours as needed.  sildenafil (REVATIO) 20 mg tablet Take 20 mg by mouth daily as needed.  Cetirizine (ZYRTEC) 10 mg cap Take 10 mg by mouth nightly.  testosterone cypionate (DEPO-TESTOSTERONE) 200 mg/mL injection 440 mg by IntraMUSCular route Once every 2 weeks.  fluticasone (FLONASE) 50 mcg/actuation nasal spray 2 Sprays by Both Nostrils route daily. (Patient taking differently: 2 Sprays by Both Nostrils route daily as needed.) Review of Systems ROS As per HPI. All other systems reviewed and are negative. Subjective:  
 
Visit Vitals /84 (BP 1 Location: Right arm, BP Patient Position: Sitting) Pulse 86 Temp 98.2 °F (36.8 °C) Resp 16 Ht 5' 7\" (1.702 m) Wt 81.6 kg (180 lb) SpO2 91% BMI 28.19 kg/m² No intake/output data recorded. No intake/output data recorded. Physical Exam: 
General: Well Developed, Well Nourished, No Acute Distress HEENT: pupils equal and round, no abnormalities noted Neck: supple, no JVD, no carotid bruits Heart: S1S2 with RRR without murmurs or gallops Lungs: Clear throughout auscultation bilaterally without adventitious sounds Abd: soft, nontender, nondistended, with good bowel sounds Ext: warm, no edema, calves supple/nontender, pulses 2+ bilaterally Skin: warm and dry Psychiatric: Normal mood and affect Neurologic: Alert and oriented X 3 Labs:  
Recent Results (from the past 24 hour(s)) EKG, 12 LEAD, INITIAL Collection Time: 04/20/19  1:13 PM  
Result Value Ref Range Ventricular Rate 80 BPM  
 Atrial Rate 80 BPM  
 P-R Interval 180 ms QRS Duration 106 ms  
 Q-T Interval 364 ms QTC Calculation (Bezet) 419 ms Calculated P Axis 78 degrees Calculated R Axis 26 degrees Calculated T Axis -4 degrees Diagnosis Normal sinus rhythm Cannot rule out Inferior infarct , age undetermined Abnormal ECG When compared with ECG of 10-APR-2019 21:03, Sinus rhythm has replaced Atrial flutter Vent. rate has decreased BY  60 BPM 
Minimal criteria for Anterior infarct are no longer Present ST no longer elevated in Inferior leads ST no longer elevated in Anterior leads POC TROPONIN-I Collection Time: 04/20/19  1:20 PM  
Result Value Ref Range Troponin-I (POC) 1.28 (H) 0.02 - 0.05 ng/ml CBC WITH AUTOMATED DIFF Collection Time: 04/20/19  1:25 PM  
Result Value Ref Range WBC 9.6 4.3 - 11.1 K/uL  
 RBC 5.35 4.23 - 5.6 M/uL  
 HGB 15.9 13.6 - 17.2 g/dL HCT 49.0 41.1 - 50.3 % MCV 91.6 79.6 - 97.8 FL  
 MCH 29.7 26.1 - 32.9 PG  
 MCHC 32.4 31.4 - 35.0 g/dL  
 RDW 14.5 11.9 - 14.6 % PLATELET 552 707 - 248 K/uL MPV 9.7 9.4 - 12.3 FL ABSOLUTE NRBC 0.00 0.0 - 0.2 K/uL  
 DF AUTOMATED NEUTROPHILS 79 (H) 43 - 78 % LYMPHOCYTES 9 (L) 13 - 44 % MONOCYTES 10 4.0 - 12.0 % EOSINOPHILS 1 0.5 - 7.8 % BASOPHILS 1 0.0 - 2.0 % IMMATURE GRANULOCYTES 1 0.0 - 5.0 %  
 ABS. NEUTROPHILS 7.6 1.7 - 8.2 K/UL  
 ABS. LYMPHOCYTES 0.9 0.5 - 4.6 K/UL  
 ABS. MONOCYTES 0.9 0.1 - 1.3 K/UL  
 ABS. EOSINOPHILS 0.1 0.0 - 0.8 K/UL  
 ABS. BASOPHILS 0.1 0.0 - 0.2 K/UL  
 ABS. IMM. GRANS. 0.1 0.0 - 0.5 K/UL METABOLIC PANEL, COMPREHENSIVE Collection Time: 04/20/19  1:25 PM  
Result Value Ref Range Sodium 143 136 - 145 mmol/L Potassium 4.4 3.5 - 5.1 mmol/L Chloride 106 98 - 107 mmol/L  
 CO2 29 21 - 32 mmol/L Anion gap 8 7 - 16 mmol/L Glucose 94 65 - 100 mg/dL BUN 14 8 - 23 MG/DL Creatinine 1.14 0.8 - 1.5 MG/DL  
 GFR est AA >60 >60 ml/min/1.73m2 GFR est non-AA >60 >60 ml/min/1.73m2 Calcium 9.4 8.3 - 10.4 MG/DL Bilirubin, total 0.8 0.2 - 1.1 MG/DL  
 ALT (SGPT) 42 12 - 65 U/L  
 AST (SGOT) 40 (H) 15 - 37 U/L Alk. phosphatase 56 50 - 136 U/L Protein, total 7.4 6.3 - 8.2 g/dL Albumin 4.0 3.2 - 4.6 g/dL Globulin 3.4 2.3 - 3.5 g/dL A-G Ratio 1.2 1.2 - 3.5 BNP Collection Time: 04/20/19  1:25 PM  
Result Value Ref Range BNP 45 (H) 0 pg/mL LIPASE Collection Time: 04/20/19  1:41 PM  
Result Value Ref Range Lipase 757 (H) 73 - 393 U/L Assessment/Plan: 1. History of persistent atrial fibrillation/atypical flutter with recent repeat ablation 2. Elevated troponin possibly related to ablation. 3. Abdominal fullness 4. Dyspnea 5. Neuropathy Elevated troponin likely post procedural related. Trend for now to assess for downtrend. Prior noted coronary CTA with no significant stenosis and recent negative Cardiolite. Atypical symptoms. Obtain CT scan of the chest with contrast to rule out possible complications with ablation to include atrioesophageal fistula although less likely but vague symptoms. Also obtain echocardiogram in a.m. Further recommendations pending clinical course Ru Montana MD 
4/20/2019 4:07 PM

## 2019-04-20 NOTE — PROGRESS NOTES
Pt admitted at Observation status. Pt instructed on home medication policy; pt voices understanding. Pt provided copies of the following: Admission pamphlet with Observation insert and Medicare FAQ's. Home meds ordered per MD, verified with [facility] and supplied by patient. No narcotic meds. Medications verified and labeled per pharmacy and placed in locked box in patient's room. The medications received from the patient include diltiazem,claritin, flecainide, aspirin,lisinopril,and omeprazole.

## 2019-04-21 VITALS
TEMPERATURE: 98.6 F | OXYGEN SATURATION: 93 % | DIASTOLIC BLOOD PRESSURE: 49 MMHG | WEIGHT: 173.8 LBS | HEIGHT: 67 IN | BODY MASS INDEX: 27.28 KG/M2 | HEART RATE: 79 BPM | RESPIRATION RATE: 16 BRPM | SYSTOLIC BLOOD PRESSURE: 107 MMHG

## 2019-04-21 LAB
ATRIAL RATE: 80 BPM
CALCULATED P AXIS, ECG09: 78 DEGREES
CALCULATED R AXIS, ECG10: 26 DEGREES
CALCULATED T AXIS, ECG11: -4 DEGREES
DIAGNOSIS, 93000: NORMAL
P-R INTERVAL, ECG05: 180 MS
Q-T INTERVAL, ECG07: 364 MS
QRS DURATION, ECG06: 106 MS
QTC CALCULATION (BEZET), ECG08: 419 MS
TROPONIN I SERPL-MCNC: 0.83 NG/ML (ref 0.02–0.05)
TROPONIN I SERPL-MCNC: 1.01 NG/ML (ref 0.02–0.05)
VENTRICULAR RATE, ECG03: 80 BPM

## 2019-04-21 PROCEDURE — 84484 ASSAY OF TROPONIN QUANT: CPT

## 2019-04-21 PROCEDURE — 93306 TTE W/DOPPLER COMPLETE: CPT

## 2019-04-21 PROCEDURE — 99218 HC RM OBSERVATION: CPT

## 2019-04-21 PROCEDURE — 36415 COLL VENOUS BLD VENIPUNCTURE: CPT

## 2019-04-21 PROCEDURE — 74011250637 HC RX REV CODE- 250/637: Performed by: INTERNAL MEDICINE

## 2019-04-21 RX ORDER — FUROSEMIDE 20 MG/1
20 TABLET ORAL ONCE
Status: COMPLETED | OUTPATIENT
Start: 2019-04-21 | End: 2019-04-21

## 2019-04-21 RX ADMIN — DILTIAZEM HYDROCHLORIDE 180 MG: 180 CAPSULE, COATED, EXTENDED RELEASE ORAL at 08:49

## 2019-04-21 RX ADMIN — FLECAINIDE ACETATE 100 MG: 100 TABLET ORAL at 08:49

## 2019-04-21 RX ADMIN — FUROSEMIDE 20 MG: 40 TABLET ORAL at 08:57

## 2019-04-21 RX ADMIN — APIXABAN 5 MG: 5 TABLET, FILM COATED ORAL at 08:50

## 2019-04-21 RX ADMIN — Medication 5 ML: at 05:24

## 2019-04-21 RX ADMIN — OMEPRAZOLE 20 MG: 20 CAPSULE, DELAYED RELEASE ORAL at 08:50

## 2019-04-21 RX ADMIN — LISINOPRIL 10 MG: 5 TABLET ORAL at 08:49

## 2019-04-21 RX ADMIN — Medication 81 MG: at 08:50

## 2019-04-21 NOTE — DISCHARGE SUMMARY
[facility] Cardiology Discharge Summary     Patient ID:  Kiara Sebastian  [id number]  35 y.o.  1950    Admit date: 4/20/2019    Discharge date and time:  4-    Admitting Physician: Krystina Bradshaw MD     Discharge Physician: Amara Mendiola PA-C/Dr. Nabil Austin    Admission Diagnoses: Chest pain [R07.9]    Discharge Diagnoses:   Patient Active Problem List    Diagnosis Date Noted    Chest pain 04/20/2019    Unsteady gait 01/15/2019    Babinski reflex 01/15/2019    Axonal polyneuropathy 01/15/2019    PMR (polymyalgia rheumatica) ([facility]) 02/21/2018    Atrial fibrillation ([address] 02/02/2018    Atrial flutter ([address] 01/23/2018    DDD (degenerative disc disease), cervical 01/18/2018    Elevated creatine kinase 01/11/2018    Microalbuminuria 09/01/2017    Lymphopenia 08/06/2017    Diagnostic tests 03/03/2017    Lung nodule, solitary 01/19/2017    Routine health maintenance 01/12/2017    Chronic nonseasonal allergic rhinitis due to pollen 01/11/2017    Vitamin D deficiency 06/02/2015    Chronic left shoulder pain 05/30/2013    Hypogonadism, testicular 05/30/2013    Essential hypertension, benign 05/30/2013    Moderate persistent asthma without complication 28/45/8657       Cardiology Procedures this admission:  CT angiogram of chest  EchoCardiogram  Consults: None    Hospital Course: Pt is a 81 y.o. male with h/o atrial fibrillation/atrial flutter ablation with recurrence and underwent repeat PVI isolation/atypical atrial flutter ablation at the [facility] clinic on 4/17/19. He has no prior history of coronary disease with prior noted coronary CTA with no significant stenosis and plaque noted in the left main/LAD [1/18]. Also noted negative Cardiolite 2/19. Since his ablation, he has had issues since discharge with abdominal fullness and dyspnea. States his dyspnea got better with trying some inhalers.   On presentation to the ER, initial troponin noted to be elevated at 1.28 and cardiology asked to evaluate for admission. No acute EKG changes. Elevated troponin trending down was thought to be secondary to ablation. Pt was given some IV lasix with improvement in dyspnea. CT abdomen showed small bilateral pleural effusions and mild central infiltrates in the lower lobes which may represent early pulmonary edema given the central distribution and associated pleural effusions, no significant pericardial effusion, no pneumomediastinum, and no abnormal fluid/debris in the central airways of the lungs and no acute process in the abdomen to suggest an etiology for the patient's epigastric pain. Pt was feeling much better the following morning. Echo showed preserved EF. Pt was up feeling well without any complaints of CP, SOB or palpitations. Pt was seen and examined by Dr. Bere Crawford and determined stable and ready for discharge. DISPOSITION: The patient is being discharged home in stable condition on a low saturated fat, low cholesterol and low salt diet. Pt is instructed to advance activities as tolerated limited to fatigue or shortness of breath. Pt is instructed to call office or return to ER for immediate evaluation of any shortness of breath or chest pain. Follow up with [facility] Cardiology in 2 weeks  Follow up with PCP Dr. Angel Mcclelland in 1-2 weeks     Discharge Exam:   Visit Vitals  /73 (BP 1 Location: Left arm, BP Patient Position: At rest)   Pulse 74   Temp 98.5 °F (36.9 °C)   Resp 15   Ht 5' 7\" (1.702 m)   Wt 78.8 kg (173 lb 12.8 oz)   SpO2 93%   BMI 27.22 kg/m²   Pt has been seen by Dr. Bere Crawford: see his progress note for exam details.     Recent Results (from the past 24 hour(s))   EKG, 12 LEAD, INITIAL    Collection Time: 04/20/19  1:13 PM   Result Value Ref Range    Ventricular Rate 80 BPM    Atrial Rate 80 BPM    P-R Interval 180 ms    QRS Duration 106 ms    Q-T Interval 364 ms    QTC Calculation (Bezet) 419 ms    Calculated P Axis 78 degrees    Calculated R Axis 26 degrees    Calculated T Axis -4 degrees    Diagnosis       Normal sinus rhythm  Cannot rule out Inferior infarct , age undetermined  Abnormal ECG  When compared with ECG of 10-APR-2019 21:03,  Sinus rhythm has replaced Atrial flutter  Vent. rate has decreased BY  60 BPM  Minimal criteria for Anterior infarct are no longer Present    Confirmed by ST ALONSO DEMARCO MD (), QUETA HELTON ([id number]) on 4/21/2019 4:11:00 AM     POC TROPONIN-I    Collection Time: 04/20/19  1:20 PM   Result Value Ref Range    Troponin-I (POC) 1.28 (H) 0.02 - 0.05 ng/ml   CBC WITH AUTOMATED DIFF    Collection Time: 04/20/19  1:25 PM   Result Value Ref Range    WBC 9.6 4.3 - 11.1 K/uL    RBC 5.35 4.23 - 5.6 M/uL    HGB 15.9 13.6 - 17.2 g/dL    HCT 49.0 41.1 - 50.3 %    MCV 91.6 79.6 - 97.8 FL    MCH 29.7 26.1 - 32.9 PG    MCHC 32.4 31.4 - 35.0 g/dL    RDW 14.5 11.9 - 14.6 %    PLATELET 849 560 - 652 K/uL    MPV 9.7 9.4 - 12.3 FL    ABSOLUTE NRBC 0.00 0.0 - 0.2 K/uL    DF AUTOMATED      NEUTROPHILS 79 (H) 43 - 78 %    LYMPHOCYTES 9 (L) 13 - 44 %    MONOCYTES 10 4.0 - 12.0 %    EOSINOPHILS 1 0.5 - 7.8 %    BASOPHILS 1 0.0 - 2.0 %    IMMATURE GRANULOCYTES 1 0.0 - 5.0 %    ABS. NEUTROPHILS 7.6 1.7 - 8.2 K/UL    ABS. LYMPHOCYTES 0.9 0.5 - 4.6 K/UL    ABS. MONOCYTES 0.9 0.1 - 1.3 K/UL    ABS. EOSINOPHILS 0.1 0.0 - 0.8 K/UL    ABS. BASOPHILS 0.1 0.0 - 0.2 K/UL    ABS. IMM. GRANS. 0.1 0.0 - 0.5 K/UL   METABOLIC PANEL, COMPREHENSIVE    Collection Time: 04/20/19  1:25 PM   Result Value Ref Range    Sodium 143 136 - 145 mmol/L    Potassium 4.4 3.5 - 5.1 mmol/L    Chloride 106 98 - 107 mmol/L    CO2 29 21 - 32 mmol/L    Anion gap 8 7 - 16 mmol/L    Glucose 94 65 - 100 mg/dL    BUN 14 8 - 23 MG/DL    Creatinine 1.14 0.8 - 1.5 MG/DL    GFR est AA >60 >60 ml/min/1.73m2    GFR est non-AA >60 >60 ml/min/1.73m2    Calcium 9.4 8.3 - 10.4 MG/DL    Bilirubin, total 0.8 0.2 - 1.1 MG/DL    ALT (SGPT) 42 12 - 65 U/L    AST (SGOT) 40 (H) 15 - 37 U/L    Alk.  phosphatase 56 50 - 136 U/L    Protein, total 7.4 6.3 - 8.2 g/dL    Albumin 4.0 3.2 - 4.6 g/dL    Globulin 3.4 2.3 - 3.5 g/dL    A-G Ratio 1.2 1.2 - 3.5     BNP    Collection Time: 04/20/19  1:25 PM   Result Value Ref Range    BNP 45 (H) 0 pg/mL   LIPASE    Collection Time: 04/20/19  1:41 PM   Result Value Ref Range    Lipase 757 (H) 73 - 393 U/L   TROPONIN I    Collection Time: 04/20/19  1:41 PM   Result Value Ref Range    Troponin-I, Qt. 1.36 (HH) 0.02 - 0.05 NG/ML   TROPONIN I    Collection Time: 04/20/19 10:54 PM   Result Value Ref Range    Troponin-I, Qt. 1.01 (HH) 0.02 - 0.05 NG/ML   TROPONIN I    Collection Time: 04/21/19  5:58 AM   Result Value Ref Range    Troponin-I, Qt. 0.83 (HH) 0.02 - 0.05 NG/ML         Patient Instructions:   Current Discharge Medication List      CONTINUE these medications which have NOT CHANGED    Details   aspirin 81 mg chewable tablet Take 81 mg by mouth daily. loratadine (CLARITIN) 10 mg tablet Take 10 mg by mouth daily. dilTIAZem CD (CARDIZEM CD) 180 mg ER capsule Take 180 mg by mouth daily. lisinopril (PRINIVIL, ZESTRIL) 10 mg tablet TAKE ONE TABLET BY MOUTH ONE TIME DAILY  Qty: 90 Tab, Refills: 1    Associated Diagnoses: Essential hypertension, benign      apixaban (ELIQUIS) 5 mg tablet Take 1 Tab by mouth two (2) times a day. Qty: 60 Tab, Refills: 11      flecainide (TAMBOCOR) 100 mg tablet Take 1 Tab by mouth two (2) times a day. Qty: 60 Tab, Refills: 3      cholecalciferol, VITAMIN D3, (VITAMIN D3) 5,000 unit tab tablet Take 5,000 Units by mouth daily. calcium citrate tab tablet Take 1,000 mg by mouth daily. omeprazole (PRILOSEC) 20 mg capsule Take 1 Cap by mouth two (2) times daily as needed. Qty: 60 Cap, Refills: 1      fluticasone-salmeterol (ADVAIR DISKUS) 250-50 mcg/dose diskus inhaler INHALE 1 DOSE BY MOUTH TWICE DAILY.  RINSE MOUTH AFTER USE  Qty: 3 Inhaler, Refills: 1    Associated Diagnoses: Asthma, moderate persistent, well-controlled      albuterol (PROAIR HFA) 90 mcg/actuation inhaler Take 2 Puffs by inhalation every six (6) hours as needed. Qty: 1 Inhaler, Refills: 11    Associated Diagnoses: Asthma, moderate persistent, well-controlled      sildenafil (REVATIO) 20 mg tablet Take 20 mg by mouth daily as needed. testosterone cypionate (DEPO-TESTOSTERONE) 200 mg/mL injection 440 mg by IntraMUSCular route Once every 2 weeks. fluticasone (FLONASE) 50 mcg/actuation nasal spray 2 Sprays by Both Nostrils route daily. Qty: 1 Bottle, Refills: 5    Associated Diagnoses: Allergic rhinitis, unspecified allergic rhinitis trigger, unspecified rhinitis seasonality         STOP taking these medications       atorvastatin (LIPITOR) 40 mg tablet Comments:   Reason for Stopping:             Signed:  Юлия Fletcher PA-C  4/21/2019  8:35 AM    I have personally seen and examined patient and agree with above assessment.  Please see my progress note for more details     Briseyda Correia MD  4/21/2019  8:59 AM

## 2019-04-21 NOTE — DISCHARGE INSTRUCTIONS
Patient Education        Chest Pain: Care Instructions  Your Care Instructions    There are many things that can cause chest pain. Some are not serious and will get better on their own in a few days. But some kinds of chest pain need more testing and treatment. Your doctor may have recommended a follow-up visit in the next 8 to 12 hours. If you are not getting better, you may need more tests or treatment. Even though your doctor has released you, you still need to watch for any problems. The doctor carefully checked you, but sometimes problems can develop later. If you have new symptoms or if your symptoms do not get better, get medical care right away. If you have worse or different chest pain or pressure that lasts more than 5 minutes or you passed out (lost consciousness), call 911 or seek other emergency help right away. A medical visit is only one step in your treatment. Even if you feel better, you still need to do what your doctor recommends, such as going to all suggested follow-up appointments and taking medicines exactly as directed. This will help you recover and help prevent future problems. How can you care for yourself at home? · Rest until you feel better. · Take your medicine exactly as prescribed. Call your doctor if you think you are having a problem with your medicine. · Do not drive after taking a prescription pain medicine. When should you call for help? Call 911 if:    · You passed out (lost consciousness).     · You have severe difficulty breathing.     · You have symptoms of a heart attack. These may include:  ? Chest pain or pressure, or a strange feeling in your chest.  ? Sweating. ? Shortness of breath. ? Nausea or vomiting. ? Pain, pressure, or a strange feeling in your back, neck, jaw, or upper belly or in one or both shoulders or arms. ? Lightheadedness or sudden weakness. ? A fast or irregular heartbeat.   After you call 911, the  may tell you to chew 1 adult-strength or 2 to 4 low-dose aspirin. Wait for an ambulance. Do not try to drive yourself.    Call your doctor today if:    · You have any trouble breathing.     · Your chest pain gets worse.     · You are dizzy or lightheaded, or you feel like you may faint.     · You are not getting better as expected.     · You are having new or different chest pain. Where can you learn more? Go to http://jose juan-trudi.info/. Enter A120 in the search box to learn more about \"Chest Pain: Care Instructions. \"  Current as of: September 23, 2018  Content Version: 11.9  © 9883-0373 [employer]. Care instructions adapted under license by [employer] (which disclaims liability or warranty for this information). If you have questions about a medical condition or this instruction, always ask your healthcare professional. [place] [address] any warranty or liability for your use of this information. DISCHARGE SUMMARY from Nurse    PATIENT INSTRUCTIONS:    After general anesthesia or intravenous sedation, for 24 hours or while taking prescription Narcotics:  · Limit your activities  · Do not drive and operate hazardous machinery  · Do not make important personal or business decisions  · Do  not drink alcoholic beverages  · If you have not urinated within 8 hours after discharge, please contact your surgeon on call. Report the following to your surgeon:  · Excessive pain, swelling, redness or odor of or around the surgical area  · Temperature over 100.5  · Nausea and vomiting lasting longer than 4 hours or if unable to take medications  · Any signs of decreased circulation or nerve impairment to extremity: change in color, persistent  numbness, tingling, coldness or increase pain  · Any questions    What to do at Home:  Recommended activity: Activity as tolerated.     If you experience any of the following symptoms chest pain, palpitations, shortness of breath, please follow up with MD.    *  Please give a list of your current medications to your Primary Care Provider. *  Please update this list whenever your medications are discontinued, doses are      changed, or new medications (including over-the-counter products) are added. *  Please carry medication information at all times in case of emergency situations. These are general instructions for a healthy lifestyle:    No smoking/ No tobacco products/ Avoid exposure to second hand smoke  Surgeon General's Warning:  Quitting smoking now greatly reduces serious risk to your health. Obesity, smoking, and sedentary lifestyle greatly increases your risk for illness    A healthy diet, regular physical exercise & weight monitoring are important for maintaining a healthy lifestyle    You may be retaining fluid if you have a history of heart failure or if you experience any of the following symptoms:  Weight gain of 3 pounds or more overnight or 5 pounds in a week, increased swelling in our hands or feet or shortness of breath while lying flat in bed. Please call your doctor as soon as you notice any of these symptoms; do not wait until your next office visit. Recognize signs and symptoms of STROKE:    F-face looks uneven    A-arms unable to move or move unevenly    S-speech slurred or non-existent    T-time-call 911 as soon as signs and symptoms begin-DO NOT go       Back to bed or wait to see if you get better-TIME IS BRAIN. Warning Signs of HEART ATTACK     Call 911 if you have these symptoms:   Chest discomfort. Most heart attacks involve discomfort in the center of the chest that lasts more than a few minutes, or that goes away and comes back. It can feel like uncomfortable pressure, squeezing, fullness, or pain.  Discomfort in other areas of the upper body. Symptoms can include pain or discomfort in one or both arms, the back, neck, jaw, or stomach.    Shortness of breath with or without chest discomfort.  Other signs may include breaking out in a cold sweat, nausea, or lightheadedness. Don't wait more than five minutes to call 911 - MINUTES MATTER! Fast action can save your life. Calling 911 is almost always the fastest way to get lifesaving treatment. Emergency Medical Services staff can begin treatment when they arrive -- up to an hour sooner than if someone gets to the hospital by car. The discharge information has been reviewed with the patient. The patient verbalized understanding. Discharge medications reviewed with the patient and appropriate educational materials and side effects teaching were provided.   ___________________________________________________________________________________________________________________________________

## 2019-04-21 NOTE — PROGRESS NOTES
Bedside and Verbal report given to Marcina Cranker, RN by self.  Report included SBAR, Kardex, ED summary, procedure summary, recent results, intake & output and cardiac rhythm SR.

## 2019-04-21 NOTE — PROGRESS NOTES
[facility] CARDIOLOGY PROGRESS NOTE 
      
 
4/21/2019 8:41 AM 
 
Admit Date: 4/20/2019 Subjective: Much improved symptoms; ~1.3L net neg overnight. ROS: 
Cardiovascular:  As noted above Objective:  
  
Vitals:  
 04/20/19 1737 04/20/19 2110 04/21/19 0107 04/21/19 1427 BP: 125/70 108/60 121/60 117/73 Pulse: 77 74 77 74 Resp: 15 16 16 15 Temp: 98.8 °F (37.1 °C) 97.7 °F (36.5 °C) 97.9 °F (36.6 °C) 98.5 °F (36.9 °C) SpO2: 90% 91% 93% 93% Weight: 81.6 kg (180 lb)   78.8 kg (173 lb 12.8 oz) Height: 5' 7\" (1.702 m)   5' 7\" (1.702 m) Physical Exam: 
General-No Acute Distress Neck- supple, no JVD 
CV- regular rate and rhythm no MRG Lung- min rt basilar rales Abd- soft, nontender, nondistended Ext- no edema bilaterally. Skin- warm and dry Data Review:  
Recent Labs  
  04/21/19 
0558 04/20/19 
2254  04/20/19 
1325 NA  --   --   --  143 K  --   --   --  4.4 BUN  --   --   --  14  
CREA  --   --   --  1.14  
GLU  --   --   --  94 WBC  --   --   --  9.6 HGB  --   --   --  15.9 HCT  --   --   --  49.0 PLT  --   --   --  162 TROIQ 0.83* 1.01*   < >  --   
 < > = values in this interval not displayed. Assessment/Plan: 1. History of persistent atrial fibrillation/atypical flutter with recent repeat ablation 2. Elevated troponin possibly related to ablation-downtrending 3. Abdominal fullness-resolved 4. Dyspnea-much improved 5. Neuropathy 
  
Elevated troponin likely post procedural related and downtrending. Prior noted coronary CTA with no significant stenosis and recent negative Cardiolite. CT scan of the chest with contrast negative for any post procedural complications. Pending echo this am; CT chest with no noted pericardial effusion. Improved dyspnea with lasix; exam euvolemic. Plan home later today pending echo.   
 
Chetan Martinez MD 
4/21/2019 8:41 AM

## 2019-04-21 NOTE — PROGRESS NOTES
Bedside and Verbal shift change report given to self (oncoming nurse) by Joaquín Tabor RN (offgoing nurse).  Report included the following information SBAR, Kardex, ED Summary, Procedure Summary, Intake/Output, MAR, Recent Results and Cardiac Rhythm SR.

## 2019-04-21 NOTE — PROGRESS NOTES
Discharge instructions reviewed with patient and spouse. Opportunity for questions provided. Patient and spouse voiced understanding of all discharge instructions. Home meds returned to patient. IV and telemetry monitor removed by self.

## 2019-04-21 NOTE — PROGRESS NOTES
Verbal bedside report received from Juan, [address] [facility]. Assumed care of patient.

## 2019-04-21 NOTE — PROGRESS NOTES
Problem: Falls - Risk of 
Goal: *Absence of Falls Description Document Sherice Latin Fall Risk and appropriate interventions in the flowsheet. Outcome: Progressing Towards Goal 
Note:  
Fall Risk Interventions: 
  
 
  
 
Medication Interventions: Patient to call before getting OOB, Teach patient to arise slowly Patient progressing towards goal with no falls on current admission. Patient without confusion, agitation, or sensory perception deficits. Patient has steady gait on ambulation. Personal belongings are within reach. Bed is in the low and locked position with side rails up x2. Yellow gripper socks to bilateral feet. Call light within reach and patient verbalizes understanding of use.

## 2019-12-13 ENCOUNTER — HOSPITAL ENCOUNTER (OUTPATIENT)
Dept: SURGERY | Age: 69
Discharge: HOME | End: 2019-12-13

## 2019-12-13 VITALS
OXYGEN SATURATION: 98 % | BODY MASS INDEX: 27.19 KG/M2 | HEART RATE: 89 BPM | RESPIRATION RATE: 18 BRPM | DIASTOLIC BLOOD PRESSURE: 74 MMHG | HEIGHT: 67 IN | WEIGHT: 173.25 LBS | SYSTOLIC BLOOD PRESSURE: 137 MMHG | TEMPERATURE: 97.5 F

## 2019-12-13 RX ORDER — TAMSULOSIN HYDROCHLORIDE 0.4 MG/1
0.4 CAPSULE ORAL 2 TIMES DAILY
COMMUNITY

## 2019-12-13 RX ORDER — DOXYCYCLINE 100 MG/1
100 CAPSULE ORAL 2 TIMES DAILY
COMMUNITY
End: 2020-02-17

## 2019-12-13 NOTE — PERIOP NOTES
Patient verified name and . Patient provided medical/health information and PTA medications to the best of their ability. TYPE  CASE:1B  Order for consent not found in EHR    Labs per surgeon:none. Labs per anesthesia protocol: none. EKG  : 19 in [address] [place] [address]  Pt recently changed cardiologist and is seeing Dr Riley Diego with the [facility]. Call placed to Dr Phoebe Madden office for hold order for Eliquis. Pt states he talked with Dr Carol Easley and he is holding Eliquis starting today. Dr Phoebe Madden office instructed to fax order when obtained from Cardiology office. Patient provided with and instructed on education handouts including Guide to Surgery, blood transfusions, pain management, and hand hygiene for the family and community, and [facility] brochure. soap and instructions given per hospital policy. Instructed patient to continue previous medications as prescribed prior to surgery unless otherwise directed and to take the following medications the day of surgery according to anesthesia guidelines :Albuterol, Cardizem, Doxycycline, Omeprazole . Instructed patient to hold  the following medications: Vitamin D, Calcium Citrate, Eliquis per order. .    Original medication prescription bottles not visualized during patient appointment. Patient teach back successful and patient demonstrates knowledge of instruction.

## 2019-12-13 NOTE — PERIOP NOTES
PLEASE CONTINUE TAKING ALL PRESCRIPTION MEDICATIONS UP TO THE DAY OF SURGERY UNLESS OTHERWISE DIRECTED BELOW. DISCONTINUE all vitamins and supplements 7 days prior to surgery. DISCONTINUE Non-Steriodal Anti-Inflammatory (NSAIDS) such as Advil and Aleve 5 days prior to surgery. Home Medications to take  the day of surgery    Albuterol, Cardizem, Doxycycline, Omeprazole           Home Medications   to Hold   Vitamin D, Calcium Citrate   Eliquis per order. Comments                Please do not bring home medications with you on the day of surgery unless otherwise directed by your nurse. If you are instructed to bring home medications, please give them to your nurse as they will be administered by the nursing staff. If you have any questions, please call [facility] [phone] or [facility] [phone].

## 2019-12-16 ENCOUNTER — ANESTHESIA EVENT (OUTPATIENT)
Dept: SURGERY | Age: 69
End: 2019-12-16
Payer: MEDICARE

## 2019-12-16 NOTE — PERIOP NOTES
Received faxed Eliquis hold note with approval to hold 2 days prior to surgery. Given to [facility] preop staff.

## 2019-12-17 ENCOUNTER — ANESTHESIA (OUTPATIENT)
Dept: SURGERY | Age: 69
End: 2019-12-17
Payer: MEDICARE

## 2019-12-17 ENCOUNTER — HOSPITAL ENCOUNTER (OUTPATIENT)
Age: 69
Setting detail: OUTPATIENT SURGERY
Discharge: HOME | End: 2019-12-17
Attending: UROLOGY | Admitting: UROLOGY
Payer: MEDICARE

## 2019-12-17 VITALS
OXYGEN SATURATION: 91 % | SYSTOLIC BLOOD PRESSURE: 139 MMHG | WEIGHT: 173 LBS | BODY MASS INDEX: 27.1 KG/M2 | RESPIRATION RATE: 16 BRPM | DIASTOLIC BLOOD PRESSURE: 78 MMHG | TEMPERATURE: 98 F | HEART RATE: 85 BPM

## 2019-12-17 LAB
ANION GAP SERPL CALC-SCNC: 6 MMOL/L (ref 7–16)
BUN SERPL-MCNC: 20 MG/DL (ref 8–23)
CALCIUM SERPL-MCNC: 9.4 MG/DL (ref 8.3–10.4)
CHLORIDE SERPL-SCNC: 105 MMOL/L (ref 98–107)
CO2 SERPL-SCNC: 27 MMOL/L (ref 21–32)
CREAT SERPL-MCNC: 1.04 MG/DL (ref 0.8–1.5)
ERYTHROCYTE [DISTWIDTH] IN BLOOD BY AUTOMATED COUNT: 14.3 % (ref 11.9–14.6)
GLUCOSE SERPL-MCNC: 100 MG/DL (ref 65–100)
HCT VFR BLD AUTO: 54.3 % (ref 41.1–50.3)
HGB BLD-MCNC: 18.4 G/DL (ref 13.6–17.2)
MCH RBC QN AUTO: 31 PG (ref 26.1–32.9)
MCHC RBC AUTO-ENTMCNC: 33.9 G/DL (ref 31.4–35)
MCV RBC AUTO: 91.4 FL (ref 79.6–97.8)
NRBC # BLD: 0 K/UL (ref 0–0.2)
PLATELET # BLD AUTO: 172 K/UL (ref 150–450)
PMV BLD AUTO: 9.2 FL (ref 9.4–12.3)
POTASSIUM SERPL-SCNC: 5.5 MMOL/L (ref 3.5–5.1)
RBC # BLD AUTO: 5.94 M/UL (ref 4.23–5.6)
SODIUM SERPL-SCNC: 138 MMOL/L (ref 136–145)
WBC # BLD AUTO: 7.1 K/UL (ref 4.3–11.1)

## 2019-12-17 PROCEDURE — 74011250637 HC RX REV CODE- 250/637: Performed by: UROLOGY

## 2019-12-17 PROCEDURE — 77030018832 HC SOL IRR H20 ICUM -A: Performed by: UROLOGY

## 2019-12-17 PROCEDURE — 76060000034 HC ANESTHESIA 1.5 TO 2 HR: Performed by: UROLOGY

## 2019-12-17 PROCEDURE — 74011250636 HC RX REV CODE- 250/636: Performed by: NURSE ANESTHETIST, CERTIFIED REGISTERED

## 2019-12-17 PROCEDURE — 77030010509 HC AIRWY LMA MSK TELE -A: Performed by: ANESTHESIOLOGY

## 2019-12-17 PROCEDURE — C1758 CATHETER, URETERAL: HCPCS | Performed by: UROLOGY

## 2019-12-17 PROCEDURE — 88305 TISSUE EXAM BY PATHOLOGIST: CPT

## 2019-12-17 PROCEDURE — 74011250636 HC RX REV CODE- 250/636: Performed by: UROLOGY

## 2019-12-17 PROCEDURE — 77030019927 HC TBNG IRR CYSTO BAXT -A: Performed by: UROLOGY

## 2019-12-17 PROCEDURE — 77030018836 HC SOL IRR NACL ICUM -A: Performed by: UROLOGY

## 2019-12-17 PROCEDURE — 77030032490 HC SLV COMPR SCD KNE COVD -B: Performed by: UROLOGY

## 2019-12-17 PROCEDURE — 74011000250 HC RX REV CODE- 250: Performed by: NURSE ANESTHETIST, CERTIFIED REGISTERED

## 2019-12-17 PROCEDURE — 76210000021 HC REC RM PH II 0.5 TO 1 HR: Performed by: UROLOGY

## 2019-12-17 PROCEDURE — 76010000153 HC OR TIME 1.5 TO 2 HR: Performed by: UROLOGY

## 2019-12-17 PROCEDURE — 77030040922 HC BLNKT HYPOTHRM STRY -A: Performed by: ANESTHESIOLOGY

## 2019-12-17 PROCEDURE — 74011636320 HC RX REV CODE- 636/320: Performed by: UROLOGY

## 2019-12-17 PROCEDURE — 74011000258 HC RX REV CODE- 258: Performed by: UROLOGY

## 2019-12-17 PROCEDURE — 80048 BASIC METABOLIC PNL TOTAL CA: CPT

## 2019-12-17 PROCEDURE — 74011250636 HC RX REV CODE- 250/636: Performed by: ANESTHESIOLOGY

## 2019-12-17 PROCEDURE — 76210000006 HC OR PH I REC 0.5 TO 1 HR: Performed by: UROLOGY

## 2019-12-17 PROCEDURE — 77030022427 HC ELECTRD RESCTCS CT STOR -C: Performed by: UROLOGY

## 2019-12-17 PROCEDURE — 85027 COMPLETE CBC AUTOMATED: CPT

## 2019-12-17 RX ORDER — SODIUM CHLORIDE, SODIUM LACTATE, POTASSIUM CHLORIDE, CALCIUM CHLORIDE 600; 310; 30; 20 MG/100ML; MG/100ML; MG/100ML; MG/100ML
100 INJECTION, SOLUTION INTRAVENOUS CONTINUOUS
Status: DISCONTINUED | OUTPATIENT
Start: 2019-12-17 | End: 2019-12-17 | Stop reason: HOSPADM

## 2019-12-17 RX ORDER — MIDAZOLAM HYDROCHLORIDE 1 MG/ML
2 INJECTION, SOLUTION INTRAMUSCULAR; INTRAVENOUS ONCE
Status: DISCONTINUED | OUTPATIENT
Start: 2019-12-17 | End: 2019-12-17 | Stop reason: HOSPADM

## 2019-12-17 RX ORDER — MIDAZOLAM HYDROCHLORIDE 1 MG/ML
2 INJECTION, SOLUTION INTRAMUSCULAR; INTRAVENOUS
Status: DISCONTINUED | OUTPATIENT
Start: 2019-12-17 | End: 2019-12-17 | Stop reason: HOSPADM

## 2019-12-17 RX ORDER — GENTAMICIN SULFATE 80 MG/100ML
80 INJECTION, SOLUTION INTRAVENOUS
Status: ACTIVE | OUTPATIENT
Start: 2019-12-17 | End: 2019-12-17

## 2019-12-17 RX ORDER — PHENAZOPYRIDINE HYDROCHLORIDE 95 MG/1
190 TABLET ORAL ONCE
Status: COMPLETED | OUTPATIENT
Start: 2019-12-17 | End: 2019-12-17

## 2019-12-17 RX ORDER — FENTANYL CITRATE 50 UG/ML
100 INJECTION, SOLUTION INTRAMUSCULAR; INTRAVENOUS ONCE
Status: DISCONTINUED | OUTPATIENT
Start: 2019-12-17 | End: 2019-12-17 | Stop reason: HOSPADM

## 2019-12-17 RX ORDER — LIDOCAINE HYDROCHLORIDE 20 MG/ML
INJECTION, SOLUTION EPIDURAL; INFILTRATION; INTRACAUDAL; PERINEURAL AS NEEDED
Status: DISCONTINUED | OUTPATIENT
Start: 2019-12-17 | End: 2019-12-17 | Stop reason: HOSPADM

## 2019-12-17 RX ORDER — PROPOFOL 10 MG/ML
INJECTION, EMULSION INTRAVENOUS AS NEEDED
Status: DISCONTINUED | OUTPATIENT
Start: 2019-12-17 | End: 2019-12-17 | Stop reason: HOSPADM

## 2019-12-17 RX ORDER — LIDOCAINE HYDROCHLORIDE 10 MG/ML
0.1 INJECTION INFILTRATION; PERINEURAL AS NEEDED
Status: DISCONTINUED | OUTPATIENT
Start: 2019-12-17 | End: 2019-12-17 | Stop reason: HOSPADM

## 2019-12-17 RX ORDER — HYDROMORPHONE HYDROCHLORIDE 2 MG/ML
0.5 INJECTION, SOLUTION INTRAMUSCULAR; INTRAVENOUS; SUBCUTANEOUS
Status: DISCONTINUED | OUTPATIENT
Start: 2019-12-17 | End: 2019-12-17 | Stop reason: HOSPADM

## 2019-12-17 RX ORDER — FENTANYL CITRATE 50 UG/ML
INJECTION, SOLUTION INTRAMUSCULAR; INTRAVENOUS AS NEEDED
Status: DISCONTINUED | OUTPATIENT
Start: 2019-12-17 | End: 2019-12-17 | Stop reason: HOSPADM

## 2019-12-17 RX ORDER — ONDANSETRON 2 MG/ML
INJECTION INTRAMUSCULAR; INTRAVENOUS AS NEEDED
Status: DISCONTINUED | OUTPATIENT
Start: 2019-12-17 | End: 2019-12-17 | Stop reason: HOSPADM

## 2019-12-17 RX ORDER — NALOXONE HYDROCHLORIDE 0.4 MG/ML
0.04 INJECTION, SOLUTION INTRAMUSCULAR; INTRAVENOUS; SUBCUTANEOUS
Status: DISCONTINUED | OUTPATIENT
Start: 2019-12-17 | End: 2019-12-17 | Stop reason: HOSPADM

## 2019-12-17 RX ORDER — DEXAMETHASONE SODIUM PHOSPHATE 4 MG/ML
INJECTION, SOLUTION INTRA-ARTICULAR; INTRALESIONAL; INTRAMUSCULAR; INTRAVENOUS; SOFT TISSUE AS NEEDED
Status: DISCONTINUED | OUTPATIENT
Start: 2019-12-17 | End: 2019-12-17 | Stop reason: HOSPADM

## 2019-12-17 RX ORDER — GENTAMICIN SULFATE 80 MG/100ML
80 INJECTION, SOLUTION INTRAVENOUS
Status: DISCONTINUED | OUTPATIENT
Start: 2019-12-17 | End: 2019-12-17

## 2019-12-17 RX ORDER — OXYCODONE HYDROCHLORIDE 5 MG/1
5 TABLET ORAL
Status: DISCONTINUED | OUTPATIENT
Start: 2019-12-17 | End: 2019-12-17 | Stop reason: HOSPADM

## 2019-12-17 RX ADMIN — PROPOFOL 200 MG: 10 INJECTION, EMULSION INTRAVENOUS at 07:44

## 2019-12-17 RX ADMIN — PHENYLEPHRINE HYDROCHLORIDE 100 MCG: 10 INJECTION INTRAVENOUS at 08:28

## 2019-12-17 RX ADMIN — URINARY PAIN RELIEF 190 MG: 95 TABLET ORAL at 10:51

## 2019-12-17 RX ADMIN — CEFTRIAXONE 2 G: 2 INJECTION, POWDER, FOR SOLUTION INTRAMUSCULAR; INTRAVENOUS at 06:34

## 2019-12-17 RX ADMIN — PHENYLEPHRINE HYDROCHLORIDE 100 MCG: 10 INJECTION INTRAVENOUS at 08:46

## 2019-12-17 RX ADMIN — GENTAMICIN SULFATE 80 MG: 0.8 INJECTION, SOLUTION INTRAVENOUS at 06:35

## 2019-12-17 RX ADMIN — DEXAMETHASONE SODIUM PHOSPHATE 4 MG: 4 INJECTION, SOLUTION INTRAMUSCULAR; INTRAVENOUS at 08:36

## 2019-12-17 RX ADMIN — LIDOCAINE HYDROCHLORIDE 60 MG: 20 INJECTION, SOLUTION EPIDURAL; INFILTRATION; INTRACAUDAL; PERINEURAL at 07:44

## 2019-12-17 RX ADMIN — FENTANYL CITRATE 50 MCG: 50 INJECTION INTRAMUSCULAR; INTRAVENOUS at 07:44

## 2019-12-17 RX ADMIN — GENTAMICIN SULFATE 80 MG: 0.8 INJECTION, SOLUTION INTRAVENOUS at 07:52

## 2019-12-17 RX ADMIN — PHENYLEPHRINE HYDROCHLORIDE 100 MCG: 10 INJECTION INTRAVENOUS at 07:50

## 2019-12-17 RX ADMIN — GENTAMICIN SULFATE 80 MG: 0.8 INJECTION, SOLUTION INTRAVENOUS at 07:27

## 2019-12-17 RX ADMIN — FENTANYL CITRATE 25 MCG: 50 INJECTION INTRAMUSCULAR; INTRAVENOUS at 08:35

## 2019-12-17 RX ADMIN — ONDANSETRON 4 MG: 2 INJECTION INTRAMUSCULAR; INTRAVENOUS at 08:36

## 2019-12-17 RX ADMIN — SODIUM CHLORIDE, SODIUM LACTATE, POTASSIUM CHLORIDE, AND CALCIUM CHLORIDE 100 ML/HR: 600; 310; 30; 20 INJECTION, SOLUTION INTRAVENOUS at 06:34

## 2019-12-17 NOTE — H&P
SEE DICTATED H & P COMPLETED YESTERDAY. 81 y.o. white male with UTI, hematuria and urethral discharge. Pt was started on antibiotics for the urethritis with no improvement. He is currently taking the antibiotics. He has a history of cardiac arrhythmia, S/P ablation of the fibrillation and is currently taking medication for the rhythm. Pt has a distant history of UTI culminating in epididymitis and testicular abcess requiring orchiectomy. PMH, FH AND ROS - see attached records. P.E.   CHEST - clear to auscultation. HEART - regular rhythm without murmur. PLAN: cystoscopy, BRP, indicated procedures.

## 2019-12-17 NOTE — H&P
[address] [place] [address]  HISTORY AND PHYSICAL    Name:  Gabrielle Chapman  MR#:  [id number]  :  1950  ACCOUNT #:  [de-identified]  ADMIT DATE:  2019      HISTORY OF PRESENT ILLNESS:  A 73-year-old white male with hematuria, urinary tract infection and urethral discharge. 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 The patient has recently developed a problem with urethral discharge. He was placed on antibiotics by primary care physician for this problem. Prior to the onset of the urethral discharge, the patient had been noted to have progressive microscopic hematuria almost reaching the point of gross hematuria. The patient is receiving testosterone replacement therapy. The patient denies any outside sexual activity. The patient has had no urethral bleeding. The patient describes his urethral discharge as being a clear mucus. The patient is taking tamsulosin 0.4 mg daily for his symptoms of bladder outlet obstruction. ALLERGIES:  NO KNOWN DRUG ALLERGIES. MEDICATIONS:  Advair inhaler, lisinopril, ProAir, sildenafil, Cardizem ER, omeprazole. ADDITIONAL PAST MEDICAL HISTORY, FAMILY HISTORY, REVIEW OF SYSTEMS:  See patient-completed questionnaire which was reviewed with the patient. The patient does have a history of atrial fibrillation. He has undergone ablation and shock therapy for this problem. Recently, the patient's rhythm has been regular; however, he has noted a slight increase in the cardiac rate. PHYSICAL EXAMINATION:  HEENT:  Physiologic. NECK:  Supple. Thyroid not palpable. CHEST:  Good chest cage and diaphragmatic excursion. Breath sounds are equal bilaterally without wheezes, rhonchi or rales. HEART:  Regular rhythm without murmur. No cardiomegaly detectable on physical examination. ABDOMEN:  Liver, spleen, kidneys and bladder are not palpable. No masses, tenderness or hernia.   GENITALIA: Normal male genitalia. The patient has one testicle absent. RECTAL:  Sphincter tone, anal canal and rectal mucosa are unremarkable. Prostate is estimated at 30 grams in size and is nonnodular. EXTREMITIES:  Muscle mass, muscle strength, pulses and reflexes are equal bilaterally in upper and lower extremities. IMPRESSION:  1. Urinary tract infection. 2.  Urethral discharge. 3.  Urinary tract infection. 4.  Hematuria. DISPOSITION:  The patient was brought in at this time for cystoscopy, bilateral retrograde pyeloureterograms and indicated procedures.       MD GITA [email or web address]  D:  12/16/2019 19:29  T:  12/17/2019 4:49  JOB #:  [id number]

## 2019-12-17 NOTE — BRIEF OP NOTE
BRIEF OPERATIVE NOTE    Date of Procedure: 12/17/2019   Preoperative Diagnosis: Hematuria, microscopic [R31.29]  Postoperative Diagnosis: Hematuria, microscopic [R31.29]    Procedure(s):  CYSTOSCOPY WITH BILATERAL RETROGRADE PYELGRAMS/ URETHRAL BIOPSY  Surgeon(s) and Role:     Pancho Sepulveda MD - Primary         Surgical Assistant: 0    Surgical Staff:  Circ-1: Rhett Nuno RN  Event Time In Time Out   Incision Start 0800    Incision Close 0910      Anesthesia: General   Estimated Blood Loss: 0  Specimens:   ID Type Source Tests Collected by Time Destination   1 : Biopsy of urethra Preservative Urethra  Felipa Castrejon MD 12/17/2019 5171 Pathology      Findings: 0  Complications: 0  Implants: * No implants in log *  [id number]

## 2019-12-17 NOTE — OP NOTES
[address] [facility]  OPERATIVE REPORT    Name:  Alphonse Barber  MR#:  [id number]  :  1950  ACCOUNT #:  [de-identified]  DATE OF SERVICE:  2019    PREOPERATIVE DIAGNOSIS:  uti    POSTOPERATIVE DIAGNOSIS:  uti    PROCEDURE PERFORMED:  Cystourethroscopy, bilateral retrograde pyeloureterograms, biopsy of prostatic urethral lesion with hemostasis. SURGEON:  Luis Miguel Aj. Karel Whitman MD    ASSISTANT:  0    ANESTHESIA:  General.    COMPLICATIONS:  None. SPECIMENS REMOVED: pathology    IMPLANTS:  0    ESTIMATED BLOOD LOSS:  10 ml    DESCRIPTION:  With the patient in the lithotomy position, a sterile field was prepared. Urethroscopy was initiated. The patient was noted to have excessive inflammatory changes in the proximal pendulous urethra and in the distal portion of the prostatic urethra. The proximal portion of prostatic urethra was normal.  The excessive inflammation was demonstrated with inflammation, irritation, redness, and polyp formation. No other abnormalities were noted in the prostatic urethra. Inspection of anterior bladder revealed no stones, ulcers, tumors, diverticula or foreign bodies present. Clear urine was noted to efflux from both ureteral orifices. Bilateral retrograde pyeloureterogram was carried out. There were no abnormalities in the upper tracts. Once this was completed, biopsy forceps were used to biopsy the distal portion of the prostatic urethra proximal to the sphincter. Two separate biopsies were taken from these areas. The areas biopsied appeared to be markedly inflamed areas with erythema, polyp formation, and vascular changes suggestive of inflammation. Once biopsies were completed, hemostasis was obtained with a Bugbee electrode. Once there was satisfactory hemostasis obtained, the bladder was drained of irrigation fluid and all instruments were removed. The procedure was terminated without complication.       Cristobal Robb MD      [email or web address]  D: 12/17/2019 9:22  T:  12/17/2019 14:35  JOB #:  [id number]

## 2019-12-17 NOTE — ANESTHESIA PREPROCEDURE EVALUATION
Anesthetic History   No history of anesthetic complications            Review of Systems / Medical History  Patient summary reviewed and pertinent labs reviewed    Pulmonary            Asthma : well controlled       Neuro/Psych   Within defined limits           Cardiovascular    Hypertension        Dysrhythmias (s/p ablation) : atrial fibrillation and atrial flutter      Exercise tolerance: >4 METS     GI/Hepatic/Renal     GERD: well controlled           Endo/Other        Arthritis     Other Findings   Comments: Lumbar surgery  DDD           Physical Exam    Airway  Mallampati: II  TM Distance: 4 - 6 cm  Neck ROM: normal range of motion   Mouth opening: Normal     Cardiovascular    Rhythm: regular  Rate: normal         Dental    Dentition: Lower partial plate and Upper partial plate     Pulmonary  Breath sounds clear to auscultation               Abdominal  GI exam deferred       Other Findings            Anesthetic Plan    ASA: 3  Anesthesia type: general          Induction: Intravenous  Anesthetic plan and risks discussed with: Patient

## 2019-12-17 NOTE — ANESTHESIA POSTPROCEDURE EVALUATION
Procedure(s):  CYSTOSCOPY WITH BILATERAL RETROGRADE PYELGRAMS/ URETHRAL BIOPSY. general    Anesthesia Post Evaluation        Patient location during evaluation: PACU  Patient participation: complete - patient participated  Level of consciousness: awake  Pain management: satisfactory to patient  Airway patency: patent  Anesthetic complications: no  Cardiovascular status: hemodynamically stable  Respiratory status: spontaneous ventilation  Hydration status: euvolemic  Post anesthesia nausea and vomiting:  none    Bedside sat 95%. Vitals Value Taken Time   /80 12/17/2019 10:05 AM   Temp 36.8 °C (98.3 °F) 12/17/2019  9:26 AM   Pulse 84 12/17/2019 10:14 AM   Resp 16 12/17/2019  9:49 AM   SpO2 92 % 12/17/2019 10:14 AM   Vitals shown include unvalidated device data.

## 2020-02-03 ENCOUNTER — HOSPITAL ENCOUNTER (OUTPATIENT)
Dept: MRI IMAGING | Age: 70
Discharge: HOME | End: 2020-02-03
Attending: FAMILY MEDICINE
Payer: MEDICARE

## 2020-02-03 DIAGNOSIS — M54.16 RADICULOPATHY, LUMBAR REGION: ICD-10-CM

## 2020-02-03 PROCEDURE — 72148 MRI LUMBAR SPINE W/O DYE: CPT

## 2020-06-17 ENCOUNTER — HOSPITAL ENCOUNTER (OUTPATIENT)
Dept: LAB | Age: 70
Discharge: HOME | End: 2020-06-17
Attending: INTERNAL MEDICINE
Payer: MEDICARE

## 2020-06-17 DIAGNOSIS — I48.3 TYPICAL ATRIAL FLUTTER (HCC): ICD-10-CM

## 2020-06-17 DIAGNOSIS — I48.91 ATRIAL FIBRILLATION, UNSPECIFIED TYPE (HCC): ICD-10-CM

## 2020-06-17 LAB
ANION GAP SERPL CALC-SCNC: 5 MMOL/L (ref 7–16)
BASOPHILS # BLD: 0 K/UL (ref 0–0.2)
BASOPHILS NFR BLD: 1 % (ref 0–2)
BUN SERPL-MCNC: 14 MG/DL (ref 8–23)
CALCIUM SERPL-MCNC: 8.7 MG/DL (ref 8.3–10.4)
CHLORIDE SERPL-SCNC: 108 MMOL/L (ref 98–107)
CO2 SERPL-SCNC: 28 MMOL/L (ref 21–32)
CREAT SERPL-MCNC: 1.2 MG/DL (ref 0.8–1.5)
DIFFERENTIAL METHOD BLD: ABNORMAL
EOSINOPHIL # BLD: 0 K/UL (ref 0–0.8)
EOSINOPHIL NFR BLD: 0 % (ref 0.5–7.8)
ERYTHROCYTE [DISTWIDTH] IN BLOOD BY AUTOMATED COUNT: 14.6 % (ref 11.9–14.6)
GLUCOSE SERPL-MCNC: 113 MG/DL (ref 65–100)
HCT VFR BLD AUTO: 49.4 % (ref 41.1–50.3)
HGB BLD-MCNC: 16.1 G/DL (ref 13.6–17.2)
IMM GRANULOCYTES # BLD AUTO: 0.1 K/UL (ref 0–0.5)
IMM GRANULOCYTES NFR BLD AUTO: 1 % (ref 0–5)
INR PPP: 1.1
LYMPHOCYTES # BLD: 0.9 K/UL (ref 0.5–4.6)
LYMPHOCYTES NFR BLD: 11 % (ref 13–44)
MCH RBC QN AUTO: 29.4 PG (ref 26.1–32.9)
MCHC RBC AUTO-ENTMCNC: 32.6 G/DL (ref 31.4–35)
MCV RBC AUTO: 90.3 FL (ref 79.6–97.8)
MONOCYTES # BLD: 0.7 K/UL (ref 0.1–1.3)
MONOCYTES NFR BLD: 9 % (ref 4–12)
NEUTS SEG # BLD: 6.1 K/UL (ref 1.7–8.2)
NEUTS SEG NFR BLD: 79 % (ref 43–78)
NRBC # BLD: 0 K/UL (ref 0–0.2)
PLATELET # BLD AUTO: 148 K/UL (ref 150–450)
PMV BLD AUTO: 10.8 FL (ref 9.4–12.3)
POTASSIUM SERPL-SCNC: 4.3 MMOL/L (ref 3.5–5.1)
PROTHROMBIN TIME: 14.9 SEC (ref 12–14.7)
RBC # BLD AUTO: 5.47 M/UL (ref 4.23–5.6)
SODIUM SERPL-SCNC: 141 MMOL/L (ref 136–145)
WBC # BLD AUTO: 7.7 K/UL (ref 4.3–11.1)

## 2020-06-17 PROCEDURE — 85610 PROTHROMBIN TIME: CPT

## 2020-06-17 PROCEDURE — 36415 COLL VENOUS BLD VENIPUNCTURE: CPT

## 2020-06-17 PROCEDURE — 85025 COMPLETE CBC W/AUTO DIFF WBC: CPT

## 2020-06-17 PROCEDURE — 80048 BASIC METABOLIC PNL TOTAL CA: CPT

## 2020-06-22 ENCOUNTER — ANESTHESIA EVENT (OUTPATIENT)
Dept: SURGERY | Age: 70
DRG: 274 | End: 2020-06-22
Payer: MEDICARE

## 2020-06-22 NOTE — PROGRESS NOTES
Patient pre-assessment complete for Atrial fib ablation with Dr Sharad Renner scheduled for 20 at 7:30am, arrival time 6am. Patient verified using . Patient instructed to bring all home medications in labeled bottles on the day of procedure. NPO status reinforced. Patient instructed to HOLD eliquis, lisinopril & diltiazem in am. Instructed they can take all other medications excluding vitamins & supplements. Patient verbalizes understanding of all instructions & denies any questions at this time.

## 2020-06-23 ENCOUNTER — HOSPITAL ENCOUNTER (INPATIENT)
Age: 70
LOS: 2 days | Discharge: HOME | DRG: 274 | End: 2020-06-26
Attending: INTERNAL MEDICINE | Admitting: INTERNAL MEDICINE
Payer: MEDICARE

## 2020-06-23 ENCOUNTER — ANESTHESIA (OUTPATIENT)
Dept: SURGERY | Age: 70
DRG: 274 | End: 2020-06-23
Payer: MEDICARE

## 2020-06-23 DIAGNOSIS — I48.91 ATRIAL FIBRILLATION, UNSPECIFIED TYPE (HCC): ICD-10-CM

## 2020-06-23 LAB
ACT BLD: 274 SECS (ref 70–128)
ACT BLD: 274 SECS (ref 70–128)
ACT BLD: 340 SECS (ref 70–128)
ANION GAP SERPL CALC-SCNC: 5 MMOL/L (ref 7–16)
ANION GAP SERPL CALC-SCNC: 5 MMOL/L (ref 7–16)
ATRIAL RATE: 101 BPM
ATRIAL RATE: 315 BPM
ATRIAL RATE: 92 BPM
BUN SERPL-MCNC: 17 MG/DL (ref 8–23)
BUN SERPL-MCNC: 18 MG/DL (ref 8–23)
CALCIUM SERPL-MCNC: 8.1 MG/DL (ref 8.3–10.4)
CALCIUM SERPL-MCNC: 8.4 MG/DL (ref 8.3–10.4)
CALCULATED P AXIS, ECG09: -100 DEGREES
CALCULATED P AXIS, ECG09: 61 DEGREES
CALCULATED P AXIS, ECG09: 69 DEGREES
CALCULATED R AXIS, ECG10: -17 DEGREES
CALCULATED R AXIS, ECG10: -9 DEGREES
CALCULATED R AXIS, ECG10: 31 DEGREES
CALCULATED T AXIS, ECG11: 35 DEGREES
CALCULATED T AXIS, ECG11: 59 DEGREES
CALCULATED T AXIS, ECG11: 8 DEGREES
CHLORIDE SERPL-SCNC: 107 MMOL/L (ref 98–107)
CHLORIDE SERPL-SCNC: 110 MMOL/L (ref 98–107)
CO2 SERPL-SCNC: 27 MMOL/L (ref 21–32)
CO2 SERPL-SCNC: 31 MMOL/L (ref 21–32)
CREAT SERPL-MCNC: 1.12 MG/DL (ref 0.8–1.5)
CREAT SERPL-MCNC: 1.16 MG/DL (ref 0.8–1.5)
DIAGNOSIS, 93000: NORMAL
ERYTHROCYTE [DISTWIDTH] IN BLOOD BY AUTOMATED COUNT: 14.6 % (ref 11.9–14.6)
GLUCOSE SERPL-MCNC: 115 MG/DL (ref 65–100)
GLUCOSE SERPL-MCNC: 92 MG/DL (ref 65–100)
HCT VFR BLD AUTO: 51.1 % (ref 41.1–50.3)
HGB BLD-MCNC: 16.4 G/DL (ref 13.6–17.2)
INR PPP: 1
MAGNESIUM SERPL-MCNC: 1.9 MG/DL (ref 1.8–2.4)
MAGNESIUM SERPL-MCNC: 2 MG/DL (ref 1.8–2.4)
MCH RBC QN AUTO: 29.1 PG (ref 26.1–32.9)
MCHC RBC AUTO-ENTMCNC: 32.1 G/DL (ref 31.4–35)
MCV RBC AUTO: 90.8 FL (ref 79.6–97.8)
NRBC # BLD: 0 K/UL (ref 0–0.2)
P-R INTERVAL, ECG05: 148 MS
P-R INTERVAL, ECG05: 148 MS
PLATELET # BLD AUTO: 141 K/UL (ref 150–450)
PMV BLD AUTO: 10.4 FL (ref 9.4–12.3)
POTASSIUM SERPL-SCNC: 3.9 MMOL/L (ref 3.5–5.1)
POTASSIUM SERPL-SCNC: 4.7 MMOL/L (ref 3.5–5.1)
PROTHROMBIN TIME: 13.9 SEC (ref 12–14.7)
Q-T INTERVAL, ECG07: 272 MS
Q-T INTERVAL, ECG07: 326 MS
Q-T INTERVAL, ECG07: 338 MS
QRS DURATION, ECG06: 84 MS
QRS DURATION, ECG06: 88 MS
QRS DURATION, ECG06: 92 MS
QTC CALCULATION (BEZET), ECG08: 359 MS
QTC CALCULATION (BEZET), ECG08: 417 MS
QTC CALCULATION (BEZET), ECG08: 422 MS
RBC # BLD AUTO: 5.63 M/UL (ref 4.23–5.6)
SODIUM SERPL-SCNC: 142 MMOL/L (ref 136–145)
SODIUM SERPL-SCNC: 143 MMOL/L (ref 136–145)
VENTRICULAR RATE, ECG03: 101 BPM
VENTRICULAR RATE, ECG03: 105 BPM
VENTRICULAR RATE, ECG03: 92 BPM
WBC # BLD AUTO: 5.8 K/UL (ref 4.3–11.1)

## 2020-06-23 PROCEDURE — 99218 HC RM OBSERVATION: CPT

## 2020-06-23 PROCEDURE — 4A023FZ MEASUREMENT OF CARDIAC RHYTHM, PERCUTANEOUS APPROACH: ICD-10-PCS | Performed by: INTERNAL MEDICINE

## 2020-06-23 PROCEDURE — 74011000250 HC RX REV CODE- 250: Performed by: NURSE ANESTHETIST, CERTIFIED REGISTERED

## 2020-06-23 PROCEDURE — 94760 N-INVAS EAR/PLS OXIMETRY 1: CPT

## 2020-06-23 PROCEDURE — C1894 INTRO/SHEATH, NON-LASER: HCPCS

## 2020-06-23 PROCEDURE — 76210000006 HC OR PH I REC 0.5 TO 1 HR: Performed by: INTERNAL MEDICINE

## 2020-06-23 PROCEDURE — 77030005401 HC CATH RAD ARRO -A: Performed by: ANESTHESIOLOGY

## 2020-06-23 PROCEDURE — 77030002912 HC SUT ETHBND J&J -A

## 2020-06-23 PROCEDURE — 74011250636 HC RX REV CODE- 250/636: Performed by: NURSE ANESTHETIST, CERTIFIED REGISTERED

## 2020-06-23 PROCEDURE — C1769 GUIDE WIRE: HCPCS

## 2020-06-23 PROCEDURE — 77030013794 HC KT TRNSDUC BLD EDWD -B

## 2020-06-23 PROCEDURE — 93662 INTRACARDIAC ECG (ICE): CPT

## 2020-06-23 PROCEDURE — C1893 INTRO/SHEATH, FIXED,NON-PEEL: HCPCS

## 2020-06-23 PROCEDURE — 74011250637 HC RX REV CODE- 250/637: Performed by: ANESTHESIOLOGY

## 2020-06-23 PROCEDURE — C1732 CATH, EP, DIAG/ABL, 3D/VECT: HCPCS

## 2020-06-23 PROCEDURE — 85027 COMPLETE CBC AUTOMATED: CPT

## 2020-06-23 PROCEDURE — 83735 ASSAY OF MAGNESIUM: CPT

## 2020-06-23 PROCEDURE — 93312 ECHO TRANSESOPHAGEAL: CPT

## 2020-06-23 PROCEDURE — 85347 COAGULATION TIME ACTIVATED: CPT

## 2020-06-23 PROCEDURE — C1733 CATH, EP, OTHR THAN COOL-TIP: HCPCS

## 2020-06-23 PROCEDURE — 93656 COMPRE EP EVAL ABLTJ ATR FIB: CPT

## 2020-06-23 PROCEDURE — 93657 TX L/R ATRIAL FIB ADDL: CPT

## 2020-06-23 PROCEDURE — 93622 COMP EP EVAL L VENTR PAC&REC: CPT

## 2020-06-23 PROCEDURE — 4A0234Z MEASUREMENT OF CARDIAC ELECTRICAL ACTIVITY, PERCUTANEOUS APPROACH: ICD-10-PCS | Performed by: INTERNAL MEDICINE

## 2020-06-23 PROCEDURE — 77030035291 HC TBNG PMP SMARTABLATE J&J -B

## 2020-06-23 PROCEDURE — 74011250636 HC RX REV CODE- 250/636: Performed by: ANESTHESIOLOGY

## 2020-06-23 PROCEDURE — 77030020506 HC NDL TRNSPTL NRG BAYL -F

## 2020-06-23 PROCEDURE — 93613 INTRACARDIAC EPHYS 3D MAPG: CPT

## 2020-06-23 PROCEDURE — 77030019908 HC STETH ESOPH SIMS -A: Performed by: ANESTHESIOLOGY

## 2020-06-23 PROCEDURE — 76060000038 HC ANESTHESIA 3.5 TO 4 HR: Performed by: INTERNAL MEDICINE

## 2020-06-23 PROCEDURE — 77030037088 HC TUBE ENDOTRACH ORAL NSL COVD-A: Performed by: ANESTHESIOLOGY

## 2020-06-23 PROCEDURE — 94640 AIRWAY INHALATION TREATMENT: CPT

## 2020-06-23 PROCEDURE — 80048 BASIC METABOLIC PNL TOTAL CA: CPT

## 2020-06-23 PROCEDURE — C1759 CATH, INTRA ECHOCARDIOGRAPHY: HCPCS

## 2020-06-23 PROCEDURE — 93655 ICAR CATH ABLTJ DSCRT ARRHYT: CPT

## 2020-06-23 PROCEDURE — 77030013687 HC GD NDL BARD -B

## 2020-06-23 PROCEDURE — 77030039425 HC BLD LARYNG TRULITE DISP TELE -A: Performed by: ANESTHESIOLOGY

## 2020-06-23 PROCEDURE — 74011250636 HC RX REV CODE- 250/636: Performed by: INTERNAL MEDICINE

## 2020-06-23 PROCEDURE — 85610 PROTHROMBIN TIME: CPT

## 2020-06-23 PROCEDURE — 02583ZZ DESTRUCTION OF CONDUCTION MECHANISM, PERCUTANEOUS APPROACH: ICD-10-PCS | Performed by: INTERNAL MEDICINE

## 2020-06-23 PROCEDURE — 93005 ELECTROCARDIOGRAM TRACING: CPT | Performed by: INTERNAL MEDICINE

## 2020-06-23 PROCEDURE — 74011250637 HC RX REV CODE- 250/637: Performed by: INTERNAL MEDICINE

## 2020-06-23 PROCEDURE — 02K83ZZ MAP CONDUCTION MECHANISM, PERCUTANEOUS APPROACH: ICD-10-PCS | Performed by: INTERNAL MEDICINE

## 2020-06-23 PROCEDURE — B24BZZ4 ULTRASONOGRAPHY OF HEART WITH AORTA, TRANSESOPHAGEAL: ICD-10-PCS | Performed by: INTERNAL MEDICINE

## 2020-06-23 PROCEDURE — 77030027107 HC PTCH EXT REF CARTO3 J&J -F

## 2020-06-23 PROCEDURE — 77030013794 HC KT TRNSDUC BLD EDWD -B: Performed by: ANESTHESIOLOGY

## 2020-06-23 PROCEDURE — 36415 COLL VENOUS BLD VENIPUNCTURE: CPT

## 2020-06-23 PROCEDURE — 77030013292 HC BOWL MX PRSM J&J -A: Performed by: ANESTHESIOLOGY

## 2020-06-23 RX ORDER — ACETAMINOPHEN 325 MG/1
650 TABLET ORAL
Status: DISCONTINUED | OUTPATIENT
Start: 2020-06-23 | End: 2020-06-26 | Stop reason: HOSPADM

## 2020-06-23 RX ORDER — PROPOFOL 10 MG/ML
INJECTION, EMULSION INTRAVENOUS AS NEEDED
Status: DISCONTINUED | OUTPATIENT
Start: 2020-06-23 | End: 2020-06-23 | Stop reason: HOSPADM

## 2020-06-23 RX ORDER — GLYCOPYRROLATE 0.2 MG/ML
INJECTION INTRAMUSCULAR; INTRAVENOUS AS NEEDED
Status: DISCONTINUED | OUTPATIENT
Start: 2020-06-23 | End: 2020-06-23 | Stop reason: HOSPADM

## 2020-06-23 RX ORDER — MIDAZOLAM HYDROCHLORIDE 1 MG/ML
2 INJECTION, SOLUTION INTRAMUSCULAR; INTRAVENOUS
Status: DISCONTINUED | OUTPATIENT
Start: 2020-06-23 | End: 2020-06-23

## 2020-06-23 RX ORDER — HYDROCODONE BITARTRATE AND ACETAMINOPHEN 5; 325 MG/1; MG/1
1 TABLET ORAL
Status: DISCONTINUED | OUTPATIENT
Start: 2020-06-23 | End: 2020-06-26 | Stop reason: HOSPADM

## 2020-06-23 RX ORDER — SODIUM CHLORIDE 0.9 % (FLUSH) 0.9 %
5-40 SYRINGE (ML) INJECTION EVERY 8 HOURS
Status: DISCONTINUED | OUTPATIENT
Start: 2020-06-23 | End: 2020-06-26 | Stop reason: HOSPADM

## 2020-06-23 RX ORDER — LIDOCAINE HYDROCHLORIDE 20 MG/ML
INJECTION, SOLUTION EPIDURAL; INFILTRATION; INTRACAUDAL; PERINEURAL AS NEEDED
Status: DISCONTINUED | OUTPATIENT
Start: 2020-06-23 | End: 2020-06-23 | Stop reason: HOSPADM

## 2020-06-23 RX ORDER — PREGABALIN 100 MG/1
100 CAPSULE ORAL 3 TIMES DAILY
Status: DISCONTINUED | OUTPATIENT
Start: 2020-06-23 | End: 2020-06-26 | Stop reason: HOSPADM

## 2020-06-23 RX ORDER — SODIUM CHLORIDE, SODIUM LACTATE, POTASSIUM CHLORIDE, CALCIUM CHLORIDE 600; 310; 30; 20 MG/100ML; MG/100ML; MG/100ML; MG/100ML
100 INJECTION, SOLUTION INTRAVENOUS CONTINUOUS
Status: DISPENSED | OUTPATIENT
Start: 2020-06-23 | End: 2020-06-24

## 2020-06-23 RX ORDER — NEOSTIGMINE METHYLSULFATE 1 MG/ML
INJECTION, SOLUTION INTRAVENOUS AS NEEDED
Status: DISCONTINUED | OUTPATIENT
Start: 2020-06-23 | End: 2020-06-23 | Stop reason: HOSPADM

## 2020-06-23 RX ORDER — KETOROLAC TROMETHAMINE 30 MG/ML
INJECTION, SOLUTION INTRAMUSCULAR; INTRAVENOUS AS NEEDED
Status: DISCONTINUED | OUTPATIENT
Start: 2020-06-23 | End: 2020-06-23 | Stop reason: HOSPADM

## 2020-06-23 RX ORDER — ONDANSETRON 2 MG/ML
4 INJECTION INTRAMUSCULAR; INTRAVENOUS
Status: DISCONTINUED | OUTPATIENT
Start: 2020-06-23 | End: 2020-06-26 | Stop reason: HOSPADM

## 2020-06-23 RX ORDER — TAMSULOSIN HYDROCHLORIDE 0.4 MG/1
0.4 CAPSULE ORAL
Status: DISCONTINUED | OUTPATIENT
Start: 2020-06-23 | End: 2020-06-26 | Stop reason: HOSPADM

## 2020-06-23 RX ORDER — FENTANYL CITRATE 50 UG/ML
INJECTION, SOLUTION INTRAMUSCULAR; INTRAVENOUS AS NEEDED
Status: DISCONTINUED | OUTPATIENT
Start: 2020-06-23 | End: 2020-06-23 | Stop reason: HOSPADM

## 2020-06-23 RX ORDER — HEPARIN SODIUM 1000 [USP'U]/ML
INJECTION, SOLUTION INTRAVENOUS; SUBCUTANEOUS AS NEEDED
Status: DISCONTINUED | OUTPATIENT
Start: 2020-06-23 | End: 2020-06-23 | Stop reason: HOSPADM

## 2020-06-23 RX ORDER — SODIUM CHLORIDE 0.9 % (FLUSH) 0.9 %
5-40 SYRINGE (ML) INJECTION AS NEEDED
Status: DISCONTINUED | OUTPATIENT
Start: 2020-06-23 | End: 2020-06-26 | Stop reason: HOSPADM

## 2020-06-23 RX ORDER — DEXAMETHASONE SODIUM PHOSPHATE 4 MG/ML
INJECTION, SOLUTION INTRA-ARTICULAR; INTRALESIONAL; INTRAMUSCULAR; INTRAVENOUS; SOFT TISSUE AS NEEDED
Status: DISCONTINUED | OUTPATIENT
Start: 2020-06-23 | End: 2020-06-23 | Stop reason: HOSPADM

## 2020-06-23 RX ORDER — METOPROLOL SUCCINATE 25 MG/1
25 TABLET, EXTENDED RELEASE ORAL DAILY
Status: DISCONTINUED | OUTPATIENT
Start: 2020-06-24 | End: 2020-06-23

## 2020-06-23 RX ORDER — SUCRALFATE 1 G/1
1 TABLET ORAL
Status: DISCONTINUED | OUTPATIENT
Start: 2020-06-23 | End: 2020-06-26 | Stop reason: HOSPADM

## 2020-06-23 RX ORDER — ONDANSETRON 2 MG/ML
INJECTION INTRAMUSCULAR; INTRAVENOUS AS NEEDED
Status: DISCONTINUED | OUTPATIENT
Start: 2020-06-23 | End: 2020-06-23 | Stop reason: HOSPADM

## 2020-06-23 RX ORDER — DOCUSATE SODIUM 100 MG/1
100 CAPSULE, LIQUID FILLED ORAL
Status: DISCONTINUED | OUTPATIENT
Start: 2020-06-23 | End: 2020-06-26 | Stop reason: HOSPADM

## 2020-06-23 RX ORDER — HEPARIN SODIUM 200 [USP'U]/100ML
4000 INJECTION, SOLUTION INTRAVENOUS AS NEEDED
Status: DISCONTINUED | OUTPATIENT
Start: 2020-06-23 | End: 2020-06-23

## 2020-06-23 RX ORDER — SODIUM CHLORIDE, SODIUM LACTATE, POTASSIUM CHLORIDE, CALCIUM CHLORIDE 600; 310; 30; 20 MG/100ML; MG/100ML; MG/100ML; MG/100ML
100 INJECTION, SOLUTION INTRAVENOUS CONTINUOUS
Status: DISCONTINUED | OUTPATIENT
Start: 2020-06-23 | End: 2020-06-23 | Stop reason: HOSPADM

## 2020-06-23 RX ORDER — HYDROMORPHONE HYDROCHLORIDE 2 MG/ML
0.5 INJECTION, SOLUTION INTRAMUSCULAR; INTRAVENOUS; SUBCUTANEOUS
Status: DISCONTINUED | OUTPATIENT
Start: 2020-06-23 | End: 2020-06-23 | Stop reason: HOSPADM

## 2020-06-23 RX ORDER — DOFETILIDE 0.5 MG/1
500 CAPSULE ORAL EVERY 12 HOURS
Status: DISCONTINUED | OUTPATIENT
Start: 2020-06-23 | End: 2020-06-26 | Stop reason: HOSPADM

## 2020-06-23 RX ORDER — BUDESONIDE AND FORMOTEROL FUMARATE DIHYDRATE 80; 4.5 UG/1; UG/1
2 AEROSOL RESPIRATORY (INHALATION)
Status: DISCONTINUED | OUTPATIENT
Start: 2020-06-23 | End: 2020-06-26 | Stop reason: HOSPADM

## 2020-06-23 RX ORDER — ROCURONIUM BROMIDE 10 MG/ML
INJECTION, SOLUTION INTRAVENOUS AS NEEDED
Status: DISCONTINUED | OUTPATIENT
Start: 2020-06-23 | End: 2020-06-23 | Stop reason: HOSPADM

## 2020-06-23 RX ORDER — ADENOSINE 3 MG/ML
140 INJECTION, SOLUTION INTRAVENOUS
Status: DISCONTINUED | OUTPATIENT
Start: 2020-06-23 | End: 2020-06-23

## 2020-06-23 RX ORDER — OXYCODONE HYDROCHLORIDE 5 MG/1
10 TABLET ORAL
Status: COMPLETED | OUTPATIENT
Start: 2020-06-23 | End: 2020-06-23

## 2020-06-23 RX ORDER — COLCHICINE 0.6 MG/1
0.6 TABLET ORAL
Status: DISCONTINUED | OUTPATIENT
Start: 2020-06-23 | End: 2020-06-26 | Stop reason: HOSPADM

## 2020-06-23 RX ORDER — HEPARIN SODIUM 5000 [USP'U]/100ML
INJECTION, SOLUTION INTRAVENOUS
Status: DISCONTINUED | OUTPATIENT
Start: 2020-06-23 | End: 2020-06-23 | Stop reason: HOSPADM

## 2020-06-23 RX ORDER — METOPROLOL SUCCINATE 25 MG/1
25 TABLET, EXTENDED RELEASE ORAL 2 TIMES DAILY
Status: DISCONTINUED | OUTPATIENT
Start: 2020-06-23 | End: 2020-06-26 | Stop reason: HOSPADM

## 2020-06-23 RX ORDER — PROTAMINE SULFATE 10 MG/ML
INJECTION, SOLUTION INTRAVENOUS AS NEEDED
Status: DISCONTINUED | OUTPATIENT
Start: 2020-06-23 | End: 2020-06-23 | Stop reason: HOSPADM

## 2020-06-23 RX ORDER — ACETAMINOPHEN 500 MG
1000 TABLET ORAL ONCE
Status: DISPENSED | OUTPATIENT
Start: 2020-06-23 | End: 2020-06-23

## 2020-06-23 RX ORDER — SODIUM CHLORIDE 0.9 % (FLUSH) 0.9 %
5-40 SYRINGE (ML) INJECTION AS NEEDED
Status: DISCONTINUED | OUTPATIENT
Start: 2020-06-23 | End: 2020-06-23

## 2020-06-23 RX ORDER — LIDOCAINE HYDROCHLORIDE 10 MG/ML
0.3 INJECTION INFILTRATION; PERINEURAL ONCE
Status: DISPENSED | OUTPATIENT
Start: 2020-06-23 | End: 2020-06-23

## 2020-06-23 RX ORDER — ALBUTEROL SULFATE 0.83 MG/ML
2.5 SOLUTION RESPIRATORY (INHALATION)
Status: DISCONTINUED | OUTPATIENT
Start: 2020-06-23 | End: 2020-06-26 | Stop reason: HOSPADM

## 2020-06-23 RX ORDER — ACETAMINOPHEN 325 MG/1
650 TABLET ORAL
COMMUNITY
End: 2022-03-17

## 2020-06-23 RX ORDER — LISINOPRIL 5 MG/1
10 TABLET ORAL DAILY
Status: DISCONTINUED | OUTPATIENT
Start: 2020-06-24 | End: 2020-06-26 | Stop reason: HOSPADM

## 2020-06-23 RX ORDER — PANTOPRAZOLE SODIUM 40 MG/1
40 TABLET, DELAYED RELEASE ORAL
Status: DISCONTINUED | OUTPATIENT
Start: 2020-06-23 | End: 2020-06-26 | Stop reason: HOSPADM

## 2020-06-23 RX ADMIN — ROCURONIUM BROMIDE 50 MG: 10 INJECTION, SOLUTION INTRAVENOUS at 07:59

## 2020-06-23 RX ADMIN — PANTOPRAZOLE SODIUM 40 MG: 40 TABLET, DELAYED RELEASE ORAL at 16:12

## 2020-06-23 RX ADMIN — PHENYLEPHRINE HYDROCHLORIDE 200 MCG: 10 INJECTION INTRAVENOUS at 10:32

## 2020-06-23 RX ADMIN — Medication 10 ML: at 21:41

## 2020-06-23 RX ADMIN — PROTAMINE SULFATE 10 MG: 10 INJECTION, SOLUTION INTRAVENOUS at 11:04

## 2020-06-23 RX ADMIN — APIXABAN 5 MG: 5 TABLET, FILM COATED ORAL at 18:06

## 2020-06-23 RX ADMIN — PROPOFOL 30 MG: 10 INJECTION, EMULSION INTRAVENOUS at 10:45

## 2020-06-23 RX ADMIN — PHENYLEPHRINE HYDROCHLORIDE 120 MCG: 10 INJECTION INTRAVENOUS at 10:25

## 2020-06-23 RX ADMIN — PHENYLEPHRINE HYDROCHLORIDE 40 MCG/MIN: 10 INJECTION INTRAVENOUS at 08:17

## 2020-06-23 RX ADMIN — HEPARIN SODIUM 8000 UNITS: 200 INJECTION, SOLUTION INTRAVENOUS at 08:34

## 2020-06-23 RX ADMIN — PHENYLEPHRINE HYDROCHLORIDE 120 MCG: 10 INJECTION INTRAVENOUS at 10:31

## 2020-06-23 RX ADMIN — PHENYLEPHRINE HYDROCHLORIDE 200 MCG: 10 INJECTION INTRAVENOUS at 08:18

## 2020-06-23 RX ADMIN — KETOROLAC TROMETHAMINE 30 MG: 30 INJECTION, SOLUTION INTRAMUSCULAR; INTRAVENOUS at 11:00

## 2020-06-23 RX ADMIN — GLYCOPYRROLATE 0.4 MG: 0.2 INJECTION, SOLUTION INTRAMUSCULAR; INTRAVENOUS at 11:07

## 2020-06-23 RX ADMIN — BUDESONIDE AND FORMOTEROL FUMARATE DIHYDRATE 2 PUFF: 80; 4.5 AEROSOL RESPIRATORY (INHALATION) at 21:20

## 2020-06-23 RX ADMIN — PHENYLEPHRINE HYDROCHLORIDE 120 MCG: 10 INJECTION INTRAVENOUS at 09:47

## 2020-06-23 RX ADMIN — PHENYLEPHRINE HYDROCHLORIDE 200 MCG: 10 INJECTION INTRAVENOUS at 08:02

## 2020-06-23 RX ADMIN — SODIUM CHLORIDE, SODIUM LACTATE, POTASSIUM CHLORIDE, AND CALCIUM CHLORIDE: 600; 310; 30; 20 INJECTION, SOLUTION INTRAVENOUS at 11:14

## 2020-06-23 RX ADMIN — PHENYLEPHRINE HYDROCHLORIDE 120 MCG: 10 INJECTION INTRAVENOUS at 08:49

## 2020-06-23 RX ADMIN — PHENYLEPHRINE HYDROCHLORIDE 200 MCG: 10 INJECTION INTRAVENOUS at 08:03

## 2020-06-23 RX ADMIN — FENTANYL CITRATE 100 MCG: 50 INJECTION INTRAMUSCULAR; INTRAVENOUS at 07:59

## 2020-06-23 RX ADMIN — PHENYLEPHRINE HYDROCHLORIDE 120 MCG: 10 INJECTION INTRAVENOUS at 09:54

## 2020-06-23 RX ADMIN — SODIUM CHLORIDE, SODIUM LACTATE, POTASSIUM CHLORIDE, AND CALCIUM CHLORIDE: 600; 310; 30; 20 INJECTION, SOLUTION INTRAVENOUS at 07:45

## 2020-06-23 RX ADMIN — PROPOFOL 130 MG: 10 INJECTION, EMULSION INTRAVENOUS at 07:59

## 2020-06-23 RX ADMIN — DOFETILIDE 500 MCG: 0.5 CAPSULE ORAL at 18:06

## 2020-06-23 RX ADMIN — LIDOCAINE HYDROCHLORIDE 50 MG: 20 INJECTION, SOLUTION EPIDURAL; INFILTRATION; INTRACAUDAL; PERINEURAL at 07:59

## 2020-06-23 RX ADMIN — PROTAMINE SULFATE 20 MG: 10 INJECTION, SOLUTION INTRAVENOUS at 11:07

## 2020-06-23 RX ADMIN — PROTAMINE SULFATE 10 MG: 10 INJECTION, SOLUTION INTRAVENOUS at 11:00

## 2020-06-23 RX ADMIN — PROTAMINE SULFATE 20 MG: 10 INJECTION, SOLUTION INTRAVENOUS at 11:08

## 2020-06-23 RX ADMIN — TAMSULOSIN HYDROCHLORIDE 0.4 MG: 0.4 CAPSULE ORAL at 21:40

## 2020-06-23 RX ADMIN — ADENOSINE 10.79 MG/MIN: 90 INJECTION, SOLUTION INTRAVENOUS at 08:35

## 2020-06-23 RX ADMIN — PHENYLEPHRINE HYDROCHLORIDE 200 MCG: 10 INJECTION INTRAVENOUS at 08:14

## 2020-06-23 RX ADMIN — METOPROLOL SUCCINATE 25 MG: 25 TABLET, EXTENDED RELEASE ORAL at 18:06

## 2020-06-23 RX ADMIN — HEPARIN SODIUM 7000 UNITS: 1000 INJECTION, SOLUTION INTRAVENOUS; SUBCUTANEOUS at 08:46

## 2020-06-23 RX ADMIN — SUCRALFATE 1 G: 1 TABLET ORAL at 16:12

## 2020-06-23 RX ADMIN — PHENYLEPHRINE HYDROCHLORIDE 200 MCG: 10 INJECTION INTRAVENOUS at 08:06

## 2020-06-23 RX ADMIN — Medication 10 ML: at 13:08

## 2020-06-23 RX ADMIN — PHENYLEPHRINE HYDROCHLORIDE 120 MCG: 10 INJECTION INTRAVENOUS at 09:39

## 2020-06-23 RX ADMIN — OXYCODONE 10 MG: 5 TABLET ORAL at 12:26

## 2020-06-23 RX ADMIN — HEPARIN SODIUM AND DEXTROSE 40 UNITS/KG/HR: 5000; 5 INJECTION INTRAVENOUS at 09:32

## 2020-06-23 RX ADMIN — PHENYLEPHRINE HYDROCHLORIDE 120 MCG: 10 INJECTION INTRAVENOUS at 10:04

## 2020-06-23 RX ADMIN — PHENYLEPHRINE HYDROCHLORIDE 200 MCG: 10 INJECTION INTRAVENOUS at 10:55

## 2020-06-23 RX ADMIN — Medication 10 ML: at 21:42

## 2020-06-23 RX ADMIN — PHENYLEPHRINE HYDROCHLORIDE 200 MCG: 10 INJECTION INTRAVENOUS at 10:47

## 2020-06-23 RX ADMIN — PHENYLEPHRINE HYDROCHLORIDE 120 MCG: 10 INJECTION INTRAVENOUS at 09:30

## 2020-06-23 RX ADMIN — DEXAMETHASONE SODIUM PHOSPHATE 4 MG: 4 INJECTION, SOLUTION INTRAMUSCULAR; INTRAVENOUS at 08:20

## 2020-06-23 RX ADMIN — PREGABALIN 100 MG: 100 CAPSULE ORAL at 16:12

## 2020-06-23 RX ADMIN — PROTAMINE SULFATE 20 MG: 10 INJECTION, SOLUTION INTRAVENOUS at 11:06

## 2020-06-23 RX ADMIN — SUCRALFATE 1 G: 1 TABLET ORAL at 21:40

## 2020-06-23 RX ADMIN — PREGABALIN 100 MG: 100 CAPSULE ORAL at 21:40

## 2020-06-23 RX ADMIN — PHENYLEPHRINE HYDROCHLORIDE 200 MCG: 10 INJECTION INTRAVENOUS at 08:05

## 2020-06-23 RX ADMIN — HEPARIN SODIUM 3000 UNITS: 1000 INJECTION, SOLUTION INTRAVENOUS; SUBCUTANEOUS at 10:09

## 2020-06-23 RX ADMIN — Medication 3 MG: at 11:07

## 2020-06-23 RX ADMIN — HEPARIN SODIUM 7000 UNITS: 1000 INJECTION, SOLUTION INTRAVENOUS; SUBCUTANEOUS at 09:30

## 2020-06-23 RX ADMIN — ONDANSETRON 4 MG: 2 INJECTION INTRAMUSCULAR; INTRAVENOUS at 08:14

## 2020-06-23 NOTE — ANESTHESIA PROCEDURE NOTES
Arterial Line Placement    Start time: 6/23/2020 8:03 AM  End time: 6/23/2020 8:06 AM  Performed by: Chico Murillo CRNA  Authorized by:  Roxanne Arriola MD     Pre-Procedure  Indications:  Arterial pressure monitoring and blood sampling  Preanesthetic Checklist: patient identified, risks and benefits discussed, anesthesia consent, site marked, patient being monitored, timeout performed and patient being monitored    Timeout Time: 08:03        Procedure:   Prep:  ChloraPrep  Seldinger Technique?: Yes    Orientation:  Left  Location:  Radial artery  Catheter size:  20 G  Number of attempts:  2    Assessment:   Post-procedure:  Line secured and sterile dressing applied  Patient Tolerance:  Patient tolerated the procedure well with no immediate complications

## 2020-06-23 NOTE — ANESTHESIA POSTPROCEDURE EVALUATION
Procedure(s):  AFIB  ABLATION. general    Anesthesia Post Evaluation      Multimodal analgesia: multimodal analgesia used between 6 hours prior to anesthesia start to PACU discharge  Patient location during evaluation: PACU  Level of consciousness: awake  Pain management: adequate  Airway patency: patent  Anesthetic complications: no  Cardiovascular status: acceptable  Respiratory status: acceptable  Hydration status: acceptable  Post anesthesia nausea and vomiting:  none      INITIAL Post-op Vital signs:   Vitals Value Taken Time   /77 6/23/2020 12:29 PM   Temp 36.3 °C (97.4 °F) 6/23/2020 12:24 PM   Pulse 97 6/23/2020 12:40 PM   Resp 16 6/23/2020 12:24 PM   SpO2 80 % 6/23/2020 12:40 PM   Vitals shown include unvalidated device data.

## 2020-06-23 NOTE — PROGRESS NOTES
Bedside and Verbal shift change report to be given to self (oncoming nurse) by Salvatore Olmstead (offgoing nurse). Report included the following information SBAR, Kardex, MAR and Recent Results.

## 2020-06-23 NOTE — PROGRESS NOTES
Patient received to [address] [place] [address] room # 9  Ambulatory from [facility]. Patient scheduled for AFib today with Dr She Sosa. Procedure reviewed & questions answered, voiced good understanding consent obtained & placed on chart. All medications and medical history reviewed. Will prep patient per orders. Patient & family updated on plan of care. The patient has a fraility score of 3-MANAGING WELL, based on patient A&Ox3, patient able to ambulate to room without difficulty.

## 2020-06-23 NOTE — ROUTINE PROCESS
Bedside and Verbal shift change report given to [address] BETH Holcomb RN (oncoming nurse) by self and Arthur Craft RN (offgoing nurse). Report included the following information SBAR, Kardex, Procedure Summary, Intake/Output, MAR, Recent Results and Cardiac Rhythm SR/ST. BL groin site with gauze and tegaderm over sutures. R groin has small spot of ooze on gauze. Otherwise intact. L radial art site c/d/i. R IJ site c/d/i with old drainage unchanged.

## 2020-06-23 NOTE — ROUTINE PROCESS
TRANSFER - IN REPORT: 
 
Verbal report received from Su Kerns RN on Cyndy Avendaño being received from PACU for routine progression of care. Report consisted of patients Situation, Background, Assessment and Recommendations(SBAR). Information from the following report(s) SBAR, Kardex, Procedure Summary, MAR, Recent Results and Cardiac Rhythm SR was reviewed. Opportunity for questions and clarification was provided. Assessment completed upon patients arrival to unit and care assumed. Patient received to room 333. Patient connected to monitor and assessment completed. Plan of care reviewed. Patient oriented to room and call light. Patient aware to use call light to communicate any chest pain or needs. Admission skin assessment completed with second RN and reveals the following:  
R groin site with gauze, tegaderm, over sutures. L groin site with gauze, tegaderm, over sutures. R IJ site with old spot of blood - marked. L radial art line site, c/d/i. Heels and sacrum are c/d/i. No need for allevyn on sacrum. No obvious wounds, abrasions, scars, or bruises.

## 2020-06-23 NOTE — PERIOP NOTES
TRANSFER - OUT REPORT:    Verbal report given to Rabia Escobar RN on Mariel Lewis  being transferred to [facility] for routine post - op       Report consisted of patients Situation, Background, Assessment and   Recommendations(SBAR). Information from the following report(s) OR Summary, Procedure Summary, Intake/Output and MAR was reviewed with the receiving nurse. Lines:   Peripheral IV 06/23/20 Anterior; Left Forearm (Active)       Peripheral IV 06/23/20 Right Antecubital (Active)       Arterial Line 06/23/20 Left Radial artery (Active)        Opportunity for questions and clarification was provided. Patient transported with:   Registered Nurse    VTE prophylaxis orders have been written for Mariel Lewis. Patient and family given floor number and nurses name. Family updated re: pt status after security code verified.

## 2020-06-23 NOTE — ANESTHESIA PREPROCEDURE EVALUATION
Anesthetic History   No history of anesthetic complications            Review of Systems / Medical History  Patient summary reviewed and pertinent labs reviewed    Pulmonary            Asthma : well controlled       Neuro/Psych   Within defined limits           Cardiovascular    Hypertension        Dysrhythmias (s/p ablation Feb 2019, in Afib again) : atrial fibrillation and atrial flutter      Exercise tolerance: >4 METS     GI/Hepatic/Renal     GERD: well controlled           Endo/Other        Arthritis     Other Findings   Comments: Lumbar surgery  DDD             Physical Exam    Airway  Mallampati: II  TM Distance: 4 - 6 cm  Neck ROM: normal range of motion   Mouth opening: Normal     Cardiovascular    Rhythm: regular  Rate: normal         Dental    Dentition: Lower partial plate and Upper partial plate     Pulmonary  Breath sounds clear to auscultation               Abdominal  GI exam deferred       Other Findings            Anesthetic Plan    ASA: 3  Anesthesia type: general          Induction: Intravenous  Anesthetic plan and risks discussed with: Patient and Spouse

## 2020-06-23 NOTE — PROCEDURES
: Kannan Khalil. Octaviano Cooper MD    REFERRING: Sidney Ibarra. Joon Coronado MD    Pre-Electrophysiology Diagnosis  1. Drug refractory persistent atrial fibrillation. 2. Atrial flutter, typical.   3. Atrial flutter, atypical.   4. Previous catheter ablation for atrial fibrillation. Procedure Performed  1. EPS afib ablation/pulmonary vein isolation  2. Left atrial pacing recording from the coronary sinus. 3. 3-D Electroanatomical mapping  4. Intracardiac echo  5. Ablation of second arrhythmia  6. Additional lines of ablation  7. LV pacing and recording  8. Transesophageal echo  9. Drug infusion    Anesthesia: General     Estimated Blood Loss: Less than 50 cc     Specimens: * No specimens in log *    Antibiotics: None    Complications: None    Fluoroscopy Time: 7.9 minutes     Procedure in Detail:  The patient was brought to the electrophysiology lab in the fasting state. The patient was intubated by anesthesiology and an esophageal temperature probe inserted and advanced to a location directly posterior to the LA at the level of the pulmonary veins. The esophageal temperature probe was repositioned throughout the case to a location as close the the ablation catheter as possible. If the esophageal temperature increased 0.5 degrees Celsius ablation was stopped and high flush performed until the temperature returned to baseline. A tranesophageal echocardiogram was performed directly prior to the procedure and was negative for a STAR thrombus (see full report in chart). A Ref-Star CARTO patch was placed, the patient was then prepped and draped in sterile fashion. Venous access was obtained under ultrasound guidance x4 using modified Seldinger technique, with placement of three 8 Fr short sidearm sheaths into the right femoral vein and placement of a 10Fr sheath into the left femoral vein. An 8 Fr sheath was placed in the RIJ for better positioning of the CS catheter.  Of note, the both groins had significant scar tissue. It was difficult to advanced a short 10 Fr sheath in the LFV. With use of a long stiff J wire, it was able to be delivered. This was exchanged for a long 10 Fr sheath 2/2 proximal tortuosity in the LFV/iliac vein. Two of the right 8 Fr sheaths were upsized to an 8.5 Fr SL-1 and Vizigo (Biosense-Mccarthy) sheaths, respectively. The patient presented to the EP lab in atypical flutter. An intra-cardiac echo probe was prepped, and then inserted into the long 10 Fr short sheath and used to localize the fossa ovalis and create LA and pulmonary vein anatomy utilizing [facility]. A Biosense Mccarthy Pentaray catheter was then inserted into an 8.5 SL1 Fr sheath and used to create a 3D electroanatomical map of the right atrium, including attempts at His localization and the os of the CS with a focus on minimizing fluoroscopic radiation. Then a Smart Touch porous irrigated BW 3.5 mm ablation catheter was used to map out the body of the CS. A decapolar Biosense Mccarthy CS catheter was inserted into the RIJ 8Fr sheath and positioned in the coronary sinus. Upon placement, it was noted that the atypical flutter circuit was coming from the left atrium. Next, a trans-septal needle was inserted into the SL1 and was used to perform a trans-septal puncture with assistance from ICE (intra-cardiac echo) as well as the [address] [place] [address],[address] map and the right atrial impedence map. The SL1 sheath was advanced into the LA. Total weight based heparin bolus was administered just after transeptal access, and systemic blood pressure monitored invasively. The patient was then placed on our heparin weight based nomogram for targeted ACT of 300-350 during the ablation procedure. A second trans-septal access was obtained with the ablation catheter using the \"brentno-wire\" technique. The sheaths were carefully advanced into the LA.      The Pentaray catheter was then inserted into the LA via the SL-1 sheath and was used to map pulmonary vein potentials and target ablation. The right sided PVs demonstrated durable isolation from a previous ablation. The left veins noted significant PV signal which required further ablation. An activation map was performed during the flutter. The atypical flutter circuit had a TCL of 200 msec. There was noted to be slow conduction with ripple mapping along the superior roof. An 8 Fr, 3.5 mm tip saline irrigated bidirectional D/F curve Thermo-cool SmartTouch catheter was used for RF ablation and ablation was performed at 36 W unless ablation was in the proximity of the esophagus where ablation was performed at 30-35 W. The esophagus was located in the mid posterior wall. Antral pulmonary vein isolation was then performed with ablation index targets of 500 and 380 on the anterior and posterior walls, respectively. The anterior left WACA line had an area of breakthrough. During ablation along the anterior ridge, PV entrance block was observed. After isolation of the left PVs was confirmed, the the macro-reentrant flutter was mapped. Given the slow conduction seen along the LA roof, a roof line and posterior box lesion set was performed. During ablation along the roof, the flutter slowed and then terminated with a return to normal sinus rhythm. Further ablation along the posterior wall was performed until demonstration of posterior wall isolation. Entrance and exit block was demonstrated by left atrial pacing and within the pulmonary veins (and posterior wall). Lack of any conducted atrial EGMs into the pulmonary veins was documented. Touch up ablation along the right posterior WACA line was performed, but an entire right sided WACA was not performed. Adenosine was injected (12 mg) to demonstrate the lack of early reconnection with the Pentaray in the PVs. There was no early reconnection with adenosine.  The ablation catheter was inserted into the LV, documented LV electrograms and was used to pace the LV for the EP study and for rescue pacing during adenosine infusion. Cavotricuspid Isthmus ablation (right atrial flutter)  Linear ablation across the cavo-tricuspid isthmus was performed starting with 1:2 A:V EGMs along the isthmus at 6pm Tunisian. The local electrogram activation sequence, differential pacing maneuvers and electrogram timing was used to demonstrate bidirectional block along the cavotricuspid isthmus. Post-Ablation trans-isthmus time: 140 msec  Local double potentials: 90 msec    Next, baseline recordings and a diagnostic EP study was performed. The coronary sinus multipolar catheter was used to pace the left atrium during the EP study. The LA CS electrograms were documented and interpreted during the procedure. A comprehensive EP study was performed with 1:1 AV decremental pacing, atrial extrastimuli and ventricular pacing to assess retrograde conduction. The patient did not have sustained slow pathway conduction or evidence of an accessory pathway. Ventricular pacing revealed retrograde AV conduction which was concentric and decremental.    At the completion of the ablation and EPS, all catheters were removed, 80 mg of Protamine was administered and sheaths were pulled after placing a figure of 8 stitch. The patient tolerated the procedure well with no acute complications recognized. The patient left the EP lab in stable condition, in normal sinus rhythm. Just prior to pulling shealths, the ICE catheter was used to obtain ultrasound images and revealed no evidence of pericardial effusion. ABLATION DATA:  Total procedure time: 165 minutes   Left PV WACA isolation time: 1 minute, 25 seconds. Right PV WACA isolation time: Previously isolated. LA Volume: n/a.      Baseline Intervals:  AH interval: 75 msec  HV interval: 47 msec  GA interval: 158 msec  QRS duration: 78 msec  QT interval: 303 msec  RR interval: 754 msec    EP Study  AV Wenchebach: 310 msec  AV frank ERP: < or equal to 600/250 msec  VA Wenchebach: <300 msec    Figures 1-3. Pre and post AF ablation maps in the PA, AP and BOLAND views, respectively. Plan of care: The patient will be placed in observation on telemetry, 4 hour flat time, followed by ambulation as tolerated and will continue anticoagulation as prescribed pre-procedure. Impression:   1. Successful pulmonary vein isolation (PVAI) of the 4 pulmonary veins. 2.  Successful ablation of CTI-dependent atrial flutter. 3.  Comprehensive EP study with normal intra-atrial, AV frank and infrahissian conduction times. Plan:   1. Continue OAC (Eliquis) indefinitely until EP follow up.  2.  Will plan for Tikosyn load given recalcitrant AF. 3.  Family updated with plan. 4.  Routine cardiology follow up with Dr. Kenney Parents.  5.  Patient and family updated about small risk of atrioesophageal fistula and need for emergent ED evaluation if any concerning symptoms arise. Aubrie Pardo.  Shy AGUAYO, MS  Clinical Cardiac Electrophysiology  6/23/2020  4:46 PM

## 2020-06-24 LAB
ANION GAP SERPL CALC-SCNC: 7 MMOL/L (ref 7–16)
ATRIAL RATE: 85 BPM
ATRIAL RATE: 86 BPM
BASOPHILS # BLD: 0 K/UL (ref 0–0.2)
BASOPHILS NFR BLD: 0 % (ref 0–2)
BUN SERPL-MCNC: 20 MG/DL (ref 8–23)
CALCIUM SERPL-MCNC: 7.6 MG/DL (ref 8.3–10.4)
CALCULATED P AXIS, ECG09: 72 DEGREES
CALCULATED P AXIS, ECG09: 74 DEGREES
CALCULATED R AXIS, ECG10: 1 DEGREES
CALCULATED R AXIS, ECG10: 7 DEGREES
CALCULATED T AXIS, ECG11: 20 DEGREES
CALCULATED T AXIS, ECG11: 36 DEGREES
CHLORIDE SERPL-SCNC: 106 MMOL/L (ref 98–107)
CO2 SERPL-SCNC: 27 MMOL/L (ref 21–32)
CREAT SERPL-MCNC: 1.17 MG/DL (ref 0.8–1.5)
DIAGNOSIS, 93000: NORMAL
DIAGNOSIS, 93000: NORMAL
DIFFERENTIAL METHOD BLD: ABNORMAL
EOSINOPHIL # BLD: 0 K/UL (ref 0–0.8)
EOSINOPHIL NFR BLD: 0 % (ref 0.5–7.8)
ERYTHROCYTE [DISTWIDTH] IN BLOOD BY AUTOMATED COUNT: 14.7 % (ref 11.9–14.6)
GLUCOSE SERPL-MCNC: 110 MG/DL (ref 65–100)
HCT VFR BLD AUTO: 44.1 % (ref 41.1–50.3)
HGB BLD-MCNC: 14 G/DL (ref 13.6–17.2)
IMM GRANULOCYTES # BLD AUTO: 0 K/UL (ref 0–0.5)
IMM GRANULOCYTES NFR BLD AUTO: 0 % (ref 0–5)
LYMPHOCYTES # BLD: 0.7 K/UL (ref 0.5–4.6)
LYMPHOCYTES NFR BLD: 7 % (ref 13–44)
MAGNESIUM SERPL-MCNC: 1.9 MG/DL (ref 1.8–2.4)
MCH RBC QN AUTO: 28.7 PG (ref 26.1–32.9)
MCHC RBC AUTO-ENTMCNC: 31.7 G/DL (ref 31.4–35)
MCV RBC AUTO: 90.6 FL (ref 79.6–97.8)
MONOCYTES # BLD: 0.7 K/UL (ref 0.1–1.3)
MONOCYTES NFR BLD: 7 % (ref 4–12)
NEUTS SEG # BLD: 8.2 K/UL (ref 1.7–8.2)
NEUTS SEG NFR BLD: 85 % (ref 43–78)
NRBC # BLD: 0 K/UL (ref 0–0.2)
P-R INTERVAL, ECG05: 150 MS
P-R INTERVAL, ECG05: 156 MS
PLATELET # BLD AUTO: 115 K/UL (ref 150–450)
PMV BLD AUTO: 10.4 FL (ref 9.4–12.3)
POTASSIUM SERPL-SCNC: 3.9 MMOL/L (ref 3.5–5.1)
Q-T INTERVAL, ECG07: 358 MS
Q-T INTERVAL, ECG07: 388 MS
QRS DURATION, ECG06: 86 MS
QRS DURATION, ECG06: 96 MS
QTC CALCULATION (BEZET), ECG08: 428 MS
QTC CALCULATION (BEZET), ECG08: 461 MS
RBC # BLD AUTO: 4.87 M/UL (ref 4.23–5.6)
SODIUM SERPL-SCNC: 140 MMOL/L (ref 136–145)
VENTRICULAR RATE, ECG03: 85 BPM
VENTRICULAR RATE, ECG03: 86 BPM
WBC # BLD AUTO: 9.7 K/UL (ref 4.3–11.1)

## 2020-06-24 PROCEDURE — 36415 COLL VENOUS BLD VENIPUNCTURE: CPT

## 2020-06-24 PROCEDURE — 74011250637 HC RX REV CODE- 250/637: Performed by: INTERNAL MEDICINE

## 2020-06-24 PROCEDURE — 65660000000 HC RM CCU STEPDOWN

## 2020-06-24 PROCEDURE — 80048 BASIC METABOLIC PNL TOTAL CA: CPT

## 2020-06-24 PROCEDURE — 93005 ELECTROCARDIOGRAM TRACING: CPT | Performed by: INTERNAL MEDICINE

## 2020-06-24 PROCEDURE — 99218 HC RM OBSERVATION: CPT

## 2020-06-24 PROCEDURE — 99232 SBSQ HOSP IP/OBS MODERATE 35: CPT | Performed by: INTERNAL MEDICINE

## 2020-06-24 PROCEDURE — 83735 ASSAY OF MAGNESIUM: CPT

## 2020-06-24 PROCEDURE — 85025 COMPLETE CBC W/AUTO DIFF WBC: CPT

## 2020-06-24 RX ADMIN — PANTOPRAZOLE SODIUM 40 MG: 40 TABLET, DELAYED RELEASE ORAL at 05:45

## 2020-06-24 RX ADMIN — PANTOPRAZOLE SODIUM 40 MG: 40 TABLET, DELAYED RELEASE ORAL at 17:05

## 2020-06-24 RX ADMIN — PREGABALIN 100 MG: 100 CAPSULE ORAL at 08:50

## 2020-06-24 RX ADMIN — Medication 10 ML: at 05:45

## 2020-06-24 RX ADMIN — TAMSULOSIN HYDROCHLORIDE 0.4 MG: 0.4 CAPSULE ORAL at 22:04

## 2020-06-24 RX ADMIN — Medication 10 ML: at 22:04

## 2020-06-24 RX ADMIN — BUDESONIDE AND FORMOTEROL FUMARATE DIHYDRATE 2 PUFF: 80; 4.5 AEROSOL RESPIRATORY (INHALATION) at 09:10

## 2020-06-24 RX ADMIN — LISINOPRIL 10 MG: 5 TABLET ORAL at 08:49

## 2020-06-24 RX ADMIN — SUCRALFATE 1 G: 1 TABLET ORAL at 22:04

## 2020-06-24 RX ADMIN — Medication 20 ML: at 11:50

## 2020-06-24 RX ADMIN — SUCRALFATE 1 G: 1 TABLET ORAL at 17:05

## 2020-06-24 RX ADMIN — ACETAMINOPHEN 650 MG: 325 TABLET, FILM COATED ORAL at 02:52

## 2020-06-24 RX ADMIN — APIXABAN 5 MG: 5 TABLET, FILM COATED ORAL at 08:50

## 2020-06-24 RX ADMIN — DOFETILIDE 500 MCG: 0.5 CAPSULE ORAL at 17:56

## 2020-06-24 RX ADMIN — APIXABAN 5 MG: 5 TABLET, FILM COATED ORAL at 17:05

## 2020-06-24 RX ADMIN — Medication 20 ML: at 14:21

## 2020-06-24 RX ADMIN — DOFETILIDE 500 MCG: 0.5 CAPSULE ORAL at 05:44

## 2020-06-24 RX ADMIN — PREGABALIN 100 MG: 100 CAPSULE ORAL at 22:04

## 2020-06-24 RX ADMIN — SUCRALFATE 1 G: 1 TABLET ORAL at 11:49

## 2020-06-24 RX ADMIN — PREGABALIN 100 MG: 100 CAPSULE ORAL at 17:05

## 2020-06-24 RX ADMIN — SUCRALFATE 1 G: 1 TABLET ORAL at 05:45

## 2020-06-24 RX ADMIN — METOPROLOL SUCCINATE 25 MG: 25 TABLET, EXTENDED RELEASE ORAL at 08:50

## 2020-06-24 RX ADMIN — METOPROLOL SUCCINATE 25 MG: 25 TABLET, EXTENDED RELEASE ORAL at 17:05

## 2020-06-24 NOTE — PROGRESS NOTES
Problem: Falls - Risk of  Goal: *Absence of Falls  Description: Document Clydene Bone Fall Risk and appropriate interventions in the flowsheet.   Outcome: Progressing Towards Goal  Note: Fall Risk Interventions:            Medication Interventions: Bed/chair exit alarm, Patient to call before getting OOB, Teach patient to arise slowly                   Problem: Afib Pathway: Day 1  Goal: *Hemodynamically stable  Outcome: Resolved/Met  Goal: *Stable cardiac rhythm  Outcome: Resolved/Met  Note: NSR 80s  Goal: *Lungs clear or at baseline  Outcome: Resolved/Met  Goal: *Labs within defined limits  Outcome: Resolved/Met  Goal: *Describes available resources and support systems  Outcome: Resolved/Met

## 2020-06-24 NOTE — PROGRESS NOTES
Bilateral groin post ablation sutures removed as ordered. Covered with gauze and tegaderm. Sites clean, dry, and intact. Patient tolerated well with no complaints of pain or discomfort. Will continue to monitor.

## 2020-06-24 NOTE — ROUTINE PROCESS
Bedside and Verbal report given to Anurag Ruiz by self.  Report included SBAR, Kardex, ED summary, procedure summary, recent results and cardiac rhythm SR.

## 2020-06-24 NOTE — PROGRESS NOTES
[facility] CARDIOLOGY PROGRESS NOTE           6/24/2020 4:37 PM    Admit Date: 6/23/2020      Subjective:   Patient stable post ablation and s/p 2 doses Tikosyn    ROS:  Cardiovascular:  As noted above    Objective:      Vitals:    06/24/20 0447 06/24/20 0544 06/24/20 0844 06/24/20 1213   BP: 114/68 117/71 119/71 112/70   Pulse: 85 86 86 86   Resp: 18  18 18   Temp: 98.3 °F (36.8 °C)  98.8 °F (37.1 °C) 98.6 °F (37 °C)   SpO2: 95%  95% 93%   Weight: 184 lb 11.2 oz (83.8 kg)      Height:           Physical Exam:  General-No Acute Distress  Neck- supple, no JVD  CV- regular rate and rhythm no MRG  Lung- clear bilaterally  Abd- soft, nontender, nondistended  Ext- no edema bilaterally. Skin- warm and dry    Data Review:   Recent Labs     06/24/20  0432 06/23/20  1447 06/23/20  0637    143 142   K 3.9 4.7 3.9   MG 1.9 2.0 1.9   BUN 20 17 18   CREA 1.17 1.16 1.12   * 115* 92   WBC 9.7  --  5.8   HGB 14.0  --  16.4   HCT 44.1  --  51.1*   *  --  141*   INR  --   --  1.0       Assessment/Plan:     Active Problems:    Atrial fibrillation (HCC) (2/2/2018)    In SR and stable QTc post 2 dose Tikosyn.           Tonya Cuevas MD  6/24/2020 4:37 PM

## 2020-06-24 NOTE — ROUTINE PROCESS
Bedside and Verbal report given to self by Haley Reid. Report included SBAR, Kardex, ED Summary, Procedure Summary, Intake and Output and Cardiac Rhythm SR  Bilateral femoral sites assessed and no hematoma or bleeding noted. Josselyn Combs

## 2020-06-24 NOTE — PROGRESS NOTES
Bedside and Verbal shift change report to be given to [facility], [facility] (oncoming nurse) by self (offgoing nurse). Report included the following information SBAR, Kardex, MAR and Recent Results.

## 2020-06-24 NOTE — PROGRESS NOTES
Bedside and Verbal shift change report to be given to self (oncoming nurse) by Didier Brood (offgoing nurse). Report included the following information SBAR, Kardex, MAR and Recent Results.

## 2020-06-24 NOTE — PROGRESS NOTES
Problem: Moderate Sedation (Adult)  Goal: *Patent airway  Outcome: Progressing Towards Goal     Problem: Patient Education: Go to Patient Education Activity  Goal: Patient/Family Education  Outcome: Progressing Towards Goal     Problem: Afib Pathway: Day 1  Goal: Activity/Safety  Outcome: Progressing Towards Goal  Goal: Nutrition/Diet  Outcome: Progressing Towards Goal  Goal: Discharge Planning  Outcome: Progressing Towards Goal  Goal: Medications  Outcome: Progressing Towards Goal  Goal: Respiratory  Outcome: Progressing Towards Goal  Goal: Treatments/Interventions/Procedures  Outcome: Progressing Towards Goal  Goal: Psychosocial  Outcome: Progressing Towards Goal  Goal: *Optimal pain control at patient's stated goal  Outcome: Progressing Towards Goal     Problem: Falls - Risk of  Goal: *Absence of Falls  Description: Document Ana Fall Risk and appropriate interventions in the flowsheet. Outcome: Progressing Towards Goal  Note: Fall Risk Interventions:    Medication Interventions: Bed/chair exit alarm  Patient progressing towards goal with no falls on current admission. Patient without confusion, agitation, or sensory perception deficits. Patient has steady gait on ambulation. Personal belongings are within reach. Bed is in the low and locked position with side rails up x2. Yellow gripper socks to bilateral feet. Call light within reach and patient verbalizes understanding of use.

## 2020-06-25 LAB
ANION GAP SERPL CALC-SCNC: 3 MMOL/L (ref 7–16)
ATRIAL RATE: 80 BPM
ATRIAL RATE: 82 BPM
BUN SERPL-MCNC: 17 MG/DL (ref 8–23)
CALCIUM SERPL-MCNC: 7.9 MG/DL (ref 8.3–10.4)
CALCULATED P AXIS, ECG09: 69 DEGREES
CALCULATED P AXIS, ECG09: 74 DEGREES
CALCULATED R AXIS, ECG10: -26 DEGREES
CALCULATED R AXIS, ECG10: -8 DEGREES
CALCULATED T AXIS, ECG11: 19 DEGREES
CALCULATED T AXIS, ECG11: 31 DEGREES
CHLORIDE SERPL-SCNC: 109 MMOL/L (ref 98–107)
CO2 SERPL-SCNC: 31 MMOL/L (ref 21–32)
CREAT SERPL-MCNC: 1.05 MG/DL (ref 0.8–1.5)
DIAGNOSIS, 93000: NORMAL
DIAGNOSIS, 93000: NORMAL
GLUCOSE SERPL-MCNC: 93 MG/DL (ref 65–100)
MAGNESIUM SERPL-MCNC: 2.1 MG/DL (ref 1.8–2.4)
P-R INTERVAL, ECG05: 152 MS
P-R INTERVAL, ECG05: 154 MS
POTASSIUM SERPL-SCNC: 4 MMOL/L (ref 3.5–5.1)
Q-T INTERVAL, ECG07: 374 MS
Q-T INTERVAL, ECG07: 386 MS
QRS DURATION, ECG06: 94 MS
QRS DURATION, ECG06: 96 MS
QTC CALCULATION (BEZET), ECG08: 436 MS
QTC CALCULATION (BEZET), ECG08: 445 MS
SODIUM SERPL-SCNC: 143 MMOL/L (ref 136–145)
VENTRICULAR RATE, ECG03: 80 BPM
VENTRICULAR RATE, ECG03: 82 BPM

## 2020-06-25 PROCEDURE — 65660000000 HC RM CCU STEPDOWN

## 2020-06-25 PROCEDURE — 36415 COLL VENOUS BLD VENIPUNCTURE: CPT

## 2020-06-25 PROCEDURE — 93005 ELECTROCARDIOGRAM TRACING: CPT | Performed by: INTERNAL MEDICINE

## 2020-06-25 PROCEDURE — 80048 BASIC METABOLIC PNL TOTAL CA: CPT

## 2020-06-25 PROCEDURE — 94760 N-INVAS EAR/PLS OXIMETRY 1: CPT

## 2020-06-25 PROCEDURE — 74011250637 HC RX REV CODE- 250/637: Performed by: INTERNAL MEDICINE

## 2020-06-25 PROCEDURE — 83735 ASSAY OF MAGNESIUM: CPT

## 2020-06-25 RX ADMIN — PANTOPRAZOLE SODIUM 40 MG: 40 TABLET, DELAYED RELEASE ORAL at 16:13

## 2020-06-25 RX ADMIN — SUCRALFATE 1 G: 1 TABLET ORAL at 06:12

## 2020-06-25 RX ADMIN — DOFETILIDE 500 MCG: 0.5 CAPSULE ORAL at 18:03

## 2020-06-25 RX ADMIN — SUCRALFATE 1 G: 1 TABLET ORAL at 16:13

## 2020-06-25 RX ADMIN — APIXABAN 5 MG: 5 TABLET, FILM COATED ORAL at 18:02

## 2020-06-25 RX ADMIN — PREGABALIN 100 MG: 100 CAPSULE ORAL at 16:13

## 2020-06-25 RX ADMIN — PREGABALIN 100 MG: 100 CAPSULE ORAL at 21:35

## 2020-06-25 RX ADMIN — SUCRALFATE 1 G: 1 TABLET ORAL at 11:39

## 2020-06-25 RX ADMIN — ACETAMINOPHEN 650 MG: 325 TABLET, FILM COATED ORAL at 06:12

## 2020-06-25 RX ADMIN — PANTOPRAZOLE SODIUM 40 MG: 40 TABLET, DELAYED RELEASE ORAL at 06:12

## 2020-06-25 RX ADMIN — Medication 10 ML: at 06:12

## 2020-06-25 RX ADMIN — METOPROLOL SUCCINATE 25 MG: 25 TABLET, EXTENDED RELEASE ORAL at 08:19

## 2020-06-25 RX ADMIN — SUCRALFATE 1 G: 1 TABLET ORAL at 21:35

## 2020-06-25 RX ADMIN — DOFETILIDE 500 MCG: 0.5 CAPSULE ORAL at 06:12

## 2020-06-25 RX ADMIN — Medication 10 ML: at 21:37

## 2020-06-25 RX ADMIN — PREGABALIN 100 MG: 100 CAPSULE ORAL at 08:19

## 2020-06-25 RX ADMIN — Medication 20 ML: at 11:45

## 2020-06-25 RX ADMIN — ACETAMINOPHEN 650 MG: 325 TABLET, FILM COATED ORAL at 18:09

## 2020-06-25 RX ADMIN — METOPROLOL SUCCINATE 25 MG: 25 TABLET, EXTENDED RELEASE ORAL at 18:02

## 2020-06-25 RX ADMIN — APIXABAN 5 MG: 5 TABLET, FILM COATED ORAL at 08:19

## 2020-06-25 RX ADMIN — LISINOPRIL 10 MG: 5 TABLET ORAL at 08:18

## 2020-06-25 RX ADMIN — TAMSULOSIN HYDROCHLORIDE 0.4 MG: 0.4 CAPSULE ORAL at 21:35

## 2020-06-25 NOTE — ROUTINE PROCESS
Bedside and Verbal report given to BUCKY Schaeffer by self.  Report included SBAR, Kardex, ED summary, procedure summary, recent results and cardiac rhythm SR.

## 2020-06-25 NOTE — PROGRESS NOTES
Pt states he has been taking his home Advair inhaler while here. Will need an order for this home maintenance inhaler please and he will use it in lieu of the ordered med.

## 2020-06-25 NOTE — PROGRESS NOTES
Bedside and Verbal shift change report to be given to Carmella Sandoval (oncoming nurse) by Freddy Kulkarni (offgoing nurse). Report included the following information SBAR, Kardex, MAR and Recent Results.

## 2020-06-25 NOTE — ROUTINE PROCESS
Bedside and Verbal report given to self by BUCKY Schaeffer.  Report included SBAR, Kardex, ED Summary, Procedure Summary, Intake and Output and Cardiac Rhythm SR.

## 2020-06-25 NOTE — PROGRESS NOTES
Problem: Afib Pathway: Day 2  Goal: *Describes available resources and support systems  Outcome: Progressing Towards Goal     Problem: Falls - Risk of  Goal: *Absence of Falls  Description: Document Ana Fall Risk and appropriate interventions in the flowsheet.   Outcome: Progressing Towards Goal  Note: Fall Risk Interventions:            Medication Interventions: Patient to call before getting OOB, Teach patient to arise slowly                   Problem: Patient Education: Go to Patient Education Activity  Goal: Patient/Family Education  Outcome: Progressing Towards Goal     Problem: Afib Pathway: Day 2  Goal: *Hemodynamically stable  Outcome: Resolved/Met  Goal: *Optimal pain control at patient's stated goal  Outcome: Resolved/Met  Goal: *Stable cardiac rhythm  Outcome: Resolved/Met  Goal: *Lungs clear or at baseline  Outcome: Resolved/Met

## 2020-06-25 NOTE — PROGRESS NOTES
[facility] CARDIOLOGY PROGRESS NOTE           6/25/2020 7:49 AM    Admit Date: 6/23/2020    Admit Diagnosis: Atrial fibrillation, unspecified type ([address] [I48.91]; Atrial fibrillation ([address] [I48.91]; Atrial fibrillation ([address] [I48.91]    Assessment:   Active Problems:    Atrial fibrillation ([address] (2/2/2018)      Overview: Ablation 2017. eliquis        Plan:   1. AF - s/p complex AF ablation. Ongoing Tikosyn load. Continue with ECG protocol. 2. Stroke ppx - continue Eliquis. 3. Dipso - anticipate tomorrow. Thank you for allowing me to participate in the electrophysiologic care of this most pleasant patient. Please feel free to contact me if there are any questions or concerns. Gadiel Vega. Carl Rubi MD, MS  Clinical Cardiac Electrophysiology  [facility]    Subjective:   No complaints this AM, no chest pain or shortness of breath. Remains in NSR. Interval History: (History of pertinent interval events obtained from nursing staff)    ROS:  GEN:  No fever or chills  Cardiovascular:  As noted above  Pulmonary:  As noted above  Neuro:  No new focal motor or sensory loss      Objective:     Vitals:    06/24/20 2125 06/25/20 0029 06/25/20 0157 06/25/20 0542   BP: 106/74 111/65 91/61 92/59   Pulse: 72 83 79 88   Resp: 17 18 15 18   Temp: 98.4 °F (36.9 °C) 98.1 °F (36.7 °C) 97.6 °F (36.4 °C) 97.8 °F (36.6 °C)   SpO2: 95% 96% 93% 94%   Weight:    180 lb 1.6 oz (81.7 kg)   Height:           Physical Exam:  General-Well Developed, Well Nourished, No Acute Distress, Alert & Oriented x 3, appropriate mood. Neck- supple, no JVD  CV- regular rate and rhythm no MRG  Lung- clear bilaterally  Abd- soft, nontender, nondistended  Ext- no edema bilaterally.   Skin- warm and dry    Current Facility-Administered Medications   Medication Dose Route Frequency    albuterol (PROVENTIL VENTOLIN) nebulizer solution 2.5 mg  2.5 mg Nebulization Q6H PRN    apixaban (ELIQUIS) tablet 5 mg  5 mg Oral BID    budesonide-formoterol (SYMBICORT) 80-4.5 mcg inhaler  2 Puff Inhalation BID RT    lisinopriL (PRINIVIL, ZESTRIL) tablet 10 mg  10 mg Oral DAILY    pregabalin (LYRICA) capsule 100 mg  100 mg Oral TID    tamsulosin (FLOMAX) capsule 0.4 mg  0.4 mg Oral QHS    sucralfate (CARAFATE) tablet 1 g  1 g Oral AC&HS    pantoprazole (PROTONIX) tablet 40 mg  40 mg Oral ACB&D    colchicine tablet 0.6 mg  0.6 mg Oral BID PRN    sodium chloride (NS) flush 5-40 mL  5-40 mL IntraVENous Q8H    acetaminophen (TYLENOL) tablet 650 mg  650 mg Oral Q4H PRN    HYDROcodone-acetaminophen (NORCO) 5-325 mg per tablet 1 Tab  1 Tab Oral Q4H PRN    sodium chloride (NS) flush 5-40 mL  5-40 mL IntraVENous Q8H    sodium chloride (NS) flush 5-40 mL  5-40 mL IntraVENous PRN    docusate sodium (COLACE) capsule 100 mg  100 mg Oral BID PRN    ondansetron (ZOFRAN) injection 4 mg  4 mg IntraVENous Q4H PRN    dofetilide (TIKOSYN) capsule 500 mcg  500 mcg Oral Q12H    metoprolol succinate (TOPROL-XL) XL tablet 25 mg  25 mg Oral BID       Data Review:   Recent Results (from the past 24 hour(s))   EKG, 12 LEAD, INITIAL    Collection Time: 06/24/20  8:25 AM   Result Value Ref Range    Ventricular Rate 85 BPM    Atrial Rate 85 BPM    P-R Interval 156 ms    QRS Duration 96 ms    Q-T Interval 388 ms    QTC Calculation (Bezet) 461 ms    Calculated P Axis 72 degrees    Calculated R Axis 1 degrees    Calculated T Axis 20 degrees    Diagnosis       Normal sinus rhythm  Normal ECG  When compared with ECG of 23-JUN-2020 20:29,  No significant change was found  Confirmed by [facility]  MD (UC)ALMAZ ([id number]) on 6/24/2020 10:42:56 AM     EKG, 12 LEAD, SUBSEQUENT    Collection Time: 06/24/20  8:50 PM   Result Value Ref Range    Ventricular Rate 80 BPM    Atrial Rate 80 BPM    P-R Interval 154 ms    QRS Duration 96 ms    Q-T Interval 386 ms    QTC Calculation (Bezet) 445 ms    Calculated P Axis 74 degrees    Calculated R Axis -8 degrees    Calculated T Axis 19 degrees    Diagnosis       Normal sinus rhythm  Nonspecific ST abnormality  Abnormal ECG  When compared with ECG of 24-JUN-2020 08:25,  No significant change was found  Confirmed by Karla Day MD (), BEENA LEE ([id number]) on 6/25/2020 6:23:74 AM     METABOLIC PANEL, BASIC    Collection Time: 06/25/20  5:17 AM   Result Value Ref Range    Sodium 143 136 - 145 mmol/L    Potassium 4.0 3.5 - 5.1 mmol/L    Chloride 109 (H) 98 - 107 mmol/L    CO2 31 21 - 32 mmol/L    Anion gap 3 (L) 7 - 16 mmol/L    Glucose 93 65 - 100 mg/dL    BUN 17 8 - 23 MG/DL    Creatinine 1.05 0.8 - 1.5 MG/DL    GFR est AA >60 >60 ml/min/1.73m2    GFR est non-AA >60 >60 ml/min/1.73m2    Calcium 7.9 (L) 8.3 - 10.4 MG/DL   MAGNESIUM    Collection Time: 06/25/20  5:17 AM   Result Value Ref Range    Magnesium 2.1 1.8 - 2.4 mg/dL       EKG:  (EKG has been independently visualized by me with interpretation below): NSr, normal axis, stable QTc.

## 2020-06-25 NOTE — PROGRESS NOTES
Bedside and Verbal shift change report to be given to self (oncoming nurse) by Hildred Grandchild (offgoing nurse). Report included the following information SBAR, Kardex, MAR and Recent Results.

## 2020-06-25 NOTE — PROGRESS NOTES
Problem: Afib Pathway: Day 3  Goal: Off Pathway (Use only if patient is Off Pathway)  Outcome: Progressing Towards Goal  Goal: Activity/Safety  Outcome: Progressing Towards Goal  Goal: Diagnostic Test/Procedures  Outcome: Progressing Towards Goal  Goal: Nutrition/Diet  Outcome: Progressing Towards Goal  Goal: Discharge Planning  Outcome: Progressing Towards Goal  Goal: Medications  Outcome: Progressing Towards Goal     Problem: Falls - Risk of  Goal: *Absence of Falls  Description: Document Ana Fall Risk and appropriate interventions in the flowsheet. Outcome: Progressing Towards Goal  Note: Fall Risk Interventions:    Medication Interventions: Patient to call before getting OOB  Patient progressing towards goal with no falls on current admission. Patient without confusion, agitation, or sensory perception deficits. Patient has steady gait on ambulation. Personal belongings are within reach. Bed is in the low and locked position with side rails up x2. Yellow gripper socks to bilateral feet. Call light within reach and patient verbalizes understanding of use.

## 2020-06-26 ENCOUNTER — APPOINTMENT (OUTPATIENT)
Dept: GENERAL RADIOLOGY | Age: 70
DRG: 274 | End: 2020-06-26
Attending: PHYSICIAN ASSISTANT
Payer: MEDICARE

## 2020-06-26 VITALS
SYSTOLIC BLOOD PRESSURE: 131 MMHG | HEART RATE: 78 BPM | OXYGEN SATURATION: 96 % | TEMPERATURE: 98 F | HEIGHT: 67 IN | BODY MASS INDEX: 28.12 KG/M2 | DIASTOLIC BLOOD PRESSURE: 71 MMHG | WEIGHT: 179.2 LBS | RESPIRATION RATE: 18 BRPM

## 2020-06-26 LAB
ANION GAP SERPL CALC-SCNC: 4 MMOL/L (ref 7–16)
ATRIAL RATE: 81 BPM
BNP SERPL-MCNC: 96 PG/ML (ref 5–125)
BUN SERPL-MCNC: 16 MG/DL (ref 8–23)
CALCIUM SERPL-MCNC: 8.2 MG/DL (ref 8.3–10.4)
CALCULATED P AXIS, ECG09: 71 DEGREES
CALCULATED R AXIS, ECG10: 6 DEGREES
CALCULATED T AXIS, ECG11: 20 DEGREES
CHLORIDE SERPL-SCNC: 107 MMOL/L (ref 98–107)
CO2 SERPL-SCNC: 30 MMOL/L (ref 21–32)
CREAT SERPL-MCNC: 0.99 MG/DL (ref 0.8–1.5)
DIAGNOSIS, 93000: NORMAL
GLUCOSE SERPL-MCNC: 104 MG/DL (ref 65–100)
MAGNESIUM SERPL-MCNC: 2.1 MG/DL (ref 1.8–2.4)
P-R INTERVAL, ECG05: 148 MS
POTASSIUM SERPL-SCNC: 3.9 MMOL/L (ref 3.5–5.1)
Q-T INTERVAL, ECG07: 374 MS
QRS DURATION, ECG06: 92 MS
QTC CALCULATION (BEZET), ECG08: 434 MS
SODIUM SERPL-SCNC: 141 MMOL/L (ref 136–145)
VENTRICULAR RATE, ECG03: 81 BPM

## 2020-06-26 PROCEDURE — 94640 AIRWAY INHALATION TREATMENT: CPT

## 2020-06-26 PROCEDURE — 74011250637 HC RX REV CODE- 250/637: Performed by: INTERNAL MEDICINE

## 2020-06-26 PROCEDURE — 71045 X-RAY EXAM CHEST 1 VIEW: CPT

## 2020-06-26 PROCEDURE — 83880 ASSAY OF NATRIURETIC PEPTIDE: CPT

## 2020-06-26 PROCEDURE — 93308 TTE F-UP OR LMTD: CPT

## 2020-06-26 PROCEDURE — 36415 COLL VENOUS BLD VENIPUNCTURE: CPT

## 2020-06-26 PROCEDURE — 80048 BASIC METABOLIC PNL TOTAL CA: CPT

## 2020-06-26 PROCEDURE — 94760 N-INVAS EAR/PLS OXIMETRY 1: CPT

## 2020-06-26 PROCEDURE — 83735 ASSAY OF MAGNESIUM: CPT

## 2020-06-26 RX ORDER — METOPROLOL SUCCINATE 25 MG/1
25 TABLET, EXTENDED RELEASE ORAL 2 TIMES DAILY
Qty: 60 TAB | Refills: 3 | Status: SHIPPED | OUTPATIENT
Start: 2020-06-26 | End: 2021-04-19 | Stop reason: SDUPTHER

## 2020-06-26 RX ORDER — PANTOPRAZOLE SODIUM 40 MG/1
40 TABLET, DELAYED RELEASE ORAL
Qty: 60 TAB | Refills: 0 | Status: SHIPPED | OUTPATIENT
Start: 2020-06-26 | End: 2020-07-30

## 2020-06-26 RX ORDER — SUCRALFATE 1 G/1
1 TABLET ORAL
Qty: 120 TAB | Refills: 0 | Status: SHIPPED | OUTPATIENT
Start: 2020-06-26 | End: 2020-07-30

## 2020-06-26 RX ORDER — DOFETILIDE 0.5 MG/1
500 CAPSULE ORAL EVERY 12 HOURS
Qty: 60 CAP | Refills: 11 | Status: SHIPPED | OUTPATIENT
Start: 2020-06-26 | End: 2021-06-14 | Stop reason: ALTCHOICE

## 2020-06-26 RX ADMIN — BUDESONIDE AND FORMOTEROL FUMARATE DIHYDRATE 2 PUFF: 80; 4.5 AEROSOL RESPIRATORY (INHALATION) at 08:11

## 2020-06-26 RX ADMIN — PANTOPRAZOLE SODIUM 40 MG: 40 TABLET, DELAYED RELEASE ORAL at 05:20

## 2020-06-26 RX ADMIN — METOPROLOL SUCCINATE 25 MG: 25 TABLET, EXTENDED RELEASE ORAL at 08:21

## 2020-06-26 RX ADMIN — SUCRALFATE 1 G: 1 TABLET ORAL at 11:14

## 2020-06-26 RX ADMIN — APIXABAN 5 MG: 5 TABLET, FILM COATED ORAL at 08:21

## 2020-06-26 RX ADMIN — Medication 10 ML: at 05:21

## 2020-06-26 RX ADMIN — Medication 10 ML: at 05:22

## 2020-06-26 RX ADMIN — LISINOPRIL 10 MG: 5 TABLET ORAL at 08:20

## 2020-06-26 RX ADMIN — DOFETILIDE 500 MCG: 0.5 CAPSULE ORAL at 05:19

## 2020-06-26 RX ADMIN — SUCRALFATE 1 G: 1 TABLET ORAL at 05:19

## 2020-06-26 RX ADMIN — PREGABALIN 100 MG: 100 CAPSULE ORAL at 08:21

## 2020-06-26 NOTE — PROGRESS NOTES
Care Management Interventions  PCP Verified by CM: Yes  Mode of Transport at Discharge: Other (see comment)  Transition of Care Consult (CM Consult): Discharge Planning  Discharge Durable Medical Equipment: No  Physical Therapy Consult: No  Occupational Therapy Consult: No  Speech Therapy Consult: No  Current Support Network: Lives with Spouse, Own Home  Confirm Follow Up Transport: Family  Discharge Location  Discharge Placement: Home with family assistance      [facility] Rx faxed to [facility], will be ordered, for Monday. Pt instructed to obtain 1 week worth until [facility] has med in stock. No further CM needs.

## 2020-06-26 NOTE — DISCHARGE SUMMARY
[facility] Cardiology Discharge Summary     Patient ID:  Lily Vu  [id number]  17 y.o.  1950    Admit date: 6/23/2020    Discharge date:  06/26/2020    Admitting Physician: Guilherme Mckenzie MD     Discharge Physician: Dr. Benjamin Lopez    Admission Diagnoses: Atrial fibrillation, unspecified type [facility]) [I48.91]    Discharge Diagnoses:    Diagnosis    Chest pain    Atrial fibrillation ([address]    Atrial flutter ([address]    DDD (degenerative disc disease), cervical    Vitamin D deficiency    Essential hypertension, benign    Moderate persistent asthma without complication       Cardiology Procedures this admission:  a fib ablation, tikosyn load   Consults: None    Hospital Course: Patient was seen at the office of [facility] by Dr. Rose Su for management of afib/flutter and was scheduled for an AM Admission ablation at [facility] on 06/23/2020 Patient underwent ablation by Dr. Rose Su. Patient tolerated the procedure well and was taken to telemetry for recovery. He was started on Tikosyn 500 mcg every 12 hours. He was monitored on telemetry for 6 doses of tikosyn with EKGs showing acceptable Qtc intervals. Patient was up feeling well without any complaints of CP, SOB or palpitations. Patient's labs were WNL. He was started on Protonix BID and Carafate for 1 month. Patient was instructed on the importance of taking Tikosyn, Eliquis, Protonix and carafate without missing a dose. The morning of discharge, patient was up feeling well without any complaints of chest pain or shortness of breath. Patient's bilateral cath sites were clean, dry and intact without hematoma or bruit. Patient was seen and examined by Dr. Benjamin Lopez and determined stable and ready for discharge.   The patient will follow up with [facility] -- Dr. Rose Su on July 30 th at 8:15 AM.     DISPOSITION: The patient is being discharged home in stable condition on a low saturated fat, low cholesterol and low salt diet. The patient is instructed to advance activities as tolerated to the limit of fatigue or shortness of breath. The patient is instructed to avoid all heavy lifting, straining, stooping or squatting for 3-5 days. The patient is instructed to watch the cath site for bleeding/oozing; if seen, the patient is instructed to apply firm pressure with a clean cloth and call [facility] Cardiology at [phone]. The patient is instructed to watch for signs of infection which include: increasing area of redness, fever/hot to touch or purulent drainage at the catheterization site. The patient is instructed not to soak in a bathtub for 7-10 days, but is cleared to shower. Discharge Exam:   Visit Vitals  /58 (BP 1 Location: Right arm, BP Patient Position: At rest)   Pulse 84   Temp 97.9 °F (36.6 °C)   Resp 16   Ht 5' 7\" (1.702 m)   Wt 81.7 kg (180 lb 1.6 oz)   SpO2 96%   BMI 28.21 kg/m²     Patient has been seen by Dr. Rhae Gosselin: see his progress note for exam details.     Recent Results (from the past 24 hour(s))   METABOLIC PANEL, BASIC    Collection Time: 06/25/20  5:17 AM   Result Value Ref Range    Sodium 143 136 - 145 mmol/L    Potassium 4.0 3.5 - 5.1 mmol/L    Chloride 109 (H) 98 - 107 mmol/L    CO2 31 21 - 32 mmol/L    Anion gap 3 (L) 7 - 16 mmol/L    Glucose 93 65 - 100 mg/dL    BUN 17 8 - 23 MG/DL    Creatinine 1.05 0.8 - 1.5 MG/DL    GFR est AA >60 >60 ml/min/1.73m2    GFR est non-AA >60 >60 ml/min/1.73m2    Calcium 7.9 (L) 8.3 - 10.4 MG/DL   MAGNESIUM    Collection Time: 06/25/20  5:17 AM   Result Value Ref Range    Magnesium 2.1 1.8 - 2.4 mg/dL   EKG, 12 LEAD, INITIAL    Collection Time: 06/25/20  8:13 AM   Result Value Ref Range    Ventricular Rate 82 BPM    Atrial Rate 82 BPM    P-R Interval 152 ms    QRS Duration 94 ms    Q-T Interval 374 ms    QTC Calculation (Bezet) 436 ms    Calculated P Axis 69 degrees    Calculated R Axis -26 degrees    Calculated T Axis 31 degrees    Diagnosis Normal sinus rhythm  Normal ECG  When compared with ECG of 24-JUN-2020 20:50,  No significant change was found  Confirmed by NALINI AGUAYO (), Mika Curtis ([id number]) on 6/25/2020 8:59:49 AM           Patient Instructions:   Current Discharge Medication List      START taking these medications    Details   sucralfate (CARAFATE) 1 gram tablet Take 1 Tab by mouth Before breakfast, lunch, dinner and at bedtime. Qty: 120 Tab, Refills: 0      dofetilide (TIKOSYN) 500 mcg capsule Take 1 Cap by mouth every twelve (12) hours every twelve (12) hours for 360 days. Qty: 60 Cap, Refills: 11      metoprolol succinate (TOPROL-XL) 25 mg XL tablet Take 1 Tab by mouth two (2) times a day. Qty: 60 Tab, Refills: 3      pantoprazole (PROTONIX) 40 mg tablet Take 1 Tab by mouth Before breakfast and dinner. Qty: 60 Tab, Refills: 0         CONTINUE these medications which have NOT CHANGED    Details   acetaminophen (TYLENOL) 325 mg tablet Take 650 mg by mouth every four (4) hours as needed for Pain or Fever. pregabalin (LYRICA) 50 mg capsule Take 100 mg by mouth three (3) times daily. tamsulosin (FLOMAX) 0.4 mg capsule Take 0.4 mg by mouth nightly. loratadine (CLARITIN) 10 mg tablet Take 10 mg by mouth nightly. lisinopril (PRINIVIL, ZESTRIL) 10 mg tablet TAKE ONE TABLET BY MOUTH ONE TIME DAILY  Qty: 90 Tab, Refills: 1    Associated Diagnoses: Essential hypertension, benign      apixaban (ELIQUIS) 5 mg tablet Take 1 Tab by mouth two (2) times a day. Qty: 60 Tab, Refills: 11      cholecalciferol, VITAMIN D3, (VITAMIN D3) 5,000 unit tab tablet Take 5,000 Units by mouth daily. calcium citrate tab tablet Take 1,000 mg by mouth daily. fluticasone-salmeterol (ADVAIR DISKUS) 250-50 mcg/dose diskus inhaler INHALE 1 DOSE BY MOUTH TWICE DAILY.  RINSE MOUTH AFTER USE  Qty: 3 Inhaler, Refills: 1    Associated Diagnoses: Asthma, moderate persistent, well-controlled      albuterol (PROAIR HFA) 90 mcg/actuation inhaler Take 2 Puffs by inhalation every six (6) hours as needed. Qty: 1 Inhaler, Refills: 11    Associated Diagnoses: Asthma, moderate persistent, well-controlled      testosterone cypionate (DEPO-TESTOSTERONE) 200 mg/mL injection 440 mg by IntraMUSCular route Once every 2 weeks. sildenafil (REVATIO) 20 mg tablet Take 20 mg by mouth daily as needed. fluticasone (FLONASE) 50 mcg/actuation nasal spray 2 Sprays by Both Nostrils route daily. Qty: 1 Bottle, Refills: 5    Associated Diagnoses: Allergic rhinitis, unspecified allergic rhinitis trigger, unspecified rhinitis seasonality         STOP taking these medications       omeprazole (PRILOSEC) 20 mg capsule Comments:   Reason for Stopping:         dilTIAZem CD (CARDIZEM CD) 180 mg ER capsule Comments:   Reason for Stopping:             I have independently seen and examined the patient with the physician extender and agree with above  assessment/plan. The patient is stable for discharge. Medications and follow up plans were reviewed with the patient. All questions were answered with the patient voicing full understanding. Echo on day of discharge with normal LVEF no pericardial effusion. Please refer to my progress note from today for discharge day examination and further details. Will have close follow up with EP Omar) in office. Return to ER should he have chest pain, dyspnea, dizziness, trouble swallowing.

## 2020-06-26 NOTE — ROUTINE PROCESS
Bedside and Verbal shift change report given to self (oncoming nurse) by Karla Wood (offgoing nurse). Report included the following information SBAR, Kardex, Intake/Output and MAR.

## 2020-06-26 NOTE — PROGRESS NOTES
Pt thought to be ready for DC after 6th dose with acceptable Qtc on ECG. Pt reports some difficulty getting deep breath, mild SOB. He is concerned he \"may have fluid\" as he did after his last ablation requiring Lasix. Will cancel DC order for now, check pro BNP and portable CXR.      Юлия Fletcher PA-C

## 2020-06-26 NOTE — DISCHARGE INSTRUCTIONS
DISCHARGE SUMMARY from Nurse    PATIENT INSTRUCTIONS:    After general anesthesia or intravenous sedation, for 24 hours or while taking prescription Narcotics:  · Limit your activities  · Do not drive and operate hazardous machinery  · Do not make important personal or business decisions  · Do  not drink alcoholic beverages  · If you have not urinated within 8 hours after discharge, please contact your surgeon on call. Report the following to your surgeon:  · Excessive pain, swelling, redness or odor of or around the surgical area  · Temperature over 100.5  · Nausea and vomiting lasting longer than 4 hours or if unable to take medications  · Any signs of decreased circulation or nerve impairment to extremity: change in color, persistent  numbness, tingling, coldness or increase pain  · Any questions    What to do at Home:  Recommended activity: Activity as tolerated,     *  Please give a list of your current medications to your Primary Care Provider. *  Please update this list whenever your medications are discontinued, doses are      changed, or new medications (including over-the-counter products) are added. *  Please carry medication information at all times in case of emergency situations. These are general instructions for a healthy lifestyle:    No smoking/ No tobacco products/ Avoid exposure to second hand smoke  Surgeon General's Warning:  Quitting smoking now greatly reduces serious risk to your health. Obesity, smoking, and sedentary lifestyle greatly increases your risk for illness    A healthy diet, regular physical exercise & weight monitoring are important for maintaining a healthy lifestyle    You may be retaining fluid if you have a history of heart failure or if you experience any of the following symptoms:  Weight gain of 3 pounds or more overnight or 5 pounds in a week, increased swelling in our hands or feet or shortness of breath while lying flat in bed.   Please call your doctor as soon as you notice any of these symptoms; do not wait until your next office visit. The discharge information has been reviewed with the patient. The patient verbalized understanding. Discharge medications reviewed with the patient and appropriate educational materials and side effects teaching were provided. ___________________________________________________________________________________________________________________________________       Atrial Fibrillation: Care Instructions  Your Care Instructions     Atrial fibrillation is an irregular and often fast heartbeat. Treating this condition is important for several reasons. It can cause blood clots, which can travel from your heart to your brain and cause a stroke. If you have a fast heartbeat, you may feel lightheaded, dizzy, and weak. An irregular heartbeat can also increase your risk for heart failure. Atrial fibrillation is often the result of another heart condition, such as high blood pressure or coronary artery disease. Making changes to improve your heart condition will help you stay healthy and active. Follow-up care is a key part of your treatment and safety. Be sure to make and go to all appointments, and call your doctor if you are having problems. It's also a good idea to know your test results and keep a list of the medicines you take. How can you care for yourself at home? Medicines  · Take your medicines exactly as prescribed. Call your doctor if you think you are having a problem with your medicine. You will get more details on the specific medicines your doctor prescribes. · If your doctor has given you a blood thinner to prevent a stroke, be sure you get instructions about how to take your medicine safely. Blood thinners can cause serious bleeding problems. · Do not take any vitamins, over-the-counter drugs, or herbal products without talking to your doctor first.  Lifestyle changes  · Do not smoke.  Smoking can increase your chance of a stroke and heart attack. If you need help quitting, talk to your doctor about stop-smoking programs and medicines. These can increase your chances of quitting for good. · Eat a heart-healthy diet. · Stay at a healthy weight. Lose weight if you need to. · Limit alcohol to 2 drinks a day for men and 1 drink a day for women. Too much alcohol can cause health problems. · Avoid colds and flu. Get a pneumococcal vaccine shot. If you have had one before, ask your doctor whether you need another dose. Get a flu shot every year. If you must be around people with colds or flu, wash your hands often. Activity  · If your doctor recommends it, get more exercise. Walking is a good choice. Bit by bit, increase the amount you walk every day. Try for at least 30 minutes on most days of the week. You also may want to swim, bike, or do other activities. Your doctor may suggest that you join a cardiac rehabilitation program so that you can have help increasing your physical activity safely. · Start light exercise if your doctor says it is okay. Even a small amount will help you get stronger, have more energy, and manage stress. Walking is an easy way to get exercise. Start out by walking a little more than you did in the hospital. Gradually increase the amount you walk. · When you exercise, watch for signs that your heart is working too hard. You are pushing too hard if you cannot talk while you are exercising. If you become short of breath or dizzy or have chest pain, sit down and rest immediately. · Check your pulse regularly. Place two fingers on the artery at the palm side of your wrist, in line with your thumb. If your heartbeat seems uneven or fast, talk to your doctor. When should you call for help? [id number] anytime you think you may need emergency care. For example, call if:  · You have symptoms of a heart attack. These may include:  ?  Chest pain or pressure, or a strange feeling in the chest.  ? Sweating. ? Shortness of breath. ? Nausea or vomiting. ? Pain, pressure, or a strange feeling in the back, neck, jaw, or upper belly or in one or both shoulders or arms. ? Lightheadedness or sudden weakness. ? A fast or irregular heartbeat. After you call 911, the  may tell you to chew 1 adult-strength or 2 to 4 low-dose aspirin. Wait for an ambulance. Do not try to drive yourself. · You have symptoms of a stroke. These may include:  ? Sudden numbness, tingling, weakness, or loss of movement in your face, arm, or leg, especially on only one side of your body. ? Sudden vision changes. ? Sudden trouble speaking. ? Sudden confusion or trouble understanding simple statements. ? Sudden problems with walking or balance. ? A sudden, severe headache that is different from past headaches. · You passed out (lost consciousness). Call your doctor now or seek immediate medical care if:  · You have new or increased shortness of breath. · You feel dizzy or lightheaded, or you feel like you may faint. · Your heart rate becomes irregular. · You can feel your heart flutter in your chest or skip heartbeats. Tell your doctor if these symptoms are new or worse. Watch closely for changes in your health, and be sure to contact your doctor if you have any problems. Where can you learn more? Go to http://jose juan-trudi.info/  Enter U020 in the search box to learn more about \"Atrial Fibrillation: Care Instructions. \"  Current as of: December 16, 2019               Content Version: 12.5  © 4840-9198 [employer]. Care instructions adapted under license by [employer] (which disclaims liability or warranty for this information). If you have questions about a medical condition or this instruction, always ask your healthcare professional. [address] any warranty or liability for your use of this information.     Dofetilide (By mouth) Dofetilide (cau-UIR-z-lide)  Treats an uneven heartbeat. Brand Name(s): Tikosyn   There may be other brand names for this medicine. When This Medicine Should Not Be Used: This medicine is not right for everyone. Do not use it if you had an allergic reaction to dofetilide, if you have long QT syndrome or severe kidney disease, or if you are on a dialysis. How to Use This Medicine:   Capsule  · Your doctor will start you on this medicine while you are in the hospital so your heart rhythm can be monitored. You will need to stay in the hospital for at least 3 days so the doctor can check your progress. · Once you are released from the hospital, take your medicine as directed. · This medicine should come with a Medication Guide. Ask your pharmacist for a copy if you do not have one. · Missed dose: If you miss a dose of this medicine, skip the missed dose and go back to your regular dosing schedule. Do not double doses. · Store the medicine in a closed container at room temperature, away from heat, moisture, and direct light. Drugs and Foods to Avoid:   Ask your doctor or pharmacist before using any other medicine, including over-the-counter medicines, vitamins, and herbal products. · Do not use this medicine together with cimetidine, dolutegravir, hydrochlorothiazide, hydrochlorothiazide/triamterene, ketoconazole, megestrol, prochlorperazine, trimethoprim, trimethoprim/sulfamethoxazole, or verapamil. · Some foods and medicines can affect how dofetilide works. Tell your doctor if you are using diltiazem, metformin, quinine, or zafirlukast.  · Tell your doctor if you are also using certain diuretics (such as amiloride, spironolactone, triamterene), blood pressure medicines, medicine for depression, a phenothiazine medicine, medicine for infections, or medicine for HIV/AIDS. · Do not eat grapefruit or drink grapefruit juice while you are using this medicine.   Warnings While Using This Medicine:   · Tell your doctor if you are pregnant or breastfeeding, or if you have kidney disease, liver disease, heart disease, other heart rhythm problems, or mineral imbalance (such as low levels of magnesium or potassium in the blood). · This medicine may cause the following problems:  ¨ Ventricular arrhythmia  ¨ QT prolongation  · Your doctor will check your progress and the effects of this medicine at regular visits. Keep all appointments. · Keep all medicine out of the reach of children. Never share your medicine with anyone. Possible Side Effects While Using This Medicine:   Call your doctor right away if you notice any of these side effects:  · Allergic reaction: Itching or hives, swelling in your face or hands, swelling or tingling in your mouth or throat, chest tightness, trouble breathing  · Chest pain or trouble breathing  · Fainting, dizziness, or lightheadedness  · Fast, slow, or pounding heartbeat  · Severe diarrhea, vomiting, loss of appetite, increased thirst, sweating, muscle cramps  If you notice other side effects that you think are caused by this medicine, tell your doctor. Call your doctor for medical advice about side effects. You may report side effects to FDA at 4-991-FDA-2773  © 2017 [address] Information is for End User's use only and may not be sold, redistributed or otherwise used for commercial purposes. The above information is an  only. It is not intended as medical advice for individual conditions or treatments. Talk to your doctor, nurse or pharmacist before following any medical regimen to see if it is safe and effective for you. Electrophysiology Study and Catheter Ablation: What to Expect at [address] had an electrophysiology study for a problem with your heartbeat. You may also have had a catheter ablation to try to correct the problem.  You may have swelling, bruising, or a small lump around the site where the catheters went into your body. These should go away in 3 to 4 weeks. Do not exercise hard or lift anything heavy for a week. You may be able to go back to work and to your normal routine in 1 or 2 days. This care sheet gives you a general idea about how long it will take for you to recover. But each person recovers at a different pace. Follow the steps below to get better as quickly as possible. How can you care for yourself at home? Activity  · For 1 week, do not lift anything that would make you strain. This may include heavy grocery bags and milk containers, a heavy briefcase or backpack, cat litter or dog food bags, a vacuum , or a child. · For 1 week, do not exercise hard or do any activity that could strain your blood vessels or the site where the catheters went into your body. · Ask your doctor when it is okay to have sex. Diet  · You can eat your normal diet. If your stomach is upset, try bland, low-fat foods like plain rice, broiled chicken, toast, and yogurt. · Drink plenty of fluids (unless your doctor tells you not to). Medicines  · Your doctor will tell you if and when you can restart your medicines. He or she will also give you instructions about taking any new medicines. · If you take aspirin or some other blood thinner, ask your doctor if and when to start taking it again. Make sure that you understand exactly what your doctor wants you to do. · Ask your doctor if you can take acetaminophen (Tylenol) for pain. Do not take aspirin for 3 days, unless your doctor says it is okay. · Check with your doctor before you take aspirin or anti-inflammatory medicines to reduce pain and swelling. These include ibuprofen (Advil, Motrin) and naproxen (Aleve). · Make sure you know which heart medicines to continue and which ones to stop. Ask your doctor if you aren't sure. Catheter site care  · You can remove your bandages the day after the procedure.   · You may shower 24 to 48 hours after the procedure, if your doctor okays it. Pat the incision dry. · Do not soak the catheter site until it is healed. Don't take a bath for 1 week, or until your doctor tells you it is okay. · Watch for bleeding from the site. A small amount of blood (up to the size of a quarter) on the bandage can be normal.  · If you are bleeding, lie down and press on the area for 15 minutes to try to make it stop. If the bleeding does not stop, call your doctor or seek immediate medical care. Follow-up care is a key part of your treatment and safety. Be sure to make and go to all appointments, and call your doctor if you are having problems. It's also a good idea to know your test results and keep a list of the medicines you take. When should you call for help? Call  911 anytime you think you may need emergency care. For example, call if:  · You passed out (lost consciousness). · You have symptoms of a heart attack. These may include:  ? Chest pain or pressure, or a strange feeling in the chest.  ? Sweating. ? Shortness of breath. ? Nausea or vomiting. ? Pain, pressure, or a strange feeling in the back, neck, jaw, or upper belly, or in one or both shoulders or arms. ? Lightheadedness or sudden weakness. ? A fast or irregular heartbeat. After you call 911, the  may tell you to chew 1 adult-strength or 2 to 4 low-dose aspirin. Wait for an ambulance. Do not try to drive yourself. · You have symptoms of a stroke. These may include:  ? Sudden numbness, tingling, weakness, or loss of movement in your face, arm, or leg, especially on only one side of your body. ? Sudden vision changes. ? Sudden trouble speaking. ? Sudden confusion or trouble understanding simple statements. ? Sudden problems with walking or balance. ? A sudden, severe headache that is different from past headaches. Call your doctor now or seek immediate medical care if:  · You are bleeding from the area where the catheter was put in your blood vessel.   · You have a fast-growing, painful lump at the catheter site. · You have signs of infection, such as:  ? Increased pain, swelling, warmth, or redness. ? Red streaks leading from the catheter site. ? Pus draining from the catheter site. ? A fever. · Your leg, arm, or hand is painful, looks blue, or feels cold, numb, or tingly. Watch closely for any changes in your health, and be sure to contact your doctor if you have any problems. Where can you learn more? Go to http://jose juan-trudi.info/  Enter H580 in the search box to learn more about \"Electrophysiology Study and Catheter Ablation: What to Expect at Home. \"  Current as of: December 16, 2019               Content Version: 12.5  © 5193-9526 [employer]. Care instructions adapted under license by [employer] (which disclaims liability or warranty for this information). If you have questions about a medical condition or this instruction, always ask your healthcare professional. [place] [address] any warranty or liability for your use of this information. Importance of Protonix and Carafate    Catheter ablation has become the most effective treatment for rhythm control in patients with Atrial Fibrillation, but the procedure does have risks and potential complications. The most lethal complication is an Atrial-Esophageal Fistula. The atrium where the procedure takes place is close to the esophagus and the temperature of the ablation can cause lesions, erosion, ulcer or fistula. If this becomes a problem for you the symptoms usually start within 2-4 weeks after the procedure.  The initial symptoms are mild chest discomfort, fever and elevated WBC.  The more progressive symptoms include blood emesis and severe sepsis. If this is not diagnosed and treated quickly it can be very difficult to repair and can result in neurological impairment or even death.     Although this complication is very serious and important to understand, there is a treatment that can help prevent this complication from occurring.  Pantoprazole (Protonix) 40mg twice a day and Sucralfate (Carafate) 2g four times a day. It is important to take this medication for at least 30 days.  Generally those medications are for acid reflux or GERD so it is important that you understand that we have not diagnosed you with acid reflux but these are for short term prevention of AEF.

## 2020-06-26 NOTE — PROGRESS NOTES
Bedside and Verbal shift change report to be given to Salvatore Marroquin and Elijah Jackson (oncoming nurse) by self Gavin pineda). Report included the following information SBAR, Kardex, MAR and Recent Results.

## 2020-06-26 NOTE — PROGRESS NOTES
Problem: Falls - Risk of  Goal: *Absence of Falls  Description: Document Abel Ambriz Fall Risk and appropriate interventions in the flowsheet.   Outcome: Progressing Towards Goal  Note: Fall Risk Interventions:            Medication Interventions: Assess postural VS orthostatic hypotension, Patient to call before getting OOB         History of Falls Interventions: Bed/chair exit alarm         Problem: Patient Education: Go to Patient Education Activity  Goal: Patient/Family Education  Outcome: Progressing Towards Goal     Problem: Breathing Pattern - Ineffective  Goal: *Absence of hypoxia  Outcome: Resolved/Met  Goal: *Use of effective breathing techniques  Outcome: Resolved/Met

## 2020-06-26 NOTE — PROGRESS NOTES
[facility] CARDIOLOGY PROGRESS NOTE           6/26/2020 11:04 AM    Admit Date: 6/23/2020      Subjective:   Doing well this AM, hemodynamics stable , electrically stable. In sinus. Some abdominal fullness, no other complaints at this time. ROS:  Cardiovascular:  As noted above    Objective:      Vitals:    06/26/20 0451 06/26/20 0456 06/26/20 0811 06/26/20 0821   BP: 95/59   131/71   Pulse: 71   78   Resp: 18   18   Temp: 97.5 °F (36.4 °C)   98 °F (36.7 °C)   SpO2: 95%  96% 96%   Weight:  81.3 kg (179 lb 3.2 oz)     Height:           Physical Exam:  General-No Acute Distress  Neck- supple, no JVD  CV- regular rate and rhythm no MRG  Lung- clear bilaterally  Abd- soft, nontender, nondistended  Ext- no edema bilaterally. Skin- warm and dry    Data Review:   Recent Labs     06/26/20  0422 06/25/20  0517 06/24/20  0432    143 140   K 3.9 4.0 3.9   MG 2.1 2.1 1.9   BUN 16 17 20   CREA 0.99 1.05 1.17   * 93 110*   WBC  --   --  9.7   HGB  --   --  14.0   HCT  --   --  44.1   PLT  --   --  115*       Assessment/Plan:     Active Problems:    Atrial fibrillation (HCC) (2/2/2018)    - s/p 6 doses Dofetilide , QTc appropriate and acceptable on prior EKGs    - s/p ablation     - Eliquis for anticoagulation    - metoprolol succinate 25 mg PO daily     - hemodynamically and electrically stable , not requiring supplemental oxygen     - Echo limited today LVEF 50-55, no pericardial effusion, mild MR    - Appointment with Dr. Allie Flores scheduled 7/30/2020 0815. GERD    - PPI     Does not appear volume overloaded, encouraged him to consume fluids without trying to drink extra as LVEF low normal. Patient appears appropriate and stable for discharge today, discussed with the patient today. Questions answered. Return to ER for dyspnea, trouble swallowing, severe chest pain.       Kenton Watt DO  6/26/2020 11:04 AM

## 2020-06-29 ENCOUNTER — PATIENT OUTREACH (OUTPATIENT)
Dept: CASE MANAGEMENT | Age: 70
End: 2020-06-29

## 2020-06-29 NOTE — PROGRESS NOTES
This note will not be viewable in [address] Transition of Care Hospital Discharge Follow-Up      Date/Time:  2020 3:02 PM    Patient was admitted to [facility] on 2020 and discharged on 2020 for A-Fib. The physician discharge summary was available at the time of outreach. Patient was contacted within 1 business days of discharge. Inpatient RUR score: 5%   Was this a readmission? No  Patient stated reason for the readmission: *N/A  Patients top risk factors for readmission: medical condition      LPN Care Coordinator contacted the patient by telephone to perform post hospital discharge assessment. Verified name and  with patient as identifiers. Provided introduction to self, and explanation of the Care Coordinator role. Patient received hospital discharge instructions. LPN reviewed discharge instructions and red flags with patient who verbalized understanding. Patient given an opportunity to ask questions and does not have any further questions or concerns at this time. The patient agrees to contact the PCP office for questions related to their healthcare. LPN provided contact information for future reference. Home Health orders at discharge: [address]: N/A  Date of initial or scheduled visit: N/A  Durable Medical Equipment ordered at discharge: none  [facility] [address] received: N/A  Medication(s):   Medication review was performed with patient, who verbalizes understanding of administration of home medications. There were no barriers to obtaining medications identified at this time. Current Outpatient Medications   Medication Sig    sucralfate (CARAFATE) 1 gram tablet Take 1 Tab by mouth Before breakfast, lunch, dinner and at bedtime.  dofetilide (TIKOSYN) 500 mcg capsule Take 1 Cap by mouth every twelve (12) hours every twelve (12) hours for 360 days.     metoprolol succinate (TOPROL-XL) 25 mg XL tablet Take 1 Tab by mouth two (2) times a day.  pantoprazole (PROTONIX) 40 mg tablet Take 1 Tab by mouth Before breakfast and dinner.  acetaminophen (TYLENOL) 325 mg tablet Take 650 mg by mouth every four (4) hours as needed for Pain or Fever.  pregabalin (LYRICA) 50 mg capsule Take 100 mg by mouth three (3) times daily.  tamsulosin (FLOMAX) 0.4 mg capsule Take 0.4 mg by mouth nightly.  loratadine (CLARITIN) 10 mg tablet Take 10 mg by mouth nightly.  lisinopril (PRINIVIL, ZESTRIL) 10 mg tablet TAKE ONE TABLET BY MOUTH ONE TIME DAILY    apixaban (ELIQUIS) 5 mg tablet Take 1 Tab by mouth two (2) times a day. (Patient taking differently: Take 5 mg by mouth two (2) times a day. Last dose 12/13/19)    cholecalciferol, VITAMIN D3, (VITAMIN D3) 5,000 unit tab tablet Take 5,000 Units by mouth daily.  calcium citrate tab tablet Take 1,000 mg by mouth daily.  fluticasone-salmeterol (ADVAIR DISKUS) 250-50 mcg/dose diskus inhaler INHALE 1 DOSE BY MOUTH TWICE DAILY. RINSE MOUTH AFTER USE    albuterol (PROAIR HFA) 90 mcg/actuation inhaler Take 2 Puffs by inhalation every six (6) hours as needed.  sildenafil (REVATIO) 20 mg tablet Take 20 mg by mouth daily as needed.  testosterone cypionate (DEPO-TESTOSTERONE) 200 mg/mL injection 440 mg by IntraMUSCular route Once every 2 weeks.  fluticasone (FLONASE) 50 mcg/actuation nasal spray 2 Sprays by Both Nostrils route daily. (Patient taking differently: 2 Sprays by Both Nostrils route daily as needed.)     No current facility-administered medications for this visit. There are no discontinued medications.     ADL assessment:  Patient is independent with all ADLs       [facility] follow up appointment(s):   Future Appointments   Date Time Provider Chandler Whitfield   7/30/2020  8:15 AM Oneida Grimm MD [facility]      Non-[facility] follow up appointment(s):   7 Day follow up with PCP or Specialist:  Dr. Megan Bello PCP on 6/30/2020   Transportation arranged:   Spouse to transport     Covid Risk Education    Patient has following risk factors of: asthma. Education provided regarding infection prevention, and signs and symptoms of COVID-19 and when to seek medical attention with patient who verbalized understanding. Discussed exposure protocols and quarantine From CDC: Are you at higher risk for severe illness?  and given an opportunity for questions and concerns. The patient agrees to contact the COVID-19 hotline [phone] or PCP office for questions related to COVID-19. For more information on steps you can take to protect yourself, see CDC's How to Protect Yourself     Patient/family/caregiver given information for GetWell Loop and agrees to enroll no  Patient's preferred e-mail: declines  Patient's preferred phone number: declines      Any other questions or concerns expressed by patient? \  Patient states he is doing well. He is able to perform all ADLs independently. Appetite is good, no c/o pain or discomfort at this time. Patient does not voice any concerns at this time. Scheduled next follow up call or referral to CTN/ ACM:  Not at this time  Next follow up call:  7-14 days    Goals Addressed                 This Visit's Progress     Prevent complications post hospitalization.

## 2020-07-01 LAB
ATRIAL RATE: 75 BPM
CALCULATED P AXIS, ECG09: 68 DEGREES
CALCULATED R AXIS, ECG10: -4 DEGREES
CALCULATED T AXIS, ECG11: 47 DEGREES
DIAGNOSIS, 93000: NORMAL
P-R INTERVAL, ECG05: 152 MS
Q-T INTERVAL, ECG07: 396 MS
QRS DURATION, ECG06: 90 MS
QTC CALCULATION (BEZET), ECG08: 442 MS
VENTRICULAR RATE, ECG03: 75 BPM

## 2020-07-09 ENCOUNTER — PATIENT OUTREACH (OUTPATIENT)
Dept: CASE MANAGEMENT | Age: 70
End: 2020-07-09

## 2020-07-09 NOTE — PROGRESS NOTES
This note will not be viewable in [address] Transition of Care Hospital Discharge Follow-Up      Date/Time:  2020 3:59 PM    LPN Care Coordinator contacted the patient by telephone to perform post hospital follow up. Verified name and  with patient as identifiers. LPN reinforced discharge instructions and red flags with patient who verbalized understanding. Patient given an opportunity to ask questions and does not have any further questions or concerns at this time. The patient agrees to contact the PCP office for questions related to their healthcare    [facility] follow up appointment(s):   Future Appointments   Date Time Provider Chandler Whitfield   2020  8:15 AM Carola Santacruz MD [facility]      Non-[facility] follow up appointment(s): NA  Patient attended follow up appointments since last contact:   What was the outcome of the appointment:     [address]: [address] [place] [address] Road: NA  Any issues/ progress:  (Assist with coordination of services if necessary.)      Medication(s):   Medication changes since discharge:     Current Outpatient Medications   Medication Sig    sucralfate (CARAFATE) 1 gram tablet Take 1 Tab by mouth Before breakfast, lunch, dinner and at bedtime.  dofetilide (TIKOSYN) 500 mcg capsule Take 1 Cap by mouth every twelve (12) hours every twelve (12) hours for 360 days.  metoprolol succinate (TOPROL-XL) 25 mg XL tablet Take 1 Tab by mouth two (2) times a day.  pantoprazole (PROTONIX) 40 mg tablet Take 1 Tab by mouth Before breakfast and dinner.  acetaminophen (TYLENOL) 325 mg tablet Take 650 mg by mouth every four (4) hours as needed for Pain or Fever.  pregabalin (LYRICA) 50 mg capsule Take 100 mg by mouth three (3) times daily.  tamsulosin (FLOMAX) 0.4 mg capsule Take 0.4 mg by mouth nightly.  loratadine (CLARITIN) 10 mg tablet Take 10 mg by mouth nightly.     lisinopril (PRINIVIL, ZESTRIL) 10 mg tablet TAKE ONE TABLET BY MOUTH ONE TIME DAILY  apixaban (ELIQUIS) 5 mg tablet Take 1 Tab by mouth two (2) times a day. (Patient taking differently: Take 5 mg by mouth two (2) times a day. Last dose 12/13/19)    cholecalciferol, VITAMIN D3, (VITAMIN D3) 5,000 unit tab tablet Take 5,000 Units by mouth daily.  calcium citrate tab tablet Take 1,000 mg by mouth daily.  fluticasone-salmeterol (ADVAIR DISKUS) 250-50 mcg/dose diskus inhaler INHALE 1 DOSE BY MOUTH TWICE DAILY. RINSE MOUTH AFTER USE    albuterol (PROAIR HFA) 90 mcg/actuation inhaler Take 2 Puffs by inhalation every six (6) hours as needed.  sildenafil (REVATIO) 20 mg tablet Take 20 mg by mouth daily as needed.  testosterone cypionate (DEPO-TESTOSTERONE) 200 mg/mL injection 440 mg by IntraMUSCular route Once every 2 weeks.  fluticasone (FLONASE) 50 mcg/actuation nasal spray 2 Sprays by Both Nostrils route daily. (Patient taking differently: 2 Sprays by Both Nostrils route daily as needed.)     No current facility-administered medications for this visit. Other Issues/ Miscellaneous? (Transportation, access to meals, ability to perform ADLs, adequate caregiver support, etc.)  Referrals needed? (SW, Diabetes education, HH, etc.)    Any other questions or concerns expressed by patient? NA    Schedule next appointment with [facility] or referral to CTN/ ACM:NA  Graduation:yes   Next Outreach Scheduled: NA    Goals      Prevent complications post hospitalization.

## 2021-01-08 ENCOUNTER — TRANSCRIBE ORDER (OUTPATIENT)
Dept: SCHEDULING | Age: 71
End: 2021-01-08

## 2021-01-08 DIAGNOSIS — R60.1 GENERALIZED EDEMA: Primary | ICD-10-CM

## 2021-02-26 NOTE — PROGRESS NOTES
Patient pre-assessment complete for Loop Monitor placement with Dr Lindsey Fortune scheduled for 3/1/21 at 9:30am, arrival time 7:30am. Patient verified using . NPO status reinforced. Instructed they can take all other medications excluding vitamins & supplements. Patient verbalizes understanding of all instructions & denies any questions at this time.

## 2021-03-01 ENCOUNTER — HOSPITAL ENCOUNTER (OUTPATIENT)
Dept: CARDIAC CATH/INVASIVE PROCEDURES | Age: 71
Discharge: HOME | End: 2021-03-01
Attending: INTERNAL MEDICINE | Admitting: INTERNAL MEDICINE
Payer: MEDICARE

## 2021-03-01 VITALS
OXYGEN SATURATION: 98 % | BODY MASS INDEX: 28.04 KG/M2 | HEART RATE: 67 BPM | WEIGHT: 179 LBS | DIASTOLIC BLOOD PRESSURE: 61 MMHG | SYSTOLIC BLOOD PRESSURE: 114 MMHG

## 2021-03-01 DIAGNOSIS — I48.11 LONGSTANDING PERSISTENT ATRIAL FIBRILLATION (HCC): ICD-10-CM

## 2021-03-01 DIAGNOSIS — I10 ESSENTIAL HYPERTENSION, BENIGN: ICD-10-CM

## 2021-03-01 DIAGNOSIS — I48.3 TYPICAL ATRIAL FLUTTER (HCC): ICD-10-CM

## 2021-03-01 LAB
ANION GAP SERPL CALC-SCNC: 4 MMOL/L (ref 7–16)
BASOPHILS # BLD: 0.1 K/UL (ref 0–0.2)
BASOPHILS NFR BLD: 2 % (ref 0–2)
BUN SERPL-MCNC: 28 MG/DL (ref 8–23)
CALCIUM SERPL-MCNC: 9.4 MG/DL (ref 8.3–10.4)
CHLORIDE SERPL-SCNC: 105 MMOL/L (ref 98–107)
CO2 SERPL-SCNC: 30 MMOL/L (ref 21–32)
CREAT SERPL-MCNC: 1.55 MG/DL (ref 0.8–1.5)
DIFFERENTIAL METHOD BLD: NORMAL
EOSINOPHIL # BLD: 0.3 K/UL (ref 0–0.8)
EOSINOPHIL NFR BLD: 5 % (ref 0.5–7.8)
ERYTHROCYTE [DISTWIDTH] IN BLOOD BY AUTOMATED COUNT: 13.3 % (ref 11.9–14.6)
GLUCOSE SERPL-MCNC: 92 MG/DL (ref 65–100)
HCT VFR BLD AUTO: 47.2 % (ref 41.1–50.3)
HGB BLD-MCNC: 15.4 G/DL (ref 13.6–17.2)
IMM GRANULOCYTES # BLD AUTO: 0 K/UL (ref 0–0.5)
IMM GRANULOCYTES NFR BLD AUTO: 0 % (ref 0–5)
INR PPP: 1
LYMPHOCYTES # BLD: 1.3 K/UL (ref 0.5–4.6)
LYMPHOCYTES NFR BLD: 26 % (ref 13–44)
MAGNESIUM SERPL-MCNC: 2.4 MG/DL (ref 1.8–2.4)
MCH RBC QN AUTO: 29.6 PG (ref 26.1–32.9)
MCHC RBC AUTO-ENTMCNC: 32.6 G/DL (ref 31.4–35)
MCV RBC AUTO: 90.8 FL (ref 79.6–97.8)
MONOCYTES # BLD: 0.6 K/UL (ref 0.1–1.3)
MONOCYTES NFR BLD: 12 % (ref 4–12)
NEUTS SEG # BLD: 2.7 K/UL (ref 1.7–8.2)
NEUTS SEG NFR BLD: 55 % (ref 43–78)
NRBC # BLD: 0 K/UL (ref 0–0.2)
PLATELET # BLD AUTO: 156 K/UL (ref 150–450)
PMV BLD AUTO: 10.4 FL (ref 9.4–12.3)
POTASSIUM SERPL-SCNC: 4.4 MMOL/L (ref 3.5–5.1)
PROTHROMBIN TIME: 13.6 SEC (ref 12.5–14.7)
RBC # BLD AUTO: 5.2 M/UL (ref 4.23–5.6)
SODIUM SERPL-SCNC: 139 MMOL/L (ref 138–145)
WBC # BLD AUTO: 4.9 K/UL (ref 4.3–11.1)

## 2021-03-01 PROCEDURE — C1764 EVENT RECORDER, CARDIAC: HCPCS

## 2021-03-01 PROCEDURE — 80048 BASIC METABOLIC PNL TOTAL CA: CPT

## 2021-03-01 PROCEDURE — 77030010507 HC ADH SKN DERMBND J&J -B

## 2021-03-01 PROCEDURE — 83735 ASSAY OF MAGNESIUM: CPT

## 2021-03-01 PROCEDURE — 85610 PROTHROMBIN TIME: CPT

## 2021-03-01 PROCEDURE — 33285 INSJ SUBQ CAR RHYTHM MNTR: CPT

## 2021-03-01 PROCEDURE — 77030031139 HC SUT VCRL2 J&J -A

## 2021-03-01 PROCEDURE — 77030041397 HC DRSG PRIMASEAL AG MDII -B

## 2021-03-01 PROCEDURE — 85025 COMPLETE CBC W/AUTO DIFF WBC: CPT

## 2021-03-01 PROCEDURE — 33285 INSJ SUBQ CAR RHYTHM MNTR: CPT | Performed by: INTERNAL MEDICINE

## 2021-03-01 RX ORDER — SODIUM CHLORIDE 9 MG/ML
75 INJECTION, SOLUTION INTRAVENOUS CONTINUOUS
Status: DISCONTINUED | OUTPATIENT
Start: 2021-03-01 | End: 2021-03-01 | Stop reason: HOSPADM

## 2021-03-01 RX ORDER — CEFAZOLIN SODIUM/WATER 2 G/20 ML
2 SYRINGE (ML) INTRAVENOUS
Status: DISCONTINUED | OUTPATIENT
Start: 2021-03-01 | End: 2021-03-01 | Stop reason: HOSPADM

## 2021-03-01 NOTE — PROCEDURES
Attending: Ezra Youngblood. Allie Flores MD  Referring MD: Kathie Jade MD  Procedure: Injectable loop recorder implant (ILR-Linq)  Indication: Atrial fibrillation  Complications: None  EBL: N/A    After informed consent was obtained the patient was brought to the electrophysiology lab in an unsedated state. The left parasternal region was prepped and draped in a sterile fashion. The fourth intercostal spaced was identified and that region was locally anesthetized with approximately 10-20 cc of 1% lidocaine solution. A 1 cm punch incision was made along the left sternal border at the 4th intercostal space using the [employer]. Next, the Lux-Dx implantable loop recorder (ILR) was percutaneously injected via the punch incision under the skin and above the pectoralis muscle. The implant tool was removed and the ILR was stably located under the skin. The ILR was interrogated and demonstrated adequate sensing. It was programmed per clinical specifications. The incision was closed with 3-0 Vicryl and poly-cyanocrylate solution (dermabond) solution after hemostasis was confirmed and a sterile dressing was placed. he tolerated the procedure well and there were no complications. Device and Lead Information  Pulse Generator Model #  Serial # Location Implant/Explant   M301 [facility] [id number] Left Pectoral [id number]     Device Measurement  R Wave Amplitude (mV)   0.10     Device Settings  Detection Type Rate Duration Response   NATALIA 40 bpm 3 seconds    TACHY 170 bpm 5 seconds Less   PAUSE  3 seconds Less    bpm 4 hours    AF  10 minutes Balanced   Blank after Sense: 160 ms  Sensitivity: 0.037 mV  Morphology Assessment: On      IMPRESSION: Successful ILR implant. PLAN: Discharge home. -Device clinic follow up in 1-2 weeks. -EP follow up in 3-4 months. Ezra Youngblood.  Allie Flores MD, [address] [place] [address] Cardiac Electrophysiology  [facility] Cardiology

## 2021-03-01 NOTE — PROGRESS NOTES
Discharge and follow up reviewed with pt  at this time. Pt discharged. Time allowed for questions. None at this time.

## 2021-03-01 NOTE — DISCHARGE INSTRUCTIONS
LOOP MONITOR INSTRUCTIONS SHEET      Activity  · As tolerated and directed by your doctor  · Bathe or shower as directed by your doctor    Diet  · Resume your previous diet     Pain  ·  Call your doctor if pain is NOT relieved by OTC medication    Dressing Care: Leave dressing on the incision until follow up appointment. If dressing becomes loose,  You may remove it. Keep area Clean & dry, Do not get incision wet or  let water run over incision. Do not apply any lotions, creams or powder to incision. Follow-Up Phone Calls  · Will be made nursing staff  · If you have any problems, call your doctor as needed    Call your doctor if  · Excessive bleeding that does not stop after holding mild pressure over the area  · Temperature of 101 degrees F or above  · Redness,excessive swelling or bruising, and/or green or yellow, smelly discharge from incision    After Anesthesia  · For the first 24 hours: DO NOT Drive, Drink alcoholic beverages, or Make important decisions  · Be aware of dizziness following anesthesia and while taking pain medication    Medications - Continue home medications as previously prescribed     Other Instructions- Always show the doctor or dentist your loop recorder identification card.       Appointment date/time - ***    Your doctor's phone number [facility] Cardiology at [phone]

## 2021-03-01 NOTE — PROGRESS NOTES
Patient received to [address] [place] [address] room # 17  Ambulatory from [facility]. Patient scheduled for Loop  today with Dr She Sosa. Procedure reviewed & questions answered, voiced good understanding consent obtained & placed on chart. All medications and medical history reviewed. Will prep patient per orders. Patient & family updated on plan of care. The patient has a fraility score of 4-VULNERABLE, based on ambulate with cane, gait fair.

## 2021-10-25 NOTE — DISCHARGE INSTRUCTIONS
Patient Education        Atrial Fibrillation: Care Instructions  Your Care Instructions    Atrial fibrillation is an irregular and often fast heartbeat. Treating this condition is important for several reasons. It can cause blood clots, which can travel from your heart to your brain and cause a stroke. If you have a fast heartbeat, you may feel lightheaded, dizzy, and weak. An irregular heartbeat can also increase your risk for heart failure. Atrial fibrillation is often the result of another heart condition, such as high blood pressure or coronary artery disease. Making changes to improve your heart condition will help you stay healthy and active. Follow-up care is a key part of your treatment and safety. Be sure to make and go to all appointments, and call your doctor if you are having problems. It's also a good idea to know your test results and keep a list of the medicines you take. How can you care for yourself at home? Medicines    · Take your medicines exactly as prescribed. Call your doctor if you think you are having a problem with your medicine. You will get more details on the specific medicines your doctor prescribes.     · If your doctor has given you a blood thinner to prevent a stroke, be sure you get instructions about how to take your medicine safely. Blood thinners can cause serious bleeding problems.     · Do not take any vitamins, over-the-counter drugs, or herbal products without talking to your doctor first.    Lifestyle changes    · Do not smoke. Smoking can increase your chance of a stroke and heart attack. If you need help quitting, talk to your doctor about stop-smoking programs and medicines. These can increase your chances of quitting for good.     · Eat a heart-healthy diet.     · Stay at a healthy weight. Lose weight if you need to.     · Limit alcohol to 2 drinks a day for men and 1 drink a day for women. Too much alcohol can cause health problems.     · Avoid colds and flu.  Get a pneumococcal vaccine shot. If you have had one before, ask your doctor whether you need another dose. Get a flu shot every year. If you must be around people with colds or flu, wash your hands often. Activity    · If your doctor recommends it, get more exercise. Walking is a good choice. Bit by bit, increase the amount you walk every day. Try for at least 30 minutes on most days of the week. You also may want to swim, bike, or do other activities. Your doctor may suggest that you join a cardiac rehabilitation program so that you can have help increasing your physical activity safely.     · Start light exercise if your doctor says it is okay. Even a small amount will help you get stronger, have more energy, and manage stress. Walking is an easy way to get exercise. Start out by walking a little more than you did in the hospital. Gradually increase the amount you walk.     · When you exercise, watch for signs that your heart is working too hard. You are pushing too hard if you cannot talk while you are exercising. If you become short of breath or dizzy or have chest pain, sit down and rest immediately.     · Check your pulse regularly. Place two fingers on the artery at the palm side of your wrist, in line with your thumb. If your heartbeat seems uneven or fast, talk to your doctor. When should you call for help? Call 911 anytime you think you may need emergency care. For example, call if:    · You have symptoms of a heart attack. These may include:  ? Chest pain or pressure, or a strange feeling in the chest.  ? Sweating. ? Shortness of breath. ? Nausea or vomiting. ? Pain, pressure, or a strange feeling in the back, neck, jaw, or upper belly or in one or both shoulders or arms. ? Lightheadedness or sudden weakness. ? A fast or irregular heartbeat. After you call 911, the  may tell you to chew 1 adult-strength or 2 to 4 low-dose aspirin. Wait for an ambulance.  Do not try to drive yourself.     · You have symptoms of a stroke. These may include:  ? Sudden numbness, tingling, weakness, or loss of movement in your face, arm, or leg, especially on only one side of your body. ? Sudden vision changes. ? Sudden trouble speaking. ? Sudden confusion or trouble understanding simple statements. ? Sudden problems with walking or balance. ? A sudden, severe headache that is different from past headaches.     · You passed out (lost consciousness).    Call your doctor now or seek immediate medical care if:    · You have new or increased shortness of breath.     · You feel dizzy or lightheaded, or you feel like you may faint.     · Your heart rate becomes irregular.     · You can feel your heart flutter in your chest or skip heartbeats. Tell your doctor if these symptoms are new or worse.    Watch closely for changes in your health, and be sure to contact your doctor if you have any problems. Where can you learn more? Go to http://jose juan-trudi.info/. Enter U020 in the search box to learn more about \"Atrial Fibrillation: Care Instructions. \"  Current as of: July 22, 2018  Content Version: 11.9  © 1608-9343 [employer]. Care instructions adapted under license by [employer] (which disclaims liability or warranty for this information). If you have questions about a medical condition or this instruction, always ask your healthcare professional. [address] any warranty or liability for your use of this information. Patient Education        Beta-Blockers: Care Instructions  Your Care Instructions    Beta-blockers are used to lower blood pressure and relieve angina symptoms, such as chest pain or pressure. And they decrease the chance of a second heart attack in someone who has already had a heart attack. They also slow the heart rate and reduce strain on the heart muscle and blood vessels.   Most people do not have any side effects from beta-blockers. In rare cases, they can make asthma worse or make you feel tired. In some people, heart rate or blood pressure can drop too low. You may feel lightheaded. This may happen if you stand up quickly. It usually gets better with time. Before you start to take this medicine, make sure your doctor knows if you have severe asthma, frequent asthma attacks, or a history of depression. Examples include:  · Atenolol (Tenormin). · Labetalol (Trandate). · Metoprolol (Lopressor, Toprol). · Propranolol (Inderal). Follow-up care is a key part of your treatment and safety. Be sure to make and go to all appointments, and call your doctor if you are having problems. It's also a good idea to know your test results and keep a list of the medicines you take. How can you care for yourself at home? · Take your medicines exactly as prescribed. Be sure to take high blood pressure medicines every day. Since high blood pressure often has no symptoms, it is easy to forget to take the pills. Call your doctor if you think you are having a problem with your medicine. · Always tell your doctor if you think you are having a side effect from your medicine. Stopping suddenly may make chest pains worse or cause your blood pressure to go up. Or it can cause other symptoms. If side effects are a problem with one medicine, you can try a different one. · Check with your doctor before you use any over-the-counter medicines. Beta-blockers can interact with other medicines. Make sure your doctor knows all of the medicines, vitamins, herbal products, and supplements you take. · Some people feel tired when they take beta-blockers. If you exercise, you may tire more easily. If beta-blockers make it very hard for you to exercise, talk to your doctor. When should you call for help? Watch closely for changes in your health, and be sure to contact your doctor if:    · You have any problems with your medicine.    Where can you learn more?  Go to http://jose juan-trudi.info/. Enter A804 in the search box to learn more about \"Beta-Blockers: Care Instructions. \"  Current as of: July 22, 2018  Content Version: 11.9  © 7221-4248 [employer], "[employer]. Care instructions adapted under license by [employer] (which disclaims liability or warranty for this information). If you have questions about a medical condition or this instruction, always ask your healthcare professional. [place] [address] any warranty or liability for your use of this information. No

## 2021-12-30 ENCOUNTER — HOSPITAL ENCOUNTER (EMERGENCY)
Age: 71
Discharge: OTHER INSTITUTION | DRG: 177 | End: 2021-12-31
Attending: EMERGENCY MEDICINE | Admitting: FAMILY MEDICINE
Payer: MEDICARE

## 2021-12-30 ENCOUNTER — TRANSCRIBE ORDER (OUTPATIENT)
Dept: SCHEDULING | Age: 71
End: 2021-12-30

## 2021-12-30 ENCOUNTER — HOSPITAL ENCOUNTER (OUTPATIENT)
Dept: ULTRASOUND IMAGING | Age: 71
Discharge: HOME | DRG: 177 | End: 2021-12-30
Attending: FAMILY MEDICINE
Payer: MEDICARE

## 2021-12-30 ENCOUNTER — HOSPITAL ENCOUNTER (OUTPATIENT)
Dept: GENERAL RADIOLOGY | Age: 71
Discharge: HOME | DRG: 177 | End: 2021-12-30
Attending: FAMILY MEDICINE
Payer: MEDICARE

## 2021-12-30 ENCOUNTER — HOSPITAL ENCOUNTER (OUTPATIENT)
Dept: CT IMAGING | Age: 71
Discharge: HOME | DRG: 177 | End: 2021-12-30
Attending: FAMILY MEDICINE
Payer: MEDICARE

## 2021-12-30 DIAGNOSIS — R06.00 DYSPNEA, UNSPECIFIED: Primary | ICD-10-CM

## 2021-12-30 DIAGNOSIS — R22.41 LOCALIZED SWELLING, MASS AND LUMP, RIGHT LOWER LIMB: ICD-10-CM

## 2021-12-30 DIAGNOSIS — R06.00 DYSPNEA: ICD-10-CM

## 2021-12-30 DIAGNOSIS — R22.41 LOCALIZED SWELLING, MASS AND LUMP, RIGHT LOWER LIMB: Primary | ICD-10-CM

## 2021-12-30 DIAGNOSIS — I26.99 ACUTE PULMONARY EMBOLISM, UNSPECIFIED PULMONARY EMBOLISM TYPE, UNSPECIFIED WHETHER ACUTE COR PULMONALE PRESENT (HCC): Primary | ICD-10-CM

## 2021-12-30 DIAGNOSIS — R06.00 DYSPNEA: Primary | ICD-10-CM

## 2021-12-30 PROBLEM — I48.92 ATRIAL FLUTTER (HCC): Status: RESOLVED | Noted: 2018-01-23 | Resolved: 2021-12-30

## 2021-12-30 PROBLEM — I82.409 DVT (DEEP VENOUS THROMBOSIS) (HCC): Status: ACTIVE | Noted: 2021-12-30

## 2021-12-30 LAB
ANION GAP SERPL CALC-SCNC: 2 MMOL/L (ref 7–16)
APTT PPP: 27.1 SEC (ref 24.1–35.1)
BASOPHILS # BLD: 0.1 K/UL (ref 0–0.2)
BASOPHILS NFR BLD: 1 % (ref 0–2)
BUN SERPL-MCNC: 18 MG/DL (ref 8–23)
CALCIUM SERPL-MCNC: 9 MG/DL (ref 8.3–10.4)
CHLORIDE SERPL-SCNC: 105 MMOL/L (ref 98–107)
CO2 SERPL-SCNC: 31 MMOL/L (ref 21–32)
COVID-19 RAPID TEST, COVR: DETECTED
CREAT BLD-MCNC: 1.2 MG/DL (ref 0.8–1.5)
CREAT SERPL-MCNC: 1.07 MG/DL (ref 0.8–1.5)
DIFFERENTIAL METHOD BLD: ABNORMAL
EOSINOPHIL # BLD: 0.1 K/UL (ref 0–0.8)
EOSINOPHIL NFR BLD: 1 % (ref 0.5–7.8)
ERYTHROCYTE [DISTWIDTH] IN BLOOD BY AUTOMATED COUNT: 17 % (ref 11.9–14.6)
GLUCOSE SERPL-MCNC: 93 MG/DL (ref 65–100)
HCT VFR BLD AUTO: 48.9 % (ref 41.1–50.3)
HGB BLD-MCNC: 15.6 G/DL (ref 13.6–17.2)
IMM GRANULOCYTES # BLD AUTO: 0.3 K/UL (ref 0–0.5)
IMM GRANULOCYTES NFR BLD AUTO: 2 % (ref 0–5)
LYMPHOCYTES # BLD: 0.9 K/UL (ref 0.5–4.6)
LYMPHOCYTES NFR BLD: 9 % (ref 13–44)
MCH RBC QN AUTO: 29.1 PG (ref 26.1–32.9)
MCHC RBC AUTO-ENTMCNC: 31.9 G/DL (ref 31.4–35)
MCV RBC AUTO: 91.1 FL (ref 79.6–97.8)
MONOCYTES # BLD: 0.7 K/UL (ref 0.1–1.3)
MONOCYTES NFR BLD: 7 % (ref 4–12)
NEUTS SEG # BLD: 8.8 K/UL (ref 1.7–8.2)
NEUTS SEG NFR BLD: 81 % (ref 43–78)
NRBC # BLD: 0 K/UL (ref 0–0.2)
PLATELET # BLD AUTO: 125 K/UL (ref 150–450)
PMV BLD AUTO: 9.5 FL (ref 9.4–12.3)
POTASSIUM SERPL-SCNC: 4.2 MMOL/L (ref 3.5–5.1)
RBC # BLD AUTO: 5.37 M/UL (ref 4.23–5.6)
SODIUM SERPL-SCNC: 138 MMOL/L (ref 136–145)
SOURCE, COVRS: ABNORMAL
WBC # BLD AUTO: 10.9 K/UL (ref 4.3–11.1)

## 2021-12-30 PROCEDURE — 85730 THROMBOPLASTIN TIME PARTIAL: CPT

## 2021-12-30 PROCEDURE — 74011250636 HC RX REV CODE- 250/636: Performed by: EMERGENCY MEDICINE

## 2021-12-30 PROCEDURE — 96366 THER/PROPH/DIAG IV INF ADDON: CPT

## 2021-12-30 PROCEDURE — 71260 CT THORAX DX C+: CPT

## 2021-12-30 PROCEDURE — 93971 EXTREMITY STUDY: CPT

## 2021-12-30 PROCEDURE — 80048 BASIC METABOLIC PNL TOTAL CA: CPT

## 2021-12-30 PROCEDURE — 74011000258 HC RX REV CODE- 258: Performed by: FAMILY MEDICINE

## 2021-12-30 PROCEDURE — 96376 TX/PRO/DX INJ SAME DRUG ADON: CPT

## 2021-12-30 PROCEDURE — 82565 ASSAY OF CREATININE: CPT

## 2021-12-30 PROCEDURE — G0378 HOSPITAL OBSERVATION PER HR: HCPCS

## 2021-12-30 PROCEDURE — 87635 SARS-COV-2 COVID-19 AMP PRB: CPT

## 2021-12-30 PROCEDURE — 85025 COMPLETE CBC W/AUTO DIFF WBC: CPT

## 2021-12-30 PROCEDURE — 71046 X-RAY EXAM CHEST 2 VIEWS: CPT

## 2021-12-30 PROCEDURE — 96365 THER/PROPH/DIAG IV INF INIT: CPT

## 2021-12-30 PROCEDURE — 74011000636 HC RX REV CODE- 636: Performed by: FAMILY MEDICINE

## 2021-12-30 RX ORDER — HEPARIN SODIUM 5000 [USP'U]/100ML
18-36 INJECTION, SOLUTION INTRAVENOUS
Status: DISCONTINUED | OUTPATIENT
Start: 2021-12-30 | End: 2021-12-31 | Stop reason: HOSPADM

## 2021-12-30 RX ORDER — HEPARIN SODIUM 1000 [USP'U]/ML
80 INJECTION, SOLUTION INTRAVENOUS; SUBCUTANEOUS ONCE
Status: COMPLETED | OUTPATIENT
Start: 2021-12-30 | End: 2021-12-30

## 2021-12-30 RX ORDER — LISINOPRIL 5 MG/1
10 TABLET ORAL DAILY
Status: CANCELLED | OUTPATIENT
Start: 2021-12-31

## 2021-12-30 RX ORDER — SODIUM CHLORIDE 0.9 % (FLUSH) 0.9 %
10 SYRINGE (ML) INJECTION
Status: COMPLETED | OUTPATIENT
Start: 2021-12-30 | End: 2021-12-30

## 2021-12-30 RX ADMIN — Medication 10 ML: at 17:43

## 2021-12-30 RX ADMIN — HEPARIN SODIUM AND DEXTROSE 18 UNITS/KG/HR: 5000; 5 INJECTION INTRAVENOUS at 22:18

## 2021-12-30 RX ADMIN — HEPARIN SODIUM 6750 UNITS: 1000 INJECTION INTRAVENOUS; SUBCUTANEOUS at 22:16

## 2021-12-30 RX ADMIN — IOPAMIDOL 100 ML: 755 INJECTION, SOLUTION INTRAVENOUS at 17:42

## 2021-12-30 RX ADMIN — SODIUM CHLORIDE 100 ML: 9 INJECTION, SOLUTION INTRAVENOUS at 17:42

## 2021-12-30 NOTE — ED TRIAGE NOTES
Pt states that he has blood clots in his right knee. Pt PCP sent him to have CT due to SOB.  Pt was brought over here by CT tech and radiologist for multiple PEs

## 2021-12-31 ENCOUNTER — HOSPITAL ENCOUNTER (INPATIENT)
Age: 71
LOS: 2 days | Discharge: HOME | DRG: 177 | End: 2022-01-02
Attending: FAMILY MEDICINE | Admitting: FAMILY MEDICINE
Payer: MEDICARE

## 2021-12-31 VITALS
HEIGHT: 68 IN | BODY MASS INDEX: 28.19 KG/M2 | DIASTOLIC BLOOD PRESSURE: 93 MMHG | HEART RATE: 99 BPM | RESPIRATION RATE: 24 BRPM | WEIGHT: 186 LBS | SYSTOLIC BLOOD PRESSURE: 142 MMHG | OXYGEN SATURATION: 95 % | TEMPERATURE: 98.5 F

## 2021-12-31 PROBLEM — J06.9 ACUTE RESPIRATORY DISEASE DUE TO COVID-19 VIRUS: Status: RESOLVED | Noted: 2021-12-31 | Resolved: 2021-12-31

## 2021-12-31 PROBLEM — U07.1 COVID-19: Status: ACTIVE | Noted: 2021-12-31

## 2021-12-31 PROBLEM — U07.1 ACUTE RESPIRATORY DISEASE DUE TO COVID-19 VIRUS: Status: ACTIVE | Noted: 2021-12-31

## 2021-12-31 PROBLEM — U07.1 ACUTE RESPIRATORY DISEASE DUE TO COVID-19 VIRUS: Status: RESOLVED | Noted: 2021-12-31 | Resolved: 2021-12-31

## 2021-12-31 PROBLEM — I26.99 PULMONARY EMBOLI (HCC): Status: ACTIVE | Noted: 2021-12-31

## 2021-12-31 PROBLEM — J06.9 ACUTE RESPIRATORY DISEASE DUE TO COVID-19 VIRUS: Status: ACTIVE | Noted: 2021-12-31

## 2021-12-31 LAB
ANION GAP SERPL CALC-SCNC: 4 MMOL/L (ref 7–16)
BUN SERPL-MCNC: 17 MG/DL (ref 8–23)
CALCIUM SERPL-MCNC: 8.7 MG/DL (ref 8.3–10.4)
CHLORIDE SERPL-SCNC: 107 MMOL/L (ref 98–107)
CO2 SERPL-SCNC: 30 MMOL/L (ref 21–32)
CREAT SERPL-MCNC: 0.99 MG/DL (ref 0.8–1.5)
ERYTHROCYTE [DISTWIDTH] IN BLOOD BY AUTOMATED COUNT: 17 % (ref 11.9–14.6)
GLUCOSE SERPL-MCNC: 94 MG/DL (ref 65–100)
HCT VFR BLD AUTO: 45.3 % (ref 41.1–50.3)
HGB BLD-MCNC: 14.5 G/DL (ref 13.6–17.2)
MCH RBC QN AUTO: 29.3 PG (ref 26.1–32.9)
MCHC RBC AUTO-ENTMCNC: 32 G/DL (ref 31.4–35)
MCV RBC AUTO: 91.5 FL (ref 79.6–97.8)
NRBC # BLD: 0 K/UL (ref 0–0.2)
PLATELET # BLD AUTO: 109 K/UL (ref 150–450)
PMV BLD AUTO: 9.7 FL (ref 9.4–12.3)
POTASSIUM SERPL-SCNC: 3.7 MMOL/L (ref 3.5–5.1)
RBC # BLD AUTO: 4.95 M/UL (ref 4.23–5.6)
SODIUM SERPL-SCNC: 141 MMOL/L (ref 138–145)
UFH PPP CHRO-ACNC: 0.6 IU/ML (ref 0.3–0.7)
UFH PPP CHRO-ACNC: 0.61 IU/ML (ref 0.3–0.7)
UFH PPP CHRO-ACNC: 0.76 IU/ML (ref 0.3–0.7)
WBC # BLD AUTO: 8.8 K/UL (ref 4.3–11.1)

## 2021-12-31 PROCEDURE — 80048 BASIC METABOLIC PNL TOTAL CA: CPT

## 2021-12-31 PROCEDURE — 74011250637 HC RX REV CODE- 250/637: Performed by: FAMILY MEDICINE

## 2021-12-31 PROCEDURE — 85520 HEPARIN ASSAY: CPT

## 2021-12-31 PROCEDURE — 65270000015 HC RM PRIVATE ONCOLOGY

## 2021-12-31 PROCEDURE — G0378 HOSPITAL OBSERVATION PER HR: HCPCS

## 2021-12-31 PROCEDURE — 36415 COLL VENOUS BLD VENIPUNCTURE: CPT

## 2021-12-31 PROCEDURE — 96366 THER/PROPH/DIAG IV INF ADDON: CPT

## 2021-12-31 PROCEDURE — 74011250636 HC RX REV CODE- 250/636: Performed by: FAMILY MEDICINE

## 2021-12-31 PROCEDURE — 85027 COMPLETE CBC AUTOMATED: CPT

## 2021-12-31 RX ORDER — POLYETHYLENE GLYCOL 3350 17 G/17G
17 POWDER, FOR SOLUTION ORAL DAILY PRN
Status: DISCONTINUED | OUTPATIENT
Start: 2021-12-31 | End: 2022-01-02 | Stop reason: HOSPADM

## 2021-12-31 RX ORDER — FLUTICASONE PROPIONATE 50 MCG
2 SPRAY, SUSPENSION (ML) NASAL DAILY
Status: DISCONTINUED | OUTPATIENT
Start: 2021-12-31 | End: 2022-01-02 | Stop reason: HOSPADM

## 2021-12-31 RX ORDER — ACETAMINOPHEN 325 MG/1
650 TABLET ORAL
Status: DISCONTINUED | OUTPATIENT
Start: 2021-12-31 | End: 2022-01-02 | Stop reason: HOSPADM

## 2021-12-31 RX ORDER — SODIUM CHLORIDE 0.9 % (FLUSH) 0.9 %
5-40 SYRINGE (ML) INJECTION EVERY 8 HOURS
Status: DISCONTINUED | OUTPATIENT
Start: 2021-12-31 | End: 2022-01-02 | Stop reason: HOSPADM

## 2021-12-31 RX ORDER — HEPARIN SODIUM 5000 [USP'U]/100ML
18-36 INJECTION, SOLUTION INTRAVENOUS
Status: DISCONTINUED | OUTPATIENT
Start: 2021-12-31 | End: 2022-01-01

## 2021-12-31 RX ORDER — PREDNISONE 1 MG/1
5 TABLET ORAL
COMMUNITY

## 2021-12-31 RX ORDER — ONDANSETRON 2 MG/ML
4 INJECTION INTRAMUSCULAR; INTRAVENOUS
Status: DISCONTINUED | OUTPATIENT
Start: 2021-12-31 | End: 2022-01-02 | Stop reason: HOSPADM

## 2021-12-31 RX ORDER — ONDANSETRON 4 MG/1
4 TABLET, ORALLY DISINTEGRATING ORAL
Status: DISCONTINUED | OUTPATIENT
Start: 2021-12-31 | End: 2022-01-02 | Stop reason: HOSPADM

## 2021-12-31 RX ORDER — BUDESONIDE AND FORMOTEROL FUMARATE DIHYDRATE 160; 4.5 UG/1; UG/1
2 AEROSOL RESPIRATORY (INHALATION)
Status: DISCONTINUED | OUTPATIENT
Start: 2021-12-31 | End: 2022-01-02 | Stop reason: HOSPADM

## 2021-12-31 RX ORDER — SODIUM CHLORIDE 0.9 % (FLUSH) 0.9 %
5-40 SYRINGE (ML) INJECTION AS NEEDED
Status: DISCONTINUED | OUTPATIENT
Start: 2021-12-31 | End: 2022-01-02 | Stop reason: HOSPADM

## 2021-12-31 RX ORDER — METOPROLOL SUCCINATE 25 MG/1
25 TABLET, EXTENDED RELEASE ORAL EVERY 12 HOURS
Status: DISCONTINUED | OUTPATIENT
Start: 2021-12-31 | End: 2022-01-02 | Stop reason: HOSPADM

## 2021-12-31 RX ORDER — ALBUTEROL SULFATE 0.83 MG/ML
2.5 SOLUTION RESPIRATORY (INHALATION)
Status: DISCONTINUED | OUTPATIENT
Start: 2021-12-31 | End: 2022-01-02 | Stop reason: HOSPADM

## 2021-12-31 RX ORDER — ACETAMINOPHEN 650 MG/1
650 SUPPOSITORY RECTAL
Status: DISCONTINUED | OUTPATIENT
Start: 2021-12-31 | End: 2022-01-02 | Stop reason: HOSPADM

## 2021-12-31 RX ORDER — TAMSULOSIN HYDROCHLORIDE 0.4 MG/1
0.4 CAPSULE ORAL
Status: DISCONTINUED | OUTPATIENT
Start: 2021-12-31 | End: 2022-01-02 | Stop reason: HOSPADM

## 2021-12-31 RX ADMIN — FLUTICASONE PROPIONATE 2 SPRAY: 50 SPRAY, METERED NASAL at 10:19

## 2021-12-31 RX ADMIN — ACETAMINOPHEN 650 MG: 325 TABLET ORAL at 20:22

## 2021-12-31 RX ADMIN — METOPROLOL SUCCINATE 25 MG: 25 TABLET, FILM COATED, EXTENDED RELEASE ORAL at 10:19

## 2021-12-31 RX ADMIN — TAMSULOSIN HYDROCHLORIDE 0.4 MG: 0.4 CAPSULE ORAL at 21:32

## 2021-12-31 RX ADMIN — HEPARIN SODIUM AND DEXTROSE 17 UNITS/KG/HR: 5000; 5 INJECTION INTRAVENOUS at 12:51

## 2021-12-31 RX ADMIN — METOPROLOL SUCCINATE 25 MG: 25 TABLET, FILM COATED, EXTENDED RELEASE ORAL at 20:22

## 2021-12-31 NOTE — H&P
Hospitalist History and Physical   Admit Date:  2021  2:38 AM   Name:  Jeremy Powers   Age:  73 y.o. Sex:  male  :  1950   MRN:  [id number]     Presenting Complaint: abnormal CT  Reason(s) for Admission: Pulmonary emboli ([facility] [address] [I26.99]  Acute respiratory disease due to COVID-19 virus [U07.1, J06.9]     History of Present Illness:   Jeremy Powers is a 73 y.o. male with medical history of HTN, afib who presented to ED after abnormal CT. Patient had outpatient CT completed for concern about PE, due to known blood clots in R leg. Patient is not currently on anticoagulation, however, previously was on Eliquis due to arrhythmia (he is s/p ablations.)  CT showed multiple bilateral PEs. Patient was started on a heparin drip. Initially, plan for admission to [place] [address], where he underwent CT, but rapid COVID is POSITIVE. Patient has been transferred to [facility] for further care. Review of Systems:  10 systems reviewed and negative except as noted in HPI. Assessment & Plan:   * Pulmonary emboli (HCC)  - Currently with normal O2 sats  - Started on heparin drip in [facility] ER  - Continue heparin while here  - Likely good candidate for NOAC, as patient was previously on Eliquis    COVID-19  - Not actually hypoxic  - Supportive symptomatic treatment as needed    Atrial fibrillation ([facility] [address]  - S/P ablation  - NSR  - Home meds otherwise    Essential hypertension, benign  - Home meds       Disposition: inpatient    Diet: ADULT DIET Regular  VTE ppx: heparin drip  Code status: Full Code      Past medical history reviewed.     Past Medical History:   Diagnosis Date    Acute pulmonary embolus ([facility] [address] 2021    Arrhythmia     A fib     Asthma, moderate persistent, well-controlled 2013    Axonal polyneuropathy 1/15/2019    Babinski reflex 1/15/2019    Chronic left shoulder pain 2013    Pain radiates from his neck    DDD (degenerative disc disease), cervical 2018    MRI: 2018 - multilevel DDD and facet arthritis w/ moderate bilateral foraminal narrowing at C6-7 and moderate right at C5-6.  Essential hypertension, benign 5/30/2013    Hypercholesteremia 12/7/2015    Hypogonadism, testicular 5/30/2013    Removal of right testicle on 11/1/2010; urologist Sherlyn Perkins of [facility], gives testosterone injections    Lumbosacral radiculopathy at L5 4/17/2016    Lung nodule, solitary 1/19/2017    Chronic - benign w/u    Lymphopenia 8/6/2017    Microalbuminuria 9/1/2017    PMR (polymyalgia rheumatica) ([facility] [address] 2/21/2018    Followed by [facility] rheumatology    Prediabetes 04/17/2016    Unsteady gait 1/15/2019    Vitamin D deficiency 6/2/2015     Past surgical history reviewed. Past Surgical History:   Procedure Laterality Date    HX AFIB ABLATION  02/2018    HX COLONOSCOPY  4/03    normal    HX LUMBAR DISKECTOMY  1989    L5 discectomy    HX LUMBAR DISKECTOMY  1983    L4    HX OTHER SURGICAL      ablation for Afib     HX UROLOGICAL  11/1/2010    testicular, right removed d/t  severe infection    [facility] CARD[facility] PROCEDURE UNLIST        Allergies   Allergen Reactions    Lisinopril Cough      Social History     Tobacco Use    Smoking status: Former Smoker    Smokeless tobacco: Never Used    Tobacco comment: smoked cigerattes from age 27 to 47   Substance Use Topics    Alcohol use: Yes     Comment: occ drinks wine      Family History   Problem Relation Age of Onset    Cancer Father         leukemia    Hypertension Mother     Cancer Mother         breast    Cancer Sister         colon      Family history reviewed and noncontributory to patient's acute condition; no relevant family history unless otherwise noted above.   Immunization History   Administered Date(s) Administered    Influenza High Dose Vaccine PF 11/22/2017, 10/18/2018    Influenza Vaccine 11/03/2015    Pneumococcal Conjugate (PCV-13) 07/31/2017    Pneumococcal Polysaccharide (PPSV-23) 02/10/2010    Tdap 03/05/2012     PTA Medications:  Current Outpatient Medications   Medication Instructions    acetaminophen (TYLENOL) 650 mg, Oral, EVERY 4 HOURS AS NEEDED    albuterol (PROAIR HFA) 90 mcg/actuation inhaler 2 Puffs, Inhalation, EVERY 6 HOURS AS NEEDED    calcium citrate 1,000 mg, Oral, DAILY    cholecalciferol (VITAMIN D3) 5,000 Units, Oral, DAILY    fluticasone (FLONASE) 50 mcg/actuation nasal spray 2 Sprays, Both Nostrils, DAILY    fluticasone-salmeterol (ADVAIR DISKUS) 250-50 mcg/dose diskus inhaler INHALE 1 DOSE BY MOUTH TWICE DAILY. RINSE MOUTH AFTER USE    lisinopril (PRINIVIL, ZESTRIL) 10 mg tablet TAKE ONE TABLET BY MOUTH ONE TIME DAILY    loratadine (CLARITIN) 10 mg, Oral, EVERY BEDTIME    metoprolol succinate (TOPROL-XL) 25 mg, Oral, 2 TIMES DAILY    pregabalin (LYRICA) 75 mg, Oral, 2 TIMES DAILY    sildenafiL (pulmonary hypertension) (REVATIO) 20 mg, Oral, DAILY AS NEEDED    tamsulosin (FLOMAX) 0.4 mg, Oral, EVERY BEDTIME    testosterone cypionate (DEPO-TESTOSTERONE) 440 mg, IntraMUSCular, EVERY 2 WEEKS       Objective:     Patient Vitals for the past 24 hrs:   Temp Pulse Resp BP SpO2   12/31/21 0240 98.4 °F (36.9 °C) 93 18 (!) 150/94 95 %     Oxygen Therapy  O2 Sat (%): 95 % (12/31/21 0240)    Estimated body mass index is 28.28 kg/m² as calculated from the following:    Height as of 12/30/21: 5' 8\" (1.727 m). Weight as of 12/30/21: 84.4 kg (186 lb). No intake or output data in the 24 hours ending 12/31/21 0643      Physical Exam:  General:    Well nourished. No overt distress  Head:  Normocephalic, atraumatic  Eyes:  Sclerae appear normal.  Pupils equally round. HENT:  Nares appear normal, no drainage. Moist mucous membranes  Neck:  No restricted ROM. Trachea midline  CV:   RRR. S1/S2 auscultated  Lungs:   CTAB. No wheezing, rhonchi, or rales. Appears even, unlabored  Abdomen: Bowel sounds present. Soft, nontender, nondistended. Extremities: Warm and dry.   No cyanosis or clubbing. No edema. Skin:     No rashes. Normal turgor. Normal coloration  Neuro:  Cranial nerves II-XII grossly intact. Sensation intact  Psych:  Normal mood and affect. Alert and oriented x3    Data Ordered and Personally Reviewed:    Last 24hr Labs:  Recent Results (from the past 24 hour(s))   CREATININE, POC    Collection Time: 12/30/21  5:35 PM   Result Value Ref Range    Creatinine (POC) 1.20 0.8 - 1.5 mg/dL    GFRAA, POC >60 >60 ml/min/1.73m2    GFRNA, POC >60 >60 ml/min/1.73m2   PTT    Collection Time: 12/30/21  8:53 PM   Result Value Ref Range    aPTT 27.1 24.1 - 35.1 SEC   CBC WITH AUTOMATED DIFF    Collection Time: 12/30/21  8:53 PM   Result Value Ref Range    WBC 10.9 4.3 - 11.1 K/uL    RBC 5.37 4.23 - 5.6 M/uL    HGB 15.6 13.6 - 17.2 g/dL    HCT 48.9 41.1 - 50.3 %    MCV 91.1 79.6 - 97.8 FL    MCH 29.1 26.1 - 32.9 PG    MCHC 31.9 31.4 - 35.0 g/dL    RDW 17.0 (H) 11.9 - 14.6 %    PLATELET 308 (L) 032 - 450 K/uL    MPV 9.5 9.4 - 12.3 FL    ABSOLUTE NRBC 0.00 0.0 - 0.2 K/uL    DF AUTOMATED      NEUTROPHILS 81 (H) 43 - 78 %    LYMPHOCYTES 9 (L) 13 - 44 %    MONOCYTES 7 4.0 - 12.0 %    EOSINOPHILS 1 0.5 - 7.8 %    BASOPHILS 1 0.0 - 2.0 %    IMMATURE GRANULOCYTES 2 0.0 - 5.0 %    ABS. NEUTROPHILS 8.8 (H) 1.7 - 8.2 K/UL    ABS. LYMPHOCYTES 0.9 0.5 - 4.6 K/UL    ABS. MONOCYTES 0.7 0.1 - 1.3 K/UL    ABS. EOSINOPHILS 0.1 0.0 - 0.8 K/UL    ABS. BASOPHILS 0.1 0.0 - 0.2 K/UL    ABS. IMM.  GRANS. 0.3 0.0 - 0.5 K/UL   METABOLIC PANEL, BASIC    Collection Time: 12/30/21  8:53 PM   Result Value Ref Range    Sodium 138 136 - 145 mmol/L    Potassium 4.2 3.5 - 5.1 mmol/L    Chloride 105 98 - 107 mmol/L    CO2 31 21 - 32 mmol/L    Anion gap 2 (L) 7 - 16 mmol/L    Glucose 93 65 - 100 mg/dL    BUN 18 8 - 23 MG/DL    Creatinine 1.07 0.8 - 1.5 MG/DL    GFR est AA >60 >60 ml/min/1.73m2    GFR est non-AA >60 >60 ml/min/1.73m2    Calcium 9.0 8.3 - 10.4 MG/DL   COVID-19 RAPID TEST    Collection Time: 12/30/21  9:14 PM Result Value Ref Range    Specimen source NASAL SWAB      COVID-19 rapid test Detected (AA) NOTD     CBC W/O DIFF    Collection Time: 12/31/21  5:12 AM   Result Value Ref Range    WBC 8.8 4.3 - 11.1 K/uL    RBC 4.95 4.23 - 5.6 M/uL    HGB 14.5 13.6 - 17.2 g/dL    HCT 45.3 41.1 - 50.3 %    MCV 91.5 79.6 - 97.8 FL    MCH 29.3 26.1 - 32.9 PG    MCHC 32.0 31.4 - 35.0 g/dL    RDW 17.0 (H) 11.9 - 14.6 %    PLATELET 502 (L) 055 - 450 K/uL    MPV 9.7 9.4 - 12.3 FL    ABSOLUTE NRBC 0.00 0.0 - 0.2 K/uL   HEPARIN XA UFH    Collection Time: 12/31/21  5:12 AM   Result Value Ref Range    Heparin Xa UFH 0.76 (H) 0.3 - 0.7 IU/mL   METABOLIC PANEL, BASIC    Collection Time: 12/31/21  5:12 AM   Result Value Ref Range    Sodium 141 138 - 145 mmol/L    Potassium 3.7 3.5 - 5.1 mmol/L    Chloride 107 98 - 107 mmol/L    CO2 30 21 - 32 mmol/L    Anion gap 4 (L) 7 - 16 mmol/L    Glucose 94 65 - 100 mg/dL    BUN 17 8 - 23 MG/DL    Creatinine 0.99 0.8 - 1.5 MG/DL    GFR est AA >60 >60 ml/min/1.73m2    GFR est non-AA >60 >60 ml/min/1.73m2    Calcium 8.7 8.3 - 10.4 MG/DL       All Micro Results     None          Other Studies:  XR CHEST PA LAT    Result Date: 12/30/2021  EXAM: CHEST X-RAY, 2 VIEWS INDICATION: Dyspnea COMPARISON: Chest x-ray 6/26/2020 TECHNIQUE: Frontal and lateral views of the chest were obtained. FINDINGS: Stable calcified granuloma on the right. No consolidation or rubin pulmonary edema. . No pneumothorax or pleural effusion. The cardiomediastinal silhouette is within normal limits. The osseous structures are unremarkable. No radiographic evidence of acute cardiopulmonary disease. DUPLEX LOWER EXT VENOUS RIGHT    Result Date: 12/30/2021  Right lower extremity venous ultrasound INDICATION:  Pain and swelling, right lower extremity. Doppler ultrasound of the right lower extremity was performed. FINDINGS:  There is normal flow in the greater saphenous, common femoral and superficial femoral veins.  Popliteal vein is occluded as are the posterior tibial veins and peroneal veins of the calf. Occlusive DVT involving the deep calf veins and popliteal vein. Findings called to the nurse, Olya Howell at the time of the exam by the ultrasound technologist.     CT CHEST PULMONARY EMBOLISM    Result Date: 12/30/2021  CT Chest with contrast Clinical Indication:  Dyspnea for 2 weeks, moderate to severe, worsening, now with DVT and swelling in the right lower extremity Comparison:  CT 4/20/2019 and radiograph earlier today Technique:  Dose reduction technique used: Automated exposure Control and/or adjustment of mA and kV according to patient size. Contiguous axial images were obtained from the neck base through the upper abdomen following the uneventful administration of 100 cc Isovue 370 intravenous contrast. Pulmonary embolus protocol was used. Coronal reconstructions were done for further evaluation of vessels. Findings:  Pulmonary arteries are mildly dilated compatible with pulmonary hypertension. There are filling defects involving the left lower lobe main pulmonary artery with extension in the multiple segmental branches, a right middle lobe segment, as well as a posterior right lower lobe segmental branch. Heart is mildly enlarged. No evidence of ventricular septal bowing. No pleural or pericardial effusion, or thoracic lymphadenopathy. Scattered coronary artery calcifications. Thoracic bones and Limited upper abdominal views demonstrate no acute abnormalities. A right renal cyst is noted, and there are small left liver hypodensities that are too small to characterize but likely benign incidental cysts as well. Lungs are limited in detail evaluation due to respiratory motion artifact. There are small areas of peripheral pneumonitis or developing infarctions in the posterior right lower lobe, a benign calcified granuloma in the right upper lobe, but no suspicious mass consolidation.  No evidence of bronchiectasis, honeycombing or pneumothorax. 1. Acute bilateral pulmonary emboli. 2. Mildly dilated pulmonary arteries suggesting pulmonary hypertension. 3. Cardiac enlargement, coronary artery calcifications.  DC5 The findings were called to the ER PA Mitul Landa by me at 615PM.  [phone]               Signed:  Kacey Pantoja MD

## 2021-12-31 NOTE — ED PROVIDER NOTES
Patient comes to our department from CT scan. She he was sent there for CT to evaluate for pulmonary emboli. Patient at one point was on Eliquis related mainly to cardiology related issues. He has had ablation x4 but is back in sinus rhythm. Denies any present chest pain. Does report he has some increase in his baseline heart rate. Denies any fever. He has had some slight cough off and on recently. No infected sputum. Had Covid in November. The history is provided by the patient. Shortness of Breath  This is a new problem. Associated symptoms include cough and leg swelling. Pertinent negatives include no fever, no syncope and no vomiting. Associated medical issues include DVT. Past Medical History:   Diagnosis Date    Acute pulmonary embolus ([facility] [address] 12/30/2021    Arrhythmia     A fib     Asthma, moderate persistent, well-controlled 5/30/2013    Axonal polyneuropathy 1/15/2019    Babinski reflex 1/15/2019    Chronic left shoulder pain 5/30/2013    Pain radiates from his neck    DDD (degenerative disc disease), cervical 1/18/2018    MRI: 1/2018 - multilevel DDD and facet arthritis w/ moderate bilateral foraminal narrowing at C6-7 and moderate right at C5-6.     Essential hypertension, benign 5/30/2013    Hypercholesteremia 12/7/2015    Hypogonadism, testicular 5/30/2013    Removal of right testicle on 11/1/2010; urologist Melodie Rush of [facility], gives testosterone injections    Lumbosacral radiculopathy at L5 4/17/2016    Lung nodule, solitary 1/19/2017    Chronic - benign w/u    Lymphopenia 8/6/2017    Microalbuminuria 9/1/2017    PMR (polymyalgia rheumatica) ([facility] [address] 2/21/2018    Followed by [facility]    Prediabetes 04/17/2016    Unsteady gait 1/15/2019    Vitamin D deficiency 6/2/2015       Past Surgical History:   Procedure Laterality Date    HX AFIB ABLATION  02/2018    HX COLONOSCOPY  4/03    normal    HX LUMBAR DISKECTOMY  1989    L5 discectomy    HX LUMBAR DISKECTOMY  1983    L4    HX OTHER SURGICAL      ablation for Afib     HX UROLOGICAL  11/1/2010    testicular, right removed d/t  severe infection    [facility] CARDIAC SURG PROCEDURE UNLIST           Family History:   Problem Relation Age of Onset    Cancer Father         leukemia    Hypertension Mother     Cancer Mother         breast    Cancer Sister         colon       Social History     Socioeconomic History    Marital status:      Spouse name: Not on file    Number of children: Not on file    Years of education: Not on file    Highest education level: Not on file   Occupational History    Not on file   Tobacco Use    Smoking status: Former Smoker    Smokeless tobacco: Never Used    Tobacco comment: smoked cigerattes from age 27 to 47   Substance and Sexual Activity    Alcohol use: Yes     Comment: occ drinks wine    Drug use: No    Sexual activity: Not on file   Other Topics Concern    Not on file   Social History Narrative    Not on file     Social Determinants of Health     Financial Resource Strain:     Difficulty of Paying Living Expenses: Not on file   Food Insecurity:     Worried About Running Out of Food in the Last Year: Not on file    Juanjo of Food in the Last Year: Not on file   Transportation Needs:     Lack of Transportation (Medical): Not on file    Lack of Transportation (Non-Medical):  Not on file   Physical Activity:     Days of Exercise per Week: Not on file    Minutes of Exercise per Session: Not on file   Stress:     Feeling of Stress : Not on file   Social Connections:     Frequency of Communication with Friends and Family: Not on file    Frequency of Social Gatherings with Friends and Family: Not on file    Attends Hinduism Services: Not on file    Active Member of Clubs or Organizations: Not on file    Attends Club or Organization Meetings: Not on file    Marital Status: Not on file   Intimate Partner Violence:     Fear of Current or Ex-Partner: Not on file    Emotionally Abused: Not on file    Physically Abused: Not on file    Sexually Abused: Not on file   Housing Stability:     Unable to Pay for Housing in the Last Year: Not on file    Number of Places Lived in the Last Year: Not on file    Unstable Housing in the Last Year: Not on file         ALLERGIES: Lisinopril    Review of Systems   Constitutional: Positive for fatigue. Negative for chills and fever. Respiratory: Positive for cough and shortness of breath. Cardiovascular: Positive for leg swelling. Negative for syncope. Gastrointestinal: Negative for vomiting. Neurological: Negative. Negative for syncope. Psychiatric/Behavioral: Negative. All other systems reviewed and are negative. Vitals:    12/30/21 2046 12/30/21 2116 12/30/21 2325 12/31/21 0030   BP: (!) 154/93 (!) 179/102 135/75 128/76   Pulse: 85 100 97 90   Resp:   20 16   Temp:       SpO2: 96% 96% 95% 95%   Weight:       Height:                Physical Exam  Vitals and nursing note reviewed. Constitutional:       General: He is not in acute distress. Appearance: He is well-developed. He is not ill-appearing, toxic-appearing or diaphoretic. Comments: Looks good/ stable and interactive in ER   HENT:      Head: Atraumatic. Eyes:      General: No scleral icterus. Cardiovascular:      Rate and Rhythm: Normal rate. Pulmonary:      Effort: Pulmonary effort is normal. No respiratory distress. Breath sounds: No wheezing or rales. Chest:      Chest wall: No tenderness. Musculoskeletal:      Cervical back: Neck supple. Right lower leg: Edema present. Left lower leg: No tenderness. Comments: Right leg significantly more swollen than left   Skin:     General: Skin is warm and dry. Neurological:      General: No focal deficit present. Psychiatric:         Mood and Affect: Mood normal.         Thought Content:  Thought content normal.          MDM  Number of Diagnoses or Management Options  Acute pulmonary embolism, unspecified pulmonary embolism type, unspecified whether acute cor pulmonale present [facility])  Diagnosis management comments: Patient with right leg swelling and this being source for most likely his pulmonary emboli. His Covid swab is positive though does not appear to be with active acute Covid illness. He has been on Eliquis in the past related to cardiac issues. May need to be on prolonged similar medications going forward.        Amount and/or Complexity of Data Reviewed  Clinical lab tests: ordered and reviewed  Tests in the radiology section of CPT®: reviewed and ordered  Independent visualization of images, tracings, or specimens: yes    Risk of Complications, Morbidity, and/or Mortality  Presenting problems: moderate  Diagnostic procedures: moderate  Management options: moderate           Procedures

## 2021-12-31 NOTE — PROGRESS NOTES
Sylvia Rosado Spouse [phone]     Chart reviewed for discharge planning. Patient lives with spouse. Independent of ADL's  No needs identified at this time. Will continue to follow for discharge planning needs  Please consult  if any new issues arise. Discharge is undetermined at this time. Care Management Interventions  PCP Verified by CM:  Yes  Transition of Care Consult (CM Consult): Discharge Planning  Support Systems: Spouse/Significant Other  Confirm Follow Up Transport: Family  The Plan for Transition of Care is Related to the Following Treatment Goals : return to baseline  The Patient and/or Patient Representative was Provided with a Choice of Provider and Agrees with the Discharge Plan?: Yes  Name of the Patient Representative Who was Provided with a Choice of Provider and Agrees with the Discharge Plan: patient  [address] Provided?: No  Discharge Location  Discharge Placement: Unable to determine at this time

## 2021-12-31 NOTE — PROGRESS NOTES
TRANSFER - IN REPORT:    Verbal report received from [facility] RN(name) on Alissa Huston  being received from [place] [address] OF [facility] ER to [place] room 533(unit) for routine progression of care      Report consisted of patients Situation, Background, Assessment and   Recommendations(SBAR). Information from the following report(s) SBAR, Kardex, ED Summary, Intake/Output, MAR, Accordion, Recent Results and Med Rec Status was reviewed with the receiving nurse. Opportunity for questions and clarification was provided. Assessment completed upon patients arrival to unit and care assumed.

## 2021-12-31 NOTE — ASSESSMENT & PLAN NOTE
- Currently with normal O2 sats  - Started on heparin drip in [facility] ER  - Continue heparin while here  - Likely good candidate for NOAC, as patient was previously on Eliquis

## 2021-12-31 NOTE — ED NOTES
TRANSFER - OUT REPORT:    Verbal report given to Betsy Sena RN on Trice Rice  being transferred to [facility] 5th floor for routine progression of care       Report consisted of patients Situation, Background, Assessment and   Recommendations(SBAR). Information from the following report(s) SBAR was reviewed with the receiving nurse. Lines:   Peripheral IV 12/30/21 Right Antecubital (Active)        Opportunity for questions and clarification was provided.       Patient transported with:   Monitor   Leigh Ann

## 2022-01-01 LAB
ALBUMIN SERPL-MCNC: 2.8 G/DL (ref 3.2–4.6)
ALBUMIN/GLOB SERPL: 0.9 {RATIO} (ref 1.2–3.5)
ALP SERPL-CCNC: 53 U/L (ref 50–136)
ALT SERPL-CCNC: 33 U/L (ref 12–65)
ANION GAP SERPL CALC-SCNC: 6 MMOL/L (ref 7–16)
AST SERPL-CCNC: 19 U/L (ref 15–37)
BILIRUB DIRECT SERPL-MCNC: 0.2 MG/DL
BILIRUB SERPL-MCNC: 0.8 MG/DL (ref 0.2–1.1)
BUN SERPL-MCNC: 15 MG/DL (ref 8–23)
CALCIUM SERPL-MCNC: 8.4 MG/DL (ref 8.3–10.4)
CHLORIDE SERPL-SCNC: 108 MMOL/L (ref 98–107)
CO2 SERPL-SCNC: 29 MMOL/L (ref 21–32)
CREAT SERPL-MCNC: 1.09 MG/DL (ref 0.8–1.5)
CRP SERPL-MCNC: 0.6 MG/DL (ref 0–0.9)
ERYTHROCYTE [DISTWIDTH] IN BLOOD BY AUTOMATED COUNT: 17.2 % (ref 11.9–14.6)
GLOBULIN SER CALC-MCNC: 3 G/DL (ref 2.3–3.5)
GLUCOSE SERPL-MCNC: 93 MG/DL (ref 65–100)
HCT VFR BLD AUTO: 48.7 % (ref 41.1–50.3)
HGB BLD-MCNC: 15.4 G/DL (ref 13.6–17.2)
MCH RBC QN AUTO: 28.5 PG (ref 26.1–32.9)
MCHC RBC AUTO-ENTMCNC: 31.6 G/DL (ref 31.4–35)
MCV RBC AUTO: 90 FL (ref 79.6–97.8)
NRBC # BLD: 0.02 K/UL (ref 0–0.2)
PLATELET # BLD AUTO: 111 K/UL (ref 150–450)
PMV BLD AUTO: 9.4 FL (ref 9.4–12.3)
POTASSIUM SERPL-SCNC: 4 MMOL/L (ref 3.5–5.1)
PROT SERPL-MCNC: 5.8 G/DL (ref 6.3–8.2)
RBC # BLD AUTO: 5.41 M/UL (ref 4.23–5.6)
SODIUM SERPL-SCNC: 143 MMOL/L (ref 136–145)
UFH PPP CHRO-ACNC: 0.51 IU/ML (ref 0.3–0.7)
WBC # BLD AUTO: 9.1 K/UL (ref 4.3–11.1)

## 2022-01-01 PROCEDURE — 65270000029 HC RM PRIVATE

## 2022-01-01 PROCEDURE — 85027 COMPLETE CBC AUTOMATED: CPT

## 2022-01-01 PROCEDURE — 74011250636 HC RX REV CODE- 250/636: Performed by: FAMILY MEDICINE

## 2022-01-01 PROCEDURE — 85520 HEPARIN ASSAY: CPT

## 2022-01-01 PROCEDURE — 80048 BASIC METABOLIC PNL TOTAL CA: CPT

## 2022-01-01 PROCEDURE — 74011250637 HC RX REV CODE- 250/637: Performed by: INTERNAL MEDICINE

## 2022-01-01 PROCEDURE — 74011250637 HC RX REV CODE- 250/637: Performed by: FAMILY MEDICINE

## 2022-01-01 PROCEDURE — 94640 AIRWAY INHALATION TREATMENT: CPT

## 2022-01-01 PROCEDURE — 80076 HEPATIC FUNCTION PANEL: CPT

## 2022-01-01 PROCEDURE — 36415 COLL VENOUS BLD VENIPUNCTURE: CPT

## 2022-01-01 PROCEDURE — 94664 DEMO&/EVAL PT USE INHALER: CPT

## 2022-01-01 PROCEDURE — 86140 C-REACTIVE PROTEIN: CPT

## 2022-01-01 RX ADMIN — Medication 10 ML: at 22:33

## 2022-01-01 RX ADMIN — BUDESONIDE AND FORMOTEROL FUMARATE DIHYDRATE 2 PUFF: 160; 4.5 AEROSOL RESPIRATORY (INHALATION) at 20:00

## 2022-01-01 RX ADMIN — APIXABAN 10 MG: 5 TABLET, FILM COATED ORAL at 17:22

## 2022-01-01 RX ADMIN — METOPROLOL SUCCINATE 25 MG: 25 TABLET, FILM COATED, EXTENDED RELEASE ORAL at 22:31

## 2022-01-01 RX ADMIN — Medication 10 ML: at 17:22

## 2022-01-01 RX ADMIN — HEPARIN SODIUM AND DEXTROSE 17 UNITS/KG/HR: 5000; 5 INJECTION INTRAVENOUS at 06:44

## 2022-01-01 RX ADMIN — METOPROLOL SUCCINATE 25 MG: 25 TABLET, FILM COATED, EXTENDED RELEASE ORAL at 08:24

## 2022-01-01 RX ADMIN — TAMSULOSIN HYDROCHLORIDE 0.4 MG: 0.4 CAPSULE ORAL at 22:31

## 2022-01-01 RX ADMIN — FLUTICASONE PROPIONATE 2 SPRAY: 50 SPRAY, METERED NASAL at 09:00

## 2022-01-01 NOTE — PROGRESS NOTES
Problem: Airway Clearance - Ineffective  Goal: Achieve or maintain patent airway  Outcome: Progressing Towards Goal     Problem: Gas Exchange - Impaired  Goal: Absence of hypoxia  Outcome: Progressing Towards Goal  Goal: Promote optimal lung function  Outcome: Progressing Towards Goal     Problem: Breathing Pattern - Ineffective  Goal: Ability to achieve and maintain a regular respiratory rate  Outcome: Progressing Towards Goal     Problem: Body Temperature -  Risk of, Imbalanced  Goal: Ability to maintain a body temperature within defined limits  Outcome: Progressing Towards Goal  Goal: Will regain or maintain usual level of consciousness  Outcome: Progressing Towards Goal  Goal: Complications related to the disease process, condition or treatment will be avoided or minimized  Outcome: Progressing Towards Goal     Problem: Isolation Precautions - Risk of Spread of Infection  Goal: Prevent transmission of infectious organism to others  Outcome: Progressing Towards Goal     Problem: Nutrition Deficits  Goal: Optimize nutrtional status  Outcome: Progressing Towards Goal     Problem: Risk for Fluid Volume Deficit  Goal: Maintain normal heart rhythm  Outcome: Progressing Towards Goal  Goal: Maintain absence of muscle cramping  Outcome: Progressing Towards Goal  Goal: Maintain normal serum potassium, sodium, calcium, phosphorus, and pH  Outcome: Progressing Towards Goal     Problem: Loneliness or Risk for Loneliness  Goal: Demonstrate positive use of time alone when socialization is not possible  Outcome: Progressing Towards Goal     Problem: Fatigue  Goal: Verbalize increase energy and improved vitality  Outcome: Progressing Towards Goal     Problem: Patient Education: Go to Patient Education Activity  Goal: Patient/Family Education  Outcome: Progressing Towards Goal     Problem: Falls - Risk of  Goal: *Absence of Falls  Description: Document Ana Fall Risk and appropriate interventions in the flowsheet.   Outcome: Progressing Towards Goal  Note: Fall Risk Interventions:            Medication Interventions: Teach patient to arise slowly

## 2022-01-01 NOTE — PROGRESS NOTES
Hospitalist Progress Note   Admit Date:  2021  2:38 AM   Name:  Margaux Earl   Age:  73 y.o. Sex:  male  :  1950   MRN:  [id number]   Room:  [facility]/    Presenting Complaint: No chief complaint on file. Reason(s) for Admission: Pulmonary emboli (HCC) [I26.99]  Acute respiratory disease due to COVID-19 virus [U07.1, J06.9]     Hospital Course & Interval History:     Margaux Earl is a 73 y.o. male with medical history of HTN, afib who presented to ED after abnormal CT. Patient had outpatient CT completed for concern about PE, due to known blood clots in R leg. Patient is not currently on anticoagulation, however, previously was on Eliquis due to arrhythmia (he is s/p ablations.)  CT showed multiple bilateral PEs. Patient was started on a heparin drip. Initially, plan for admission to [place] [address], where he underwent CT, but rapid COVID is POSITIVE. Patient has been transferred to [facility] for further care. Subjective (22):   Rt leg pain and swelling on iv heparin    Assessment & Plan:     Principal Problem:     * Pulmonary emboli (HCC)  - Currently with normal O2 sats  - Started on heparin drip in [facility] ER  - Continue heparin while here  - Likely good candidate for NOAC, as patient was previously on Eliquis     COVID-19  - Not actually hypoxic  - Supportive symptomatic treatment as needed     Atrial fibrillation ([facility] [address]  - S/P ablation  - NSR  - Home meds otherwise     Essential hypertension, benign  - Home meds           Dispo/Discharge Planning:    Home     Diet:  ADULT DIET Regular  DVT PPx: heparin  Code status: Full Code    Hospital Problems as of 2022 Date Reviewed: 2021          Codes Class Noted - Resolved POA    * (Principal) Pulmonary emboli ([facility] [address] ICD-10-CM: I26.99  ICD-9-CM: 415.19  2021 - Present Unknown        COVID-19 ICD-10-CM: U07.1  ICD-9-CM: 079.89  2021 - Present Unknown        Atrial fibrillation ([facility] [address] ICD-10-CM: I48.91  ICD-9-CM: 427.31 2/2/2018 - Present Yes    Overview Signed 12/24/2018 11:42 AM by DO Makayla Cuenca 2017. eliquis             Essential hypertension, benign ICD-10-CM: I10  ICD-9-CM: 401.1  5/30/2013 - Present Yes    Overview Signed 8/1/2017  8:46 PM by Vernon Thompson MD     Microalbumin: 7/2017 - 118             RESOLVED: Acute respiratory disease due to COVID-19 virus ICD-10-CM: U07.1, J06.9  ICD-9-CM: 465.9, 079.89  12/31/2021 - 12/31/2021 Unknown              Objective:     Patient Vitals for the past 24 hrs:   Temp Pulse Resp BP SpO2   01/01/22 1600 97.9 °F (36.6 °C) 94 -- 125/74 94 %   01/01/22 1100 97.7 °F (36.5 °C) 86 -- 118/78 94 %   01/01/22 0758 97.7 °F (36.5 °C) 80 -- 125/79 95 %   01/01/22 0350 98.4 °F (36.9 °C) 76 16 112/88 95 %   01/01/22 0038 99.1 °F (37.3 °C) 78 16 132/78 95 %   12/31/21 2031 99 °F (37.2 °C) 87 18 126/84 94 %   12/31/21 2022 -- 75 -- -- --     Oxygen Therapy  O2 Sat (%): 94 % (01/01/22 1600)  O2 Device: None (Room air) (12/31/21 1019)    Estimated body mass index is 28.28 kg/m² as calculated from the following:    Height as of 12/30/21: 5' 8\" (1.727 m). Weight as of 12/30/21: 84.4 kg (186 lb). No intake or output data in the 24 hours ending 01/01/22 1618      Physical Exam:     Blood pressure 125/74, pulse 94, temperature 97.9 °F (36.6 °C), resp. rate 16, SpO2 94 %. General:    Well nourished. No overt distress  Head:  Normocephalic, atraumatic  Eyes:  Sclerae appear normal.  Pupils equally round. ENT:  Nares appear normal, no drainage. Moist oral mucosa  Neck:  No restricted ROM. Trachea midline   CV:   RRR. No m/r/g. No jugular venous distension. Lungs:   CTAB. No wheezing, rhonchi, or rales. Respirations even, unlabored  Abdomen: Bowel sounds present. Soft, nontender, nondistended. Extremities: No cyanosis or clubbing. Rt leg  edema  Skin:     No rashes and normal coloration. Warm and dry. Neuro:  CN II-XII grossly intact. Sensation intact. A&Ox3  Psych:  Normal mood and affect. I have reviewed ordered lab tests and independently visualized imaging below:    Recent Labs:  Recent Results (from the past 48 hour(s))   CREATININE, POC    Collection Time: 12/30/21  5:35 PM   Result Value Ref Range    Creatinine (POC) 1.20 0.8 - 1.5 mg/dL    GFRAA, POC >60 >60 ml/min/1.73m2    GFRNA, POC >60 >60 ml/min/1.73m2   PTT    Collection Time: 12/30/21  8:53 PM   Result Value Ref Range    aPTT 27.1 24.1 - 35.1 SEC   CBC WITH AUTOMATED DIFF    Collection Time: 12/30/21  8:53 PM   Result Value Ref Range    WBC 10.9 4.3 - 11.1 K/uL    RBC 5.37 4.23 - 5.6 M/uL    HGB 15.6 13.6 - 17.2 g/dL    HCT 48.9 41.1 - 50.3 %    MCV 91.1 79.6 - 97.8 FL    MCH 29.1 26.1 - 32.9 PG    MCHC 31.9 31.4 - 35.0 g/dL    RDW 17.0 (H) 11.9 - 14.6 %    PLATELET 403 (L) 879 - 450 K/uL    MPV 9.5 9.4 - 12.3 FL    ABSOLUTE NRBC 0.00 0.0 - 0.2 K/uL    DF AUTOMATED      NEUTROPHILS 81 (H) 43 - 78 %    LYMPHOCYTES 9 (L) 13 - 44 %    MONOCYTES 7 4.0 - 12.0 %    EOSINOPHILS 1 0.5 - 7.8 %    BASOPHILS 1 0.0 - 2.0 %    IMMATURE GRANULOCYTES 2 0.0 - 5.0 %    ABS. NEUTROPHILS 8.8 (H) 1.7 - 8.2 K/UL    ABS. LYMPHOCYTES 0.9 0.5 - 4.6 K/UL    ABS. MONOCYTES 0.7 0.1 - 1.3 K/UL    ABS. EOSINOPHILS 0.1 0.0 - 0.8 K/UL    ABS. BASOPHILS 0.1 0.0 - 0.2 K/UL    ABS. IMM.  GRANS. 0.3 0.0 - 0.5 K/UL   METABOLIC PANEL, BASIC    Collection Time: 12/30/21  8:53 PM   Result Value Ref Range    Sodium 138 136 - 145 mmol/L    Potassium 4.2 3.5 - 5.1 mmol/L    Chloride 105 98 - 107 mmol/L    CO2 31 21 - 32 mmol/L    Anion gap 2 (L) 7 - 16 mmol/L    Glucose 93 65 - 100 mg/dL    BUN 18 8 - 23 MG/DL    Creatinine 1.07 0.8 - 1.5 MG/DL    GFR est AA >60 >60 ml/min/1.73m2    GFR est non-AA >60 >60 ml/min/1.73m2    Calcium 9.0 8.3 - 10.4 MG/DL   COVID-19 RAPID TEST    Collection Time: 12/30/21  9:14 PM   Result Value Ref Range    Specimen source NASAL SWAB      COVID-19 rapid test Detected (AA) NOTD     CBC W/O DIFF    Collection Time: 12/31/21  5:12 AM   Result Value Ref Range    WBC 8.8 4.3 - 11.1 K/uL    RBC 4.95 4.23 - 5.6 M/uL    HGB 14.5 13.6 - 17.2 g/dL    HCT 45.3 41.1 - 50.3 %    MCV 91.5 79.6 - 97.8 FL    MCH 29.3 26.1 - 32.9 PG    MCHC 32.0 31.4 - 35.0 g/dL    RDW 17.0 (H) 11.9 - 14.6 %    PLATELET 912 (L) 833 - 450 K/uL    MPV 9.7 9.4 - 12.3 FL    ABSOLUTE NRBC 0.00 0.0 - 0.2 K/uL   HEPARIN XA UFH    Collection Time: 12/31/21  5:12 AM   Result Value Ref Range    Heparin Xa UFH 0.76 (H) 0.3 - 0.7 IU/mL   METABOLIC PANEL, BASIC    Collection Time: 12/31/21  5:12 AM   Result Value Ref Range    Sodium 141 138 - 145 mmol/L    Potassium 3.7 3.5 - 5.1 mmol/L    Chloride 107 98 - 107 mmol/L    CO2 30 21 - 32 mmol/L    Anion gap 4 (L) 7 - 16 mmol/L    Glucose 94 65 - 100 mg/dL    BUN 17 8 - 23 MG/DL    Creatinine 0.99 0.8 - 1.5 MG/DL    GFR est AA >60 >60 ml/min/1.73m2    GFR est non-AA >60 >60 ml/min/1.73m2    Calcium 8.7 8.3 - 10.4 MG/DL   HEPARIN XA UFH    Collection Time: 12/31/21 11:50 AM   Result Value Ref Range    Heparin Xa UFH 0.61 0.3 - 0.7 IU/mL   HEPARIN XA UFH    Collection Time: 12/31/21  5:26 PM   Result Value Ref Range    Heparin Xa UFH 0.60 0.3 - 0.7 IU/mL   METABOLIC PANEL, BASIC    Collection Time: 01/01/22  4:07 AM   Result Value Ref Range    Sodium 143 136 - 145 mmol/L    Potassium 4.0 3.5 - 5.1 mmol/L    Chloride 108 (H) 98 - 107 mmol/L    CO2 29 21 - 32 mmol/L    Anion gap 6 (L) 7 - 16 mmol/L    Glucose 93 65 - 100 mg/dL    BUN 15 8 - 23 MG/DL    Creatinine 1.09 0.8 - 1.5 MG/DL    GFR est AA >60 >60 ml/min/1.73m2    GFR est non-AA >60 >60 ml/min/1.73m2    Calcium 8.4 8.3 - 10.4 MG/DL   CBC W/O DIFF    Collection Time: 01/01/22  4:07 AM   Result Value Ref Range    WBC 9.1 4.3 - 11.1 K/uL    RBC 5.41 4.23 - 5.6 M/uL    HGB 15.4 13.6 - 17.2 g/dL    HCT 48.7 41.1 - 50.3 %    MCV 90.0 79.6 - 97.8 FL    MCH 28.5 26.1 - 32.9 PG    MCHC 31.6 31.4 - 35.0 g/dL    RDW 17.2 (H) 11.9 - 14.6 %    PLATELET 403 (L) 124 - 450 K/uL MPV 9.4 9.4 - 12.3 FL    ABSOLUTE NRBC 0.02 0.0 - 0.2 K/uL   C REACTIVE PROTEIN, QT    Collection Time: 01/01/22  4:07 AM   Result Value Ref Range    C-Reactive protein 0.6 0.0 - 0.9 mg/dL   HEPATIC FUNCTION PANEL    Collection Time: 01/01/22  4:07 AM   Result Value Ref Range    Protein, total 5.8 (L) 6.3 - 8.2 g/dL    Albumin 2.8 (L) 3.2 - 4.6 g/dL    Globulin 3.0 2.3 - 3.5 g/dL    A-G Ratio 0.9 (L) 1.2 - 3.5      Bilirubin, total 0.8 0.2 - 1.1 MG/DL    Bilirubin, direct 0.2 <0.4 MG/DL    Alk. phosphatase 53 50 - 136 U/L    AST (SGOT) 19 15 - 37 U/L    ALT (SGPT) 33 12 - 65 U/L   HEPARIN XA UFH    Collection Time: 01/01/22  4:07 AM   Result Value Ref Range    Heparin Xa UFH 0.51 0.3 - 0.7 IU/mL       All Micro Results     None          Other Studies:  No results found.     Current Meds:  Current Facility-Administered Medications   Medication Dose Route Frequency    fluticasone propionate (FLONASE) 50 mcg/actuation nasal spray 2 Spray  2 Spray Both Nostrils DAILY    budesonide-formoteroL (SYMBICORT) 160-4.5 mcg/actuation HFA inhaler 2 Puff  2 Puff Inhalation BID RT    albuterol (PROVENTIL VENTOLIN) nebulizer solution 2.5 mg  2.5 mg Nebulization Q6H PRN    tamsulosin (FLOMAX) capsule 0.4 mg  0.4 mg Oral QHS    metoprolol succinate (TOPROL-XL) XL tablet 25 mg  25 mg Oral Q12H    heparin 25,000 units in dextrose 500 mL infusion  18-36 Units/kg/hr IntraVENous TITRATE    sodium chloride (NS) flush 5-40 mL  5-40 mL IntraVENous Q8H    sodium chloride (NS) flush 5-40 mL  5-40 mL IntraVENous PRN    acetaminophen (TYLENOL) tablet 650 mg  650 mg Oral Q6H PRN    Or    acetaminophen (TYLENOL) suppository 650 mg  650 mg Rectal Q6H PRN    polyethylene glycol (MIRALAX) packet 17 g  17 g Oral DAILY PRN    ondansetron (ZOFRAN ODT) tablet 4 mg  4 mg Oral Q8H PRN    Or    ondansetron (ZOFRAN) injection 4 mg  4 mg IntraVENous Q6H PRN       Signed:  Ck Ny MD    Part of this note may have been written by using a voice dictation software. The note has been proof read but may still contain some grammatical/other typographical errors.

## 2022-01-01 NOTE — PROGRESS NOTES
Care of pt assumed at 1900. Hourly rounds performed throughout shift. Pt C/O RLE pain x1,pt reported adequate pain control with APAP per MAR. Heparin infusing at 17u/kg/hr, last Xa=-0.51, no rate change necessary. Pt on cardiac telemetry, no events. Pt bed in low/locked position, call bell and personal items within reach. Pt reports no needs or concerns at this time. Will continue to monitor and report to oncoming dayshift RN. Disabled

## 2022-01-02 VITALS
HEART RATE: 99 BPM | WEIGHT: 186.07 LBS | TEMPERATURE: 98.5 F | HEIGHT: 68 IN | OXYGEN SATURATION: 94 % | BODY MASS INDEX: 28.2 KG/M2 | DIASTOLIC BLOOD PRESSURE: 88 MMHG | SYSTOLIC BLOOD PRESSURE: 142 MMHG | RESPIRATION RATE: 16 BRPM

## 2022-01-02 LAB
ANION GAP SERPL CALC-SCNC: 6 MMOL/L (ref 7–16)
BUN SERPL-MCNC: 12 MG/DL (ref 8–23)
CALCIUM SERPL-MCNC: 8.8 MG/DL (ref 8.3–10.4)
CHLORIDE SERPL-SCNC: 110 MMOL/L (ref 98–107)
CO2 SERPL-SCNC: 26 MMOL/L (ref 21–32)
CREAT SERPL-MCNC: 0.97 MG/DL (ref 0.8–1.5)
ERYTHROCYTE [DISTWIDTH] IN BLOOD BY AUTOMATED COUNT: 17.2 % (ref 11.9–14.6)
GLUCOSE SERPL-MCNC: 108 MG/DL (ref 65–100)
HCT VFR BLD AUTO: 47.6 % (ref 41.1–50.3)
HGB BLD-MCNC: 15.3 G/DL (ref 13.6–17.2)
MCH RBC QN AUTO: 28.6 PG (ref 26.1–32.9)
MCHC RBC AUTO-ENTMCNC: 32.1 G/DL (ref 31.4–35)
MCV RBC AUTO: 89 FL (ref 79.6–97.8)
NRBC # BLD: 0.02 K/UL (ref 0–0.2)
PLATELET # BLD AUTO: 123 K/UL (ref 150–450)
PMV BLD AUTO: 9.4 FL (ref 9.4–12.3)
POTASSIUM SERPL-SCNC: 4.2 MMOL/L (ref 3.5–5.1)
RBC # BLD AUTO: 5.35 M/UL (ref 4.23–5.6)
SODIUM SERPL-SCNC: 142 MMOL/L (ref 136–145)
WBC # BLD AUTO: 8.8 K/UL (ref 4.3–11.1)

## 2022-01-02 PROCEDURE — 80048 BASIC METABOLIC PNL TOTAL CA: CPT

## 2022-01-02 PROCEDURE — 94640 AIRWAY INHALATION TREATMENT: CPT

## 2022-01-02 PROCEDURE — 36415 COLL VENOUS BLD VENIPUNCTURE: CPT

## 2022-01-02 PROCEDURE — 94760 N-INVAS EAR/PLS OXIMETRY 1: CPT

## 2022-01-02 PROCEDURE — 85027 COMPLETE CBC AUTOMATED: CPT

## 2022-01-02 PROCEDURE — 74011250637 HC RX REV CODE- 250/637: Performed by: FAMILY MEDICINE

## 2022-01-02 PROCEDURE — 74011250637 HC RX REV CODE- 250/637: Performed by: INTERNAL MEDICINE

## 2022-01-02 RX ADMIN — BUDESONIDE AND FORMOTEROL FUMARATE DIHYDRATE 2 PUFF: 160; 4.5 AEROSOL RESPIRATORY (INHALATION) at 09:34

## 2022-01-02 RX ADMIN — Medication 10 ML: at 06:08

## 2022-01-02 RX ADMIN — FLUTICASONE PROPIONATE 2 SPRAY: 50 SPRAY, METERED NASAL at 09:00

## 2022-01-02 RX ADMIN — ACETAMINOPHEN 650 MG: 325 TABLET ORAL at 01:56

## 2022-01-02 RX ADMIN — APIXABAN 10 MG: 5 TABLET, FILM COATED ORAL at 06:07

## 2022-01-02 RX ADMIN — METOPROLOL SUCCINATE 25 MG: 25 TABLET, FILM COATED, EXTENDED RELEASE ORAL at 09:13

## 2022-01-02 NOTE — PROGRESS NOTES
Pt is for discharge home today with family and no needs/supportive care orders recieved for CM at this time. Care Management Interventions  PCP Verified by CM:  Yes  Transition of Care Consult (CM Consult): Discharge Planning  Support Systems: Spouse/Significant Other  Confirm Follow Up Transport: Family  The Plan for Transition of Care is Related to the Following Treatment Goals : return to baseline  The Patient and/or Patient Representative was Provided with a Choice of Provider and Agrees with the Discharge Plan?: Yes  Name of the Patient Representative Who was Provided with a Choice of Provider and Agrees with the Discharge Plan: patient  [employer] Provided?: No  Discharge Location  Discharge Placement: Home

## 2022-01-02 NOTE — DISCHARGE SUMMARY
Hospitalist Discharge Summary   Admit Date:  2021  2:38 AM   DC Note date: 2022  Name:  Polina Easley   Age:  73 y.o. Sex:  male  :  1950   MRN:  [id number]   Room:  [facility]  PCP:  Sparkle Redmond MD    Presenting Complaint: No chief complaint on file. Initial Admission Diagnosis: Pulmonary emboli ([facility] [address] [I26.99]  Acute respiratory disease due to COVID-19 virus [U07.1, J06.9]     Problem List for this Hospitalization:  Hospital Problems as of 2022 Date Reviewed: 2021          Codes Class Noted - Resolved POA    * (Principal) Pulmonary emboli ([facility] [address] ICD-10-CM: I26.99  ICD-9-CM: 415.19  2021 - Present Unknown        COVID-19 ICD-10-CM: U07.1  ICD-9-CM: 079.89  2021 - Present Unknown        Atrial fibrillation ([facility] ICD-10-CM: I48.91  ICD-9-CM: 427.31  2018 - Present Yes    Overview Signed 2018 11:42 AM by Nevin Allison DO     Ablation . eliquis             Essential hypertension, benign ICD-10-CM: I10  ICD-9-CM: 401.1  2013 - Present Yes    Overview Signed 2017  8:46 PM by Kentrell Thompson MD     Microalbumin: 2017 - 118             RESOLVED: Acute respiratory disease due to COVID-19 virus ICD-10-CM: U07.1, J06.9  ICD-9-CM: 465.9, 079.89  2021 - 2021 Unknown            Did Patient have Sepsis (YES OR NO): No    Hospital Course:  Per the admission H&P HPI section: Dean Blanchard a 73 y. o. male with medical history of HTN, afib who presented to ED after abnormal CT.  Patient had outpatient CT completed for concern about PE, due to known blood clots in R leg.  Patient is not currently on anticoagulation, however, previously was on Eliquis due to arrhythmia (he is s/p ablations.)  CT showed multiple bilateral PEs.  Patient was started on a heparin drip.  Initially, plan for admission to [place] [address], where he underwent CT, but rapid COVID is POSITIVE.  Patient has been transferred to [facility] for further care. \"    The patient tolerated the heparin drip had no oxygen requirements had no additional symptoms felt well on the day of discharge. He is okay with starting a treatment dose of apixaban he already has all the pills from when he used to be on it for his A. fib a flutter. He will follow up with his cardiologist within another 2 weeks and knows to continue this for at least 3 months to consider this a provoked DVT from his Covid. Disposition:   Diet: ADULT DIET Regular  Code Status: Full Code    Follow Up Orders:  No orders of the defined types were placed in this encounter. Follow-up Information    None         Follow up labs/diagnostics (ultimately defer to outpatient provider):  Cardiology    Time spent in patient discharge and coordination 32 minutes. Plan was discussed with the patient. All questions answered. Patient was stable at time of discharge. Instructions given to call a physician or return if any concerns. Discharge Info:   Current Discharge Medication List          Procedures done this admission:  * No surgery found *    Consults this admission:  None    Echocardiogram/EKG results:  No results found for this or any previous visit. EKG Results     None          Diagnostic Imaging/Tests:   No results found.     All Micro Results     None          Labs: Results:       BMP, Mg, Phos Recent Labs     01/02/22  0546 01/01/22  0407 12/31/21  0512    143 141   K 4.2 4.0 3.7   * 108* 107   CO2 26 29 30   AGAP 6* 6* 4*   BUN 12 15 17   CREA 0.97 1.09 0.99   CA 8.8 8.4 8.7   * 93 94      CBC Recent Labs     01/02/22  0546 01/01/22  0407 12/31/21  0512 12/30/21 2053 12/30/21 2053   WBC 8.8 9.1 8.8   < > 10.9   RBC 5.35 5.41 4.95   < > 5.37   HGB 15.3 15.4 14.5   < > 15.6   HCT 47.6 48.7 45.3   < > 48.9   * 111* 109*   < > 125*   GRANS  --   --   --   --  81*   LYMPH  --   --   --   --  9*   EOS  --   --   --   --  1   MONOS  --   --   --   --  7   BASOS  --   --   --   --  1   IG --   --   --   --  2   ANEU  --   --   --   --  8.8*   ABL  --   --   --   --  0.9   BRISA  --   --   --   --  0.1   ABM  --   --   --   --  0.7   ABB  --   --   --   --  0.1   AIG  --   --   --   --  0.3    < > = values in this interval not displayed.       LFT Recent Labs     01/01/22  0407   ALT 33   AP 53   TP 5.8*   ALB 2.8*   GLOB 3.0   AGRAT 0.9*      Cardiac Testing Lab Results   Component Value Date/Time    BNP 45 (H) 04/20/2019 01:25 PM    BNP 21 (H) 02/04/2019 04:31 PM    CK 41 01/25/2018 11:24 AM    Troponin-I, Qt. 0.83 () 04/21/2019 05:58 AM    Troponin-I, Qt. 1.01 () 04/20/2019 10:54 PM    Troponin-I, Qt. 1.36 () 04/20/2019 01:41 PM      Coagulation Tests Lab Results   Component Value Date/Time    Prothrombin time 13.6 03/01/2021 08:12 AM    Prothrombin time 13.9 06/23/2020 06:37 AM    Prothrombin time 14.9 (H) 06/17/2020 02:27 PM    INR 1.0 03/01/2021 08:12 AM    INR 1.0 06/23/2020 06:37 AM    INR 1.1 06/17/2020 02:27 PM    aPTT 27.1 12/30/2021 08:53 PM      A1c Lab Results   Component Value Date/Time    Hemoglobin A1c 5.5 01/18/2019 12:05 PM    Hemoglobin A1c 5.5 04/07/2016 11:25 AM    Hemoglobin A1c 5.3 12/08/2014 09:01 AM      Lipid Panel Lab Results   Component Value Date/Time    Cholesterol, total 170 11/29/2018 09:03 AM    HDL Cholesterol 40 11/29/2018 09:03 AM    LDL, calculated 105 (H) 11/29/2018 09:03 AM    VLDL, calculated 25 11/29/2018 09:03 AM    Triglyceride 123 11/29/2018 09:03 AM      Thyroid Panel Lab Results   Component Value Date/Time    TSH 2.000 01/25/2018 11:24 AM    TSH 1.950 02/06/2017 09:19 AM    T4, Free 1.12 02/06/2017 09:19 AM        Most Recent UA Lab Results   Component Value Date/Time    Color YELLOW 01/24/2018 09:00 AM    Appearance CLEAR 01/24/2018 09:00 AM    pH (UA) 7.5 01/24/2018 09:00 AM    Protein TRACE (A) 01/24/2018 09:00 AM    Glucose NEGATIVE  01/24/2018 09:00 AM    Ketone NEGATIVE  01/24/2018 09:00 AM    Bilirubin NEGATIVE  01/24/2018 09:00 AM    Blood NEGATIVE  01/24/2018 09:00 AM    Urobilinogen 1.0 01/24/2018 09:00 AM    Nitrites NEGATIVE  01/24/2018 09:00 AM    Leukocyte Esterase NEGATIVE  01/24/2018 09:00 AM    WBC 0-3 04/07/2016 03:04 PM    RBC 0-3 04/07/2016 03:04 PM    Casts 0-3 04/07/2016 03:04 PM          All Labs from Last 24 Hrs:  Recent Results (from the past 24 hour(s))   METABOLIC PANEL, BASIC    Collection Time: 01/02/22  5:46 AM   Result Value Ref Range    Sodium 142 136 - 145 mmol/L    Potassium 4.2 3.5 - 5.1 mmol/L    Chloride 110 (H) 98 - 107 mmol/L    CO2 26 21 - 32 mmol/L    Anion gap 6 (L) 7 - 16 mmol/L    Glucose 108 (H) 65 - 100 mg/dL    BUN 12 8 - 23 MG/DL    Creatinine 0.97 0.8 - 1.5 MG/DL    GFR est AA >60 >60 ml/min/1.73m2    GFR est non-AA >60 >60 ml/min/1.73m2    Calcium 8.8 8.3 - 10.4 MG/DL   CBC W/O DIFF    Collection Time: 01/02/22  5:46 AM   Result Value Ref Range    WBC 8.8 4.3 - 11.1 K/uL    RBC 5.35 4.23 - 5.6 M/uL    HGB 15.3 13.6 - 17.2 g/dL    HCT 47.6 41.1 - 50.3 %    MCV 89.0 79.6 - 97.8 FL    MCH 28.6 26.1 - 32.9 PG    MCHC 32.1 31.4 - 35.0 g/dL    RDW 17.2 (H) 11.9 - 14.6 %    PLATELET 620 (L) 578 - 450 K/uL    MPV 9.4 9.4 - 12.3 FL    ABSOLUTE NRBC 0.02 0.0 - 0.2 K/uL       Current Med List in Hospital:   Current Facility-Administered Medications   Medication Dose Route Frequency    apixaban (ELIQUIS) tablet 10 mg  10 mg Oral Q12H    [START ON 1/8/2022] apixaban (ELIQUIS) tablet 5 mg  5 mg Oral Q12H    fluticasone propionate (FLONASE) 50 mcg/actuation nasal spray 2 Spray  2 Spray Both Nostrils DAILY    budesonide-formoteroL (SYMBICORT) 160-4.5 mcg/actuation HFA inhaler 2 Puff  2 Puff Inhalation BID RT    albuterol (PROVENTIL VENTOLIN) nebulizer solution 2.5 mg  2.5 mg Nebulization Q6H PRN    tamsulosin (FLOMAX) capsule 0.4 mg  0.4 mg Oral QHS    metoprolol succinate (TOPROL-XL) XL tablet 25 mg  25 mg Oral Q12H    sodium chloride (NS) flush 5-40 mL  5-40 mL IntraVENous Q8H    sodium chloride (NS) flush 5-40 mL 5-40 mL IntraVENous PRN    acetaminophen (TYLENOL) tablet 650 mg  650 mg Oral Q6H PRN    Or    acetaminophen (TYLENOL) suppository 650 mg  650 mg Rectal Q6H PRN    polyethylene glycol (MIRALAX) packet 17 g  17 g Oral DAILY PRN    ondansetron (ZOFRAN ODT) tablet 4 mg  4 mg Oral Q8H PRN    Or    ondansetron (ZOFRAN) injection 4 mg  4 mg IntraVENous Q6H PRN       Allergies   Allergen Reactions    Lisinopril Cough     Immunization History   Administered Date(s) Administered    Influenza High Dose Vaccine PF 11/22/2017, 10/18/2018    Influenza Vaccine 11/03/2015    Pneumococcal Conjugate (PCV-13) 07/31/2017    Pneumococcal Polysaccharide (PPSV-23) 02/10/2010    Tdap 03/05/2012       Recent Vital Data:  Patient Vitals for the past 24 hrs:   Temp Pulse Resp BP SpO2   01/02/22 1114 98.5 °F (36.9 °C) 99 16 (!) 142/88 94 %   01/02/22 0934 -- -- -- -- 96 %   01/02/22 0728 97.6 °F (36.4 °C) 84 16 113/77 96 %   01/02/22 0325 97.8 °F (36.6 °C) 80 16 95/75 95 %   01/01/22 2319 98.4 °F (36.9 °C) 92 16 101/68 93 %   01/01/22 2231 -- 90 -- 121/78 --   01/01/22 2055 -- -- -- -- 92 %   01/01/22 2018 98.2 °F (36.8 °C) 94 16 137/84 92 %   01/01/22 1600 97.9 °F (36.6 °C) 94 20 125/74 94 %     Oxygen Therapy  O2 Sat (%): 94 % (01/02/22 1114)  Pulse via Oximetry: 110 beats per minute (01/02/22 0934)  O2 Device: None (Room air) (01/02/22 0934)    Estimated body mass index is 28.29 kg/m² as calculated from the following:    Height as of this encounter: 5' 8\" (1.727 m). Weight as of this encounter: 84.4 kg (186 lb 1.1 oz). No intake or output data in the 24 hours ending 01/02/22 1324      Physical Exam:    General:    Well nourished. No overt distress  Head:  Normocephalic, atraumatic  Eyes:  Sclerae appear normal.  Pupils equally round. HENT:  Nares appear normal, no drainage. Moist mucous membranes  Neck:  No restricted ROM. Trachea midline  CV:   RRR. No m/r/g. No JVD  Lungs:   CTAB. No wheezing, rhonchi, or rales. Even, unlabored  Abdomen:   Soft, nontender, nondistended. Extremities: Warm and dry. No cyanosis or clubbing. No edema. Skin:     No rashes. Normal coloration  Neuro:  CN II-XII grossly intact. Psych:  Normal mood and affect. Signed:  Masha Mckeon MD    Part of this note may have been written by using a voice dictation software. The note has been proof read but may still contain some grammatical/other typographical errors.

## 2022-03-18 PROBLEM — M35.3 PMR (POLYMYALGIA RHEUMATICA) (HCC): Status: ACTIVE | Noted: 2018-02-21

## 2022-03-18 PROBLEM — D72.810 LYMPHOPENIA: Status: ACTIVE | Noted: 2017-08-06

## 2022-03-18 PROBLEM — J30.89 CHRONIC NONSEASONAL ALLERGIC RHINITIS DUE TO POLLEN: Status: ACTIVE | Noted: 2017-01-11

## 2022-03-18 PROBLEM — U07.1 COVID-19: Status: ACTIVE | Noted: 2021-12-31

## 2022-03-18 PROBLEM — Z92.89 H/O DIAGNOSTIC TESTS: Status: ACTIVE | Noted: 2017-03-03

## 2022-03-19 PROBLEM — I26.99 PULMONARY EMBOLI (HCC): Status: ACTIVE | Noted: 2021-12-31

## 2022-03-19 PROBLEM — R74.8 ELEVATED CREATINE KINASE: Status: ACTIVE | Noted: 2018-01-11

## 2022-03-19 PROBLEM — R26.81 UNSTEADY GAIT: Status: ACTIVE | Noted: 2019-01-15

## 2022-03-19 PROBLEM — R29.2 BABINSKI REFLEX: Status: ACTIVE | Noted: 2019-01-15

## 2022-03-19 PROBLEM — M50.30 DDD (DEGENERATIVE DISC DISEASE), CERVICAL: Status: ACTIVE | Noted: 2018-01-18

## 2022-03-19 PROBLEM — I82.409 DVT (DEEP VENOUS THROMBOSIS) (HCC): Status: ACTIVE | Noted: 2021-12-30

## 2022-03-19 PROBLEM — I48.91 ATRIAL FIBRILLATION (HCC): Status: ACTIVE | Noted: 2018-02-02

## 2022-03-19 PROBLEM — R80.9 MICROALBUMINURIA: Status: ACTIVE | Noted: 2017-09-01

## 2022-03-19 PROBLEM — G62.9 AXONAL POLYNEUROPATHY: Status: ACTIVE | Noted: 2019-01-15

## 2022-03-20 PROBLEM — R91.1 LUNG NODULE, SOLITARY: Status: ACTIVE | Noted: 2017-01-19

## 2022-03-20 PROBLEM — Z00.00 ROUTINE HEALTH MAINTENANCE: Status: ACTIVE | Noted: 2017-01-12

## 2022-03-20 PROBLEM — I26.99 ACUTE PULMONARY EMBOLUS (HCC): Status: ACTIVE | Noted: 2021-12-30

## 2022-03-20 PROBLEM — R07.9 CHEST PAIN: Status: ACTIVE | Noted: 2019-04-20

## 2022-05-02 ENCOUNTER — HOSPITAL ENCOUNTER (EMERGENCY)
Age: 72
Discharge: HOME | DRG: 605 | End: 2022-05-02
Attending: EMERGENCY MEDICINE
Payer: MEDICARE

## 2022-05-02 ENCOUNTER — APPOINTMENT (OUTPATIENT)
Dept: GENERAL RADIOLOGY | Age: 72
DRG: 605 | End: 2022-05-02
Attending: EMERGENCY MEDICINE
Payer: MEDICARE

## 2022-05-02 VITALS
DIASTOLIC BLOOD PRESSURE: 73 MMHG | HEIGHT: 67 IN | WEIGHT: 177 LBS | TEMPERATURE: 98.1 F | RESPIRATION RATE: 16 BRPM | HEART RATE: 85 BPM | SYSTOLIC BLOOD PRESSURE: 116 MMHG | BODY MASS INDEX: 27.78 KG/M2 | OXYGEN SATURATION: 96 %

## 2022-05-02 DIAGNOSIS — R05.9 COUGH: ICD-10-CM

## 2022-05-02 DIAGNOSIS — S30.1XXA CONTUSION OF ABDOMINAL WALL, INITIAL ENCOUNTER: Primary | ICD-10-CM

## 2022-05-02 LAB
ALBUMIN SERPL-MCNC: 3.7 G/DL (ref 3.2–4.6)
ALBUMIN/GLOB SERPL: 1.5 {RATIO}
ALP SERPL-CCNC: 50 U/L (ref 45–117)
ALT SERPL-CCNC: 29 U/L (ref 13–61)
ANION GAP SERPL CALC-SCNC: 10 MMOL/L (ref 7–16)
AST SERPL-CCNC: 31 U/L (ref 15–37)
BASOPHILS # BLD: 0.1 K/UL (ref 0–0.2)
BASOPHILS NFR BLD: 0 % (ref 0–2)
BILIRUB SERPL-MCNC: 0.8 MG/DL (ref 0.2–1.1)
BUN SERPL-MCNC: 18 MG/DL (ref 7–18)
CALCIUM SERPL-MCNC: 8.9 MG/DL (ref 8.3–10.4)
CHLORIDE SERPL-SCNC: 106 MMOL/L (ref 98–107)
CO2 SERPL-SCNC: 26 MMOL/L (ref 21–32)
CREAT SERPL-MCNC: 1.07 MG/DL (ref 0.8–1.5)
DIFFERENTIAL METHOD BLD: ABNORMAL
EOSINOPHIL # BLD: 0 K/UL (ref 0–0.8)
EOSINOPHIL NFR BLD: 0 % (ref 0.5–7.8)
ERYTHROCYTE [DISTWIDTH] IN BLOOD BY AUTOMATED COUNT: 15.5 % (ref 11.9–14.6)
GLOBULIN SER CALC-MCNC: 2.4 G/DL (ref 2.3–3.5)
GLUCOSE SERPL-MCNC: 163 MG/DL (ref 65–100)
HCT VFR BLD AUTO: 46.5 % (ref 41.1–50.3)
HGB BLD-MCNC: 14.9 G/DL (ref 13.6–17.2)
IMM GRANULOCYTES # BLD AUTO: 0.1 K/UL (ref 0–0.5)
IMM GRANULOCYTES NFR BLD AUTO: 1 % (ref 0–5)
INR PPP: 1.2
LYMPHOCYTES # BLD: 0.9 K/UL (ref 0.5–4.6)
LYMPHOCYTES NFR BLD: 7 % (ref 13–44)
MCH RBC QN AUTO: 29.2 PG (ref 26.1–32.9)
MCHC RBC AUTO-ENTMCNC: 32 G/DL (ref 31.4–35)
MCV RBC AUTO: 91.2 FL (ref 79.6–97.8)
MONOCYTES # BLD: 0.8 K/UL (ref 0.1–1.3)
MONOCYTES NFR BLD: 6 % (ref 4–12)
NEUTS SEG # BLD: 11.3 K/UL (ref 1.7–8.2)
NEUTS SEG NFR BLD: 86 % (ref 43–78)
NRBC # BLD: 0 K/UL (ref 0–0.2)
PLATELET # BLD AUTO: 202 K/UL (ref 150–450)
PMV BLD AUTO: 9.3 FL (ref 9.4–12.3)
POTASSIUM SERPL-SCNC: 4.8 MMOL/L (ref 3.5–5.1)
PROT SERPL-MCNC: 6.1 G/DL (ref 6.4–8.2)
PROTHROMBIN TIME: 15.8 SEC (ref 12.6–14.5)
RBC # BLD AUTO: 5.1 M/UL (ref 4.23–5.6)
SODIUM SERPL-SCNC: 142 MMOL/L (ref 136–145)
WBC # BLD AUTO: 13.2 K/UL (ref 4.3–11.1)

## 2022-05-02 PROCEDURE — 71111 X-RAY EXAM RIBS/CHEST4/> VWS: CPT

## 2022-05-02 PROCEDURE — 85025 COMPLETE CBC W/AUTO DIFF WBC: CPT

## 2022-05-02 PROCEDURE — 99284 EMERGENCY DEPT VISIT MOD MDM: CPT

## 2022-05-02 PROCEDURE — 85610 PROTHROMBIN TIME: CPT

## 2022-05-02 PROCEDURE — 80053 COMPREHEN METABOLIC PANEL: CPT

## 2022-05-02 NOTE — DISCHARGE INSTRUCTIONS
Continue to use ice as well as Ace bandage to help with bruising and swelling  Follow-up with your primary care physician  Return to the ER for any new, worsening or life-threatening symptom

## 2022-05-02 NOTE — ED PROVIDER NOTES
Patient presents ER with complaints of abdominal pain and swelling. Reports he has had cough for over a week and a half. Placed on antibiotics for bronchitis. States he is noted some bruising to his upper abdomen. States bruising is worsened overnight. He denies any vomiting. The history is provided by the patient. Abdominal Pain   This is a new problem. The current episode started 3 to 5 hours ago. The problem occurs constantly. The problem has not changed since onset. The pain is located in the generalized abdominal region and LLQ. The quality of the pain is aching and cramping. The pain is at a severity of 4/10. The pain is moderate. Pertinent negatives include no fever, no nausea, no vomiting, no myalgias, no chest pain and no back pain. His past medical history does not include PUD or ulcerative colitis. Past Medical History:   Diagnosis Date    Acute pulmonary embolus ([facility] [address] 12/30/2021    Arrhythmia     A fib     Asthma, moderate persistent, well-controlled 5/30/2013    Axonal polyneuropathy 1/15/2019    Babinski reflex 1/15/2019    Chronic left shoulder pain 5/30/2013    Pain radiates from his neck    DDD (degenerative disc disease), cervical 1/18/2018    MRI: 1/2018 - multilevel DDD and facet arthritis w/ moderate bilateral foraminal narrowing at C6-7 and moderate right at C5-6.     Essential hypertension, benign 5/30/2013    Hypercholesteremia 12/7/2015    Hypogonadism, testicular 5/30/2013    Removal of right testicle on 11/1/2010; urologist Eagle Harris of [facility], gives testosterone injections    Lumbosacral radiculopathy at L5 4/17/2016    Lung nodule, solitary 1/19/2017    Chronic - benign w/u    Lymphopenia 8/6/2017    Microalbuminuria 9/1/2017    PMR (polymyalgia rheumatica) ([facility] [address] 2/21/2018    Followed by [facility]ology    Prediabetes 04/17/2016    Unsteady gait 1/15/2019    Vitamin D deficiency 6/2/2015       Past Surgical History:   Procedure Laterality Date    HX AFIB ABLATION  2018    HX COLONOSCOPY      normal    HX LUMBAR DISKECTOMY      L5 discectomy    HX LUMBAR DISKECTOMY      L4    HX OTHER SURGICAL      ablation for Afib     HX UROLOGICAL  2010    testicular, right removed d/t  severe infection    [place] CARDIAC SURG PROCEDURE UNLIST           Family History:   Problem Relation Age of Onset    Cancer Father         leukemia    Hypertension Mother     Cancer Mother         breast    Cancer Sister         colon       Social History     Socioeconomic History    Marital status:      Spouse name: Not on file    Number of children: Not on file    Years of education: Not on file    Highest education level: Not on file   Occupational History    Not on file   Tobacco Use    Smoking status: Former Smoker     Quit date:      Years since quittin.3    Smokeless tobacco: Never Used    Tobacco comment: smoked cigerattes from age 27 to 47   Substance and Sexual Activity    Alcohol use: Yes     Comment: occ drinks wine    Drug use: No    Sexual activity: Not on file   Other Topics Concern    Not on file   Social History Narrative    Not on file     Social Determinants of Health     Financial Resource Strain:     Difficulty of Paying Living Expenses: Not on file   Food Insecurity:     Worried About Running Out of Food in the Last Year: Not on file    Juanjo of Food in the Last Year: Not on file   Transportation Needs:     Lack of Transportation (Medical): Not on file    Lack of Transportation (Non-Medical):  Not on file   Physical Activity:     Days of Exercise per Week: Not on file    Minutes of Exercise per Session: Not on file   Stress:     Feeling of Stress : Not on file   Social Connections:     Frequency of Communication with Friends and Family: Not on file    Frequency of Social Gatherings with Friends and Family: Not on file    Attends Faith Services: Not on file   [employer] Clubs or Organizations: Not on file    Attends Club or Organization Meetings: Not on file    Marital Status: Not on file   Intimate Partner Violence:     Fear of Current or Ex-Partner: Not on file    Emotionally Abused: Not on file    Physically Abused: Not on file    Sexually Abused: Not on file   Housing Stability:     Unable to Pay for Housing in the Last Year: Not on file    Number of [place] in the Last Year: Not on file    Unstable Housing in the Last Year: Not on file         ALLERGIES: Lisinopril    Review of Systems   Constitutional: Negative for fatigue and fever. HENT: Negative for congestion, dental problem and voice change. Eyes: Negative for redness and visual disturbance. Respiratory: Positive for cough. Negative for chest tightness. Cardiovascular: Negative for chest pain and leg swelling. Gastrointestinal: Positive for abdominal pain. Negative for nausea and vomiting. Endocrine: Negative for polyphagia and polyuria. Musculoskeletal: Negative for back pain, myalgias and neck stiffness. Skin: Positive for color change. Negative for pallor and rash. Neurological: Negative for syncope and weakness. Hematological: Negative for adenopathy. Does not bruise/bleed easily. Psychiatric/Behavioral: Negative for behavioral problems. All other systems reviewed and are negative. Vitals:    05/02/22 1209   BP: 116/73   Pulse: 85   Resp: 16   Temp: 98.1 °F (36.7 °C)   SpO2: 96%   Weight: 80.3 kg (177 lb)   Height: 5' 7\" (1.702 m)            Physical Exam  Vitals and nursing note reviewed. Constitutional:       General: He is not in acute distress. Appearance: Normal appearance. He is not ill-appearing. HENT:      Head: Normocephalic and atraumatic. Right Ear: External ear normal.      Left Ear: External ear normal.      Nose: Nose normal. No rhinorrhea. Eyes:      General:         Right eye: No discharge. Left eye: No discharge.       Extraocular Movements: Extraocular movements intact. Pupils: Pupils are equal, round, and reactive to light. Cardiovascular:      Rate and Rhythm: Normal rate and regular rhythm. Pulses: Normal pulses. Heart sounds: Normal heart sounds. Pulmonary:      Effort: Pulmonary effort is normal. No respiratory distress. Breath sounds: Normal breath sounds. No wheezing. Abdominal:      General: Abdomen is flat. Palpations: There is no mass. Tenderness: There is abdominal tenderness. Hernia: No hernia is present. Musculoskeletal:         General: No swelling, tenderness, deformity or signs of injury. Normal range of motion. Cervical back: Normal range of motion and neck supple. Skin:     General: Skin is warm. Coloration: Skin is not pale. Findings: Bruising present. Neurological:      General: No focal deficit present. Mental Status: He is alert and oriented to person, place, and time. Cranial Nerves: No cranial nerve deficit. Psychiatric:         Mood and Affect: Mood normal.         Behavior: Behavior normal.          MDM  Number of Diagnoses or Management Options  Diagnosis management comments: Did perform bedside ultrasound of abdominal wall, no obvious huge expanding hematoma noted. Some bruising and edema within the subcutaneous tissues noted    Will obtain basic labs here as well as chest and rib x-rays    1:29 PM  Labs are stable here including hemoglobin of 14.9 and previous was noted to be 15.3. Chest x-ray shows no gross abnormalities. Symptomatically patient feels better. Did place an Ace bandage around her abdomen with an ice pack and patient states it seems to help. His primary care physician and call him in cough syrup with codeine. We will have him take that at home for pain and coughing. We discussed immediate return to the ER precautions.        Amount and/or Complexity of Data Reviewed  Clinical lab tests: ordered and reviewed  Tests in the radiology section of CPT®: ordered and reviewed           Bedside US    Date/Time: 5/2/2022 12:28 PM  Performed by: Yocasta Garner MD  Authorized by: Yocasta Garner MD     Written consent obtained: Yes    Given by:  Patient  Performed by: Attending  Type of procedure: Focused soft tissue  Left leg: Indications:  Swelling    Skin and subcutaneous tissue:  Adequate    Cobblestoning:  Increased    Subcutaneous Collection:  Absent                     Results Include:    Recent Results (from the past 24 hour(s))   CBC WITH AUTOMATED DIFF    Collection Time: 05/02/22 12:16 PM   Result Value Ref Range    WBC 13.2 (H) 4.3 - 11.1 K/uL    RBC 5.10 4.23 - 5.60 M/uL    HGB 14.9 13.6 - 17.2 g/dL    HCT 46.5 41.1 - 50.3 %    MCV 91.2 79.6 - 97.8 FL    MCH 29.2 26.1 - 32.9 PG    MCHC 32.0 31.4 - 35.0 g/dL    RDW 15.5 (H) 11.9 - 14.6 %    PLATELET 397 538 - 335 K/uL    MPV 9.3 (L) 9.4 - 12.3 FL    ABSOLUTE NRBC 0.00 0.0 - 0.2 K/uL    DF AUTOMATED      NEUTROPHILS 86 (H) 43 - 78 %    LYMPHOCYTES 7 (L) 13 - 44 %    MONOCYTES 6 4.0 - 12.0 %    EOSINOPHILS 0 (L) 0.5 - 7.8 %    BASOPHILS 0 0.0 - 2.0 %    IMMATURE GRANULOCYTES 1 0.0 - 5.0 %    ABS. NEUTROPHILS 11.3 (H) 1.7 - 8.2 K/UL    ABS. LYMPHOCYTES 0.9 0.5 - 4.6 K/UL    ABS. MONOCYTES 0.8 0.1 - 1.3 K/UL    ABS. EOSINOPHILS 0.0 0.0 - 0.8 K/UL    ABS. BASOPHILS 0.1 0.0 - 0.2 K/UL    ABS. IMM.  GRANS. 0.1 0.0 - 0.5 K/UL   METABOLIC PANEL, COMPREHENSIVE    Collection Time: 05/02/22 12:16 PM   Result Value Ref Range    Sodium 142 136 - 145 mmol/L    Potassium 4.8 3.5 - 5.1 mmol/L    Chloride 106 98 - 107 mmol/L    CO2 26 21 - 32 mmol/L    Anion gap 10 7.0 - 16.0 mmol/L    Glucose 163 (H) 65 - 100 mg/dL    BUN 18 7.0 - 18.0 MG/DL    Creatinine 1.07 0.8 - 1.5 MG/DL    GFR est AA >88 >60 ml/min/1.73m2    GFR est non-AA >60 >60 ml/min/1.73m2    Calcium 8.9 8.3 - 10.4 MG/DL    Bilirubin, total 0.8 0.2 - 1.1 MG/DL    ALT (SGPT) 29 13.0 - 61.0 U/L    AST (SGOT) 31 15 - 37 U/L    Alk. phosphatase 50 45.0 - 117.0 U/L    Protein, total 6.1 (L) 6.4 - 8.2 g/dL    Albumin 3.7 3.2 - 4.6 g/dL    Globulin 2.4 2.3 - 3.5 g/dL    A-G Ratio 1.5     PROTHROMBIN TIME + INR    Collection Time: 05/02/22 12:16 PM   Result Value Ref Range    Prothrombin time 15.8 (H) 12.6 - 14.5 sec    INR 1.2       Voice dictation software was used during the making of this note. This software is not perfect and grammatical and other typographical errors may be present. This note has been proofread, but may still contain errors.   Connor Zaidi MD; 5/2/2022 @1:30 PM   ===================================================================

## 2022-05-02 NOTE — ED NOTES
I have reviewed discharge instructions with the patient. The patient verbalized understanding. Patient left ED via Discharge Method: ambulatory to Home with family member. Opportunity for questions and clarification provided. Patient given 0 scripts. To continue your aftercare when you leave the hospital, you may receive an automated call from our care team to check in on how you are doing. This is a free service and part of our promise to provide the best care and service to meet your aftercare needs.  If you have questions, or wish to unsubscribe from this service please call [phone]. Thank you for Choosing our [facility] Emergency Department.

## 2022-05-02 NOTE — ED TRIAGE NOTES
Pt presents to ED c/o hematoma to left abdomen. Pt states he has had bronchitis and was put on abox by PCP. States he has been coughing so hard that he got a left abd hematoma. Pain 9/10. Masked.

## 2022-05-05 ENCOUNTER — APPOINTMENT (OUTPATIENT)
Dept: CT IMAGING | Age: 72
DRG: 605 | End: 2022-05-05
Attending: EMERGENCY MEDICINE
Payer: MEDICARE

## 2022-05-05 ENCOUNTER — HOSPITAL ENCOUNTER (INPATIENT)
Age: 72
LOS: 3 days | Discharge: HOME | DRG: 605 | End: 2022-05-08
Attending: FAMILY MEDICINE | Admitting: FAMILY MEDICINE
Payer: MEDICARE

## 2022-05-05 ENCOUNTER — HOSPITAL ENCOUNTER (EMERGENCY)
Age: 72
Discharge: OTHER INSTITUTION | DRG: 605 | End: 2022-05-05
Attending: EMERGENCY MEDICINE
Payer: MEDICARE

## 2022-05-05 VITALS
BODY MASS INDEX: 27.78 KG/M2 | RESPIRATION RATE: 18 BRPM | WEIGHT: 177 LBS | TEMPERATURE: 98.2 F | OXYGEN SATURATION: 96 % | DIASTOLIC BLOOD PRESSURE: 72 MMHG | HEIGHT: 67 IN | HEART RATE: 84 BPM | SYSTOLIC BLOOD PRESSURE: 113 MMHG

## 2022-05-05 DIAGNOSIS — S30.1XXD HEMATOMA OF RECTUS SHEATH, SUBSEQUENT ENCOUNTER: Primary | ICD-10-CM

## 2022-05-05 DIAGNOSIS — D62 ANEMIA DUE TO ACUTE BLOOD LOSS: ICD-10-CM

## 2022-05-05 DIAGNOSIS — J45.21 MILD INTERMITTENT ASTHMA WITH ACUTE EXACERBATION: Primary | ICD-10-CM

## 2022-05-05 PROBLEM — S30.1XXA ABDOMINAL WALL HEMATOMA: Status: ACTIVE | Noted: 2022-05-05

## 2022-05-05 LAB
ANION GAP SERPL CALC-SCNC: 11 MMOL/L (ref 7–16)
BASOPHILS # BLD: 0.1 K/UL (ref 0–0.2)
BASOPHILS NFR BLD: 1 % (ref 0–2)
BUN SERPL-MCNC: 22 MG/DL (ref 7–18)
CALCIUM SERPL-MCNC: 9.5 MG/DL (ref 8.3–10.4)
CHLORIDE SERPL-SCNC: 101 MMOL/L (ref 98–107)
CO2 SERPL-SCNC: 29 MMOL/L (ref 21–32)
CREAT SERPL-MCNC: 1.21 MG/DL (ref 0.8–1.5)
DIFFERENTIAL METHOD BLD: ABNORMAL
EOSINOPHIL # BLD: 0 K/UL (ref 0–0.8)
EOSINOPHIL NFR BLD: 0 % (ref 0.5–7.8)
ERYTHROCYTE [DISTWIDTH] IN BLOOD BY AUTOMATED COUNT: 16 % (ref 11.9–14.6)
GLUCOSE SERPL-MCNC: 118 MG/DL (ref 65–100)
HCT VFR BLD AUTO: 32.3 % (ref 41.1–50.3)
HGB BLD-MCNC: 10.4 G/DL (ref 13.6–17.2)
IMM GRANULOCYTES # BLD AUTO: 0.2 K/UL (ref 0–0.5)
IMM GRANULOCYTES NFR BLD AUTO: 2 % (ref 0–5)
INR PPP: 1.2
LYMPHOCYTES # BLD: 0.8 K/UL (ref 0.5–4.6)
LYMPHOCYTES NFR BLD: 7 % (ref 13–44)
MCH RBC QN AUTO: 29.5 PG (ref 26.1–32.9)
MCHC RBC AUTO-ENTMCNC: 32.2 G/DL (ref 31.4–35)
MCV RBC AUTO: 91.5 FL (ref 79.6–97.8)
MONOCYTES # BLD: 0.8 K/UL (ref 0.1–1.3)
MONOCYTES NFR BLD: 7 % (ref 4–12)
NEUTS SEG # BLD: 9.1 K/UL (ref 1.7–8.2)
NEUTS SEG NFR BLD: 83 % (ref 43–78)
NRBC # BLD: 0.03 K/UL (ref 0–0.2)
PLATELET # BLD AUTO: 185 K/UL (ref 150–450)
PMV BLD AUTO: 9.2 FL (ref 9.4–12.3)
POTASSIUM SERPL-SCNC: 4.4 MMOL/L (ref 3.5–5.1)
PROTHROMBIN TIME: 15.4 SEC (ref 12.6–14.5)
RBC # BLD AUTO: 3.53 M/UL (ref 4.23–5.6)
SODIUM SERPL-SCNC: 141 MMOL/L (ref 136–145)
WBC # BLD AUTO: 10.9 K/UL (ref 4.3–11.1)

## 2022-05-05 PROCEDURE — 74177 CT ABD & PELVIS W/CONTRAST: CPT

## 2022-05-05 PROCEDURE — 94640 AIRWAY INHALATION TREATMENT: CPT

## 2022-05-05 PROCEDURE — 74011636637 HC RX REV CODE- 636/637: Performed by: FAMILY MEDICINE

## 2022-05-05 PROCEDURE — 85610 PROTHROMBIN TIME: CPT

## 2022-05-05 PROCEDURE — 86900 BLOOD TYPING SEROLOGIC ABO: CPT

## 2022-05-05 PROCEDURE — 74011000250 HC RX REV CODE- 250: Performed by: FAMILY MEDICINE

## 2022-05-05 PROCEDURE — 74011000258 HC RX REV CODE- 258: Performed by: EMERGENCY MEDICINE

## 2022-05-05 PROCEDURE — 74011000636 HC RX REV CODE- 636: Performed by: EMERGENCY MEDICINE

## 2022-05-05 PROCEDURE — 80048 BASIC METABOLIC PNL TOTAL CA: CPT

## 2022-05-05 PROCEDURE — 74011250637 HC RX REV CODE- 250/637: Performed by: FAMILY MEDICINE

## 2022-05-05 PROCEDURE — 36415 COLL VENOUS BLD VENIPUNCTURE: CPT

## 2022-05-05 PROCEDURE — 85025 COMPLETE CBC W/AUTO DIFF WBC: CPT

## 2022-05-05 PROCEDURE — 65270000029 HC RM PRIVATE

## 2022-05-05 PROCEDURE — 94760 N-INVAS EAR/PLS OXIMETRY 1: CPT

## 2022-05-05 RX ORDER — POLYETHYLENE GLYCOL 3350 17 G/17G
17 POWDER, FOR SOLUTION ORAL DAILY PRN
Status: DISCONTINUED | OUTPATIENT
Start: 2022-05-05 | End: 2022-05-08 | Stop reason: HOSPADM

## 2022-05-05 RX ORDER — TAMSULOSIN HYDROCHLORIDE 0.4 MG/1
0.4 CAPSULE ORAL 2 TIMES DAILY
Status: DISCONTINUED | OUTPATIENT
Start: 2022-05-05 | End: 2022-05-08 | Stop reason: HOSPADM

## 2022-05-05 RX ORDER — HYDROCODONE BITARTRATE AND HOMATROPINE METHYLBROMIDE 1.5; 5 MG/5ML; MG/5ML
5 SYRUP ORAL
Status: DISCONTINUED | OUTPATIENT
Start: 2022-05-05 | End: 2022-05-08 | Stop reason: HOSPADM

## 2022-05-05 RX ORDER — PREDNISONE 20 MG/1
40 TABLET ORAL
Status: DISCONTINUED | OUTPATIENT
Start: 2022-05-05 | End: 2022-05-07

## 2022-05-05 RX ORDER — SODIUM CHLORIDE 0.9 % (FLUSH) 0.9 %
5-40 SYRINGE (ML) INJECTION AS NEEDED
Status: DISCONTINUED | OUTPATIENT
Start: 2022-05-05 | End: 2022-05-08 | Stop reason: HOSPADM

## 2022-05-05 RX ORDER — ONDANSETRON 2 MG/ML
4 INJECTION INTRAMUSCULAR; INTRAVENOUS
Status: DISCONTINUED | OUTPATIENT
Start: 2022-05-05 | End: 2022-05-08 | Stop reason: HOSPADM

## 2022-05-05 RX ORDER — ACETAMINOPHEN 325 MG/1
650 TABLET ORAL
Status: DISCONTINUED | OUTPATIENT
Start: 2022-05-05 | End: 2022-05-08 | Stop reason: HOSPADM

## 2022-05-05 RX ORDER — CETIRIZINE HYDROCHLORIDE 5 MG/1
5 TABLET ORAL DAILY
Status: DISCONTINUED | OUTPATIENT
Start: 2022-05-06 | End: 2022-05-08 | Stop reason: HOSPADM

## 2022-05-05 RX ORDER — METOPROLOL SUCCINATE 50 MG/1
50 TABLET, EXTENDED RELEASE ORAL 2 TIMES DAILY
Status: DISCONTINUED | OUTPATIENT
Start: 2022-05-05 | End: 2022-05-08 | Stop reason: HOSPADM

## 2022-05-05 RX ORDER — ONDANSETRON 4 MG/1
4 TABLET, ORALLY DISINTEGRATING ORAL
Status: DISCONTINUED | OUTPATIENT
Start: 2022-05-05 | End: 2022-05-08 | Stop reason: HOSPADM

## 2022-05-05 RX ORDER — ALBUTEROL SULFATE 0.83 MG/ML
2.5 SOLUTION RESPIRATORY (INHALATION)
Status: DISCONTINUED | OUTPATIENT
Start: 2022-05-05 | End: 2022-05-06

## 2022-05-05 RX ORDER — SODIUM CHLORIDE 0.9 % (FLUSH) 0.9 %
5-40 SYRINGE (ML) INJECTION EVERY 8 HOURS
Status: DISCONTINUED | OUTPATIENT
Start: 2022-05-05 | End: 2022-05-08 | Stop reason: HOSPADM

## 2022-05-05 RX ORDER — ACETAMINOPHEN 650 MG/1
650 SUPPOSITORY RECTAL
Status: DISCONTINUED | OUTPATIENT
Start: 2022-05-05 | End: 2022-05-08 | Stop reason: HOSPADM

## 2022-05-05 RX ADMIN — PREDNISONE 40 MG: 20 TABLET ORAL at 16:04

## 2022-05-05 RX ADMIN — SODIUM CHLORIDE 100 ML: 9 INJECTION, SOLUTION INTRAVENOUS at 13:01

## 2022-05-05 RX ADMIN — ACETAMINOPHEN 650 MG: 325 TABLET ORAL at 16:04

## 2022-05-05 RX ADMIN — MOMETASONE FUROATE AND FORMOTEROL FUMARATE DIHYDRATE 2 PUFF: 200; 5 AEROSOL RESPIRATORY (INHALATION) at 20:40

## 2022-05-05 RX ADMIN — TAMSULOSIN HYDROCHLORIDE 0.4 MG: 0.4 CAPSULE ORAL at 17:44

## 2022-05-05 RX ADMIN — HYDROCODONE BITARTRATE AND HOMATROPINE METHYLBROMIDE 5 ML: 5; 1.5 SOLUTION ORAL at 17:44

## 2022-05-05 RX ADMIN — SODIUM CHLORIDE, PRESERVATIVE FREE 10 ML: 5 INJECTION INTRAVENOUS at 21:00

## 2022-05-05 RX ADMIN — IOPAMIDOL 100 ML: 755 INJECTION, SOLUTION INTRAVENOUS at 13:01

## 2022-05-05 RX ADMIN — SODIUM CHLORIDE, PRESERVATIVE FREE 5 ML: 5 INJECTION INTRAVENOUS at 16:05

## 2022-05-05 RX ADMIN — METOPROLOL SUCCINATE 50 MG: 50 TABLET, EXTENDED RELEASE ORAL at 17:44

## 2022-05-05 NOTE — ACP (ADVANCE CARE PLANNING)
[facility] Hospitalist Service  At the heart of better care     Advance Care Planning   Admit Date:  2022  2:59 PM   Name:  Sidney Day   Age:  81 y.o. Sex:  male  :  1950   MRN:  [id number]   Room:  85 Lorna Gomez is able to make his own decisions: Yes    Patient / surrogate decision-maker directed:  Code Status: FULL CODE -full aggressive medical and surgical interventions, including intubations, resuscitations, pressors, artificial tube feeding    Patient or surrogate consented to discussion of the current conditions, workup, management plans, prognosis, and understand the risk for further deterioration. Time spent: 17 minutes in direct discussion (face to face and/or over phone).     Signed:  Esther Puga DO

## 2022-05-05 NOTE — ED TRIAGE NOTES
Patient ambulatory to triage with after being seen on 5/2/2022 for bruising on abdomen. Patient states the bruising is getting worse and moving down from his abdomen.

## 2022-05-05 NOTE — PROGRESS NOTES
TRANSFER - IN REPORT:    Verbal report received from [facility] RN(name) on Rose Dodd  being received from [facility](unit) for routine progression of care      Report consisted of patients Situation, Background, Assessment and   Recommendations(SBAR). Information from the following report(s) SBAR was reviewed with the receiving nurse. Opportunity for questions and clarification was provided. Assessment completed upon patients arrival to unit and care assumed.

## 2022-05-05 NOTE — PROGRESS NOTES
05/05/22 1531   Dual Skin Pressure Injury Assessment   Dual Skin Pressure Injury Assessment WDL   Primary Nurse Jennifer Josue RN and  Ricky Sheehan RN performed a dual skin assessment on this patient No impairment noted  Delonte score is 23  extensive bruising in abd /suprapubic area and buttocks.  Echymosis

## 2022-05-05 NOTE — PROGRESS NOTES
Chart screened by  for potential discharge needs or concerns. Please notify/consult  if any discharge needs arise. Care Management Interventions  PCP Verified by CM: Yes  Mode of Transport at Discharge:  Other (see comment) (family)  Discharge Durable Medical Equipment: No  Physical Therapy Consult: No  Occupational Therapy Consult: No  Speech Therapy Consult: No  Support Systems: Spouse/Significant Other  Confirm Follow Up Transport: Family  Discharge Location  Patient Expects to be Discharged to[de-identified] Unable to determine at this time

## 2022-05-05 NOTE — ED PROVIDER NOTES
82-year-old male presenting to the emergency department today complaining of worsening abdominal pain with spreading of the abdominal wall hematoma that he was seen and evaluated for 3 days ago. The patient states that this is the third time he has had an episode like this but this is the most extensive. When it started going into the groin and testicles the patient became concerned and he has had worsening pain in the left upper quadrant where it all initiated. Patient does take Eliquis. Denies any trauma or injury but says this occurs with his persistent cough. Past Medical History:   Diagnosis Date    Acute pulmonary embolus ([facility] [address] 12/30/2021    Arrhythmia     A fib     Asthma, moderate persistent, well-controlled 5/30/2013    Axonal polyneuropathy 1/15/2019    Babinski reflex 1/15/2019    Chronic left shoulder pain 5/30/2013    Pain radiates from his neck    DDD (degenerative disc disease), cervical 1/18/2018    MRI: 1/2018 - multilevel DDD and facet arthritis w/ moderate bilateral foraminal narrowing at C6-7 and moderate right at C5-6.     Essential hypertension, benign 5/30/2013    Hypercholesteremia 12/7/2015    Hypogonadism, testicular 5/30/2013    Removal of right testicle on 11/1/2010; urologist Ayla Plasencia of [facility], gives testosterone injections    Lumbosacral radiculopathy at L5 4/17/2016    Lung nodule, solitary 1/19/2017    Chronic - benign w/u    Lymphopenia 8/6/2017    Microalbuminuria 9/1/2017    PMR (polymyalgia rheumatica) ([facility] [address] 2/21/2018    Followed by [facility]ology    Prediabetes 04/17/2016    Unsteady gait 1/15/2019    Vitamin D deficiency 6/2/2015       Past Surgical History:   Procedure Laterality Date    HX AFIB ABLATION  02/2018    HX COLONOSCOPY  4/03    normal    HX LUMBAR DISKECTOMY  1989    L5 discectomy    HX LUMBAR DISKECTOMY  1983    L4    HX OTHER SURGICAL      ablation for Afib     HX UROLOGICAL  11/1/2010    testicular, right removed d/t  severe infection    [place] CARDIAC SURG PROCEDURE UNLIST           Family History:   Problem Relation Age of Onset   Delatorre Cancer Father         leukemia    Hypertension Mother     Cancer Mother         breast    Cancer Sister         colon       Social History     Socioeconomic History    Marital status:      Spouse name: Not on file    Number of children: Not on file    Years of education: Not on file    Highest education level: Not on file   Occupational History    Not on file   Tobacco Use    Smoking status: Former Smoker     Quit date:      Years since quittin.3    Smokeless tobacco: Never Used    Tobacco comment: smoked cigerattes from age 27 to 47   Substance and Sexual Activity    Alcohol use: Yes     Comment: occ drinks wine    Drug use: No    Sexual activity: Not on file   Other Topics Concern    Not on file   Social History Narrative    Not on file     Social Determinants of Health     Financial Resource Strain:     Difficulty of Paying Living Expenses: Not on file   Food Insecurity:     Worried About [address] [employer] in the Last Year: Not on file    Juanjo of Food in the Last Year: Not on file   Transportation Needs:     Lack of Transportation (Medical): Not on file    Lack of Transportation (Non-Medical):  Not on file   Physical Activity:     Days of Exercise per Week: Not on file    Minutes of Exercise per Session: Not on file   Stress:     Feeling of Stress : Not on file   Social Connections:     Frequency of Communication with Friends and Family: Not on file    Frequency of Social Gatherings with Friends and Family: Not on file    Attends Church Services: Not on file    Active Member of Clubs or Organizations: Not on file    Attends Club or Organization Meetings: Not on file    Marital Status: Not on file   Intimate Partner Violence:     Fear of Current or Ex-Partner: Not on file    Emotionally Abused: Not on file    Physically Abused: Not on file    Sexually Abused: Not on file   Housing Stability:     Unable to Pay for Housing in the Last Year: Not on file    Number of Places Lived in the Last Year: Not on file    Unstable Housing in the Last Year: Not on file         ALLERGIES: Lisinopril    Review of Systems   Gastrointestinal: Positive for abdominal pain. Skin: Positive for color change. All other systems reviewed and are negative. Vitals:    05/05/22 1148 05/05/22 1149   BP: (!) 155/82    Pulse: 84    Resp: 18    Temp: 98.2 °F (36.8 °C)    SpO2: 96% 96%   Weight: 80.3 kg (177 lb)    Height: 5' 7\" (1.702 m)             Physical Exam     GENERAL:The patient has Body mass index is 27.72 kg/m². Well-hydrated. VITAL SIGNS: Heart rate, blood pressure, respiratory rate reviewed as recorded in  nurse's notes  EYES: Pupils reactive. Extraocular motion intact. No conjunctival redness or drainage. NECK: Supple, no meningeal signs. Trachea midline. No masses or thyromegaly. LUNGS: Breath sounds clear and equal bilaterally no accessory muscle use. CHEST: No deformity  CARDIOVASCULAR: Regular rate and rhythm  ABDOMEN: Soft with left upper quadrant tenderness. No palpable masses or organomegaly. No  peritoneal signs. No rigidity. EXTREMITIES: No clubbing or cyanosis. No joint swelling. Normal muscle tone. No  restricted range of motion appreciated. NEUROLOGIC: Sensation is grossly intact. Cranial nerve exam reveals face is  symmetrical, tongue is midline speech is clear. SKIN: Good skin turgor palpated. Diffuse ecchymosis across the abdominal wall. Goes down into the suprapubic region testicle and penile shaft. This also goes around to his back. Please see photo  PSYCHIATRIC: Alert and oriented. Appropriate behavior and judgment.                   MDM  Number of Diagnoses or Management Options  Diagnosis management comments: Viral infection, gastroenteritis, viral adenitis, pseudomembranous colitis, inflammatory  bowel disease, infectious diarrhea    Abdominal wall pain,     Constipation, fecal impaction, small bowel obstruction, partial small bowel obstruction,  Ileus    UTI, pyelonephritis, renal colic, ureteral stone     Peptic ulcer disease, esophagitis, GERD    Pancreatitis, pancreatic pseudocyst,    hepatic cirrhosis, GI bleed, esophageal varices, poisoning,    gallbladder disease, cholecystitis, diverticulitis, appendicitis, appendicitis with rupture,    ingestion of foreign material         Amount and/or Complexity of Data Reviewed  Clinical lab tests: ordered and reviewed  Tests in the radiology section of CPT®: reviewed and ordered  Review and summarize past medical records: yes  Independent visualization of images, tracings, or specimens: yes      ED Course as of 05/05/22 1341   Thu May 05, 2022   1227 HGB(!): 10.4  14.9 on 5/2/22 [KH]   5 I spoke to Dr. Rae Cross with interventional radiology and he will review the patient's CT imaging and provide recommendations. [MW]   5933 Dr. Rae Cross recommended stopping his blood thinner. He will be admitted to [facility] and they will evaluate the patient. If his hemoglobin continues to drop they will consider embolization of the offending vessel [KH]   7582 I talked to the patient about the findings in the emergency department on the blood work and imaging studies and he is agreeable with this plan. [LI]   4655 Dr. Roxie Limon hospitalist was consulted for admission.  [KH]      ED Course User Index  [KH] Nubia Flair, DO       Procedures

## 2022-05-05 NOTE — ED NOTES
TRANSFER - OUT REPORT:    Verbal report given to Al, RN(name) on Beverly Goodell  being transferred to  702(unit) for routine progression of care       Report consisted of patients Situation, Background, Assessment and   Recommendations(SBAR). Information from the following report(s) ED Summary was reviewed with the receiving nurse. Lines:   Peripheral IV 05/05/22 Left Antecubital (Active)   Site Assessment Clean, dry, & intact 05/05/22 1213   Phlebitis Assessment 0 05/05/22 1213   Infiltration Assessment 0 05/05/22 1213   Dressing Status Clean, dry, & intact 05/05/22 1213   Dressing Type Transparent 05/05/22 1213   Hub Color/Line Status Green 05/05/22 1213        Opportunity for questions and clarification was provided.       Patient transported with:   EMS transport

## 2022-05-05 NOTE — H&P
Hospitalist Admission History and Physical     NAME:  Mortimer Force   Age:  81 y.o.  :   1950   MRN:   [id number]  PCP: Sofia Hernandez MD  Consulting MD:  Treatment Team: Attending Provider: Shilpi Silva DO    No chief complaint on file. HPI:   Patient is a 81 y.o. male who presented as a direct admit from [facility] ER for acute blood loss anemia from worsening abdominal hematoma. Hx of polymyalgia rheumatica, HTN, paroxysmal a fib s/p 3 ablations, MGUS, elevated IgE levels, and bilateral PEs started on Eliquis 21. Patient states he has been having increasing cough with SOB with his asthma and tends to get abdominal hematomas due to the increased abdominal pressure. Vitals not done    Labs- Hg 10.4 from 14.9 three days ago. CT abdomen pelvis with contrast - Large anterior left rectus muscle hematoma measuring 14.3 x 4.9 x 14.6 cm. This is bilobed in appearance on coronal imaging. No other areas of hemorrhage are appreciated. 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     Essential hypertension, benign 2013    Hypercholesteremia 2015    Hypogonadism, testicular 2013    Removal of right testicle on 2010; urologist Veto Carroll of [facility], gives testosterone injections    Lumbosacral radiculopathy at L5 2016    Lung nodule, solitary 2017    Chronic - benign w/u    Lymphopenia 2017    Microalbuminuria 2017    PMR (polymyalgia rheumatica) ([address] 2018    Followed by [facility]    Prediabetes 2016    Unsteady gait 1/15/2019    Vitamin D deficiency 2015        Past Surgical History:   Procedure Laterality Date    HX AFIB ABLATION  2018    HX COLONOSCOPY      normal    HX LUMBAR DISKECTOMY      L5 discectomy    HX LUMBAR DISKECTOMY      L4    HX OTHER SURGICAL      ablation for Afib     HX UROLOGICAL  2010    testicular, right removed d/t  severe infection    [place] CARDIAC SURG PROCEDURE UNLIST          Family History   Problem Relation Age of Onset    Cancer Father         leukemia    Hypertension Mother     Cancer Mother         breast    Cancer Sister         colon     Family history reviewed and negative except as noted above. Social History     Social History Narrative    Not on file        Social History     Tobacco Use    Smoking status: Former Smoker     Quit date:      Years since quittin.3    Smokeless tobacco: Never Used    Tobacco comment: smoked cigerattes from age 27 to 47   Substance Use Topics    Alcohol use: Yes     Comment: occ drinks wine        Social History     Substance and Sexual Activity   Drug Use No         Allergies   Allergen Reactions    Lisinopril Cough       Prior to Admission medications    Medication Sig Start Date End Date Taking? Authorizing Provider   povidone (Soothe Hydration) 1.25 % drop Apply  to eye. Provider, Historical   apixaban (ELIQUIS) 5 mg tablet Take 1 Tablet by mouth every twelve (12) hours for 180 days. 3/17/22 9/13/22  Meghan Arizmendi MD   predniSONE (DELTASONE) 1 mg tablet Take 5 mg by mouth daily (with breakfast). Indications: muscle pain and stiffness in the shoulder, neck and pelvis    Provider, Historical   metoprolol succinate (TOPROL-XL) 25 mg XL tablet Take 1 Tablet by mouth two (2) times a day. Patient taking differently: Take 50 mg by mouth two (2) times a day.  21   Meghan Arizmendi MD   tamsulosin (FLOMAX) 0.4 mg capsule Take 0.4 mg by mouth two (2) times a day. Provider, Historical   loratadine (CLARITIN) 10 mg tablet Take 10 mg by mouth nightly. Provider, Historical   calcium citrate tab tablet Take 1,000 mg by mouth daily. Provider, Historical   fluticasone-salmeterol (ADVAIR DISKUS) 250-50 mcg/dose diskus inhaler INHALE 1 DOSE BY MOUTH TWICE DAILY. RINSE MOUTH AFTER USE 11/27/18   Taylor Ovalles MD   albuterol [facility] [place] [facility]) 90 mcg/actuation inhaler Take 2 Puffs by inhalation every six (6) hours as needed. 11/27/18   Taylor Ovalles MD   sildenafil (REVATIO) 20 mg tablet Take 20 mg by mouth daily as needed. Provider, Historical   testosterone cypionate (DEPO-TESTOSTERONE) 200 mg/mL injection 440 mg by IntraMUSCular route Once every 2 weeks. Provider, Historical   fluticasone (FLONASE) 50 mcg/actuation nasal spray 2 Sprays by Both Nostrils route daily. Patient taking differently: 2 Sprays by Both Nostrils route daily as needed.  12/21/16   Taylor Ovalles MD           Review of Systems    Constitutional: NAD  Eyes:  no change in visual acuity, no photophobia  Ears, nose, mouth, throat, and face: no  Odynphagia, dysphagia, no thrush or exudate, negative for chronic sinus congestion, recurrent headaches  Respiratory: non productive cough  Cardiovascular: negative for CP, palpitations, or PND  Gastrointestinal: worsening hematoma  Genitourinary: no urgency, frequency, or dysuria, no nocturia  Integument/breast: negative for skin rash or skin lesions  Hematologic/lymphatic: negative for known bleeding disorder  Musculoskeletal:negative for joint pain or joint tenderness  Neurological: negative for lightheadedness, syncope or presyncopal events, no seizure or CVA history  Behavioral/Psych: negative for depression or chronic anxiety,   Endocrine: negative for polydyspia, polyuria or intolerance to heat or cold  Allergic/Immunologic: negative for chronic allergic rhinitis, or known connective tissue disorder      Objective: No data found. No intake/output data recorded. No intake/output data recorded. Data Review:   Recent Results (from the past 24 hour(s))   CBC WITH AUTOMATED DIFF    Collection Time: 05/05/22 12:14 PM   Result Value Ref Range    WBC 10.9 4.3 - 11.1 K/uL    RBC 3.53 (L) 4.23 - 5.60 M/uL    HGB 10.4 (L) 13.6 - 17.2 g/dL    HCT 32.3 (L) 41.1 - 50.3 %    MCV 91.5 79.6 - 97.8 FL    MCH 29.5 26.1 - 32.9 PG    MCHC 32.2 31.4 - 35.0 g/dL    RDW 16.0 (H) 11.9 - 14.6 %    PLATELET 277 915 - 051 K/uL    MPV 9.2 (L) 9.4 - 12.3 FL    ABSOLUTE NRBC 0.03 0.0 - 0.2 K/uL    DF AUTOMATED      NEUTROPHILS 83 (H) 43 - 78 %    LYMPHOCYTES 7 (L) 13 - 44 %    MONOCYTES 7 4.0 - 12.0 %    EOSINOPHILS 0 (L) 0.5 - 7.8 %    BASOPHILS 1 0.0 - 2.0 %    IMMATURE GRANULOCYTES 2 0.0 - 5.0 %    ABS. NEUTROPHILS 9.1 (H) 1.7 - 8.2 K/UL    ABS. LYMPHOCYTES 0.8 0.5 - 4.6 K/UL    ABS. MONOCYTES 0.8 0.1 - 1.3 K/UL    ABS. EOSINOPHILS 0.0 0.0 - 0.8 K/UL    ABS. BASOPHILS 0.1 0.0 - 0.2 K/UL    ABS. IMM. GRANS. 0.2 0.0 - 0.5 K/UL   METABOLIC PANEL, BASIC    Collection Time: 05/05/22 12:14 PM   Result Value Ref Range    Sodium 141 136 - 145 mmol/L    Potassium 4.4 3.5 - 5.1 mmol/L    Chloride 101 98 - 107 mmol/L    CO2 29 21 - 32 mmol/L    Anion gap 11 7.0 - 16.0 mmol/L    Glucose 118 (H) 65 - 100 mg/dL    BUN 22 (H) 7.0 - 18.0 MG/DL    Creatinine 1.21 0.8 - 1.5 MG/DL    GFR est AA >76 >60 ml/min/1.73m2    GFR est non-AA >60 >60 ml/min/1.73m2    Calcium 9.5 8.3 - 10.4 MG/DL   PROTHROMBIN TIME + INR    Collection Time: 05/05/22 12:14 PM   Result Value Ref Range    Prothrombin time 15.4 (H) 12.6 - 14.5 sec    INR 1.2         Physical Exam:     General:  Alert, cooperative, no distress, appears stated age. Eyes:  Conjunctivae/corneas clear. PERRL   Ears:  Normal TMs and external ear canals both ears. Nose: Nares normal.    Mouth/Throat: Lips, mucosa, and tongue normal. Teeth and gums normal.   Neck:  no JVD.    Back:   deferred   Lungs:   Clear to auscultation bilaterally. Heart:  Regular rate and rhythm, S1, S2 normal   Abdomen:   +BS, diffuse ecchymosis, splenomegaly with taunt tissue at LUQ. Otherwise, abdomen is soft. Extremities: Extremities normal, atraumatic, no cyanosis or edema. Pulses: 2+ and symmetric all extremities. Skin: As above    Lymph nodes: Cervical, supraclavicular, and axillary nodes normal.   Neurologic: CNII-XII intact. Normal strength, sensation and reflexes throughout. Assessment and Plan     Active Problems:    Acute blood loss anemia (5/5/2022)      Abdominal wall hematoma (5/5/2022)    Acute blood loss anemia secondary to abdominal wall hematoma - Stop Eliquis. Consult IR in case may need arterial embolization. Type and screen. NPO past midnight. Trend Hg levels. Cardiac monitor. Paroxysmal a fib - s/p ablation, no longer needs anticoagulation. Bilateral PE - Started Eliquis on 12/30/21 but will hold due to his active bleeding. Asthma exacerbation - Continue home meds but will increase his Prednisone to 40mg daily for 5 days. PRN hycodan. HTN- BB. No longer is taking lisinopril, cardizem, or atorvastatin.      DVT prophylaxis - SCDs  Signed By: Nolberto Chaves DO   May 5, 2022

## 2022-05-06 ENCOUNTER — APPOINTMENT (OUTPATIENT)
Dept: INTERVENTIONAL RADIOLOGY/VASCULAR | Age: 72
DRG: 605 | End: 2022-05-06
Attending: RADIOLOGY
Payer: MEDICARE

## 2022-05-06 PROBLEM — J45.901 ACUTE ASTHMA EXACERBATION: Status: ACTIVE | Noted: 2022-05-06

## 2022-05-06 LAB
ABO + RH BLD: NORMAL
ANION GAP SERPL CALC-SCNC: 5 MMOL/L (ref 7–16)
BLOOD GROUP ANTIBODIES SERPL: NORMAL
BUN SERPL-MCNC: 28 MG/DL (ref 8–23)
CALCIUM SERPL-MCNC: 8.9 MG/DL (ref 8.3–10.4)
CHLORIDE SERPL-SCNC: 104 MMOL/L (ref 98–107)
CO2 SERPL-SCNC: 29 MMOL/L (ref 21–32)
CREAT SERPL-MCNC: 1.2 MG/DL (ref 0.8–1.5)
GLUCOSE SERPL-MCNC: 123 MG/DL (ref 65–100)
HCT VFR BLD AUTO: 28.6 % (ref 41.1–50.3)
HCT VFR BLD AUTO: 29.6 % (ref 41.1–50.3)
HCT VFR BLD AUTO: 29.7 % (ref 41.1–50.3)
HCT VFR BLD AUTO: 29.8 % (ref 41.1–50.3)
HCT VFR BLD AUTO: 30 % (ref 41.1–50.3)
HGB BLD-MCNC: 9.3 G/DL (ref 13.6–17.2)
HGB BLD-MCNC: 9.4 G/DL (ref 13.6–17.2)
HGB BLD-MCNC: 9.5 G/DL (ref 13.6–17.2)
HGB BLD-MCNC: 9.5 G/DL (ref 13.6–17.2)
HGB BLD-MCNC: 9.7 G/DL (ref 13.6–17.2)
POTASSIUM SERPL-SCNC: 4.1 MMOL/L (ref 3.5–5.1)
SODIUM SERPL-SCNC: 138 MMOL/L (ref 136–145)
SPECIMEN EXP DATE BLD: NORMAL

## 2022-05-06 PROCEDURE — 94664 DEMO&/EVAL PT USE INHALER: CPT

## 2022-05-06 PROCEDURE — 2709999900 HC NON-CHARGEABLE SUPPLY

## 2022-05-06 PROCEDURE — 80048 BASIC METABOLIC PNL TOTAL CA: CPT

## 2022-05-06 PROCEDURE — 94760 N-INVAS EAR/PLS OXIMETRY 1: CPT

## 2022-05-06 PROCEDURE — 94640 AIRWAY INHALATION TREATMENT: CPT

## 2022-05-06 PROCEDURE — 74011000250 HC RX REV CODE- 250: Performed by: FAMILY MEDICINE

## 2022-05-06 PROCEDURE — 65270000029 HC RM PRIVATE

## 2022-05-06 PROCEDURE — 74011636637 HC RX REV CODE- 636/637: Performed by: FAMILY MEDICINE

## 2022-05-06 PROCEDURE — 74011000250 HC RX REV CODE- 250: Performed by: HOSPITALIST

## 2022-05-06 PROCEDURE — 36415 COLL VENOUS BLD VENIPUNCTURE: CPT

## 2022-05-06 PROCEDURE — 74011250637 HC RX REV CODE- 250/637: Performed by: EMERGENCY MEDICINE

## 2022-05-06 PROCEDURE — 74011250637 HC RX REV CODE- 250/637: Performed by: FAMILY MEDICINE

## 2022-05-06 PROCEDURE — 85018 HEMOGLOBIN: CPT

## 2022-05-06 RX ORDER — BENZONATATE 100 MG/1
100 CAPSULE ORAL
Status: DISCONTINUED | OUTPATIENT
Start: 2022-05-06 | End: 2022-05-07

## 2022-05-06 RX ORDER — LEVALBUTEROL INHALATION SOLUTION 0.63 MG/3ML
0.63 SOLUTION RESPIRATORY (INHALATION)
Status: DISCONTINUED | OUTPATIENT
Start: 2022-05-06 | End: 2022-05-08 | Stop reason: HOSPADM

## 2022-05-06 RX ADMIN — LEVALBUTEROL HYDROCHLORIDE 0.63 MG: 0.63 SOLUTION RESPIRATORY (INHALATION) at 15:37

## 2022-05-06 RX ADMIN — METOPROLOL SUCCINATE 50 MG: 50 TABLET, EXTENDED RELEASE ORAL at 08:59

## 2022-05-06 RX ADMIN — LEVALBUTEROL HYDROCHLORIDE 0.63 MG: 0.63 SOLUTION RESPIRATORY (INHALATION) at 09:27

## 2022-05-06 RX ADMIN — BENZONATATE 100 MG: 100 CAPSULE ORAL at 04:11

## 2022-05-06 RX ADMIN — SODIUM CHLORIDE, PRESERVATIVE FREE 10 ML: 5 INJECTION INTRAVENOUS at 21:30

## 2022-05-06 RX ADMIN — SODIUM CHLORIDE, PRESERVATIVE FREE 5 ML: 5 INJECTION INTRAVENOUS at 14:00

## 2022-05-06 RX ADMIN — HYDROCODONE BITARTRATE AND HOMATROPINE METHYLBROMIDE 5 ML: 5; 1.5 SOLUTION ORAL at 17:04

## 2022-05-06 RX ADMIN — HYDROCODONE BITARTRATE AND HOMATROPINE METHYLBROMIDE 5 ML: 5; 1.5 SOLUTION ORAL at 08:59

## 2022-05-06 RX ADMIN — HYDROCODONE BITARTRATE AND HOMATROPINE METHYLBROMIDE 5 ML: 5; 1.5 SOLUTION ORAL at 21:30

## 2022-05-06 RX ADMIN — CETIRIZINE HYDROCHLORIDE 5 MG: 5 TABLET ORAL at 08:59

## 2022-05-06 RX ADMIN — TAMSULOSIN HYDROCHLORIDE 0.4 MG: 0.4 CAPSULE ORAL at 17:04

## 2022-05-06 RX ADMIN — MOMETASONE FUROATE AND FORMOTEROL FUMARATE DIHYDRATE 2 PUFF: 200; 5 AEROSOL RESPIRATORY (INHALATION) at 09:27

## 2022-05-06 RX ADMIN — MOMETASONE FUROATE AND FORMOTEROL FUMARATE DIHYDRATE 2 PUFF: 200; 5 AEROSOL RESPIRATORY (INHALATION) at 20:24

## 2022-05-06 RX ADMIN — SODIUM CHLORIDE, PRESERVATIVE FREE 10 ML: 5 INJECTION INTRAVENOUS at 04:13

## 2022-05-06 RX ADMIN — LEVALBUTEROL HYDROCHLORIDE 0.63 MG: 0.63 SOLUTION RESPIRATORY (INHALATION) at 20:24

## 2022-05-06 RX ADMIN — METOPROLOL SUCCINATE 50 MG: 50 TABLET, EXTENDED RELEASE ORAL at 17:04

## 2022-05-06 RX ADMIN — PREDNISONE 40 MG: 20 TABLET ORAL at 08:59

## 2022-05-06 RX ADMIN — HYDROCODONE BITARTRATE AND HOMATROPINE METHYLBROMIDE 5 ML: 5; 1.5 SOLUTION ORAL at 00:24

## 2022-05-06 RX ADMIN — BENZONATATE 100 MG: 100 CAPSULE ORAL at 15:21

## 2022-05-06 RX ADMIN — BENZONATATE 100 MG: 100 CAPSULE ORAL at 21:30

## 2022-05-06 RX ADMIN — TAMSULOSIN HYDROCHLORIDE 0.4 MG: 0.4 CAPSULE ORAL at 08:59

## 2022-05-06 RX ADMIN — ACETAMINOPHEN 650 MG: 325 TABLET ORAL at 04:11

## 2022-05-06 NOTE — PROGRESS NOTES
Hospitalist Progress Note   Admit Date:  2022  2:59 PM   Name:  Tristin Boyce   Age:  81 y.o. Sex:  male  :  1950   MRN:  [id number]   Room:  2/    Presenting Complaint: No chief complaint on file. Reason(s) for Admission: Acute blood loss anemia [D62]  Abdominal wall hematoma [S30.1XXA]     Hospital Course & Interval History:   Patient is a 81 y.o. male who presented as a direct admit from [facility] ER for acute blood loss anemia from worsening abdominal hematoma. Patient states he has been having increasing cough with SOB with his asthma and tends to get abdominal hematomas due to the increased abdominal pressure. [facility] [facility] PM[facility]x of polymyalgia rheumatica, HTN, paroxysmal a fib s/p 3 ablations, MGUS, elevated IgE levels, and bilateral PEs started on Eliquis 21.     CT abdomen pelvis with contrast - Large anterior left rectus muscle hematoma measuring 14.3 x 4.9 x 14.6 cm. This is bilobed in appearance on coronal imaging. No other areas of hemorrhage are appreciated. Subjective/24hr Events (22):  Endorses left-sided abdominal pain, flatus. Denies SOB, N/V    ROS:  10 systems reviewed and negative except as noted above.      Assessment & Plan:     Principal Problem:  Acute blood loss anemia (2022)  Abdominal wall hematoma (2022)  22  -Hgb 9.4  -2/2 hematoma  -Monitor hgb and transfuse for Hgb < 7  -IR to see for possible embolization     Active Problems:  Essential hypertension, benign (2013)  22  -Stable BP; continue current regimen     DDD (degenerative disc disease), cervical (2018)  22  -Prn analgesia     Atrial fibrillation ([address] (2018)  22  -S/P ablations, NSR  -Continue BB     PMR (polymyalgia rheumatica) [address] (2018)  -Noted    Acute asthma exacerbation (2022)   22  -Now stable on RA with appropriate sats   -Continue home meds with increased dosing of steroids  -Resp cultures pending; no obvious infection so no abx at this time. Patient is afebrile      Bilateral PEs  -Diagnosed in 12/2021  -Eliquis on hold 2/2 hematoma   -Patient denies SOB, CP        Discharge Planning:      TBD    Diet:  DIET NPO  DVT PPx: SCDs  Code status: Full Code    Hospital Problems as of 5/6/2022 Date Reviewed: 3/17/2022          Codes Class Noted - Resolved POA    Acute asthma exacerbation ICD-10-CM: J45.901  ICD-9-CM: 493.92  5/6/2022 - Present Yes        * (Principal) Acute blood loss anemia ICD-10-CM: D62  ICD-9-CM: 285.1  5/5/2022 - Present Yes        Abdominal wall hematoma ICD-10-CM: S30. 1XXA  ICD-9-CM: 922.2  5/5/2022 - Present Yes        PMR (polymyalgia rheumatica) ([facility] ICD-10-CM: M35.3  ICD-9-CM: 660  2/21/2018 - Present Yes    Overview Signed 2/21/2018  9:11 AM by Aiden Ford MD     Followed by [facility] rheumatology             Atrial fibrillation [facility]) ICD-10-CM: I48.91  ICD-9-CM: 427.31  2/2/2018 - Present Yes    Overview Signed 12/24/2018 11:42 AM by DO Makayla Canales 2017. eliquis             DDD (degenerative disc disease), cervical ICD-10-CM: M50.30  ICD-9-CM: 722.4  1/18/2018 - Present Yes    Overview Signed 1/18/2018  9:32 AM by Aiden Ford MD     MRI: 1/2018 - multilevel DDD and facet arthritis w/ moderate bilateral foraminal narrowing at C6-7 and moderate right at C5-6.              Essential hypertension, benign ICD-10-CM: I10  ICD-9-CM: 401.1  5/30/2013 - Present Yes    Overview Signed 8/1/2017  8:46 PM by Aiden Ford MD     Microalbumin: 7/2017 - 118                   Objective:     Patient Vitals for the past 24 hrs:   Temp Pulse Resp BP SpO2   05/06/22 0927 -- -- -- -- 95 %   05/06/22 0801 97.8 °F (36.6 °C) 81 17 119/74 96 %   05/06/22 0408 98.3 °F (36.8 °C) 81 17 125/82 96 %   05/05/22 2344 98.2 °F (36.8 °C) 91 18 121/80 94 %   05/05/22 2040 -- -- -- -- 95 %   05/05/22 2001 98.7 °F (37.1 °C) 81 18 126/72 95 %   05/05/22 1500 98.4 °F (36.9 °C) 88 17 129/81 93 %     Oxygen Therapy  O2 Sat (%): 95 % (05/06/22 9390)  Pulse via Oximetry: 78 beats per minute (05/06/22 0927)  O2 Device: None (Room air) (05/06/22 0927)    Estimated body mass index is 27.72 kg/m² as calculated from the following:    Height as of an earlier encounter on 5/5/22: 5' 7\" (1.702 m). Weight as of an earlier encounter on 5/5/22: 80.3 kg (177 lb). No intake or output data in the 24 hours ending 05/06/22 1015      Physical Exam:     Blood pressure 119/74, pulse 81, temperature 97.8 °F (36.6 °C), resp. rate 17, SpO2 95 %. General:    No overt distress  Head:  Normocephalic, atraumatic  Eyes:  Sclerae appear normal.  Pupils equally round. ENT:  Nares appear normal, no drainage. Moist oral mucosa  Neck:  No restricted ROM. Trachea midline   CV:   RRR. No m/r/g. No jugular venous distension. Lungs:   CTAB. Scattered wheezing, No rhonchi, or rales. Respirations even, unlabored  Abdomen: Bowel sounds present. Soft, left-sided tenderness, nondistended. Extremities: No cyanosis or clubbing. No edema  Skin:     No rashes and bruising over abdomen extending across back and into pelvic region. Warm and dry. Neuro:  CN II-XII grossly intact. A&Ox3  Psych:  Normal mood and affect.       I have reviewed ordered lab tests and independently visualized imaging below:    Recent Labs:  Recent Results (from the past 48 hour(s))   CBC WITH AUTOMATED DIFF    Collection Time: 05/05/22 12:14 PM   Result Value Ref Range    WBC 10.9 4.3 - 11.1 K/uL    RBC 3.53 (L) 4.23 - 5.60 M/uL    HGB 10.4 (L) 13.6 - 17.2 g/dL    HCT 32.3 (L) 41.1 - 50.3 %    MCV 91.5 79.6 - 97.8 FL    MCH 29.5 26.1 - 32.9 PG    MCHC 32.2 31.4 - 35.0 g/dL    RDW 16.0 (H) 11.9 - 14.6 %    PLATELET 577 898 - 278 K/uL    MPV 9.2 (L) 9.4 - 12.3 FL    ABSOLUTE NRBC 0.03 0.0 - 0.2 K/uL    DF AUTOMATED      NEUTROPHILS 83 (H) 43 - 78 %    LYMPHOCYTES 7 (L) 13 - 44 %    MONOCYTES 7 4.0 - 12.0 %    EOSINOPHILS 0 (L) 0.5 - 7.8 %    BASOPHILS 1 0.0 - 2.0 %    IMMATURE GRANULOCYTES 2 0.0 - 5.0 %    ABS. NEUTROPHILS 9.1 (H) 1.7 - 8.2 K/UL    ABS. LYMPHOCYTES 0.8 0.5 - 4.6 K/UL    ABS. MONOCYTES 0.8 0.1 - 1.3 K/UL    ABS. EOSINOPHILS 0.0 0.0 - 0.8 K/UL    ABS. BASOPHILS 0.1 0.0 - 0.2 K/UL    ABS. IMM.  GRANS. 0.2 0.0 - 0.5 K/UL   METABOLIC PANEL, BASIC    Collection Time: 05/05/22 12:14 PM   Result Value Ref Range    Sodium 141 136 - 145 mmol/L    Potassium 4.4 3.5 - 5.1 mmol/L    Chloride 101 98 - 107 mmol/L    CO2 29 21 - 32 mmol/L    Anion gap 11 7.0 - 16.0 mmol/L    Glucose 118 (H) 65 - 100 mg/dL    BUN 22 (H) 7.0 - 18.0 MG/DL    Creatinine 1.21 0.8 - 1.5 MG/DL    GFR est AA >76 >60 ml/min/1.73m2    GFR est non-AA >60 >60 ml/min/1.73m2    Calcium 9.5 8.3 - 10.4 MG/DL   PROTHROMBIN TIME + INR    Collection Time: 05/05/22 12:14 PM   Result Value Ref Range    Prothrombin time 15.4 (H) 12.6 - 14.5 sec    INR 1.2     TYPE & SCREEN    Collection Time: 05/05/22  6:28 PM   Result Value Ref Range    Crossmatch Expiration 05/08/2022,2359     ABO/Rh(D) A NEGATIVE     Antibody screen NEG    HGB & HCT    Collection Time: 05/05/22 11:50 PM   Result Value Ref Range    HGB 9.3 (L) 13.6 - 17.2 g/dL    HCT 28.6 (L) 41.1 - 69.9 %   METABOLIC PANEL, BASIC    Collection Time: 05/06/22  3:12 AM   Result Value Ref Range    Sodium 138 136 - 145 mmol/L    Potassium 4.1 3.5 - 5.1 mmol/L    Chloride 104 98 - 107 mmol/L    CO2 29 21 - 32 mmol/L    Anion gap 5 (L) 7 - 16 mmol/L    Glucose 123 (H) 65 - 100 mg/dL    BUN 28 (H) 8 - 23 MG/DL    Creatinine 1.20 0.8 - 1.5 MG/DL    GFR est AA >60 >60 ml/min/1.73m2    GFR est non-AA >60 >60 ml/min/1.73m2    Calcium 8.9 8.3 - 10.4 MG/DL   HGB & HCT    Collection Time: 05/06/22  3:12 AM   Result Value Ref Range    HGB 9.4 (L) 13.6 - 17.2 g/dL    HCT 29.8 (L) 41.1 - 50.3 %       All Micro Results     Procedure Component Value Units Date/Time    CULTURE, RESPIRATORY/SPUTUM/BRONCH Espinoza Cole [[id number]]     Order Status: Sent Specimen: Sputum           Other Studies:  CT ABD PELV W CONT    Result Date: 5/5/2022  CT OF THE ABDOMEN AND PELVIS INDICATION: Worsening abdominal bruising. Multiple axial images were obtained through the abdomen and pelvis. Oral contrast was used for bowel opacification. 100mL of Isovue 370 intravenous contrast was used for better evaluation of solid organs and vascular structures. Radiation dose reduction techniques were used for this study. All CT scans performed at this facility use one or all of the following: Automated exposure control, adjustment of the mA and/or kVp according to patient's size, iterative reconstruction. COMPARISON: CT chest 12/30/2021. CT chest abdomen and pelvis 4/20/2019. FINDINGS: LOWER CHEST: Large calcified granuloma noted in the right middle lobe. Lung bases otherwise clear. No pleural fluid. HEPATOBILIARY: Fatty infiltration liver. Left hepatic lobe cyst on image 23 of series 2 measures approximately 1 cm. No specific follow-up suggested for this lesion. Subtle enhancing lesion right hepatic dome measures 1.3 cm on image 17 of series 2. No calcified gallstones. PANCREAS: Normal. SPLEEN: Normal. ADRENAL GLANDS: Normal. KIDNEYS/BLADDER: Multiple renal cortical cysts are present. No hydronephrosis or urinary tract calculi. BOWEL: No bowel obstruction or diverticulitis. Appendix is normal. LYMPH NODES: No enlarged abdominal or pelvic lymph nodes. VASCULATURE: Scattered calcified plaque abdominal aorta without aneurysm or dissection. PELVIC ORGANS: Prostate gland and rectum are unremarkable. MUSCULOSKELETAL: Left rectus muscle hematoma measures approximately 14.3 x 4.9 cm in greatest axial dimensions on image 48 of series 2 and measures up to 14.6 cm in craniocaudal dimension. This is bilobed in appearance on coronal imaging. No other evidence of abdominal wall or intraperitoneal hemorrhage. No evidence of abdominal wall hernia. Lumbar fusion changes are noted. No destructive bone lesions.      1. Large anterior left rectus muscle hematoma measuring 14.3 x 4.9 x 14.6 cm. This is bilobed in appearance on coronal imaging. No other areas of hemorrhage are appreciated. 2. Liver and renal cysts. No specific follow-up suggested. Subtle enhancing right hepatic dome lesion is stable and therefore likely benign. No specific follow-up suggested. 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 Current Meds:  Current Facility-Administered Medications   Medication Dose Route Frequency    benzonatate (TESSALON) capsule 100 mg  100 mg Oral TID PRN    levalbuterol (XOPENEX) nebulizer soln 0.63 mg/3 mL  0.63 mg Nebulization QID RT    sodium chloride (NS) flush 5-40 mL  5-40 mL IntraVENous Q8H    sodium chloride (NS) flush 5-40 mL  5-40 mL IntraVENous PRN    acetaminophen (TYLENOL) tablet 650 mg  650 mg Oral Q6H PRN    Or    acetaminophen (TYLENOL) suppository 650 mg  650 mg Rectal Q6H PRN    polyethylene glycol (MIRALAX) packet 17 g  17 g Oral DAILY PRN    ondansetron (ZOFRAN ODT) tablet 4 mg  4 mg Oral Q8H PRN    Or    ondansetron (ZOFRAN) injection 4 mg  4 mg IntraVENous Q6H PRN    mometasone-formoterol (DULERA) 200mcg-5mcg/puff  2 Puff Inhalation BID RT    cetirizine (ZYRTEC) tablet 5 mg  5 mg Oral DAILY    metoprolol succinate (TOPROL-XL) XL tablet 50 mg  50 mg Oral BID    tamsulosin (FLOMAX) capsule 0.4 mg  0.4 mg Oral BID    predniSONE (DELTASONE) tablet 40 mg  40 mg Oral DAILY WITH BREAKFAST    HYDROcodone-homatropine (HYCODAN) 5-1.5 mg/5 mL (5 mL) oral solution 5 mL  5 mL Oral Q4H PRN       Signed:  Vianey Vicente NP    Part of this note may have been written by using a voice dictation software. The note has been proof read but may still contain some grammatical/other typographical errors.

## 2022-05-06 NOTE — CONSULTS
Department of Interventional Radiology  [phone]        Consult Note     Patient: Thea Roberto MRN: [id number]  SSN: [id number]    YOB: 1950  Age: 81 y.o. Sex: male      Referring Physician: Hospitalist    Consult Date: 5/6/2022     Subjective:     Chief Complaint: hematoma    History of Present Illness: Thea Roberto is a 81 y.o. male who is seen in consultation for possible arteriogram.  Pt presented at direct admit from [facility] ED with worsening abdominal wall hematoma. Pt woke up 4 nights ago coughing and experienced sudden onset left sided abdominal discomfort and became diaphoretic. He was seen [facility] ED where he tells me an US was performed. He takes Eliquis for PE in 12/2021. Last dose was yesterday morning. Slight drop in hgb from 10.4 on presentation to 9.4 overnight. New labs pending. He has not required transfusion    He reports at least 3 previous abdominal wall hematomas after a coughing episode and hip/back hematoma while getting out of his truck. He has been off and on Eliquis for many years for a fib, but after several ablations, this was discontinue until his PE in Dec .  He is not absolute, but thinks his prior hematomas have occurred while on Eliquis; although, none have been this severe and painful. He coughs frequently due to asthma. The last rectus hematoma was right sided. Mobile images are uploaded in media tab and are impressive. CT scan reveals large rectus sheath hematoma and possible inf epigastric abnormality.     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     Essential hypertension, benign 2013    Hypercholesteremia 2015    Hypogonadism, testicular 2013    Removal of right testicle on 2010; urologist Sy Briones of [facility], gives testosterone injections    Lumbosacral radiculopathy at L5 2016    Lung nodule, solitary 2017    Chronic - benign w/u    Lymphopenia 2017    Microalbuminuria 2017    PMR (polymyalgia rheumatica) [facility] 2018    Followed by [facility] rheumatology    Prediabetes 2016    Unsteady gait 1/15/2019    Vitamin D deficiency 2015     Past Surgical History:   Procedure Laterality Date    HX AFIB ABLATION  2018    HX COLONOSCOPY      normal    HX LUMBAR DISKECTOMY      L5 discectomy    HX LUMBAR DISKECTOMY      L4    HX OTHER SURGICAL      ablation for Afib     HX UROLOGICAL  2010    testicular, right removed d/t  severe infection    MI CARDIAC SURG PROCEDURE UNLIST        Family History   Problem Relation Age of Onset    Cancer Father         leukemia    Hypertension Mother     Cancer Mother         breast    Cancer Sister         colon     Social History     Tobacco Use    Smoking status: Former Smoker     Quit date:      Years since quittin.3    Smokeless tobacco: Never Used    Tobacco comment: smoked cigerattes from age 27 to 47   Substance Use Topics    Alcohol use: Yes     Comment: occ drinks wine      Allergies   Allergen Reactions    Lisinopril Cough     Current Facility-Administered Medications   Medication Dose Route Frequency Provider Last Rate Last Admin    benzonatate (TESSALON) capsule 100 mg  100 mg Oral TID PRN Joann Sterling MD   100 mg at 22 0411    levalbuterol (XOPENEX) nebulizer soln 0.63 mg/3 mL  0.63 mg Nebulization QID RT Parish Mazariegos MD   0.63 mg at 22 0927    sodium chloride (NS) flush 5-40 mL  5-40 mL IntraVENous Q8H Tonia Arnett, DO   10 mL at 05/05/22 2100    sodium chloride (NS) flush 5-40 mL  5-40 mL IntraVENous PRN Hilary Jose L, DO   10 mL at 05/06/22 0413    acetaminophen (TYLENOL) tablet 650 mg  650 mg Oral Q6H PRN Hilary Jose L, DO   650 mg at 05/06/22 0411    Or    acetaminophen (TYLENOL) suppository 650 mg  650 mg Rectal Q6H PRN Hilary Jose L, DO        polyethylene glycol (MIRALAX) packet 17 g  17 g Oral DAILY PRN Hilary Jose L, DO        ondansetron (ZOFRAN ODT) tablet 4 mg  4 mg Oral Q8H PRN Hilary Jose L, DO        Or    ondansetron [facility]) injection 4 mg  4 mg IntraVENous Q6H PRN Hilary Jose L, DO        mometasone-formoterol (DULERA) 200mcg-5mcg/puff  2 Puff Inhalation BID RT Hilary Jose L, DO   2 Puff at 05/06/22 5627    cetirizine (ZYRTEC) tablet 5 mg  5 mg Oral DAILY Hilary Jose L, DO   5 mg at 05/06/22 0859    metoprolol succinate (TOPROL-XL) XL tablet 50 mg  50 mg Oral BID Hilary Jose L, DO   50 mg at 05/06/22 0859    tamsulosin (FLOMAX) capsule 0.4 mg  0.4 mg Oral BID Hilary Jose L, DO   0.4 mg at 05/06/22 0859    predniSONE (DELTASONE) tablet 40 mg  40 mg Oral DAILY WITH Doy Blood, DO   40 mg at 05/06/22 0859    HYDROcodone-homatropine (HYCODAN) 5-1.5 mg/5 mL (5 mL) oral solution 5 mL  5 mL Oral Q4H PRN Hilary Jose L, DO   5 mL at 05/06/22 0859       Medications Prior to Admission   Medication Sig    povidone (Soothe Hydration) 1.25 % drop Apply  to eye. apixaban (ELIQUIS) 5 mg tablet Take 1 Tablet by mouth every twelve (12) hours for 180 days. predniSONE (DELTASONE) 1 mg tablet Take 5 mg by mouth daily (with breakfast). Indications: muscle pain and stiffness in the shoulder, neck and pelvis    metoprolol succinate (TOPROL-XL) 25 mg XL tablet Take 1 Tablet by mouth two (2) times a day. (Patient taking differently: Take 50 mg by mouth two (2) times a day.)    tamsulosin (FLOMAX) 0.4 mg capsule Take 0.4 mg by mouth two (2) times a day.     loratadine (CLARITIN) 10 mg tablet Take 10 mg by mouth nightly. calcium citrate tab tablet Take 1,000 mg by mouth daily. fluticasone-salmeterol (ADVAIR DISKUS) 250-50 mcg/dose diskus inhaler INHALE 1 DOSE BY MOUTH TWICE DAILY. RINSE MOUTH AFTER USE    albuterol (PROAIR HFA) 90 mcg/actuation inhaler Take 2 Puffs by inhalation every six (6) hours as needed. sildenafil (REVATIO) 20 mg tablet Take 20 mg by mouth daily as needed. testosterone cypionate (DEPO-TESTOSTERONE) 200 mg/mL injection 440 mg by IntraMUSCular route Once every 2 weeks. fluticasone (FLONASE) 50 mcg/actuation nasal spray 2 Sprays by Both Nostrils route daily. (Patient taking differently: 2 Sprays by Both Nostrils route daily as needed.)        Allergies   Allergen Reactions    Lisinopril Cough       Review of Systems:  A detailed 10 organ review of systems is obtained with pertinent positives as listed in the History of Present Illness and Past Medical History. All others are negative.     Objective:     Physical Exam:  Vitals:    05/05/22 2344 05/06/22 0408 05/06/22 0801 05/06/22 0927   BP: 121/80 125/82 119/74    Pulse: 91 81 81    Resp: 18 17 17    Temp: 98.2 °F (36.8 °C) 98.3 °F (36.8 °C) 97.8 °F (36.6 °C)    SpO2: 94% 96% 96% 95%        Pain Assessment  Pain Intensity 1: 0 (05/06/22 0717)  Pain Location 1: Chest  Pain Intervention(s) 1: Medication (see MAR)  Patient Stated Pain Goal: 0      HEART: regular rate and rhythm  LUNG: clear to auscultation bilaterally  ABDOMEN: soft, obese, entire abdomen is ecchymotic from nipple line to upper thighs, groin, around his back and buttocks  EXTREMITIES: warm  Lab/Data Review:  CMP:   Lab Results   Component Value Date/Time     05/06/2022 03:12 AM    K 4.1 05/06/2022 03:12 AM     05/06/2022 03:12 AM    CO2 29 05/06/2022 03:12 AM    AGAP 5 (L) 05/06/2022 03:12 AM     (H) 05/06/2022 03:12 AM    BUN 28 (H) 05/06/2022 03:12 AM    CREA 1.20 05/06/2022 03:12 AM    GFRAA >60 05/06/2022 03:12 AM    GFRNA >60 05/06/2022 03:12 AM    CA 8.9 05/06/2022 03:12 AM     CBC:   Lab Results   Component Value Date/Time    HGB 9.4 (L) 05/06/2022 03:12 AM    HCT 29.8 (L) 05/06/2022 03:12 AM     COAGS: No results found for: APTT, PTP, INR, INREXT, INREXT      Assessment/Plan:   Large rectus hematoma with extensive abdominal wall, groin and back bruising. Eliquis has been discontinued-last dose yesterday morning. Hgb pending. Will discuss prophylactic embolization if no evidence of ongoing hemorrhage since this is his third or forth bleeding event (last being Jan. 2022 although was right side). He has remained hemodynamically stable. Final plans per Dr. Levon Mercado. Nesha Hooks, [facility] Problems  Date Reviewed: 3/17/2022            Codes Class Noted POA    Acute asthma exacerbation ICD-10-CM: J45.901  ICD-9-CM: 493.92  5/6/2022 Yes        * (Principal) Acute blood loss anemia ICD-10-CM: D62  ICD-9-CM: 285.1  5/5/2022 Yes        Abdominal wall hematoma ICD-10-CM: S30. 1XXA  ICD-9-CM: 922.2  5/5/2022 Yes        PMR (polymyalgia rheumatica) ([facility] [address] ICD-10-CM: M35.3  ICD-9-CM: 812  2/21/2018 Yes    Overview Signed 2/21/2018  9:11 AM by Fani Haq MD     Followed by [facility] rheumatology             Atrial fibrillation [facility]) ICD-10-CM: I48.91  ICD-9-CM: 427.31  2/2/2018 Yes    Overview Signed 12/24/2018 11:42 AM by Vickie Glasgow DO     Ablation 2017. eliquis             DDD (degenerative disc disease), cervical ICD-10-CM: M50.30  ICD-9-CM: 722.4  1/18/2018 Yes    Overview Signed 1/18/2018  9:32 AM by Fani Haq MD     MRI: 1/2018 - multilevel DDD and facet arthritis w/ moderate bilateral foraminal narrowing at C6-7 and moderate right at C5-6. Essential hypertension, benign ICD-10-CM: I10  ICD-9-CM: 401.1  5/30/2013 Yes    Overview Signed 8/1/2017  8:46 PM by Fani Haq MD     Microalbumin: 7/2017 - 80                      Long discussion with patient and wife.   Given the stability, embolization likely isn't necessary. Especially if patient not on [facility]. We will hold on any procedure for now.   OK by me to eat and dc

## 2022-05-07 LAB
HCT VFR BLD AUTO: 27.7 % (ref 41.1–50.3)
HCT VFR BLD AUTO: 31.3 % (ref 41.1–50.3)
HCT VFR BLD AUTO: 31.6 % (ref 41.1–50.3)
HGB BLD-MCNC: 10.1 G/DL (ref 13.6–17.2)
HGB BLD-MCNC: 10.1 G/DL (ref 13.6–17.2)
HGB BLD-MCNC: 8.8 G/DL (ref 13.6–17.2)

## 2022-05-07 PROCEDURE — 74011250637 HC RX REV CODE- 250/637: Performed by: NURSE PRACTITIONER

## 2022-05-07 PROCEDURE — 36415 COLL VENOUS BLD VENIPUNCTURE: CPT

## 2022-05-07 PROCEDURE — 74011000250 HC RX REV CODE- 250: Performed by: FAMILY MEDICINE

## 2022-05-07 PROCEDURE — 94640 AIRWAY INHALATION TREATMENT: CPT

## 2022-05-07 PROCEDURE — 74011000250 HC RX REV CODE- 250: Performed by: HOSPITALIST

## 2022-05-07 PROCEDURE — 74011636637 HC RX REV CODE- 636/637: Performed by: FAMILY MEDICINE

## 2022-05-07 PROCEDURE — 85018 HEMOGLOBIN: CPT

## 2022-05-07 PROCEDURE — 65270000029 HC RM PRIVATE

## 2022-05-07 PROCEDURE — 94760 N-INVAS EAR/PLS OXIMETRY 1: CPT

## 2022-05-07 PROCEDURE — 74011250637 HC RX REV CODE- 250/637: Performed by: FAMILY MEDICINE

## 2022-05-07 PROCEDURE — 74011250637 HC RX REV CODE- 250/637: Performed by: EMERGENCY MEDICINE

## 2022-05-07 RX ORDER — PREDNISONE 20 MG/1
20 TABLET ORAL
Status: DISCONTINUED | OUTPATIENT
Start: 2022-05-08 | End: 2022-05-08 | Stop reason: HOSPADM

## 2022-05-07 RX ORDER — POLYETHYLENE GLYCOL 3350 17 G/17G
17 POWDER, FOR SOLUTION ORAL DAILY
Status: DISCONTINUED | OUTPATIENT
Start: 2022-05-07 | End: 2022-05-08 | Stop reason: HOSPADM

## 2022-05-07 RX ORDER — BENZONATATE 100 MG/1
100 CAPSULE ORAL 3 TIMES DAILY
Status: DISCONTINUED | OUTPATIENT
Start: 2022-05-07 | End: 2022-05-08 | Stop reason: HOSPADM

## 2022-05-07 RX ADMIN — POLYETHYLENE GLYCOL 3350 17 G: 17 POWDER, FOR SOLUTION ORAL at 08:56

## 2022-05-07 RX ADMIN — TAMSULOSIN HYDROCHLORIDE 0.4 MG: 0.4 CAPSULE ORAL at 08:45

## 2022-05-07 RX ADMIN — POLYETHYLENE GLYCOL 3350 17 G: 17 POWDER, FOR SOLUTION ORAL at 12:13

## 2022-05-07 RX ADMIN — MOMETASONE FUROATE AND FORMOTEROL FUMARATE DIHYDRATE 2 PUFF: 200; 5 AEROSOL RESPIRATORY (INHALATION) at 20:05

## 2022-05-07 RX ADMIN — HYDROCODONE BITARTRATE AND HOMATROPINE METHYLBROMIDE 5 ML: 5; 1.5 SOLUTION ORAL at 16:06

## 2022-05-07 RX ADMIN — SODIUM CHLORIDE, PRESERVATIVE FREE 10 ML: 5 INJECTION INTRAVENOUS at 21:44

## 2022-05-07 RX ADMIN — BENZONATATE 100 MG: 100 CAPSULE ORAL at 08:56

## 2022-05-07 RX ADMIN — ACETAMINOPHEN 650 MG: 325 TABLET ORAL at 00:15

## 2022-05-07 RX ADMIN — PREDNISONE 40 MG: 20 TABLET ORAL at 08:45

## 2022-05-07 RX ADMIN — LEVALBUTEROL HYDROCHLORIDE 0.63 MG: 0.63 SOLUTION RESPIRATORY (INHALATION) at 11:42

## 2022-05-07 RX ADMIN — METOPROLOL SUCCINATE 50 MG: 50 TABLET, EXTENDED RELEASE ORAL at 08:45

## 2022-05-07 RX ADMIN — LEVALBUTEROL HYDROCHLORIDE 0.63 MG: 0.63 SOLUTION RESPIRATORY (INHALATION) at 15:30

## 2022-05-07 RX ADMIN — MOMETASONE FUROATE AND FORMOTEROL FUMARATE DIHYDRATE 2 PUFF: 200; 5 AEROSOL RESPIRATORY (INHALATION) at 08:54

## 2022-05-07 RX ADMIN — LEVALBUTEROL HYDROCHLORIDE 0.63 MG: 0.63 SOLUTION RESPIRATORY (INHALATION) at 08:54

## 2022-05-07 RX ADMIN — LEVALBUTEROL HYDROCHLORIDE 0.63 MG: 0.63 SOLUTION RESPIRATORY (INHALATION) at 20:05

## 2022-05-07 RX ADMIN — METOPROLOL SUCCINATE 50 MG: 50 TABLET, EXTENDED RELEASE ORAL at 17:52

## 2022-05-07 RX ADMIN — ACETAMINOPHEN 650 MG: 325 TABLET ORAL at 08:45

## 2022-05-07 RX ADMIN — SODIUM CHLORIDE, PRESERVATIVE FREE 5 ML: 5 INJECTION INTRAVENOUS at 16:06

## 2022-05-07 RX ADMIN — TAMSULOSIN HYDROCHLORIDE 0.4 MG: 0.4 CAPSULE ORAL at 17:52

## 2022-05-07 RX ADMIN — HYDROCODONE BITARTRATE AND HOMATROPINE METHYLBROMIDE 5 ML: 5; 1.5 SOLUTION ORAL at 08:56

## 2022-05-07 RX ADMIN — BENZONATATE 100 MG: 100 CAPSULE ORAL at 16:06

## 2022-05-07 RX ADMIN — CETIRIZINE HYDROCHLORIDE 5 MG: 5 TABLET ORAL at 08:45

## 2022-05-07 NOTE — PROGRESS NOTES
Hospitalist Progress Note   Admit Date:  2022  2:59 PM   Name:  Rebekah Aguilar   Age:  81 y.o. Sex:  male  :  1950   MRN:  [id number]   Room:  702/    Presenting Complaint: No chief complaint on file. Reason(s) for Admission: Acute blood loss anemia [D62]  Abdominal wall hematoma [S30.1XXA]     Hospital Course & Interval History:   Patient is a 81 y.o. male who presented as a direct admit from [facility] ER for acute blood loss anemia from worsening abdominal hematoma. Patient states he has been having increasing cough with SOB with his asthma and tends to get abdominal hematomas due to the increased abdominal pressure. Geri Boyd PMHx of polymyalgia rheumatica, HTN, paroxysmal a fib s/p 3 ablations, MGUS, elevated IgE levels, and bilateral PEs started on Eliquis 21.     CT abdomen pelvis with contrast - Large anterior left rectus muscle hematoma measuring 14.3 x 4.9 x 14.6 cm. This is bilobed in appearance on coronal imaging. No other areas of hemorrhage are appreciated. Subjective/24hr Events (22):  Endorses left-sided abdominal pain, flatus. Denies SOB, N/V, no BM since Thursday    ROS:  10 systems reviewed and negative except as noted above.      Assessment & Plan:     Principal Problem:  Acute blood loss anemia (2022)  Abdominal wall hematoma (2022)  22  -Hgb 9.4  -2/2 hematoma  -Monitor hgb and transfuse for Hgb < 7  -IR to see for possible embolization   22  -Hgb 8.8 from 9.7  -No embolization d/t to stability of hematoma per IR  -Will monitor patient overnight if Hgb remains stable patient can discharge in a.m.   -continue to hold [facility]      Active Problems:  Essential hypertension, benign (2013)  22  -Stable BP; continue current regimen     DDD (degenerative disc disease), cervical (2018)  22  -Prn analgesia     Atrial fibrillation ([address] (2018)  22  -S/P ablations, NSR; No AC needed   -Continue BB     PMR (polymyalgia rheumatica) ([facility] (2/21/2018)  -Noted    Acute asthma exacerbation (5/6/2022)   5/6/22  -Now stable on RA with appropriate sats   -Continue home meds with increased dosing of steroids  -Resp cultures pending; no obvious infection so no abx at this time. Patient is afebrile   5/7/22  -Titrating steroids today    Bilateral PEs  -Diagnosed in 12/2021  -Eliquis on hold 2/2 hematoma   -Patient denies SOB, CP        Discharge Planning:      TBD    Diet:  ADULT DIET Regular  DVT PPx: SCDs  Code status: Full Code    Hospital Problems as of 5/7/2022 Date Reviewed: 3/17/2022          Codes Class Noted - Resolved POA    Acute asthma exacerbation ICD-10-CM: J45.901  ICD-9-CM: 493.92  5/6/2022 - Present Yes        * (Principal) Acute blood loss anemia ICD-10-CM: D62  ICD-9-CM: 285.1  5/5/2022 - Present Yes        Abdominal wall hematoma ICD-10-CM: S30. 1XXA  ICD-9-CM: 922.2  5/5/2022 - Present Yes        PMR (polymyalgia rheumatica) ([facility] ICD-10-CM: M35.3  ICD-9-CM: 799  2/21/2018 - Present Yes    Overview Signed 2/21/2018  9:11 AM by Wilfrid Holloway MD     Followed by [facility] rheumatology             Atrial fibrillation [facility]) ICD-10-CM: I48.91  ICD-9-CM: 427.31  2/2/2018 - Present Yes    Overview Signed 12/24/2018 11:42 AM by DO Makayla Rock 2017. eliquis             DDD (degenerative disc disease), cervical ICD-10-CM: M50.30  ICD-9-CM: 722.4  1/18/2018 - Present Yes    Overview Signed 1/18/2018  9:32 AM by Wilfrid Holloway MD     MRI: 1/2018 - multilevel DDD and facet arthritis w/ moderate bilateral foraminal narrowing at C6-7 and moderate right at C5-6.              Essential hypertension, benign ICD-10-CM: I10  ICD-9-CM: 401.1  5/30/2013 - Present Yes    Overview Signed 8/1/2017  8:46 PM by Wilfrid Holloway MD     Microalbumin: 7/2017 - 118                   Objective:     Patient Vitals for the past 24 hrs:   Temp Pulse Resp BP SpO2   05/07/22 0854 -- -- -- -- 96 %   05/07/22 0750 98.3 °F (36.8 °C) 81 16 125/81 95 % 05/07/22 0322 97.6 °F (36.4 °C) 76 17 115/68 96 %   05/07/22 0014 97.9 °F (36.6 °C) 94 16 114/67 94 %   05/06/22 2024 -- -- -- -- 95 %   05/06/22 1911 98.3 °F (36.8 °C) 91 20 116/69 94 %   05/06/22 1631 98.3 °F (36.8 °C) 88 17 120/71 94 %   05/06/22 1537 -- -- -- -- 97 %   05/06/22 1141 97.3 °F (36.3 °C) 84 16 130/73 96 %     Oxygen Therapy  O2 Sat (%): 96 % (05/07/22 0854)  Pulse via Oximetry: 84 beats per minute (05/07/22 0854)  O2 Device: None (Room air) (05/07/22 0854)    Estimated body mass index is 27.72 kg/m² as calculated from the following:    Height as of this encounter: 5' 7\" (1.702 m). Weight as of this encounter: 80.3 kg (177 lb). No intake or output data in the 24 hours ending 05/07/22 0935      Physical Exam:     Blood pressure 125/81, pulse 81, temperature 98.3 °F (36.8 °C), resp. rate 16, height 5' 7\" (1.702 m), weight 80.3 kg (177 lb), SpO2 96 %. General:    No overt distress  Head:  Normocephalic, atraumatic  Eyes:  Sclerae appear normal.  Pupils equally round. ENT:  Nares appear normal, no drainage. Moist oral mucosa  Neck:  No restricted ROM. Trachea midline   CV:   RRR. No m/r/g. No jugular venous distension. Lungs:   CTAB. Scattered wheezing, No rhonchi, or rales. Respirations even, unlabored  Abdomen: Bowel sounds present. Soft, left-sided tenderness, nondistended. Extremities: No cyanosis or clubbing. No edema  Skin:     No rashes and bruising over abdomen extending across back and into pelvic region. Warm and dry. Neuro:  CN II-XII grossly intact. A&Ox3  Psych:  Normal mood and affect.       I have reviewed ordered lab tests and independently visualized imaging below:    Recent Labs:  Recent Results (from the past 48 hour(s))   CBC WITH AUTOMATED DIFF    Collection Time: 05/05/22 12:14 PM   Result Value Ref Range    WBC 10.9 4.3 - 11.1 K/uL    RBC 3.53 (L) 4.23 - 5.60 M/uL    HGB 10.4 (L) 13.6 - 17.2 g/dL    HCT 32.3 (L) 41.1 - 50.3 %    MCV 91.5 79.6 - 97.8 FL MCH 29.5 26.1 - 32.9 PG    MCHC 32.2 31.4 - 35.0 g/dL    RDW 16.0 (H) 11.9 - 14.6 %    PLATELET 587 070 - 038 K/uL    MPV 9.2 (L) 9.4 - 12.3 FL    ABSOLUTE NRBC 0.03 0.0 - 0.2 K/uL    DF AUTOMATED      NEUTROPHILS 83 (H) 43 - 78 %    LYMPHOCYTES 7 (L) 13 - 44 %    MONOCYTES 7 4.0 - 12.0 %    EOSINOPHILS 0 (L) 0.5 - 7.8 %    BASOPHILS 1 0.0 - 2.0 %    IMMATURE GRANULOCYTES 2 0.0 - 5.0 %    ABS. NEUTROPHILS 9.1 (H) 1.7 - 8.2 K/UL    ABS. LYMPHOCYTES 0.8 0.5 - 4.6 K/UL    ABS. MONOCYTES 0.8 0.1 - 1.3 K/UL    ABS. EOSINOPHILS 0.0 0.0 - 0.8 K/UL    ABS. BASOPHILS 0.1 0.0 - 0.2 K/UL    ABS. IMM.  GRANS. 0.2 0.0 - 0.5 K/UL   METABOLIC PANEL, BASIC    Collection Time: 05/05/22 12:14 PM   Result Value Ref Range    Sodium 141 136 - 145 mmol/L    Potassium 4.4 3.5 - 5.1 mmol/L    Chloride 101 98 - 107 mmol/L    CO2 29 21 - 32 mmol/L    Anion gap 11 7.0 - 16.0 mmol/L    Glucose 118 (H) 65 - 100 mg/dL    BUN 22 (H) 7.0 - 18.0 MG/DL    Creatinine 1.21 0.8 - 1.5 MG/DL    GFR est AA >76 >60 ml/min/1.73m2    GFR est non-AA >60 >60 ml/min/1.73m2    Calcium 9.5 8.3 - 10.4 MG/DL   PROTHROMBIN TIME + INR    Collection Time: 05/05/22 12:14 PM   Result Value Ref Range    Prothrombin time 15.4 (H) 12.6 - 14.5 sec    INR 1.2     TYPE & SCREEN    Collection Time: 05/05/22  6:28 PM   Result Value Ref Range    Crossmatch Expiration 05/08/2022,2359     ABO/Rh(D) A NEGATIVE     Antibody screen NEG    HGB & HCT    Collection Time: 05/05/22 11:50 PM   Result Value Ref Range    HGB 9.3 (L) 13.6 - 17.2 g/dL    HCT 28.6 (L) 41.1 - 40.9 %   METABOLIC PANEL, BASIC    Collection Time: 05/06/22  3:12 AM   Result Value Ref Range    Sodium 138 136 - 145 mmol/L    Potassium 4.1 3.5 - 5.1 mmol/L    Chloride 104 98 - 107 mmol/L    CO2 29 21 - 32 mmol/L    Anion gap 5 (L) 7 - 16 mmol/L    Glucose 123 (H) 65 - 100 mg/dL    BUN 28 (H) 8 - 23 MG/DL    Creatinine 1.20 0.8 - 1.5 MG/DL    GFR est AA >60 >60 ml/min/1.73m2    GFR est non-AA >60 >60 ml/min/1.73m2 Calcium 8.9 8.3 - 10.4 MG/DL   HGB & HCT    Collection Time: 05/06/22  3:12 AM   Result Value Ref Range    HGB 9.4 (L) 13.6 - 17.2 g/dL    HCT 29.8 (L) 41.1 - 50.3 %   HGB & HCT    Collection Time: 05/06/22  9:55 AM   Result Value Ref Range    HGB 9.5 (L) 13.6 - 17.2 g/dL    HCT 29.7 (L) 41.1 - 50.3 %   HGB & HCT    Collection Time: 05/06/22  6:19 PM   Result Value Ref Range    HGB 9.5 (L) 13.6 - 17.2 g/dL    HCT 29.6 (L) 41.1 - 50.3 %   HGB & HCT    Collection Time: 05/06/22  8:16 PM   Result Value Ref Range    HGB 9.7 (L) 13.6 - 17.2 g/dL    HCT 30.0 (L) 41.1 - 50.3 %   HGB & HCT    Collection Time: 05/07/22  4:31 AM   Result Value Ref Range    HGB 8.8 (L) 13.6 - 17.2 g/dL    HCT 27.7 (L) 41.1 - 50.3 %       All Micro Results     Procedure Component Value Units Date/Time    CULTURE, RESPIRATORY/SPUTUM/BRONCH Ambreen Pellant [[id number]]     Order Status: Sent Specimen: Sputum           Other Studies:  No results found.     Current Meds:  Current Facility-Administered Medications   Medication Dose Route Frequency    benzonatate (TESSALON) capsule 100 mg  100 mg Oral TID PRN    levalbuterol (XOPENEX) nebulizer soln 0.63 mg/3 mL  0.63 mg Nebulization QID RT    sodium chloride (NS) flush 5-40 mL  5-40 mL IntraVENous Q8H    sodium chloride (NS) flush 5-40 mL  5-40 mL IntraVENous PRN    acetaminophen (TYLENOL) tablet 650 mg  650 mg Oral Q6H PRN    Or    acetaminophen (TYLENOL) suppository 650 mg  650 mg Rectal Q6H PRN    polyethylene glycol (MIRALAX) packet 17 g  17 g Oral DAILY PRN    ondansetron (ZOFRAN ODT) tablet 4 mg  4 mg Oral Q8H PRN    Or    ondansetron (ZOFRAN) injection 4 mg  4 mg IntraVENous Q6H PRN    mometasone-formoterol (DULERA) 200mcg-5mcg/puff  2 Puff Inhalation BID RT    cetirizine (ZYRTEC) tablet 5 mg  5 mg Oral DAILY    metoprolol succinate (TOPROL-XL) XL tablet 50 mg  50 mg Oral BID    tamsulosin (FLOMAX) capsule 0.4 mg  0.4 mg Oral BID    predniSONE (DELTASONE) tablet 40 mg  40 mg Oral DAILY WITH BREAKFAST    HYDROcodone-homatropine (HYCODAN) 5-1.5 mg/5 mL (5 mL) oral solution 5 mL  5 mL Oral Q4H PRN       Signed:  Chica Farah NP    Part of this note may have been written by using a voice dictation software. The note has been proof read but may still contain some grammatical/other typographical errors.

## 2022-05-07 NOTE — PROGRESS NOTES
Problem: Falls - Risk of  Goal: *Absence of Falls  Description: Document Nori Garcia Fall Risk and appropriate interventions in the flowsheet.   5/7/2022 0047 by Ana An RN  Outcome: Progressing Towards Goal  Note: Fall Risk Interventions:            Medication Interventions: Evaluate medications/consider consulting pharmacy,Patient to call before getting OOB         History of Falls Interventions: Bed/chair exit alarm      5/6/2022 2312 by Ana An RN  Outcome: Progressing Towards Goal  Note: Fall Risk Interventions:            Medication Interventions: Evaluate medications/consider consulting pharmacy,Patient to call before getting OOB         History of Falls Interventions: Bed/chair exit alarm         Problem: Patient Education: Go to Patient Education Activity  Goal: Patient/Family Education  5/7/2022 0047 by Ana An RN  Outcome: Progressing Towards Goal  5/6/2022 2312 by Ana An RN  Outcome: Progressing Towards Goal     Problem: Airway Clearance - Ineffective  Goal: *Patent airway  5/7/2022 0047 by Ana An RN  Outcome: Progressing Towards Goal  5/6/2022 2312 by Ana An RN  Outcome: Progressing Towards Goal  Goal: *Absence of airway secretions  5/7/2022 0047 by Ana An RN  Outcome: Progressing Towards Goal  5/6/2022 2312 by Ana An RN  Outcome: Progressing Towards Goal  Goal: *Able to cough effectively  5/7/2022 0047 by Ana An RN  Outcome: Progressing Towards Goal  5/6/2022 2312 by Ana An RN  Outcome: Progressing Towards Goal     Problem: Patient Education: Go to Patient Education Activity  Goal: Patient/Family Education  5/7/2022 0047 by Ana An RN  Outcome: Progressing Towards Goal  5/6/2022 2312 by Ana An RN  Outcome: Progressing Towards Goal     Problem: Pain  Goal: *Control of Pain  5/7/2022 0047 by Ana An RN  Outcome: Progressing Towards Goal  5/6/2022 2312 by Loco Pretty RN  Outcome: Progressing Towards Goal     Problem: Patient Education: Go to Patient Education Activity  Goal: Patient/Family Education  5/7/2022 0047 by Loco Pretty RN  Outcome: Progressing Towards Goal  5/6/2022 2312 by Loco Pretty RN  Outcome: Progressing Towards Goal

## 2022-05-07 NOTE — PROGRESS NOTES
Problem: Falls - Risk of  Goal: *Absence of Falls  Description: Document Abel Ambriz Fall Risk and appropriate interventions in the flowsheet.   Outcome: Progressing Towards Goal  Note: Fall Risk Interventions:            Medication Interventions: Evaluate medications/consider consulting pharmacy,Patient to call before getting OOB         History of Falls Interventions: Bed/chair exit alarm         Problem: Patient Education: Go to Patient Education Activity  Goal: Patient/Family Education  Outcome: Progressing Towards Goal     Problem: Airway Clearance - Ineffective  Goal: *Patent airway  Outcome: Progressing Towards Goal  Goal: *Absence of airway secretions  Outcome: Progressing Towards Goal  Goal: *Able to cough effectively  Outcome: Progressing Towards Goal     Problem: Patient Education: Go to Patient Education Activity  Goal: Patient/Family Education  Outcome: Progressing Towards Goal     Problem: Pain  Goal: *Control of Pain  Outcome: Progressing Towards Goal     Problem: Patient Education: Go to Patient Education Activity  Goal: Patient/Family Education  Outcome: Progressing Towards Goal

## 2022-05-08 VITALS
HEART RATE: 77 BPM | WEIGHT: 177 LBS | OXYGEN SATURATION: 95 % | RESPIRATION RATE: 18 BRPM | BODY MASS INDEX: 27.78 KG/M2 | HEIGHT: 67 IN | DIASTOLIC BLOOD PRESSURE: 73 MMHG | SYSTOLIC BLOOD PRESSURE: 118 MMHG | TEMPERATURE: 98.5 F

## 2022-05-08 LAB
HCT VFR BLD AUTO: 29.8 % (ref 41.1–50.3)
HGB BLD-MCNC: 9.3 G/DL (ref 13.6–17.2)

## 2022-05-08 PROCEDURE — 74011250637 HC RX REV CODE- 250/637: Performed by: FAMILY MEDICINE

## 2022-05-08 PROCEDURE — 74011636637 HC RX REV CODE- 636/637: Performed by: NURSE PRACTITIONER

## 2022-05-08 PROCEDURE — 74011000250 HC RX REV CODE- 250: Performed by: FAMILY MEDICINE

## 2022-05-08 PROCEDURE — 74011250637 HC RX REV CODE- 250/637: Performed by: NURSE PRACTITIONER

## 2022-05-08 PROCEDURE — 94760 N-INVAS EAR/PLS OXIMETRY 1: CPT

## 2022-05-08 PROCEDURE — 94640 AIRWAY INHALATION TREATMENT: CPT

## 2022-05-08 PROCEDURE — 74011000250 HC RX REV CODE- 250: Performed by: HOSPITALIST

## 2022-05-08 PROCEDURE — 85018 HEMOGLOBIN: CPT

## 2022-05-08 PROCEDURE — 36415 COLL VENOUS BLD VENIPUNCTURE: CPT

## 2022-05-08 RX ORDER — GABAPENTIN 100 MG/1
100 CAPSULE ORAL ONCE
Status: COMPLETED | OUTPATIENT
Start: 2022-05-08 | End: 2022-05-08

## 2022-05-08 RX ORDER — HYDROCODONE BITARTRATE AND HOMATROPINE METHYLBROMIDE 1.5; 5 MG/5ML; MG/5ML
5 SYRUP ORAL
Qty: 90 ML | Refills: 0 | Status: SHIPPED | OUTPATIENT
Start: 2022-05-08 | End: 2022-05-11

## 2022-05-08 RX ORDER — PREDNISONE 10 MG/1
TABLET ORAL
Qty: 6 TABLET | Refills: 0 | Status: SHIPPED | OUTPATIENT
Start: 2022-05-09 | End: 2022-05-13

## 2022-05-08 RX ORDER — METOPROLOL SUCCINATE 50 MG/1
50 TABLET, EXTENDED RELEASE ORAL 2 TIMES DAILY
Qty: 60 TABLET | Refills: 0 | Status: SHIPPED | OUTPATIENT
Start: 2022-05-08 | End: 2022-06-07

## 2022-05-08 RX ORDER — GABAPENTIN 100 MG/1
100 CAPSULE ORAL 2 TIMES DAILY
Qty: 14 CAPSULE | Refills: 0 | Status: SHIPPED | OUTPATIENT
Start: 2022-05-08 | End: 2022-05-15

## 2022-05-08 RX ADMIN — BENZONATATE 100 MG: 100 CAPSULE ORAL at 08:55

## 2022-05-08 RX ADMIN — GABAPENTIN 100 MG: 100 CAPSULE ORAL at 10:30

## 2022-05-08 RX ADMIN — PREDNISONE 20 MG: 20 TABLET ORAL at 08:55

## 2022-05-08 RX ADMIN — HYDROCODONE BITARTRATE AND HOMATROPINE METHYLBROMIDE 5 ML: 5; 1.5 SOLUTION ORAL at 08:59

## 2022-05-08 RX ADMIN — POLYETHYLENE GLYCOL 3350 17 G: 17 POWDER, FOR SOLUTION ORAL at 08:56

## 2022-05-08 RX ADMIN — SODIUM CHLORIDE, PRESERVATIVE FREE 10 ML: 5 INJECTION INTRAVENOUS at 05:38

## 2022-05-08 RX ADMIN — LEVALBUTEROL HYDROCHLORIDE 0.63 MG: 0.63 SOLUTION RESPIRATORY (INHALATION) at 11:32

## 2022-05-08 RX ADMIN — MOMETASONE FUROATE AND FORMOTEROL FUMARATE DIHYDRATE 2 PUFF: 200; 5 AEROSOL RESPIRATORY (INHALATION) at 07:55

## 2022-05-08 RX ADMIN — TAMSULOSIN HYDROCHLORIDE 0.4 MG: 0.4 CAPSULE ORAL at 08:55

## 2022-05-08 RX ADMIN — LEVALBUTEROL HYDROCHLORIDE 0.63 MG: 0.63 SOLUTION RESPIRATORY (INHALATION) at 08:05

## 2022-05-08 RX ADMIN — CETIRIZINE HYDROCHLORIDE 5 MG: 5 TABLET ORAL at 08:55

## 2022-05-08 RX ADMIN — BENZONATATE 100 MG: 100 CAPSULE ORAL at 00:06

## 2022-05-08 RX ADMIN — METOPROLOL SUCCINATE 50 MG: 50 TABLET, EXTENDED RELEASE ORAL at 08:55

## 2022-05-08 NOTE — PROGRESS NOTES
Problem: Falls - Risk of  Goal: *Absence of Falls  Description: Document Rosita Blood Fall Risk and appropriate interventions in the flowsheet.   Outcome: Progressing Towards Goal  Note: Fall Risk Interventions:            Medication Interventions: Bed/chair exit alarm         History of Falls Interventions: Bed/chair exit alarm         Problem: Patient Education: Go to Patient Education Activity  Goal: Patient/Family Education  Outcome: Progressing Towards Goal     Problem: Patient Education: Go to Patient Education Activity  Goal: Patient/Family Education  Outcome: Progressing Towards Goal     Problem: Pain  Goal: *Control of Pain  Outcome: Progressing Towards Goal     Problem: Patient Education: Go to Patient Education Activity  Goal: Patient/Family Education  Outcome: Progressing Towards Goal

## 2022-05-08 NOTE — PROGRESS NOTES
Patient is up for discharge today. Patient will be getting discharged home with no needs at this time. Care Management Interventions  PCP Verified by CM: Yes  Mode of Transport at Discharge:  Other (see comment) (family)  Discharge Durable Medical Equipment: No  Physical Therapy Consult: No  Occupational Therapy Consult: No  Speech Therapy Consult: No  Support Systems: Spouse/Significant Other  Confirm Follow Up Transport: Family  Discharge Location  Patient Expects to be Discharged to[de-identified] Home

## 2022-05-08 NOTE — DISCHARGE SUMMARY
Hospitalist Discharge Summary   Admit Date:  2022  2:59 PM   DC Note date: 2022  Name:  Sophia Montana   Age:  81 y.o. Sex:  male  :  1950   MRN:  [id number]   Room:  [facility]  PCP:  Jessica Yang MD    Presenting Complaint: No chief complaint on file. Initial Admission Diagnosis: Acute blood loss anemia [D62]  Abdominal wall hematoma [S30.1XXA]     Problem List for this Hospitalization:  Hospital Problems as of 2022 Date Reviewed: 3/17/2022          Codes Class Noted - Resolved POA    Acute asthma exacerbation ICD-10-CM: J45.901  ICD-9-CM: 493.92  2022 - Present Yes        * (Principal) Acute blood loss anemia ICD-10-CM: D62  ICD-9-CM: 285.1  2022 - Present Yes        Abdominal wall hematoma ICD-10-CM: S30. 1XXA  ICD-9-CM: 922.2  2022 - Present Yes        PMR (polymyalgia rheumatica) ([facility] [address] ICD-10-CM: M35.3  ICD-9-CM: 056  2018 - Present Yes    Overview Signed 2018  9:11 AM by Tigist Parson MD     Followed by [facility] rheumatology             Atrial fibrillation [facility]) ICD-10-CM: I48.91  ICD-9-CM: 427.31  2018 - Present Yes    Overview Signed 2018 11:42 AM by Marcia Sales DO     Ablation . eliquis             DDD (degenerative disc disease), cervical ICD-10-CM: M50.30  ICD-9-CM: 722.4  2018 - Present Yes    Overview Signed 2018  9:32 AM by Tigist Parson MD     MRI: 2018 - multilevel DDD and facet arthritis w/ moderate bilateral foraminal narrowing at C6-7 and moderate right at C5-6. Essential hypertension, benign ICD-10-CM: I10  ICD-9-CM: 401.1  2013 - Present Yes    Overview Signed 2017  8:46 PM by Tigist Parson MD     Microalbumin: 2017 - 118                 Did Patient have Sepsis (YES OR NO): No     Hospital Course:  Patient is a 77 y. o. male who presented as a direct admit from [facility] ER for acute blood loss anemia from worsening abdominal hematoma.  Patient states he has been having increasing cough with SOB with his asthma and tends to get abdominal hematomas due to the increased abdominal pressure. Mckayla Elle PMHx of polymyalgia rheumatica, HTN, paroxysmal a fib s/p 3 ablations, MGUS, elevated IgE levels, and bilateral PEs started on Eliquis 12/30/21.      CT abdomen pelvis with contrast - Large anterior left rectus muscle hematoma measuring 14.3 x 4.9 x 14.6 cm. This is bilobed in appearance on coronal imaging. No other areas of hemorrhage are appreciated. IR consulted for possible embolization and after extensive discussion with wife and patient decision made to hold on procedure due to the stability of hematoma. Patient's Hgb monitored overnight and was stable. Patient alert and oriented x 4. Endorsed minimal left-sided abdominal pain unchanged from admit. Denied hematochezia, hematemesis, N/V, CP, dizziness and discharged with order for follow-up with Dr. Tanna Munoz and PCP for repeat CBC in 3-5 days. No respiratory culture obtain. Cough non productive Gabapentin started due to his CRC     Disposition: Home or Self Care  Diet: ADULT DIET Regular  Code Status: Full Code    Follow Up Orders: Follow-up Appointments   Procedures    FOLLOW UP VISIT Appointment in: One Week Dr Tanna Munoz in 1 week for evaluation of hematoma PCP in in 3-5 days for recheck of CBC     Dr Tanna Munoz in 1 week for evaluation of hematoma  PCP in in 3-5 days for recheck of CBC     Standing Status:   Standing     Number of Occurrences:   1     Order Specific Question:   Appointment in     Answer: One Week       Follow-up Information     Follow up With Specialties Details Why Contact Info    Marietta Spence MD Family Medicine   [address] [place] [address] Rd  [phone]            Follow up labs/diagnostics (ultimately defer to outpatient provider):  PCP/Dr. Tanna Munoz     Time spent in patient discharge and coordination 42 minutes. Plan was discussed with patient/spouse. All questions answered.   Patient was stable at time of discharge. Instructions given to call a physician or return if any concerns. Discharge Info:   Current Discharge Medication List      START taking these medications    Details   gabapentin (NEURONTIN) 100 mg capsule Take 1 Capsule by mouth two (2) times a day for 7 days. Qty: 14 Capsule, Refills: 0  Start date: 5/8/2022, End date: 5/15/2022      HYDROcodone-homatropine (HYCODAN) 5-1.5 mg/5 mL (5 mL) oral solution Take 5 mL by mouth every four (4) hours as needed for Cough for up to 3 days. Max Daily Amount: 30 mL. Qty: 90 mL, Refills: 0  Start date: 5/8/2022, End date: 5/11/2022    Associated Diagnoses: Mild intermittent asthma with acute exacerbation         CONTINUE these medications which have CHANGED    Details   metoprolol succinate (TOPROL-XL) 50 mg XL tablet Take 1 Tablet by mouth two (2) times a day for 30 days. Qty: 60 Tablet, Refills: 0  Start date: 5/8/2022, End date: 6/7/2022      !! predniSONE (DELTASONE) 10 mg tablet Take 20 mg by mouth daily (with breakfast) for 2 days, THEN 10 mg daily for 2 days. Indications: muscle pain and stiffness in the shoulder, neck and pelvis  Qty: 6 Tablet, Refills: 0  Start date: 5/9/2022, End date: 5/13/2022       !! - Potential duplicate medications found. Please discuss with provider. CONTINUE these medications which have NOT CHANGED    Details   povidone (Soothe Hydration) 1.25 % drop Apply  to eye. !! predniSONE (DELTASONE) 1 mg tablet Take 5 mg by mouth daily (with breakfast). Indications: muscle pain and stiffness in the shoulder, neck and pelvis      tamsulosin (FLOMAX) 0.4 mg capsule Take 0.4 mg by mouth two (2) times a day. loratadine (CLARITIN) 10 mg tablet Take 10 mg by mouth nightly. calcium citrate tab tablet Take 1,000 mg by mouth daily. fluticasone-salmeterol (ADVAIR DISKUS) 250-50 mcg/dose diskus inhaler INHALE 1 DOSE BY MOUTH TWICE DAILY.  RINSE MOUTH AFTER USE  Qty: 3 Inhaler, Refills: 1    Associated Diagnoses: Asthma, moderate persistent, well-controlled      albuterol (PROAIR HFA) 90 mcg/actuation inhaler Take 2 Puffs by inhalation every six (6) hours as needed. Qty: 1 Inhaler, Refills: 11    Associated Diagnoses: Asthma, moderate persistent, well-controlled      sildenafil (REVATIO) 20 mg tablet Take 20 mg by mouth daily as needed. testosterone cypionate (DEPO-TESTOSTERONE) 200 mg/mL injection 440 mg by IntraMUSCular route Once every 2 weeks. fluticasone (FLONASE) 50 mcg/actuation nasal spray 2 Sprays by Both Nostrils route daily. Qty: 1 Bottle, Refills: 5    Associated Diagnoses: Allergic rhinitis, unspecified allergic rhinitis trigger, unspecified rhinitis seasonality       ! ! - Potential duplicate medications found. Please discuss with provider. STOP taking these medications       apixaban (ELIQUIS) 5 mg tablet Comments:   Reason for Stopping:               Procedures done this admission:  * No surgery found *    Consults this admission:  IP CONSULT TO INTERVENTIONAL RADIOLOGY    Echocardiogram/EKG results:  No results found for this or any previous visit. EKG Results     None          Diagnostic Imaging/Tests:   CT ABD PELV W CONT    Result Date: 5/5/2022  CT OF THE ABDOMEN AND PELVIS INDICATION: Worsening abdominal bruising. Multiple axial images were obtained through the abdomen and pelvis. Oral contrast was used for bowel opacification. 100mL of Isovue 370 intravenous contrast was used for better evaluation of solid organs and vascular structures. Radiation dose reduction techniques were used for this study. All CT scans performed at this facility use one or all of the following: Automated exposure control, adjustment of the mA and/or kVp according to patient's size, iterative reconstruction. COMPARISON: CT chest 12/30/2021. CT chest abdomen and pelvis 4/20/2019. FINDINGS: LOWER CHEST: Large calcified granuloma noted in the right middle lobe. Lung bases otherwise clear.  No pleural fluid. HEPATOBILIARY: Fatty infiltration liver. Left hepatic lobe cyst on image 23 of series 2 measures approximately 1 cm. No specific follow-up suggested for this lesion. Subtle enhancing lesion right hepatic dome measures 1.3 cm on image 17 of series 2. No calcified gallstones. PANCREAS: Normal. SPLEEN: Normal. ADRENAL GLANDS: Normal. KIDNEYS/BLADDER: Multiple renal cortical cysts are present. No hydronephrosis or urinary tract calculi. BOWEL: No bowel obstruction or diverticulitis. Appendix is normal. LYMPH NODES: No enlarged abdominal or pelvic lymph nodes. VASCULATURE: Scattered calcified plaque abdominal aorta without aneurysm or dissection. PELVIC ORGANS: Prostate gland and rectum are unremarkable. MUSCULOSKELETAL: Left rectus muscle hematoma measures approximately 14.3 x 4.9 cm in greatest axial dimensions on image 48 of series 2 and measures up to 14.6 cm in craniocaudal dimension. This is bilobed in appearance on coronal imaging. No other evidence of abdominal wall or intraperitoneal hemorrhage. No evidence of abdominal wall hernia. Lumbar fusion changes are noted. No destructive bone lesions. 1. Large anterior left rectus muscle hematoma measuring 14.3 x 4.9 x 14.6 cm. This is bilobed in appearance on coronal imaging. No other areas of hemorrhage are appreciated. 2. Liver and renal cysts. No specific follow-up suggested. Subtle enhancing right hepatic dome lesion is stable and therefore likely benign. No specific follow-up suggested. 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        All Micro Results     Procedure Component Value Units Date/Time    CULTURE, RESPIRATORY/SPUTUM/BRONCH Rosa Valenzuela [[id number]]     Order Status: Sent Specimen: Sputum           Labs: Results:       BMP, Mg, Phos Recent Labs     05/06/22  0312 05/05/22  1214    141   K 4.1 4.4    101   CO2 29 29   AGAP 5* 11   BUN 28* 22*   CREA 1.20 1.21   CA 8.9 9.5   * 118*      CBC Recent Labs 05/08/22  0402 05/07/22  2046 05/07/22  1221 05/05/22  2350 05/05/22  1214   WBC  --   --   --   --  10.9   RBC  --   --   --   --  3.53*   HGB 9.3* 10.1* 10.1*   < > 10.4*   HCT 29.8* 31.3* 31.6*   < > 32.3*   PLT  --   --   --   --  185   GRANS  --   --   --   --  83*   LYMPH  --   --   --   --  7*   EOS  --   --   --   --  0*   MONOS  --   --   --   --  7   BASOS  --   --   --   --  1   IG  --   --   --   --  2   ANEU  --   --   --   --  9.1*   ABL  --   --   --   --  0.8   BRISA  --   --   --   --  0.0   ABM  --   --   --   --  0.8   ABB  --   --   --   --  0.1   AIG  --   --   --   --  0.2    < > = values in this interval not displayed. LFT No results for input(s): ALT, TBIL, AP, TP, ALB, GLOB, AGRAT in the last 72 hours.     No lab exists for component: SGOT, GPT   Cardiac Testing Lab Results   Component Value Date/Time    BNP 45 (H) 04/20/2019 01:25 PM    BNP 21 (H) 02/04/2019 04:31 PM    CK 41 01/25/2018 11:24 AM    Troponin-I, Qt. 0.83 () 04/21/2019 05:58 AM    Troponin-I, Qt. 1.01 () 04/20/2019 10:54 PM    Troponin-I, Qt. 1.36 () 04/20/2019 01:41 PM      Coagulation Tests Lab Results   Component Value Date/Time    Prothrombin time 15.4 (H) 05/05/2022 12:14 PM    Prothrombin time 15.8 (H) 05/02/2022 12:16 PM    Prothrombin time 13.6 03/01/2021 08:12 AM    INR 1.2 05/05/2022 12:14 PM    INR 1.2 05/02/2022 12:16 PM    INR 1.0 03/01/2021 08:12 AM    aPTT 27.1 12/30/2021 08:53 PM      A1c Lab Results   Component Value Date/Time    Hemoglobin A1c 5.5 01/18/2019 12:05 PM    Hemoglobin A1c 5.5 04/07/2016 11:25 AM    Hemoglobin A1c 5.3 12/08/2014 09:01 AM      Lipid Panel Lab Results   Component Value Date/Time    Cholesterol, total 170 11/29/2018 09:03 AM    HDL Cholesterol 40 11/29/2018 09:03 AM    LDL, calculated 105 (H) 11/29/2018 09:03 AM    VLDL, calculated 25 11/29/2018 09:03 AM    Triglyceride 123 11/29/2018 09:03 AM      Thyroid Panel Lab Results   Component Value Date/Time    TSH 2.000 01/25/2018 11:24 AM    TSH 1.950 02/06/2017 09:19 AM    T4, Free 1.12 02/06/2017 09:19 AM        Most Recent UA Lab Results   Component Value Date/Time    Color YELLOW 01/24/2018 09:00 AM    Appearance CLEAR 01/24/2018 09:00 AM    pH (UA) 7.5 01/24/2018 09:00 AM    Protein TRACE (A) 01/24/2018 09:00 AM    Glucose NEGATIVE  01/24/2018 09:00 AM    Ketone NEGATIVE  01/24/2018 09:00 AM    Bilirubin NEGATIVE  01/24/2018 09:00 AM    Blood NEGATIVE  01/24/2018 09:00 AM    Urobilinogen 1.0 01/24/2018 09:00 AM    Nitrites NEGATIVE  01/24/2018 09:00 AM    Leukocyte Esterase NEGATIVE  01/24/2018 09:00 AM    WBC 0-3 04/07/2016 03:04 PM    RBC 0-3 04/07/2016 03:04 PM    Casts 0-3 04/07/2016 03:04 PM          All Labs from Last 24 Hrs:  Recent Results (from the past 24 hour(s))   HGB & HCT    Collection Time: 05/07/22 12:21 PM   Result Value Ref Range    HGB 10.1 (L) 13.6 - 17.2 g/dL    HCT 31.6 (L) 41.1 - 50.3 %   HGB & HCT    Collection Time: 05/07/22  8:46 PM   Result Value Ref Range    HGB 10.1 (L) 13.6 - 17.2 g/dL    HCT 31.3 (L) 41.1 - 50.3 %   HGB & HCT    Collection Time: 05/08/22  4:02 AM   Result Value Ref Range    HGB 9.3 (L) 13.6 - 17.2 g/dL    HCT 29.8 (L) 41.1 - 50.3 %       Current Med List in Hospital:   Current Facility-Administered Medications   Medication Dose Route Frequency    predniSONE (DELTASONE) tablet 20 mg  20 mg Oral DAILY WITH BREAKFAST    benzonatate (TESSALON) capsule 100 mg  100 mg Oral TID    polyethylene glycol (MIRALAX) packet 17 g  17 g Oral DAILY    levalbuterol (XOPENEX) nebulizer soln 0.63 mg/3 mL  0.63 mg Nebulization QID RT    sodium chloride (NS) flush 5-40 mL  5-40 mL IntraVENous Q8H    sodium chloride (NS) flush 5-40 mL  5-40 mL IntraVENous PRN    acetaminophen (TYLENOL) tablet 650 mg  650 mg Oral Q6H PRN    Or    acetaminophen (TYLENOL) suppository 650 mg  650 mg Rectal Q6H PRN    polyethylene glycol (MIRALAX) packet 17 g  17 g Oral DAILY PRN    ondansetron (ZOFRAN ODT) tablet 4 mg  4 mg Oral Q8H PRN    Or    ondansetron (ZOFRAN) injection 4 mg  4 mg IntraVENous Q6H PRN    mometasone-formoterol (DULERA) 200mcg-5mcg/puff  2 Puff Inhalation BID RT    cetirizine (ZYRTEC) tablet 5 mg  5 mg Oral DAILY    metoprolol succinate (TOPROL-XL) XL tablet 50 mg  50 mg Oral BID    tamsulosin (FLOMAX) capsule 0.4 mg  0.4 mg Oral BID    HYDROcodone-homatropine (HYCODAN) 5-1.5 mg/5 mL (5 mL) oral solution 5 mL  5 mL Oral Q4H PRN       Allergies   Allergen Reactions    Lisinopril Cough     Immunization History   Administered Date(s) Administered    Influenza High Dose Vaccine PF 11/22/2017, 10/18/2018    Influenza Vaccine 11/03/2015    Pneumococcal Conjugate (PCV-13) 07/31/2017    Pneumococcal Polysaccharide (PPSV-23) 02/10/2010    Tdap 03/05/2012       Recent Vital Data:  Patient Vitals for the past 24 hrs:   Temp Pulse Resp BP SpO2   05/08/22 1132 -- -- -- -- 95 %   05/08/22 0755 -- -- -- -- 92 %   05/08/22 0710 97.7 °F (36.5 °C) 74 17 125/82 99 %   05/08/22 0405 97.7 °F (36.5 °C) 73 -- 115/65 95 %   05/08/22 0009 97.9 °F (36.6 °C) 82 20 115/75 95 %   05/07/22 2006 -- -- -- -- 96 %   05/07/22 1926 98 °F (36.7 °C) 84 20 123/79 95 %   05/07/22 1611 98 °F (36.7 °C) 84 20 112/66 94 %   05/07/22 1531 -- -- -- -- 95 %     Oxygen Therapy  O2 Sat (%): 95 % (05/08/22 1132)  Pulse via Oximetry: 79 beats per minute (05/08/22 1132)  O2 Device: None (Room air) (05/08/22 1132)    Estimated body mass index is 27.72 kg/m² as calculated from the following:    Height as of this encounter: 5' 7\" (1.702 m). Weight as of this encounter: 80.3 kg (177 lb). No intake or output data in the 24 hours ending 05/08/22 1154      Physical Exam:    General:    Well nourished. No overt distress  Head:  Normocephalic, atraumatic  Eyes:  Sclerae appear normal.  Pupils equally round. HENT:  Nares appear normal, no drainage. Moist mucous membranes  Neck:  No restricted ROM. Trachea midline  CV:   RRR. No m/r/g.   No JVD  Lungs: CTAB.  No wheezing, rhonchi, or rales. Even, unlabored  Abdomen:   Soft, nontender, nondistended. Extremities: Warm and dry. No cyanosis or clubbing. No edema. Skin:     No rashes. Bruising to abdomen that extends across lower back into pelvic region   Neuro:  CN II-XII grossly intact. Psych:  Normal mood and affect. Signed:  Sabrina Huerta NP    Part of this note may have been written by using a voice dictation software. The note has been proof read but may still contain some grammatical/other typographical errors.

## 2022-05-25 ENCOUNTER — OFFICE VISIT (OUTPATIENT)
Dept: CARDIOLOGY CLINIC | Age: 72
End: 2022-05-25
Payer: MEDICARE

## 2022-05-25 ENCOUNTER — TELEPHONE (OUTPATIENT)
Dept: CARDIAC CATH/INVASIVE PROCEDURES | Age: 72
End: 2022-05-25

## 2022-05-25 ENCOUNTER — CARE COORDINATION (OUTPATIENT)
Dept: CARE COORDINATION | Facility: CLINIC | Age: 72
End: 2022-05-25

## 2022-05-25 VITALS
WEIGHT: 177 LBS | SYSTOLIC BLOOD PRESSURE: 118 MMHG | HEIGHT: 66 IN | BODY MASS INDEX: 28.45 KG/M2 | HEART RATE: 95 BPM | DIASTOLIC BLOOD PRESSURE: 78 MMHG

## 2022-05-25 DIAGNOSIS — I48.19 PERSISTENT ATRIAL FIBRILLATION (HCC): Primary | ICD-10-CM

## 2022-05-25 DIAGNOSIS — I10 ESSENTIAL HYPERTENSION, BENIGN: ICD-10-CM

## 2022-05-25 DIAGNOSIS — I26.99 ACUTE PULMONARY EMBOLISM, UNSPECIFIED PULMONARY EMBOLISM TYPE, UNSPECIFIED WHETHER ACUTE COR PULMONALE PRESENT (HCC): ICD-10-CM

## 2022-05-25 PROCEDURE — 99214 OFFICE O/P EST MOD 30 MIN: CPT | Performed by: INTERNAL MEDICINE

## 2022-05-25 PROCEDURE — 1036F TOBACCO NON-USER: CPT | Performed by: INTERNAL MEDICINE

## 2022-05-25 PROCEDURE — G8427 DOCREV CUR MEDS BY ELIG CLIN: HCPCS | Performed by: INTERNAL MEDICINE

## 2022-05-25 PROCEDURE — G8417 CALC BMI ABV UP PARAM F/U: HCPCS | Performed by: INTERNAL MEDICINE

## 2022-05-25 PROCEDURE — 3017F COLORECTAL CA SCREEN DOC REV: CPT | Performed by: INTERNAL MEDICINE

## 2022-05-25 PROCEDURE — 93000 ELECTROCARDIOGRAM COMPLETE: CPT | Performed by: INTERNAL MEDICINE

## 2022-05-25 PROCEDURE — 1123F ACP DISCUSS/DSCN MKR DOCD: CPT | Performed by: INTERNAL MEDICINE

## 2022-05-25 RX ORDER — METOPROLOL SUCCINATE 50 MG/1
50 TABLET, EXTENDED RELEASE ORAL 2 TIMES DAILY
COMMUNITY
Start: 2022-03-01

## 2022-05-25 RX ORDER — POTASSIUM CHLORIDE 750 MG/1
TABLET, FILM COATED, EXTENDED RELEASE ORAL
COMMUNITY

## 2022-05-25 RX ORDER — FUROSEMIDE 40 MG/1
TABLET ORAL
COMMUNITY

## 2022-05-25 RX ORDER — TRAZODONE HYDROCHLORIDE 100 MG/1
TABLET ORAL
COMMUNITY
Start: 2022-03-19

## 2022-05-25 RX ORDER — PANTOPRAZOLE SODIUM 40 MG/1
TABLET, DELAYED RELEASE ORAL
COMMUNITY
Start: 2022-02-27

## 2022-05-25 NOTE — CARE COORDINATION
[facility] Transitions Follow Up Call    2022    Patient: Leobardo Kaiser  Patient : 1950   MRN: [id number]  Reason for Admission: Anemia  Discharge Date: 22 RARS: No data recorded       Care Transitions Follow Up Call    Needs to be reviewed by the provider   Additional needs identified to be addressed with provider: No  none             Method of communication with provider : none      LPN Care Coordinator contacted the patient by telephone to follow up after admission on 2022. Verified name and  with patient as identifiers. Addressed changes since last contact: none  Discussed follow-up appointments. If no appointment was previously scheduled, appointment scheduling offered: Yes. Is follow up appointment scheduled within 7 days of discharge? Yes. Advance Care Planning:   Does patient have an Advance Directive: not on file    LPN CC reviewed discharge instructions, medical action plan and red flags with patient and discussed any barriers to care and/or understanding of plan of care after discharge. Discussed appropriate site of care based on symptoms and resources available to patient including: When to call 911. The patient agrees to contact the PCP office for questions related to their healthcare. Patients top risk factors for readmission: Anemia  Interventions to address risk factors: Obtained and reviewed discharge summary and/or continuity of care documents      [facility] follow up appointment(s): Spoke with patient states fine. Patient denies any concerns during this phone call. Patient states attended a Cardiology watchman follow up today and it went well. LPN CC provided contact information for future needs. Plan for follow-up call in 10-14 days based on severity of symptoms and risk factors.   Plan for next call: self management-discuss questions with plan of care    Goals Addressed                    This Visit's Progress      Conditions and Symptoms (pt-stated)         I will notify my provider of any barriers to my plan of care. Barriers: lack of education  Plan for overcoming my barriers: Patient states will work with provider on any issues that come up . Confidence: 8/10  Anticipated Goal Completion Date: within 30 days                Care Transitions Subsequent and Final Call    Subsequent and Final Calls  Care Transitions Interventions  Other Interventions:            Follow Up  Future Appointments   Date Time Provider Charanjit Leon   6/8/2022  1:20 PM [facility] [address] MARCELLA REYEZ   9/19/2022  1:00 PM MD JOAQUIN Goyal AISSATOU [facility] LPN

## 2022-05-25 NOTE — PROGRESS NOTES
[address], [place]  [address], [address] [place] [address], [address] [place] [address]  PHONE: [phone]  1950    SUBJECTIVE:   Rodriguez Amador is a 81 y.o. male seen for a follow up visit regarding the following:     Chief Complaint   Patient presents with    Consultation     Asap Watchman per consult per Shirin Malik       HPI:    Rodriguez Amador is a very pleasant 81 y.o. male with a past medical and cardiac history significant for persistent AF s/p ablation x 3 on tikosyn, HTN, asthma, PMR, DVT and PE c/b rectus sheath hematoma and presents to discuss Watchman. He presents to discuss Watchman in the setting of multiple bleeding problems. Cardiac PMH: (Old records have been reviewed and summarized below)  previous AF/AFL ablation by Dr. Sofia Cisneros, 2/2018, s/p AFL ablation on 2/2019 and repeat AF ablation in 4/17/2019 at the [facility]  with Dr. Sarah Crawford and another AF ablation on 6/23/2020 with termination of LA roof flutter. On Tikosyn     Reviewed office note Dr. Shirin Malik 5/19/22    Past Medical History, Past Surgical History, Family history, Social History, and Medications were all reviewed with the patient today and updated as necessary.      Current Outpatient Medications   Medication Sig Dispense Refill    furosemide (LASIX) 40 MG tablet furosemide 40 mg tablet   TAKE 1 TABLET BY MOUTH ONCE DAILY AS NEEDED FOR SWELLING      metoprolol succinate (TOPROL XL) 50 MG extended release tablet 50 mg in the morning and at bedtime      pantoprazole (PROTONIX) 40 MG tablet pantoprazole 40 mg tablet,delayed release   TAKE 1 TABLET BY MOUTH ONCE DAILY 30 MINUTES BEFORE FIRST MEAL OF THE DAY      potassium chloride (KLOR-CON) 10 MEQ extended release tablet potassium chloride ER 10 mEq tablet,extended release   TAKE 1 TABLET BY MOUTH ONCE DAILY WHEN TAKES FUROSEMIDE      traZODone (DESYREL) 100 MG tablet trazodone 100 mg tablet   TAKE 1/2 TO 1 (ONE-HALF TO ONE) TABLET BY MOUTH AT BEDTIME AS NEEDED FOR INSOMNIA      albuterol sulfate  (90 Base) MCG/ACT inhaler Inhale 2 puffs into the lungs every 6 hours as needed      Calcium Citrate 1040 MG TABS Take 1,000 mg by mouth daily      fluticasone (FLONASE) 50 MCG/ACT nasal spray 2 sprays by Nasal route daily      fluticasone-salmeterol (ADVAIR) 250-50 MCG/ACT AEPB diskus inhaler INHALE 1 DOSE BY MOUTH TWICE DAILY. RINSE MOUTH AFTER USE      loratadine (CLARITIN) 10 MG tablet Take 10 mg by mouth daily       predniSONE (DELTASONE) 1 MG tablet Take 5 mg by mouth daily (with breakfast)      sildenafil (REVATIO) 20 MG tablet Take 20 mg by mouth daily as needed      tamsulosin (FLOMAX) 0.4 MG capsule Take 0.4 mg by mouth 2 times daily      testosterone cypionate (DEPOTESTOTERONE CYPIONATE) 200 MG/ML injection Inject 440 mg into the muscle. No current facility-administered medications for this visit. Allergies   Allergen Reactions    Lisinopril Other (See Comments)     [unfilled]  Past Medical History:   Diagnosis Date    Acute pulmonary embolus ([facility] 12/30/2021    Arrhythmia     A fib     Asthma, moderate persistent, well-controlled 5/30/2013    Axonal polyneuropathy 1/15/2019    Babinski reflex 1/15/2019    Chronic left shoulder pain 5/30/2013    Pain radiates from his neck    DDD (degenerative disc disease), cervical 1/18/2018    MRI: 1/2018 - multilevel DDD and facet arthritis w/ moderate bilateral foraminal narrowing at C6-7 and moderate right at C5-6.     Essential hypertension, benign 5/30/2013    Hypercholesteremia 12/7/2015    Hypogonadism, testicular 5/30/2013    Removal of right testicle on 11/1/2010; urologist Estefany Jimenez of [facility], gives testosterone injections    Lumbosacral radiculopathy at L5 4/17/2016    Lung nodule, solitary 1/19/2017    Chronic - benign w/u    Lymphopenia 8/6/2017    Microalbuminuria 9/1/2017    PMR (polymyalgia rheumatica) ([facility] 2/21/2018    Followed by [facility] rheumatology    Prediabetes 2016    Unsteady gait 1/15/2019    Vitamin D deficiency 2015     Past Surgical History:   Procedure Laterality Date    ABLATION OF DYSRHYTHMIC FOCUS  2018    COLONOSCOPY      normal    LUMBAR DISCECTOMY      L4    LUMBAR DISCECTOMY      L5 discectomy    OTHER SURGICAL HISTORY      ablation for Afib     OK CARDIAC SURG PROCEDURE UNLIST      UROLOGICAL SURGERY  2010    testicular, right removed d/t  severe infection     Family History   Problem Relation Age of Onset    Cancer Father         leukemia    Hypertension Mother     Cancer Mother         breast    Cancer Sister         colon     Social History     Tobacco Use    Smoking status: Former Smoker     Quit date: 1985     Years since quittin.4    Smokeless tobacco: Never Used    Tobacco comment: Quit smoking: smoked cigerattes from age 27 to 47   Substance Use Topics    Alcohol use: Yes       PHYSICAL EXAM:    /78   Pulse 95   Ht 5' 6\" (1.676 m)   Wt 177 lb (80.3 kg)   BMI 28.57 kg/m²      Wt Readings from Last 5 Encounters:   22 177 lb (80.3 kg)   22 178 lb 14.4 oz (81.1 kg)   22 177 lb 12.8 oz (80.6 kg)   21 174 lb (78.9 kg)       BP Readings from Last 5 Encounters:   22 118/78   22 136/76   22 118/78   21 102/62       General appearance: Alert, well appearing, and in no distress   CV: RRR, no M/R/G, no JVD, normal distal pulses, no lower extremity edema  Pulm: Clear to auscultation, no wheezes, no crackles, no accessory muscle use  GI: Soft, nontender, nondistended  Neuro: Alert and oriented    Medical problems and test results were reviewed with the patient today.      Lab Results   Component Value Date    K 4.1 2022     CREATININE   Date Value Ref Range Status   2022 1.20 0.8 - 1.5 MG/DL Final     Lab Results   Component Value Date    HGB 9.3 2022     Lab Results   Component Value Date    INR 1.2 05/05/2022    INR 1.2 05/02/2022    INR 1.0 03/01/2021       EKG: (EKG has been independently visualized by me with interpretation below)  Sinus  Rhythm, normal axis, rate 95  WITHIN NORMAL LIMITS    Wei Amaya was seen today for consultation. Diagnoses and all orders for this visit:    Atrial fibrillation ([facility]  -     EKG 12 lead    Essential hypertension, benign    Acute pulmonary embolism, unspecified pulmonary embolism type, unspecified whether acute cor pulmonale present ([address]        ASSESSMENT and PLAN  1. CVA protection: Patient is an appropriate candidate for consideration of left atrial appendage occlusion. The patient's [id number] score is  3 which indicates a 3.2% annual risk of stroke. (Age, HTN, PE). Has-BLED score is 3 which indicates a 3.72% annual risk of a major bleeding event. (age, HTN, bleeding). I have discussed with the rationale for  left atrial appendage occlusion. We discussed the available data from randomized trials which demonstate left atrial appendage occlusion is as effective as long term anticoagulation at prevention of CVA or systemic embolism. We also discussed that left atrial appendage closure reduces risk of CVA or sytemic embolization by 67-83% compared to no anticoagulation. The risk of left atrial appendage were also discussed. Risk of major complication is approximately 1.5% based on available data. Risk include but not limited:   - Pericardial tamponade (1.28%) which can be treated percutaneously in 63% of cases but can require sugical therapy in  31% of cases (3 out of 1000 patients). - Procedural related CVA in 2 out of 1000 patients. - Device embolization in less than 3 out of 1000 patients   - Death related to procedure in approximately 6 out of 10,000 patients. Based on our discussion, it is felt benefits of left atrial appendage significantly outweigh risk. All questions answered to the best of my ability.   Patient would like to proceed with

## 2022-05-25 NOTE — TELEPHONE ENCOUNTER
The patient was contacted to discuss [address]. The watchman process and procedure was explained in depth. The patient has a watchman consult with Dr. Dwayne Talbot on 5/25/2022. WM coordinator contact information provided for questions.

## 2022-05-26 ENCOUNTER — TELEPHONE (OUTPATIENT)
Dept: CARDIAC CATH/INVASIVE PROCEDURES | Age: 72
End: 2022-05-26

## 2022-05-26 NOTE — TELEPHONE ENCOUNTER
Patient is scheduled for pre WM screening BILL at [facility] on 6/2/2022 @ 1030am (arrival time 0930) with Dr. Kentrell Sandoval. NPO status and need for  reinforced. He verbalized understanding. Patient has WM coordinator contact information for questions or concerns.

## 2022-06-01 NOTE — PROGRESS NOTES
Patient pre-assessment complete for BILL Pre LAAO scheduled for 22, arrival time 0930. Patient verified using . Patient instructed to bring a list of all home medications on the day of procedure. NPO status reinforced. Patient instructed to HOLD Lasix. Instructed they can take all other medications excluding vitamins & supplements. Patient verbalizes understanding of all instructions & denies any questions at this time.

## 2022-06-02 ENCOUNTER — HOSPITAL ENCOUNTER (OUTPATIENT)
Dept: CARDIAC CATH/INVASIVE PROCEDURES | Age: 72
Discharge: HOME | End: 2022-06-04
Payer: MEDICARE

## 2022-06-02 VITALS
BODY MASS INDEX: 28.45 KG/M2 | SYSTOLIC BLOOD PRESSURE: 142 MMHG | HEIGHT: 66 IN | OXYGEN SATURATION: 98 % | DIASTOLIC BLOOD PRESSURE: 82 MMHG | HEART RATE: 80 BPM | WEIGHT: 177 LBS | RESPIRATION RATE: 20 BRPM | TEMPERATURE: 98 F

## 2022-06-02 DIAGNOSIS — I48.91 AFIB (HCC): ICD-10-CM

## 2022-06-02 LAB
ANION GAP SERPL CALC-SCNC: 3 MMOL/L (ref 7–16)
BUN SERPL-MCNC: 19 MG/DL (ref 8–23)
CALCIUM SERPL-MCNC: 8.8 MG/DL (ref 8.3–10.4)
CHLORIDE SERPL-SCNC: 107 MMOL/L (ref 98–107)
CO2 SERPL-SCNC: 28 MMOL/L (ref 21–32)
CREAT SERPL-MCNC: 1 MG/DL (ref 0.8–1.5)
ECHO BSA: 1.93 M2
EKG ATRIAL RATE: 80 BPM
EKG DIAGNOSIS: NORMAL
EKG P AXIS: 56 DEGREES
EKG P-R INTERVAL: 146 MS
EKG Q-T INTERVAL: 360 MS
EKG QRS DURATION: 90 MS
EKG QTC CALCULATION (BAZETT): 415 MS
EKG R AXIS: 1 DEGREES
EKG T AXIS: 17 DEGREES
EKG VENTRICULAR RATE: 80 BPM
ERYTHROCYTE [DISTWIDTH] IN BLOOD BY AUTOMATED COUNT: 17.3 % (ref 11.9–14.6)
GLUCOSE SERPL-MCNC: 89 MG/DL (ref 65–100)
HCT VFR BLD AUTO: 43.9 % (ref 41.1–50.3)
HGB BLD-MCNC: 13.8 G/DL (ref 13.6–17.2)
LV EF: 58 %
LVEF MODALITY: NORMAL
MAGNESIUM SERPL-MCNC: 2.3 MG/DL (ref 1.8–2.4)
MCH RBC QN AUTO: 30.3 PG (ref 26.1–32.9)
MCHC RBC AUTO-ENTMCNC: 31.4 G/DL (ref 31.4–35)
MCV RBC AUTO: 96.3 FL (ref 79.6–97.8)
NRBC # BLD: 0 K/UL (ref 0–0.2)
PLATELET # BLD AUTO: 151 K/UL (ref 150–450)
PMV BLD AUTO: 9.9 FL (ref 9.4–12.3)
POTASSIUM SERPL-SCNC: 4 MMOL/L (ref 3.5–5.1)
RBC # BLD AUTO: 4.56 M/UL (ref 4.23–5.6)
SODIUM SERPL-SCNC: 138 MMOL/L (ref 138–145)
WBC # BLD AUTO: 6.7 K/UL (ref 4.3–11.1)

## 2022-06-02 PROCEDURE — 93005 ELECTROCARDIOGRAM TRACING: CPT | Performed by: INTERNAL MEDICINE

## 2022-06-02 PROCEDURE — 99152 MOD SED SAME PHYS/QHP 5/>YRS: CPT | Performed by: INTERNAL MEDICINE

## 2022-06-02 PROCEDURE — 80048 BASIC METABOLIC PNL TOTAL CA: CPT

## 2022-06-02 PROCEDURE — 93325 DOPPLER ECHO COLOR FLOW MAPG: CPT | Performed by: INTERNAL MEDICINE

## 2022-06-02 PROCEDURE — 93325 DOPPLER ECHO COLOR FLOW MAPG: CPT

## 2022-06-02 PROCEDURE — 93312 ECHO TRANSESOPHAGEAL: CPT | Performed by: INTERNAL MEDICINE

## 2022-06-02 PROCEDURE — 93320 DOPPLER ECHO COMPLETE: CPT | Performed by: INTERNAL MEDICINE

## 2022-06-02 PROCEDURE — 93320 DOPPLER ECHO COMPLETE: CPT

## 2022-06-02 PROCEDURE — 83735 ASSAY OF MAGNESIUM: CPT

## 2022-06-02 PROCEDURE — 99152 MOD SED SAME PHYS/QHP 5/>YRS: CPT

## 2022-06-02 PROCEDURE — 93312 ECHO TRANSESOPHAGEAL: CPT

## 2022-06-02 PROCEDURE — 76377 3D RENDER W/INTRP POSTPROCES: CPT

## 2022-06-02 PROCEDURE — 85027 COMPLETE CBC AUTOMATED: CPT

## 2022-06-02 PROCEDURE — 6360000002 HC RX W HCPCS: Performed by: INTERNAL MEDICINE

## 2022-06-02 PROCEDURE — 6370000000 HC RX 637 (ALT 250 FOR IP): Performed by: INTERNAL MEDICINE

## 2022-06-02 RX ORDER — LIDOCAINE HYDROCHLORIDE 20 MG/ML
SOLUTION OROPHARYNGEAL
Status: COMPLETED | OUTPATIENT
Start: 2022-06-02 | End: 2022-06-02

## 2022-06-02 RX ORDER — MIDAZOLAM HYDROCHLORIDE 2 MG/2ML
INJECTION, SOLUTION INTRAMUSCULAR; INTRAVENOUS
Status: COMPLETED | OUTPATIENT
Start: 2022-06-02 | End: 2022-06-02

## 2022-06-02 RX ORDER — SODIUM CHLORIDE 9 MG/ML
INJECTION, SOLUTION INTRAVENOUS CONTINUOUS
Status: DISCONTINUED | OUTPATIENT
Start: 2022-06-02 | End: 2022-06-05 | Stop reason: HOSPADM

## 2022-06-02 RX ORDER — FENTANYL CITRATE 50 UG/ML
INJECTION, SOLUTION INTRAMUSCULAR; INTRAVENOUS
Status: COMPLETED | OUTPATIENT
Start: 2022-06-02 | End: 2022-06-02

## 2022-06-02 RX ADMIN — Medication 15 ML: at 10:52

## 2022-06-02 RX ADMIN — FENTANYL CITRATE 50 MCG: 50 INJECTION INTRAMUSCULAR; INTRAVENOUS at 11:09

## 2022-06-02 RX ADMIN — MIDAZOLAM HYDROCHLORIDE 2 MG: 1 INJECTION, SOLUTION INTRAMUSCULAR; INTRAVENOUS at 11:11

## 2022-06-02 RX ADMIN — MIDAZOLAM HYDROCHLORIDE 1 MG: 1 INJECTION, SOLUTION INTRAMUSCULAR; INTRAVENOUS at 11:15

## 2022-06-02 RX ADMIN — MIDAZOLAM HYDROCHLORIDE 2 MG: 1 INJECTION, SOLUTION INTRAMUSCULAR; INTRAVENOUS at 11:09

## 2022-06-02 NOTE — PROGRESS NOTES
Patient received to [address] [place] [address] room # 15  Ambulatory from [facility]. Patient scheduled for BILL today with Dr Saravanan Palencia. Procedure reviewed & questions answered, voiced good understanding consent obtained & placed on chart. All medications and medical history reviewed. Will prep patient per orders. Patient & family updated on plan of care. The patient has a fraility score of 3-MANAGING WELL, based on patient A&Ox3, patient able to ambulate to room without difficulty.

## 2022-06-02 NOTE — PROGRESS NOTES
TRANSFER - OUT REPORT:    Verbal report given to RN(name) on Ariadna Rush being transferred to CPRU(unit) for routine progression of patient care       Report consisted of patient's Situation, Background, Assessment and   Recommendations(SBAR). Information from the following report(s) Nurse Handoff Report was reviewed with the receiving nurse. Opportunity for questions and clarification was provided.       Patient transported with:   Monitor     BILL w/ Dr. Kentrell Sandoval  5 mg versed  50 mcg fentanyl

## 2022-06-03 RX ORDER — MONTELUKAST SODIUM 10 MG/1
10 TABLET ORAL NIGHTLY
COMMUNITY

## 2022-06-03 RX ORDER — ASPIRIN 81 MG/1
81 TABLET ORAL DAILY
Status: ON HOLD | COMMUNITY
End: 2022-06-15 | Stop reason: HOSPADM

## 2022-06-03 NOTE — PROGRESS NOTES
Patient pre-assessment complete for Alton Burrows scheduled for 2022, arrival time 0900. Patient verified using . Patient instructed to bring a list of home medications on the day of procedure. NPO status reinforced. He will take aspirin 81mg, prednisone, protonix, flomax, metoprolol, and advair with a small sip of water, the morning of his procedure. Patient instructed to HOLD all other medications and supplements. Patient verbalizes understanding of all instructions & denies any questions at this time. Patient has watchman contact info if he were to have any other questions.

## 2022-06-06 NOTE — PROGRESS NOTES
Sergio Caballero has been referred to the [address] [place] [address] [facility] for evaluation for Left Atrial Appendage Closure with the Watchman device for management of stroke risk resulting from non-valvular atrial fibrillation. Based on this past history of rectal sheath hematoma with multiple bleeding problems, it has been determined that he is a poor candidate for long-term oral anticoagulation, however may be tolerant of short term treatment needed for watchman implantation. Atrial Fibrillation CHADSVASC2 Score Stroke Risk:   81 y.o. 74-69 - 1   male Male - 0   CHF HX: No - 0   HTN HX: Yes - 1   Stroke/TIA/Thromboembolism Yes _2   Vascular Disease HX: No - 0   Diabetes Mellitus No - 0   CHADSVASC 2 Score 4      Annual Stroke Risk 4.8%- moderate-high      HAS-BLED Score     HTN Hx [x] 1 Point   Renal Disease  [] 1 Point   Liver Disease [] [address] Hx [] 1 Point   Prior Major Bleeding or Predisposition [x] 1 Point   Labile INR [] 1 Point   Age > 65 Years Current: 81 y.o. [x] 1 Point   Rx Usage Predisposing to Bleeding [] 1 Point   Alcohol Use (> or = 8 Drinks/wk) [] 1 Point   Total: UCHASBLED: Score 3 High Risk 5.8% Risk of major bleeding 3.72  Bleeds Per 100 pts years Alternatives to Anticoagulation can be considered       Dr. Jenni Ambrose and Dr. Royal Liao have both had a face to face conversation with this patient explaining atrial fibrillation, stroke risk with atrial fibrillation, and the different treatment plans as related to their elevated stroke risk with atrial fibrillation. Based on stroke risk, as shown with [id number] score, and bleeding risk, as shown with HAS-BLED score, a shared decision has been made to pursue closure of the left atrial appendage as a safe and effective alternative to oral anticoagulation.

## 2022-06-08 ENCOUNTER — CARE COORDINATION (OUTPATIENT)
Dept: CARE COORDINATION | Facility: CLINIC | Age: 72
End: 2022-06-08

## 2022-06-08 NOTE — CARE COORDINATION
[facility] Transitions Follow Up Call    2022    Patient: Lourdes Martinez  Patient : 1950   MRN: [id number]  Reason for Admission: Anemia  Discharge Date: 22 RARS: No data recorded     Care Transitions Follow Up Call    Needs to be reviewed by the provider   Additional needs identified to be addressed with provider: No  none             Method of communication with provider : none      LPN Care Coordinator contacted the patient by telephone to follow up after admission on 2022. Verified name and  with patient as identifiers. Addressed changes since last contact: Patient is scheuled for surgery on 2022 Watchman placement  Discussed follow-up appointments. If no appointment was previously scheduled, appointment scheduling offered: Yes. Is follow up appointment scheduled within 7 days of discharge? Yes. Advance Care Planning:   Does patient have an Advance Directive: Reviewed    LPN CC reviewed discharge instructions, medical action plan and red flags with patient and discussed any barriers to care and/or understanding of plan of care after discharge. Discussed appropriate site of care based on symptoms and resources available to patient including: When to call [address] The patient agrees to contact the PCP office for questions related to their healthcare. Patients top risk factors for readmission: Anemia  Interventions to address risk factors: Obtained and reviewed discharge summary and/or continuity of care documents      Non-[facility] follow up appointment(s): Spoke with patient states doing fine. Patient states attended a Hematology appointment on 2022. Patient state he has  surgery on 2022. Watchman placement. LPN CC provided contact information for future needs. No further follow-up call indicated based on severity of symptoms and risk factors.     Goals Addressed                    This Visit's Progress      Conditions and Symptoms (pt-stated)   On track      I will notify my provider of any barriers to my plan of care. Barriers: lack of education  Plan for overcoming my barriers: Patient states will work with provider on any issues that come up . Confidence: 8/10  Anticipated Goal Completion Date: within 30 days                    Care Transitions Subsequent and Final Call    Subsequent and Final Calls  Care Transitions Interventions  Other Interventions:            Follow Up  Future Appointments   Date Time Provider Charanjit Leon   6/8/2022  1:20 PM [facility]   7/27/2022  2:40 PM [facility]   9/19/2022  1:00 PM Isabel Pham MD [facility]       Tiff Simmons LPN

## 2022-06-13 ENCOUNTER — TELEPHONE (OUTPATIENT)
Dept: CARDIAC CATH/INVASIVE PROCEDURES | Age: 72
End: 2022-06-13

## 2022-06-13 ENCOUNTER — HOSPITAL ENCOUNTER (OUTPATIENT)
Dept: LAB | Age: 72
Discharge: HOME | End: 2022-06-16

## 2022-06-13 ENCOUNTER — ANESTHESIA EVENT (OUTPATIENT)
Dept: SURGERY | Age: 72
DRG: 274 | End: 2022-06-13
Payer: MEDICARE

## 2022-06-13 ENCOUNTER — PREP FOR PROCEDURE (OUTPATIENT)
Dept: CARDIAC CATH/INVASIVE PROCEDURES | Age: 72
End: 2022-06-13

## 2022-06-13 DIAGNOSIS — I48.19 PERSISTENT ATRIAL FIBRILLATION (HCC): ICD-10-CM

## 2022-06-13 NOTE — TELEPHONE ENCOUNTER
Mr. Helio Webb was phoned with an updated arrival time due to scheduling changes. He will be arriving at 0900 on 6/14/2022 for LAAO implantation. He has WM coordinator contact information.

## 2022-06-14 ENCOUNTER — APPOINTMENT (OUTPATIENT)
Dept: CARDIAC CATH/INVASIVE PROCEDURES | Age: 72
DRG: 274 | End: 2022-06-14
Attending: INTERNAL MEDICINE
Payer: MEDICARE

## 2022-06-14 ENCOUNTER — HOSPITAL ENCOUNTER (INPATIENT)
Age: 72
LOS: 1 days | Discharge: HOME | DRG: 274 | End: 2022-06-15
Attending: INTERNAL MEDICINE | Admitting: INTERNAL MEDICINE
Payer: MEDICARE

## 2022-06-14 ENCOUNTER — ANESTHESIA (OUTPATIENT)
Dept: SURGERY | Age: 72
DRG: 274 | End: 2022-06-14
Payer: MEDICARE

## 2022-06-14 DIAGNOSIS — I48.19 PERSISTENT ATRIAL FIBRILLATION (HCC): ICD-10-CM

## 2022-06-14 LAB
ACT BLD: 237 SECS (ref 70–128)
ACT BLD: 248 SECS (ref 70–128)
ALBUMIN SERPL-MCNC: 3.7 G/DL (ref 3.2–4.6)
ANION GAP SERPL CALC-SCNC: 6 MMOL/L (ref 7–16)
BUN SERPL-MCNC: 17 MG/DL (ref 8–23)
CALCIUM SERPL-MCNC: 9 MG/DL (ref 8.3–10.4)
CHLORIDE SERPL-SCNC: 107 MMOL/L (ref 98–107)
CO2 SERPL-SCNC: 28 MMOL/L (ref 21–32)
CREAT SERPL-MCNC: 1.2 MG/DL (ref 0.8–1.5)
ECHO BSA: 1.93 M2
ECHO MV REGURGITANT PEAK GRADIENT: 100 MMHG
ECHO MV REGURGITANT PEAK VELOCITY: 5 M/S
ECHO MV REGURGITANT VTIA: 164.7 CM
EKG ATRIAL RATE: 75 BPM
EKG DIAGNOSIS: NORMAL
EKG P AXIS: 19 DEGREES
EKG P-R INTERVAL: 162 MS
EKG Q-T INTERVAL: 360 MS
EKG QRS DURATION: 94 MS
EKG QTC CALCULATION (BAZETT): 402 MS
EKG R AXIS: 1 DEGREES
EKG T AXIS: 14 DEGREES
EKG VENTRICULAR RATE: 75 BPM
ERYTHROCYTE [DISTWIDTH] IN BLOOD BY AUTOMATED COUNT: 15.7 % (ref 11.9–14.6)
GLUCOSE SERPL-MCNC: 95 MG/DL (ref 65–100)
HCT VFR BLD AUTO: 47.3 % (ref 41.1–50.3)
HGB BLD-MCNC: 15.1 G/DL (ref 13.6–17.2)
HISTORY CHECK: NORMAL
INR PPP: 1
MAGNESIUM SERPL-MCNC: 2.4 MG/DL (ref 1.8–2.4)
MCH RBC QN AUTO: 30 PG (ref 26.1–32.9)
MCHC RBC AUTO-ENTMCNC: 31.9 G/DL (ref 31.4–35)
MCV RBC AUTO: 94 FL (ref 79.6–97.8)
NRBC # BLD: 0 K/UL (ref 0–0.2)
PLATELET # BLD AUTO: 166 K/UL (ref 150–450)
PMV BLD AUTO: 9.6 FL (ref 9.4–12.3)
POTASSIUM SERPL-SCNC: 4.1 MMOL/L (ref 3.5–5.1)
PROTHROMBIN TIME: 13.2 SEC (ref 12.6–14.5)
RBC # BLD AUTO: 5.03 M/UL (ref 4.23–5.6)
SODIUM SERPL-SCNC: 141 MMOL/L (ref 136–145)
WBC # BLD AUTO: 8.2 K/UL (ref 4.3–11.1)

## 2022-06-14 PROCEDURE — 6360000002 HC RX W HCPCS: Performed by: INTERNAL MEDICINE

## 2022-06-14 PROCEDURE — 33340 PERQ CLSR TCAT L ATR APNDGE: CPT | Performed by: INTERNAL MEDICINE

## 2022-06-14 PROCEDURE — 6360000002 HC RX W HCPCS: Performed by: NURSE ANESTHETIST, CERTIFIED REGISTERED

## 2022-06-14 PROCEDURE — 86923 COMPATIBILITY TEST ELECTRIC: CPT

## 2022-06-14 PROCEDURE — 86901 BLOOD TYPING SEROLOGIC RH(D): CPT

## 2022-06-14 PROCEDURE — 83735 ASSAY OF MAGNESIUM: CPT

## 2022-06-14 PROCEDURE — 36415 COLL VENOUS BLD VENIPUNCTURE: CPT

## 2022-06-14 PROCEDURE — 82040 ASSAY OF SERUM ALBUMIN: CPT

## 2022-06-14 PROCEDURE — 85610 PROTHROMBIN TIME: CPT

## 2022-06-14 PROCEDURE — 94640 AIRWAY INHALATION TREATMENT: CPT

## 2022-06-14 PROCEDURE — 85027 COMPLETE CBC AUTOMATED: CPT

## 2022-06-14 PROCEDURE — 93005 ELECTROCARDIOGRAM TRACING: CPT | Performed by: INTERNAL MEDICINE

## 2022-06-14 PROCEDURE — 2500000003 HC RX 250 WO HCPCS: Performed by: NURSE ANESTHETIST, CERTIFIED REGISTERED

## 2022-06-14 PROCEDURE — 3700000001 HC ADD 15 MINUTES (ANESTHESIA): Performed by: INTERNAL MEDICINE

## 2022-06-14 PROCEDURE — 6370000000 HC RX 637 (ALT 250 FOR IP): Performed by: INTERNAL MEDICINE

## 2022-06-14 PROCEDURE — C1889 IMPLANT/INSERT DEVICE, NOC: HCPCS | Performed by: INTERNAL MEDICINE

## 2022-06-14 PROCEDURE — C1760 CLOSURE DEV, VASC: HCPCS | Performed by: INTERNAL MEDICINE

## 2022-06-14 PROCEDURE — 93355 ECHO TRANSESOPHAGEAL (TEE): CPT | Performed by: INTERNAL MEDICINE

## 2022-06-14 PROCEDURE — 85347 COAGULATION TIME ACTIVATED: CPT

## 2022-06-14 PROCEDURE — 2580000003 HC RX 258: Performed by: ANESTHESIOLOGY

## 2022-06-14 PROCEDURE — C1769 GUIDE WIRE: HCPCS | Performed by: INTERNAL MEDICINE

## 2022-06-14 PROCEDURE — B24BZZ4 ULTRASONOGRAPHY OF HEART WITH AORTA, TRANSESOPHAGEAL: ICD-10-PCS | Performed by: INTERNAL MEDICINE

## 2022-06-14 PROCEDURE — 1100000003 HC PRIVATE W/ TELEMETRY

## 2022-06-14 PROCEDURE — 93325 DOPPLER ECHO COLOR FLOW MAPG: CPT

## 2022-06-14 PROCEDURE — 6360000004 HC RX CONTRAST MEDICATION: Performed by: INTERNAL MEDICINE

## 2022-06-14 PROCEDURE — 2709999900 HC NON-CHARGEABLE SUPPLY: Performed by: INTERNAL MEDICINE

## 2022-06-14 PROCEDURE — 3700000000 HC ANESTHESIA ATTENDED CARE: Performed by: INTERNAL MEDICINE

## 2022-06-14 PROCEDURE — 7100000000 HC PACU RECOVERY - FIRST 15 MIN: Performed by: INTERNAL MEDICINE

## 2022-06-14 PROCEDURE — C1893 INTRO/SHEATH, FIXED,NON-PEEL: HCPCS | Performed by: INTERNAL MEDICINE

## 2022-06-14 PROCEDURE — 02L73DK OCCLUSION OF LEFT ATRIAL APPENDAGE WITH INTRALUMINAL DEVICE, PERCUTANEOUS APPROACH: ICD-10-PCS | Performed by: INTERNAL MEDICINE

## 2022-06-14 PROCEDURE — 80048 BASIC METABOLIC PNL TOTAL CA: CPT

## 2022-06-14 PROCEDURE — C1894 INTRO/SHEATH, NON-LASER: HCPCS | Performed by: INTERNAL MEDICINE

## 2022-06-14 PROCEDURE — 2580000003 HC RX 258: Performed by: NURSE ANESTHETIST, CERTIFIED REGISTERED

## 2022-06-14 PROCEDURE — 7100000001 HC PACU RECOVERY - ADDTL 15 MIN: Performed by: INTERNAL MEDICINE

## 2022-06-14 PROCEDURE — 93662 INTRACARDIAC ECG (ICE): CPT | Performed by: INTERNAL MEDICINE

## 2022-06-14 PROCEDURE — C1759 CATH, INTRA ECHOCARDIOGRAPHY: HCPCS | Performed by: INTERNAL MEDICINE

## 2022-06-14 PROCEDURE — 2720000010 HC SURG SUPPLY STERILE: Performed by: INTERNAL MEDICINE

## 2022-06-14 DEVICE — LEFT ATRIAL APPENDAGE CLOSURE DEVICE WITH DELIVERY SYSTEM
Type: IMPLANTABLE DEVICE | Status: FUNCTIONAL
Brand: WATCHMAN FLX™

## 2022-06-14 RX ORDER — PANTOPRAZOLE SODIUM 40 MG/1
40 TABLET, DELAYED RELEASE ORAL
Status: DISCONTINUED | OUTPATIENT
Start: 2022-06-15 | End: 2022-06-15 | Stop reason: HOSPADM

## 2022-06-14 RX ORDER — ACETAMINOPHEN 500 MG
1000 TABLET ORAL ONCE
Status: DISCONTINUED | OUTPATIENT
Start: 2022-06-14 | End: 2022-06-14 | Stop reason: HOSPADM

## 2022-06-14 RX ORDER — HYDROMORPHONE HYDROCHLORIDE 2 MG/ML
0.5 INJECTION, SOLUTION INTRAMUSCULAR; INTRAVENOUS; SUBCUTANEOUS EVERY 10 MIN PRN
Status: DISCONTINUED | OUTPATIENT
Start: 2022-06-14 | End: 2022-06-14 | Stop reason: HOSPADM

## 2022-06-14 RX ORDER — PROPOFOL 10 MG/ML
INJECTION, EMULSION INTRAVENOUS PRN
Status: DISCONTINUED | OUTPATIENT
Start: 2022-06-14 | End: 2022-06-14 | Stop reason: SDUPTHER

## 2022-06-14 RX ORDER — SODIUM CHLORIDE 0.9 % (FLUSH) 0.9 %
5-40 SYRINGE (ML) INJECTION EVERY 12 HOURS SCHEDULED
Status: DISCONTINUED | OUTPATIENT
Start: 2022-06-14 | End: 2022-06-14 | Stop reason: HOSPADM

## 2022-06-14 RX ORDER — MONTELUKAST SODIUM 10 MG/1
10 TABLET ORAL NIGHTLY
Status: DISCONTINUED | OUTPATIENT
Start: 2022-06-15 | End: 2022-06-15 | Stop reason: HOSPADM

## 2022-06-14 RX ORDER — ACETAMINOPHEN 325 MG/1
650 TABLET ORAL EVERY 4 HOURS PRN
Status: DISCONTINUED | OUTPATIENT
Start: 2022-06-14 | End: 2022-06-15 | Stop reason: HOSPADM

## 2022-06-14 RX ORDER — SCOLOPAMINE TRANSDERMAL SYSTEM 1 MG/1
1 PATCH, EXTENDED RELEASE TRANSDERMAL
Status: DISCONTINUED | OUTPATIENT
Start: 2022-06-14 | End: 2022-06-14 | Stop reason: HOSPADM

## 2022-06-14 RX ORDER — SODIUM CHLORIDE 9 MG/ML
INJECTION, SOLUTION INTRAVENOUS PRN
Status: DISCONTINUED | OUTPATIENT
Start: 2022-06-14 | End: 2022-06-15 | Stop reason: HOSPADM

## 2022-06-14 RX ORDER — METOPROLOL SUCCINATE 25 MG/1
50 TABLET, EXTENDED RELEASE ORAL 2 TIMES DAILY
Status: DISCONTINUED | OUTPATIENT
Start: 2022-06-14 | End: 2022-06-15 | Stop reason: HOSPADM

## 2022-06-14 RX ORDER — OXYCODONE HYDROCHLORIDE 5 MG/1
5 TABLET ORAL PRN
Status: DISCONTINUED | OUTPATIENT
Start: 2022-06-14 | End: 2022-06-14 | Stop reason: HOSPADM

## 2022-06-14 RX ORDER — FENTANYL CITRATE 50 UG/ML
100 INJECTION, SOLUTION INTRAMUSCULAR; INTRAVENOUS
Status: DISCONTINUED | OUTPATIENT
Start: 2022-06-14 | End: 2022-06-14 | Stop reason: HOSPADM

## 2022-06-14 RX ORDER — DIPHENHYDRAMINE HYDROCHLORIDE 50 MG/ML
12.5 INJECTION INTRAMUSCULAR; INTRAVENOUS
Status: DISCONTINUED | OUTPATIENT
Start: 2022-06-14 | End: 2022-06-14 | Stop reason: HOSPADM

## 2022-06-14 RX ORDER — PREDNISONE 1 MG/1
5 TABLET ORAL
Status: DISCONTINUED | OUTPATIENT
Start: 2022-06-15 | End: 2022-06-15 | Stop reason: HOSPADM

## 2022-06-14 RX ORDER — FLUTICASONE PROPIONATE 50 MCG
2 SPRAY, SUSPENSION (ML) NASAL DAILY
Status: DISCONTINUED | OUTPATIENT
Start: 2022-06-14 | End: 2022-06-15 | Stop reason: HOSPADM

## 2022-06-14 RX ORDER — FENTANYL CITRATE 50 UG/ML
25 INJECTION, SOLUTION INTRAMUSCULAR; INTRAVENOUS EVERY 5 MIN PRN
Status: DISCONTINUED | OUTPATIENT
Start: 2022-06-14 | End: 2022-06-14 | Stop reason: HOSPADM

## 2022-06-14 RX ORDER — TAMSULOSIN HYDROCHLORIDE 0.4 MG/1
0.4 CAPSULE ORAL 2 TIMES DAILY
Status: DISCONTINUED | OUTPATIENT
Start: 2022-06-14 | End: 2022-06-15 | Stop reason: HOSPADM

## 2022-06-14 RX ORDER — OXYCODONE HYDROCHLORIDE 5 MG/1
10 TABLET ORAL PRN
Status: DISCONTINUED | OUTPATIENT
Start: 2022-06-14 | End: 2022-06-14 | Stop reason: HOSPADM

## 2022-06-14 RX ORDER — LIDOCAINE HYDROCHLORIDE 20 MG/ML
INJECTION, SOLUTION INTRAVENOUS PRN
Status: DISCONTINUED | OUTPATIENT
Start: 2022-06-14 | End: 2022-06-14 | Stop reason: SDUPTHER

## 2022-06-14 RX ORDER — ONDANSETRON 2 MG/ML
INJECTION INTRAMUSCULAR; INTRAVENOUS PRN
Status: DISCONTINUED | OUTPATIENT
Start: 2022-06-14 | End: 2022-06-14 | Stop reason: SDUPTHER

## 2022-06-14 RX ORDER — SODIUM CHLORIDE 0.9 % (FLUSH) 0.9 %
5-40 SYRINGE (ML) INJECTION PRN
Status: DISCONTINUED | OUTPATIENT
Start: 2022-06-14 | End: 2022-06-14 | Stop reason: HOSPADM

## 2022-06-14 RX ORDER — ONDANSETRON 2 MG/ML
4 INJECTION INTRAMUSCULAR; INTRAVENOUS
Status: DISCONTINUED | OUTPATIENT
Start: 2022-06-14 | End: 2022-06-14 | Stop reason: HOSPADM

## 2022-06-14 RX ORDER — SODIUM CHLORIDE, SODIUM LACTATE, POTASSIUM CHLORIDE, CALCIUM CHLORIDE 600; 310; 30; 20 MG/100ML; MG/100ML; MG/100ML; MG/100ML
INJECTION, SOLUTION INTRAVENOUS CONTINUOUS
Status: DISCONTINUED | OUTPATIENT
Start: 2022-06-14 | End: 2022-06-15 | Stop reason: HOSPADM

## 2022-06-14 RX ORDER — MIDAZOLAM HYDROCHLORIDE 2 MG/2ML
2 INJECTION, SOLUTION INTRAMUSCULAR; INTRAVENOUS
Status: DISCONTINUED | OUTPATIENT
Start: 2022-06-14 | End: 2022-06-14 | Stop reason: HOSPADM

## 2022-06-14 RX ORDER — SODIUM CHLORIDE, SODIUM LACTATE, POTASSIUM CHLORIDE, CALCIUM CHLORIDE 600; 310; 30; 20 MG/100ML; MG/100ML; MG/100ML; MG/100ML
INJECTION, SOLUTION INTRAVENOUS CONTINUOUS
Status: DISCONTINUED | OUTPATIENT
Start: 2022-06-14 | End: 2022-06-14 | Stop reason: HOSPADM

## 2022-06-14 RX ORDER — SODIUM CHLORIDE, SODIUM LACTATE, POTASSIUM CHLORIDE, CALCIUM CHLORIDE 600; 310; 30; 20 MG/100ML; MG/100ML; MG/100ML; MG/100ML
INJECTION, SOLUTION INTRAVENOUS CONTINUOUS
Status: DISCONTINUED | OUTPATIENT
Start: 2022-06-14 | End: 2022-06-15 | Stop reason: SDUPTHER

## 2022-06-14 RX ORDER — TRAZODONE HYDROCHLORIDE 50 MG/1
100 TABLET ORAL NIGHTLY PRN
Status: DISCONTINUED | OUTPATIENT
Start: 2022-06-14 | End: 2022-06-15 | Stop reason: HOSPADM

## 2022-06-14 RX ORDER — PROTAMINE SULFATE 10 MG/ML
INJECTION, SOLUTION INTRAVENOUS PRN
Status: DISCONTINUED | OUTPATIENT
Start: 2022-06-14 | End: 2022-06-14 | Stop reason: SDUPTHER

## 2022-06-14 RX ORDER — HEPARIN SODIUM 1000 [USP'U]/ML
INJECTION, SOLUTION INTRAVENOUS; SUBCUTANEOUS PRN
Status: DISCONTINUED | OUTPATIENT
Start: 2022-06-14 | End: 2022-06-14 | Stop reason: SDUPTHER

## 2022-06-14 RX ORDER — ROCURONIUM BROMIDE 10 MG/ML
INJECTION, SOLUTION INTRAVENOUS PRN
Status: DISCONTINUED | OUTPATIENT
Start: 2022-06-14 | End: 2022-06-14 | Stop reason: SDUPTHER

## 2022-06-14 RX ADMIN — SUGAMMADEX 200 MG: 100 INJECTION, SOLUTION INTRAVENOUS at 15:08

## 2022-06-14 RX ADMIN — Medication 2000 MG: at 14:00

## 2022-06-14 RX ADMIN — METOPROLOL SUCCINATE 50 MG: 25 TABLET, EXTENDED RELEASE ORAL at 21:11

## 2022-06-14 RX ADMIN — ROCURONIUM BROMIDE 10 MG: 50 INJECTION, SOLUTION INTRAVENOUS at 14:26

## 2022-06-14 RX ADMIN — PHENYLEPHRINE HYDROCHLORIDE 1 ML: 10 INJECTION INTRAVENOUS at 14:05

## 2022-06-14 RX ADMIN — MOMETASONE FUROATE AND FORMOTEROL FUMARATE DIHYDRATE 2 PUFF: 100; 5 AEROSOL RESPIRATORY (INHALATION) at 21:35

## 2022-06-14 RX ADMIN — LIDOCAINE HYDROCHLORIDE 100 MG: 20 INJECTION, SOLUTION INTRAVENOUS at 13:54

## 2022-06-14 RX ADMIN — HEPARIN SODIUM 5000 UNITS: 1000 INJECTION INTRAVENOUS; SUBCUTANEOUS at 14:20

## 2022-06-14 RX ADMIN — ROCURONIUM BROMIDE 50 MG: 50 INJECTION, SOLUTION INTRAVENOUS at 13:54

## 2022-06-14 RX ADMIN — SODIUM CHLORIDE, SODIUM LACTATE, POTASSIUM CHLORIDE, AND CALCIUM CHLORIDE: 600; 310; 30; 20 INJECTION, SOLUTION INTRAVENOUS at 13:47

## 2022-06-14 RX ADMIN — HEPARIN SODIUM 5000 UNITS: 1000 INJECTION INTRAVENOUS; SUBCUTANEOUS at 14:17

## 2022-06-14 RX ADMIN — TAMSULOSIN HYDROCHLORIDE 0.4 MG: 0.4 CAPSULE ORAL at 21:11

## 2022-06-14 RX ADMIN — PROPOFOL 150 MG: 10 INJECTION, EMULSION INTRAVENOUS at 13:54

## 2022-06-14 RX ADMIN — PHENYLEPHRINE HYDROCHLORIDE 1 ML: 10 INJECTION INTRAVENOUS at 14:08

## 2022-06-14 RX ADMIN — PHENYLEPHRINE HYDROCHLORIDE 1 ML: 10 INJECTION INTRAVENOUS at 14:22

## 2022-06-14 RX ADMIN — PROTAMINE SULFATE 50 MG: 10 INJECTION, SOLUTION INTRAVENOUS at 15:11

## 2022-06-14 RX ADMIN — ONDANSETRON 4 MG: 2 INJECTION INTRAMUSCULAR; INTRAVENOUS at 15:03

## 2022-06-14 RX ADMIN — HEPARIN SODIUM 2000 UNITS: 1000 INJECTION INTRAVENOUS; SUBCUTANEOUS at 14:32

## 2022-06-14 ASSESSMENT — PAIN - FUNCTIONAL ASSESSMENT
PAIN_FUNCTIONAL_ASSESSMENT: NONE - DENIES PAIN
PAIN_FUNCTIONAL_ASSESSMENT: NONE - DENIES PAIN

## 2022-06-14 ASSESSMENT — PAIN SCALES - GENERAL: PAINLEVEL_OUTOF10: 0

## 2022-06-14 ASSESSMENT — ENCOUNTER SYMPTOMS: SHORTNESS OF BREATH: 1

## 2022-06-14 NOTE — ANESTHESIA PROCEDURE NOTES
Airway  Urgency: elective    Airway not difficult    General Information and Staff    Patient location during procedure: OR  Resident/CRNA: REMEDIOS Williamson - CIELO    Indications and Patient Condition  Indications for airway management: anesthesia  Sedation level: deep  Preoxygenated: yes  Patient position: sniffing  MILS maintained throughout      Final Airway Details  Final airway type: endotracheal airway      Successful airway: ETT    Endotracheal tube insertion site: oral  Blade: Brian  Blade size: #4  ETT size (mm): 8.0  Placement verified by: chest auscultation and capnometry   Number of attempts at approach: 1    no

## 2022-06-14 NOTE — ANESTHESIA PRE PROCEDURE
Department of Anesthesiology  Preprocedure Note       Name:  Vicenta Baker   Age:  81 y.o.  :  1950                                          MRN:  [id number]         Date:  2022      Surgeon: Ledy Salmon):  Smith Oliveira MD    Procedure: Procedure(s):  WATCHMAN KEE CLOSURE DEVICE    Medications prior to admission:   Prior to Admission medications    Medication Sig Start Date End Date Taking? Authorizing Provider   montelukast (SINGULAIR) 10 MG tablet Take 10 mg by mouth nightly   Yes Historical Provider, MD   aspirin EC 81 MG EC tablet Take 81 mg by mouth daily   Yes Historical Provider, MD   furosemide (LASIX) 40 MG tablet furosemide 40 mg tablet   TAKE 1 TABLET BY MOUTH ONCE DAILY AS NEEDED FOR SWELLING    Historical Provider, MD   metoprolol succinate (TOPROL XL) 50 MG extended release tablet 50 mg in the morning and at bedtime 3/1/22   Historical Provider, MD   pantoprazole (PROTONIX) 40 MG tablet pantoprazole 40 mg tablet,delayed release   TAKE 1 TABLET BY MOUTH ONCE DAILY 30 MINUTES BEFORE FIRST MEAL OF THE DAY 22   Historical Provider, MD   potassium chloride (KLOR-CON) 10 MEQ extended release tablet potassium chloride ER 10 mEq tablet,extended release   TAKE 1 TABLET BY MOUTH ONCE DAILY WHEN TAKES FUROSEMIDE    Historical Provider, MD   traZODone (DESYREL) 100 MG tablet trazodone 100 mg tablet   TAKE 1/2 TO 1 (ONE-HALF TO ONE) TABLET BY MOUTH AT BEDTIME AS NEEDED FOR INSOMNIA 3/19/22   Historical Provider, MD   albuterol sulfate  (90 Base) MCG/ACT inhaler Inhale 2 puffs into the lungs every 6 hours as needed 18   Ar Automatic Reconciliation   Calcium Citrate 1040 MG TABS Take 1,000 mg by mouth daily    Ar Automatic Reconciliation   fluticasone (FLONASE) 50 MCG/ACT nasal spray 2 sprays by Nasal route daily 16   Ar Automatic Reconciliation   fluticasone-salmeterol (ADVAIR) 250-50 MCG/ACT AEPB diskus inhaler INHALE 1 DOSE BY MOUTH TWICE DAILY.  RINSE MOUTH AFTER USE shoulder pain M25.512, G89.29    H/O diagnostic tests Z92.89    DVT (deep venous thrombosis) ([facility]) I82.409    Pulmonary emboli ([facility]) I26.99    Moderate persistent asthma without complication T77.07    Microalbuminuria R80.9    Unsteady gait R26.81    Elevated creatine kinase R74.8    Afib ([facility]) I48.91    Essential hypertension, benign I10    DDD (degenerative disc disease), cervical M50.30    Axonal polyneuropathy G62.9    Babinski reflex R29.2    Chest pain R07.9    Routine health maintenance Z00.00    Lung nodule, solitary R91.1    Acute pulmonary embolus ([facility]) I26.99    Hypogonadism, testicular E29.1    Acute blood loss anemia D62    Abdominal wall hematoma S30. 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     Essential hypertension, benign 5/30/2013    Hypercholesteremia 12/7/2015    Hypogonadism, testicular 5/30/2013    Removal of right testicle on 11/1/2010; urologist Carlos Pinedo of [facility], gives testosterone injections    Lumbosacral radiculopathy at L5 4/17/2016    Lung nodule, solitary 1/19/2017    Chronic - benign w/u    Lymphopenia 8/6/2017    Microalbuminuria 9/1/2017    PMR (polymyalgia rheumatica) ([facility] [address] 2/21/2018    Followed by [facility] rheumatology    Prediabetes 04/17/2016    Unsteady gait 1/15/2019    Vitamin D deficiency 6/2/2015       Past Surgical History:        Procedure Laterality Date    ABLATION OF DYSRHYTHMIC FOCUS  02/2018   [facility] BACK SURGERY      CHOLECYSTECTOMY      COLONOSCOPY      normal    EYE SURGERY  2022    bilateral cataract removal    LUMBAR DISCECTOMY      L4    LUMBAR DISCECTOMY      L5 discectomy    OTHER SURGICAL HISTORY      ablation for Afib     [place] CARDIAC SURG PROCEDURE UNLIST      UROLOGICAL SURGERY  2010    testicular, right removed d/t  severe infection       Social History:    Social History     Tobacco Use    Smoking status: Former Smoker     Quit date: 1985     Years since quittin.4    Smokeless tobacco: Never Used    Tobacco comment: Quit smoking: smoked cigerattes from age 27 to 47   Substance Use Topics    Alcohol use: Yes     Alcohol/week: 1.0 standard drink     Types: 1 Glasses of wine per week                                Counseling given: Not Answered  Comment: Quit smoking: smoked cigerattes from age 27 to 47      Vital Signs (Current):   Vitals:    22 1449 22 0926   BP:  (!) 144/88   Pulse:  76   Temp:  98 °F (36.7 °C)   TempSrc:  Oral   SpO2:  98%   Weight: 177 lb (80.3 kg) 177 lb (80.3 kg)   Height: 5' 6\" (1.676 m) 5' 6\" (1.676 m)                                              BP Readings from Last 3 Encounters:   22 (!) 144/88   22 (!) 142/82   22 118/78       NPO Status: Time of last liquid consumption:                         Time of last solid consumption:                         Date of last liquid consumption: 22                        Date of last solid food consumption: 22    BMI:   Wt Readings from Last 3 Encounters:   22 177 lb (80.3 kg)   22 177 lb (80.3 kg)   22 177 lb (80.3 kg)     Body mass index is 28.57 kg/m².     CBC:   Lab Results   Component Value Date    WBC 8.2 2022    RBC 5.03 2022    HGB 15.1 2022    HCT 47.3 2022    MCV 94.0 2022    RDW 15.7 2022     2022       CMP:   Lab Results   Component Value Date     2022    K 4.1 2022    CL 107 06/14/2022    CO2 28 06/14/2022    BUN 17 06/14/2022    CREATININE 1.20 06/14/2022    GFRAA >60 06/14/2022    AGRATIO 1.5 05/02/2022    LABGLOM >60 06/14/2022    GLUCOSE 95 06/14/2022    PROT 6.1 05/02/2022    CALCIUM 9.0 06/14/2022    BILITOT 0.8 05/02/2022    ALKPHOS 50 05/02/2022    AST 31 05/02/2022    ALT 29 05/02/2022       POC Tests: No results for input(s): POCGLU, POCNA, POCK, POCCL, POCBUN, POCHEMO, POCHCT in the last 72 hours. Coags:   Lab Results   Component Value Date    PROTIME 13.2 06/14/2022    INR 1.0 06/14/2022    APTT 27.1 12/30/2021       HCG (If Applicable): No results found for: PREGTESTUR, PREGSERUM, HCG, HCGQUANT     ABGs: No results found for: PHART, PO2ART, MPJ5AEF, NOX7JND, BEART, B4DWMTUJ     Type & Screen (If Applicable):  No results found for: LABABO, LABRH    Drug/Infectious Status (If Applicable):  No results found for: HIV, HEPCAB    COVID-19 Screening (If Applicable):   Lab Results   Component Value Date    COVID19 Detected 12/30/2021    COVID19 Not Detected 06/17/2020           Anesthesia Evaluation  Patient summary reviewed and Nursing notes reviewed  Airway: Mallampati: II  TM distance: >3 FB   Neck ROM: full     Dental:          Pulmonary:Negative Pulmonary ROS and normal exam    (+) asthma:                            Cardiovascular:  Exercise tolerance: good (>4 METS),   (+) hypertension:, dysrhythmias: atrial fibrillation,         Rhythm: regular  Rate: normal                    Neuro/Psych:   Negative Neuro/Psych ROS              GI/Hepatic/Renal: Neg GI/Hepatic/Renal ROS            Endo/Other: Negative Endo/Other ROS             Pt had no PAT visit       Abdominal:             Vascular: negative vascular ROS. Other Findings:           Anesthesia Plan      ASA 3       Induction: intravenous. Anesthetic plan and risks discussed with patient.       Plan discussed with surgical team.                    Jaren Littlejohn MD   6/14/2022

## 2022-06-14 NOTE — PROGRESS NOTES
TRANSFER - IN REPORT:    Verbal report received from [address] [facility] on [facility] 193 being received from PACU () for routine progression of care. Report consisted of patients Situation, Background, Assessment and Recommendations(SBAR). Information from the following report(s) SBAR, Procedure Summary and Cardiac Rhythm sinus rythm was reviewed. Opportunity for questions and clarification was provided. Assessment completed upon patients arrival to unit and care assumed. Patient received to room 305. Patient connected to monitor and assessment completed. Plan of care reviewed. Patient oriented to room and call light. Patient aware to use call light to communicate any chest pain or needs. Admission skin assessment completed with second RN and reveals the following: sacrum and heels intact and free of redness. Pt presents to unit with scattered bruising from home and a right groin cath site for watchman. Site C/D/I with no hematoma present. Bed rest initiated. Lacy RN for dual skin assessment.

## 2022-06-14 NOTE — Clinical Note
Prepped: bilateral groin. Site was clipped and prepped. Prepped with: ChloraPrep. Wet prep solution applied at: 6/14/2022 2:04 PM. Wet prep solution dried at: 6/14/2022 2:09 PM. Wet prep elapsed drying time: 5 mins. Patient was draped after wet prep solution dried.

## 2022-06-14 NOTE — PROGRESS NOTES
Patient received to [address] [place] [address] room # 17  Ambulatory from [facility]. Patient scheduled for LAAO today with Dr Zenaida Rodriguez. Procedure reviewed & questions answered, voiced good understanding consent obtained & placed on chart. All medications and medical history reviewed. Will prep patient per orders. Patient & family updated on plan of care. The patient has a fraility score of 3-MANAGING WELL, based on patient A&Ox3, patient able to ambulate to room without difficulty.

## 2022-06-14 NOTE — PERIOP NOTE
TRANSFER - OUT REPORT:    Verbal report given to [facility] RN on Vicky Santos  being transferred to [facility] for routine post-op       Report consisted of patients Situation, Background, Assessment and   Recommendations(SBAR). Information from the following report(s) Nurse Handoff Report, Adult Overview, Surgery Report, Intake/Output and MAR was reviewed with the receiving nurse. Lines:   Peripheral IV 06/14/22 Right Antecubital (Active)   Site Assessment Clean, dry & intact 06/14/22 1605   Line Status Infusing 06/14/22 [facility] Connections checked and tightened 06/14/22 1605   Phlebitis Assessment No symptoms 06/14/22 1605   Infiltration Assessment 0 06/14/22 1605   Alcohol Cap Used No 06/14/22 1605   Dressing Status Clean, dry & intact 06/14/22 1605   Dressing Type Transparent 06/14/22 1605       Peripheral IV 06/14/22 Left Hand (Active)   Site Assessment Clean, dry & intact 06/14/22 1605   Line Status Infusing 06/14/22 [facility] Connections checked and tightened 06/14/22 1605   Phlebitis Assessment No symptoms 06/14/22 1605   Infiltration Assessment 0 06/14/22 1605   Alcohol Cap Used No 06/14/22 1605   Dressing Status Clean, dry & intact 06/14/22 1605   Dressing Type Transparent 06/14/22 1605        Opportunity for questions and clarification was provided. Patient transported with:   Monitor  Tech    VTE prophylaxis orders have been written for Vicky Santos. Patient and family given floor number and nurses name. Family updated re: pt status after security code verified.

## 2022-06-14 NOTE — ANESTHESIA PRE PROCEDURE
Department of Anesthesiology  Preprocedure Note       Name:  Maureen Ramírez   Age:  81 y.o.  :  1950                                          MRN:  [id number]         Date:  2022      Surgeon: Yanci Sequeira):  Lupis Reyes MD    Procedure: Procedure(s):  WATCHMAN KEE CLOSURE DEVICE    Medications prior to admission:   Prior to Admission medications    Medication Sig Start Date End Date Taking? Authorizing Provider   montelukast (SINGULAIR) 10 MG tablet Take 10 mg by mouth nightly   Yes Historical Provider, MD   aspirin EC 81 MG EC tablet Take 81 mg by mouth daily   Yes Historical Provider, MD   furosemide (LASIX) 40 MG tablet furosemide 40 mg tablet   TAKE 1 TABLET BY MOUTH ONCE DAILY AS NEEDED FOR SWELLING    Historical Provider, MD   metoprolol succinate (TOPROL XL) 50 MG extended release tablet 50 mg in the morning and at bedtime 3/1/22   Historical Provider, MD   pantoprazole (PROTONIX) 40 MG tablet pantoprazole 40 mg tablet,delayed release   TAKE 1 TABLET BY MOUTH ONCE DAILY 30 MINUTES BEFORE FIRST MEAL OF THE DAY 22   Historical Provider, MD   potassium chloride (KLOR-CON) 10 MEQ extended release tablet potassium chloride ER 10 mEq tablet,extended release   TAKE 1 TABLET BY MOUTH ONCE DAILY WHEN TAKES FUROSEMIDE    Historical Provider, MD   traZODone (DESYREL) 100 MG tablet trazodone 100 mg tablet   TAKE 1/2 TO 1 (ONE-HALF TO ONE) TABLET BY MOUTH AT BEDTIME AS NEEDED FOR INSOMNIA 3/19/22   Historical Provider, MD   albuterol sulfate  (90 Base) MCG/ACT inhaler Inhale 2 puffs into the lungs every 6 hours as needed 18   Ar Automatic Reconciliation   Calcium Citrate 1040 MG TABS Take 1,000 mg by mouth daily    Ar Automatic Reconciliation   fluticasone (FLONASE) 50 MCG/ACT nasal spray 2 sprays by Nasal route daily 16   Ar Automatic Reconciliation   fluticasone-salmeterol (ADVAIR) 250-50 MCG/ACT AEPB diskus inhaler INHALE 1 DOSE BY MOUTH TWICE DAILY.  RINSE MOUTH AFTER USE 11/27/18   Ar Automatic Reconciliation   predniSONE (DELTASONE) 1 MG tablet Take 5 mg by mouth daily (with breakfast)    Ar Automatic Reconciliation   sildenafil (REVATIO) 20 MG tablet Take 20 mg by mouth daily as needed    Ar Automatic Reconciliation   tamsulosin (FLOMAX) 0.4 MG capsule Take 0.4 mg by mouth 2 times daily    Ar Automatic Reconciliation   testosterone cypionate (DEPOTESTOTERONE CYPIONATE) 200 MG/ML injection Inject 400 mg into the muscle every 14 days. Ar Automatic Reconciliation       Current medications:    Current Facility-Administered Medications   Medication Dose Route Frequency Provider Last Rate Last Admin    acetaminophen (TYLENOL) tablet 1,000 mg  1,000 mg Oral Once Isak Fuentes MD        fentaNYL (SUBLIMAZE) injection 100 mcg  100 mcg IntraVENous Once PRN Isak Fuentes MD        scopolamine (TRANSDERM-SCOP) transdermal patch 1 patch  1 patch TransDERmal Q72H Isak Fuentes MD        lactated ringers infusion   IntraVENous Continuous Isak Fuentes MD        sodium chloride flush 0.9 % injection 5-40 mL  5-40 mL IntraVENous 2 times per day Isak Fuentes MD        sodium chloride flush 0.9 % injection 5-40 mL  5-40 mL IntraVENous PRN Isak Fuentes MD        midazolam PF (VERSED) injection 2 mg  2 mg IntraVENous Once PRN sIak Fuentes MD        ceFAZolin (ANCEF) 2000 mg in sterile water 20 mL IV syringe  2,000 mg IntraVENous Once Ashley Hernandez MD        lactated ringers infusion   IntraVENous Continuous Ashley Hernandez MD        0.9 % sodium chloride infusion   IntraVENous PRN Ashley Hernandez MD           Allergies:     Allergies   Allergen Reactions    Lisinopril Other (See Comments)       Problem List:    Patient Active Problem List   Diagnosis Code    Chronic nonseasonal allergic rhinitis due to pollen J30.89    Lymphopenia D72.810    Vitamin D deficiency E55.9    PMR [facility]) ([facility] M35.3    COVID-19 U07.1    Chronic left shoulder pain M25.512, G89.29    H/O diagnostic tests Z92.89    DVT (deep venous thrombosis) ([facility]) I82.409    Pulmonary emboli ([facility]) I26.99    Moderate persistent asthma without complication X32.70    Microalbuminuria R80.9    Unsteady gait R26.81    Elevated creatine kinase R74.8    Afib ([facility]) I48.91    Essential hypertension, benign I10    DDD (degenerative disc disease), cervical M50.30    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 1XXA    Acute asthma exacerbation J45.901       Past Medical History:        Diagnosis Date    Acute pulmonary embolus ([facility] [address] 12/30/2021    Arrhythmia     A fib     Asthma, moderate persistent, well-controlled 5/30/2013    Axonal polyneuropathy 1/15/2019    Babinski reflex 1/15/2019    Chronic left shoulder pain 5/30/2013    Pain radiates from his neck    DDD (degenerative disc disease), cervical 1/18/2018    MRI: 1/2018 - multilevel DDD and facet arthritis w/ moderate bilateral foraminal narrowing at C6-7 and moderate right at C5-6.     Essential hypertension, benign 5/30/2013    Hypercholesteremia 12/7/2015    Hypogonadism, testicular 5/30/2013    Removal of right testicle on 11/1/2010; urologist Estefany Jimenez of [facility], gives testosterone injections    Lumbosacral radiculopathy at L5 4/17/2016    Lung nodule, solitary 1/19/2017    Chronic - benign w/u    Lymphopenia 8/6/2017    Microalbuminuria 9/1/2017    PMR (polymyalgia rheumatica) ([facility] [address] 2/21/2018    Followed by [facility] rheumatology    Prediabetes 04/17/2016    Unsteady gait 1/15/2019    Vitamin D deficiency 6/2/2015       Past Surgical History:        Procedure Laterality Date    ABLATION OF DYSRHYTHMIC FOCUS  02/2018   [facility]ERY      CHOLECYSTECTOMY      COLONOSCOPY      normal    EYE SURGERY  2022    bilateral cataract removal    LUMBAR DISCECTOMY      L4    LUMBAR DISCECTOMY      L5 discectomy    OTHER SURGICAL HISTORY      ablation for Afib     [place] CARDIAC SURG PROCEDURE UNLIST      UROLOGICAL SURGERY  2010    testicular, right removed d/t  severe infection       Social History:    Social History     Tobacco Use    Smoking status: Former Smoker     Quit date: 1985     Years since quittin.4    Smokeless tobacco: Never Used    Tobacco comment: Quit smoking: smoked cigerattes from age 27 to 47   Substance Use Topics    Alcohol use: Yes     Alcohol/week: 1.0 standard drink     Types: 1 Glasses of wine per week                                Counseling given: Not Answered  Comment: Quit smoking: smoked cigerattes from age 27 to 47      Vital Signs (Current):   Vitals:    22 1449 22 0926   BP:  (!) 144/88   Pulse:  76   Temp:  98 °F (36.7 °C)   TempSrc:  Oral   SpO2:  98%   Weight: 177 lb (80.3 kg) 177 lb (80.3 kg)   Height: 5' 6\" (1.676 m) 5' 6\" (1.676 m)                                              BP Readings from Last 3 Encounters:   22 (!) 144/88   22 (!) 142/82   22 118/78       NPO Status: Time of last liquid consumption:                         Time of last solid consumption:                         Date of last liquid consumption: 22                        Date of last solid food consumption: 22    BMI:   Wt Readings from Last 3 Encounters:   22 177 lb (80.3 kg)   22 177 lb (80.3 kg)   22 177 lb (80.3 kg)     Body mass index is 28.57 kg/m².     CBC:   Lab Results   Component Value Date    WBC 8.2 2022    RBC 5.03 2022    HGB 15.1 2022    HCT 47.3 2022    MCV 94.0 2022    RDW 15.7 2022     2022       CMP:   Lab Results   Component Value Date     2022    K 4.1 2022    CL 107 06/14/2022    CO2 28 06/14/2022    BUN 17 06/14/2022    CREATININE 1.20 06/14/2022    GFRAA >60 06/14/2022    AGRATIO 1.5 05/02/2022    LABGLOM >60 06/14/2022    GLUCOSE 95 06/14/2022    PROT 6.1 05/02/2022    CALCIUM 9.0 06/14/2022    BILITOT 0.8 05/02/2022    ALKPHOS 50 05/02/2022    AST 31 05/02/2022    ALT 29 05/02/2022       POC Tests: No results for input(s): POCGLU, POCNA, POCK, POCCL, POCBUN, POCHEMO, POCHCT in the last 72 hours. Coags:   Lab Results   Component Value Date    PROTIME 13.2 06/14/2022    INR 1.0 06/14/2022    APTT 27.1 12/30/2021       HCG (If Applicable): No results found for: PREGTESTUR, PREGSERUM, HCG, HCGQUANT     ABGs: No results found for: PHART, PO2ART, HKI4CWR, BJU8RVZ, BEART, B7TYRXVQ     Type & Screen (If Applicable):  No results found for: LABABO, LABRH    Drug/Infectious Status (If Applicable):  No results found for: HIV, HEPCAB    COVID-19 Screening (If Applicable):   Lab Results   Component Value Date    COVID19 Detected 12/30/2021    COVID19 Not Detected 06/17/2020           Anesthesia Evaluation  Patient summary reviewed and Nursing notes reviewed  Airway: Mallampati: II  TM distance: >3 FB   Neck ROM: full     Dental:          Pulmonary:Negative Pulmonary ROS and normal exam    (+) shortness of breath:                             Cardiovascular:  Exercise tolerance: good (>4 METS),   (+) hypertension:, dysrhythmias: atrial fibrillation,         Rhythm: regular  Rate: normal                    Neuro/Psych:   Negative Neuro/Psych ROS              GI/Hepatic/Renal: Neg GI/Hepatic/Renal ROS            Endo/Other: Negative Endo/Other ROS             Pt had no PAT visit       Abdominal:             Vascular: negative vascular ROS. Other Findings:           Anesthesia Plan      general     ASA 3       Induction: intravenous. Anesthetic plan and risks discussed with patient.       Plan discussed with surgical team.                    Kimberlyn Mcelroy MD 6/14/2022

## 2022-06-15 VITALS
HEIGHT: 66 IN | TEMPERATURE: 98.3 F | OXYGEN SATURATION: 93 % | HEART RATE: 78 BPM | DIASTOLIC BLOOD PRESSURE: 83 MMHG | SYSTOLIC BLOOD PRESSURE: 117 MMHG | BODY MASS INDEX: 28.38 KG/M2 | WEIGHT: 176.6 LBS | RESPIRATION RATE: 20 BRPM

## 2022-06-15 LAB
ANION GAP SERPL CALC-SCNC: 7 MMOL/L (ref 7–16)
BUN SERPL-MCNC: 14 MG/DL (ref 8–23)
CALCIUM SERPL-MCNC: 8.5 MG/DL (ref 8.3–10.4)
CHLORIDE SERPL-SCNC: 110 MMOL/L (ref 98–107)
CO2 SERPL-SCNC: 26 MMOL/L (ref 21–32)
CREAT SERPL-MCNC: 1 MG/DL (ref 0.8–1.5)
ERYTHROCYTE [DISTWIDTH] IN BLOOD BY AUTOMATED COUNT: 15.6 % (ref 11.9–14.6)
GLUCOSE SERPL-MCNC: 93 MG/DL (ref 65–100)
HCT VFR BLD AUTO: 41.7 % (ref 41.1–50.3)
HGB BLD-MCNC: 13.3 G/DL (ref 13.6–17.2)
MCH RBC QN AUTO: 29.6 PG (ref 26.1–32.9)
MCHC RBC AUTO-ENTMCNC: 31.9 G/DL (ref 31.4–35)
MCV RBC AUTO: 92.7 FL (ref 79.6–97.8)
NRBC # BLD: 0 K/UL (ref 0–0.2)
PLATELET # BLD AUTO: 149 K/UL (ref 150–450)
PMV BLD AUTO: 9.8 FL (ref 9.4–12.3)
POTASSIUM SERPL-SCNC: 4 MMOL/L (ref 3.5–5.1)
RBC # BLD AUTO: 4.5 M/UL (ref 4.23–5.6)
SODIUM SERPL-SCNC: 143 MMOL/L (ref 136–145)
WBC # BLD AUTO: 8.3 K/UL (ref 4.3–11.1)

## 2022-06-15 PROCEDURE — 36415 COLL VENOUS BLD VENIPUNCTURE: CPT

## 2022-06-15 PROCEDURE — 80048 BASIC METABOLIC PNL TOTAL CA: CPT

## 2022-06-15 PROCEDURE — 94640 AIRWAY INHALATION TREATMENT: CPT

## 2022-06-15 PROCEDURE — 6370000000 HC RX 637 (ALT 250 FOR IP): Performed by: INTERNAL MEDICINE

## 2022-06-15 PROCEDURE — 85027 COMPLETE CBC AUTOMATED: CPT

## 2022-06-15 PROCEDURE — 99024 POSTOP FOLLOW-UP VISIT: CPT | Performed by: INTERNAL MEDICINE

## 2022-06-15 RX ADMIN — MOMETASONE FUROATE AND FORMOTEROL FUMARATE DIHYDRATE 2 PUFF: 100; 5 AEROSOL RESPIRATORY (INHALATION) at 07:32

## 2022-06-15 RX ADMIN — METOPROLOL SUCCINATE 50 MG: 25 TABLET, EXTENDED RELEASE ORAL at 08:17

## 2022-06-15 RX ADMIN — FLUTICASONE PROPIONATE 2 SPRAY: 50 SPRAY, METERED NASAL at 08:23

## 2022-06-15 RX ADMIN — MONTELUKAST 10 MG: 10 TABLET, FILM COATED ORAL at 08:18

## 2022-06-15 RX ADMIN — APIXABAN 5 MG: 5 TABLET, FILM COATED ORAL at 08:17

## 2022-06-15 RX ADMIN — PANTOPRAZOLE SODIUM 40 MG: 40 TABLET, DELAYED RELEASE ORAL at 06:12

## 2022-06-15 RX ADMIN — TAMSULOSIN HYDROCHLORIDE 0.4 MG: 0.4 CAPSULE ORAL at 08:17

## 2022-06-15 RX ADMIN — PREDNISONE 5 MG: 5 TABLET ORAL at 08:18

## 2022-06-15 ASSESSMENT — PAIN SCALES - GENERAL: PAINLEVEL_OUTOF10: 0

## 2022-06-15 NOTE — ANESTHESIA POSTPROCEDURE EVALUATION
Department of Anesthesiology  Postprocedure Note    Patient: Eriberto Freeman  MRN: [id number]  YOB: 1950  Date of evaluation: 6/15/2022  Time:  6:53 AM     Procedure Summary     Date: 06/14/22 Room / Location: [facility] MAIN OR 15 / [facility] MAIN OR    Anesthesia Start: 1347 Anesthesia Stop: 8397    Procedure: WATCHMAN KEE CLOSURE DEVICE (N/A ) Diagnosis:       Persistent atrial fibrillation (HCC)      (Persistent atrial fibrillation ([address] [I48.19])    Providers: Pennie Mason MD Responsible Provider: Eugene Paz MD    Anesthesia Type: general ASA Status: 3          Anesthesia Type: No value filed. Westley Phase I: Westley Score: 10    Westley Phase II:      Last vitals: Reviewed and per EMR flowsheets.        Anesthesia Post Evaluation    Patient location during evaluation: PACU  Patient participation: complete - patient participated  Level of consciousness: awake and awake and alert  Airway patency: patent  Nausea & Vomiting: no nausea  Complications: no  Cardiovascular status: hemodynamically stable  Hydration status: euvolemic

## 2022-06-15 NOTE — PLAN OF CARE
Problem: Discharge Planning  Goal: Discharge to home or other facility with appropriate resources  6/15/2022 0052 by Shayy Dixon RN  Outcome: Progressing  6/14/2022 1841 by Amberly Justin RN  Outcome: Progressing  Flowsheets (Taken 6/14/2022 1717)  Discharge to home or other facility with appropriate resources: Identify barriers to discharge with patient and caregiver     Problem: ABCDS Injury Assessment  Goal: Absence of physical injury  6/15/2022 0052 by Shayy Dixon RN  Outcome: Progressing  6/14/2022 1841 by Amberly Justin RN  Outcome: Adequate for Discharge

## 2022-06-15 NOTE — PLAN OF CARE
Problem: Discharge Planning  Goal: Discharge to home or other facility with appropriate resources  6/15/2022 0932 by Sonya Pinedo RN  Outcome: Completed  6/15/2022 0052 by Suly Overton RN  Outcome: Progressing     Problem: ABCDS Injury Assessment  Goal: Absence of physical injury  6/15/2022 0932 by Sonya Pinedo RN  Outcome: Completed  6/15/2022 0052 by Suly Overton RN  Outcome: Progressing

## 2022-06-15 NOTE — CARE COORDINATION
Pt was seen at the office of [facility] by Dr. Felipe Cabral for A.Fib. The pt was felt to be an appropriate candidate for Watchman device. Pt underwent successful implantation of a 24 mm Watchman device and is now discharging home in stable condition. No discharge needs were identified. Tx goals have been met.       06/15/22 [address] [facility] Discharge None   [facility] Information Provided? No   Mode of Transport at Discharge Other (see comment)  (Family)   Confirm Follow Up Transport Family   Condition of Participation: Discharge Planning   The Patient and/or Patient Representative was provided with a Choice of Provider? Patient   The Patient and/Or Patient Representative agree with the Discharge Plan? Yes   Freedom of Choice list was provided with basic dialogue that supports the patient's individualized plan of care/goals, treatment preferences, and shares the quality data associated with the providers?   Yes

## 2022-06-15 NOTE — DISCHARGE SUMMARY
[facility] Discharge Summary     Patient ID:  Perez Silvestre  [id number]  65 y.o.  1950    Admit date: 6/14/2022    Discharge date:  6/15/22    Admitting Physician: Wilbur Phoenix, MD     Discharge Physician: JERRY Wright, PA/Dr. Florencio Fay    Admission Diagnoses: Persistent atrial fibrillation [facility]) [I48.19]    Discharge Diagnoses:    Diagnosis    Acute asthma exacerbation    Acute blood loss anemia    Abdominal wall hematoma    COVID-19    Pulmonary emboli (HCC)    DVT (deep venous thrombosis) (HCC)    Acute pulmonary embolus (HCC)    Chest pain    Unsteady gait    Axonal polyneuropathy    Babinski reflex    PMR (polymyalgia rheumatica) (HCC)    Afib (HCC)    DDD (degenerative disc disease), cervical    Elevated creatine kinase    Microalbuminuria    Lymphopenia    H/O diagnostic tests    Lung nodule, solitary    Routine health maintenance    Chronic nonseasonal allergic rhinitis due to pollen    Vitamin D deficiency    Chronic left shoulder pain    Moderate persistent asthma without complication    Essential hypertension, benign    Hypogonadism, testicular       Cardiology Procedures this admission:  BILL, Implantation of Watchman device, Echocardiogram  Consults: none    Hospital Course: Patient was seen at the office of [facility] by Dr. Yumiko Padilla for atrial fibrillation with h/o DVT/PE w rectus sheath hematoma. The patient was felt to be an appropriate candidate for Watchman device. The patient presented for procedure. BILL was performed directly prior to procedure that was negative for KEE thrombus. The patient underwent successful implantation of a 24 mm Watchman device. The patient tolerated the procedure well and was monitored closely. The morning of 6/15/22, patient was up feeling well without any complaints of chest pain or shortness of breath. Patient's labs were stable.      Patient was seen and examined by  Luke and determined stable and ready for discharge. Patient was instructed on the importance of medication compliance. He/She will remain on eliquis for at least 45 days. Once KEE is noted to be sealed, eliquis will be discontinued and pt will take ASA and Plavix 75mg will be started and continued for 6 months post Watchman implant. This protocol is due to hemorraghic complications. The patient will have repeat BILL performed in 45 days on 7-25 at 0930 w Dr Alyssa Chapman, f/u with Dr Waleska Hazel 8-8 at [place] [address] office. 10 minute discussion w pt re hospital course and d/c instructions. All questions answered. Indication for treatment and risk of Eliquis therapy was discussed with the patient and he/she understands to seek medical care immediately if any signs of bleeding.     DISPOSITION: The patient is being discharged home in stable condition on a low saturated fat, low cholesterol and low salt diet. The patient is instructed to advance activities as tolerated to the limit of fatigue or shortness of breath. The patient is instructed to avoid lifting anything heavier than 10 lbs for 1 week. The patient is instructed to avoid any straining, stooping or squatting for 2 days. The patient is instructed not to drive for 2 days. The patient is instructed to watch the groin site for bleeding/oozing; if seen, the patient is instructed to apply firm pressure with a clean cloth and call [facility] at [phone]. The patient is instructed to watch for signs of infection which include: increasing area of redness, fever/hot to touch or purulent drainage at the groin site. The patient is instructed not to soak in a bathtub for 7-10 days, but is cleared to shower. The patient is instructed to return to the ER immediately for any severe pain, color change, or temperature change in leg.      The patient is informed not to stop any medications without discussing with our office and to contact our office if any dental work or possible surgeries are expected.      Discharge Exam: /72   Pulse 82   Temp 97.9 °F (36.6 °C)   Resp 12   Ht 5' 6\" (1.676 m)   Wt 176 lb 9.6 oz (80.1 kg)   SpO2 95%   BMI 28.50 kg/m²           Recent Results (from the past 24 hour(s))   Basic Metabolic Panel    Collection Time: 06/14/22  9:19 AM   Result Value Ref Range    Sodium 141 136 - 145 mmol/L    Potassium 4.1 3.5 - 5.1 mmol/L    Chloride 107 98 - 107 mmol/L    CO2 28 21 - 32 mmol/L    Anion Gap 6 (L) 7 - 16 mmol/L    Glucose 95 65 - 100 mg/dL    BUN 17 8 - 23 MG/DL    CREATININE 1.20 0.8 - 1.5 MG/DL    GFR African American >60 >60 ml/min/1.73m2    GFR Non- >60 >60 ml/min/1.73m2    Calcium 9.0 8.3 - 10.4 MG/DL   CBC    Collection Time: 06/14/22  9:19 AM   Result Value Ref Range    WBC 8.2 4.3 - 11.1 K/uL    RBC 5.03 4.23 - 5.6 M/uL    Hemoglobin 15.1 13.6 - 17.2 g/dL    Hematocrit 47.3 41.1 - 50.3 %    MCV 94.0 79.6 - 97.8 FL    MCH 30.0 26.1 - 32.9 PG    MCHC 31.9 31.4 - 35.0 g/dL    RDW 15.7 (H) 11.9 - 14.6 %    Platelets 360 231 - 378 K/uL    MPV 9.6 9.4 - 12.3 FL    nRBC 0.00 0.0 - 0.2 K/uL   Magnesium    Collection Time: 06/14/22  9:19 AM   Result Value Ref Range    Magnesium 2.4 1.8 - 2.4 mg/dL   Protime-INR    Collection Time: 06/14/22  9:19 AM   Result Value Ref Range    Protime 13.2 12.6 - 14.5 sec    INR 1.0     Albumin    Collection Time: 06/14/22  9:19 AM   Result Value Ref Range    Albumin 3.7 3.2 - 4.6 g/dL   TYPE AND SCREEN    Collection Time: 06/14/22  9:19 AM   Result Value Ref Range    Crossmatch expiration date 06/17/2022,9581     ABO/Rh A NEGATIVE     Antibody Screen NEG     Blood Bank Comment       Specimen processed by automated Blood Bank analyzer  Luciana Malcolm      Unit Number [id number]     Product Code Blood Bank  LR     Unit Divison 00     Dispense Status Blood Bank ALLOCATED     Crossmatch Result Compatible     Unit Number [id number]     Product Code [facility]     Dispense Status Blood Bank ALLOCATED     Crossmatch Result Compatible     Unit Number [id number]     Product Code Blood Bank RC LR     Unit Divison 00     Dispense Status Blood Bank ALLOCATED     Crossmatch Result Compatible     Unit Number [id number]     Product Code Blood Bank RC LR     Unit Divison 00     Dispense Status Blood Bank ALLOCATED     Crossmatch Result Compatible    EKG 12 Lead    Collection Time: 06/14/22  9:42 AM   Result Value Ref Range    Ventricular Rate 75 BPM    Atrial Rate 75 BPM    P-R Interval 162 ms    QRS Duration 94 ms    Q-T Interval 360 ms    QTc Calculation (Bazett) 402 ms    P Axis 19 degrees    R Axis 1 degrees    T Axis 14 degrees    Diagnosis Normal sinus rhythm    POCT activated clotting time    Collection Time: 06/14/22  2:27 PM   Result Value Ref Range    Activated Clotting Time 248 (H) 70 - 128 SECS   POCT activated clotting time    Collection Time: 06/14/22  2:58 PM   Result Value Ref Range    Activated Clotting Time 237 (H) 70 - 128 SECS         Patient Instructions:   Current Discharge Medication List      START taking these medications    Details   apixaban (ELIQUIS) 5 MG TABS tablet Take 1 tablet by mouth 2 times daily  Qty: 60 tablet, Refills: 11         CONTINUE these medications which have NOT CHANGED    Details   montelukast (SINGULAIR) 10 MG tablet Take 10 mg by mouth nightly      furosemide (LASIX) 40 MG tablet furosemide 40 mg tablet   TAKE 1 TABLET BY MOUTH ONCE DAILY AS NEEDED FOR SWELLING      metoprolol succinate (TOPROL XL) 50 MG extended release tablet 50 mg in the morning and at bedtime      pantoprazole (PROTONIX) 40 MG tablet pantoprazole 40 mg tablet,delayed release   TAKE 1 TABLET BY MOUTH ONCE DAILY 30 MINUTES BEFORE FIRST MEAL OF THE DAY      potassium chloride (KLOR-CON) 10 MEQ extended release tablet potassium chloride ER 10 mEq tablet,extended release   TAKE 1 TABLET BY MOUTH ONCE DAILY WHEN TAKES FUROSEMIDE      traZODone (DESYREL) 100 MG tablet trazodone 100 mg tablet   TAKE 1/2 TO 1 (ONE-HALF TO ONE) TABLET BY MOUTH AT BEDTIME AS NEEDED FOR INSOMNIA      albuterol sulfate  (90 Base) MCG/ACT inhaler Inhale 2 puffs into the lungs every 6 hours as needed      Calcium Citrate 1040 MG TABS Take 1,000 mg by mouth daily      fluticasone (FLONASE) 50 MCG/ACT nasal spray 2 sprays by Nasal route daily      fluticasone-salmeterol (ADVAIR) 250-50 MCG/ACT AEPB diskus inhaler INHALE 1 DOSE BY MOUTH TWICE DAILY. RINSE MOUTH AFTER USE      predniSONE (DELTASONE) 1 MG tablet Take 5 mg by mouth daily (with breakfast)      sildenafil (REVATIO) 20 MG tablet Take 20 mg by mouth daily as needed      tamsulosin (FLOMAX) 0.4 MG capsule Take 0.4 mg by mouth 2 times daily      testosterone cypionate (DEPOTESTOTERONE CYPIONATE) 200 MG/ML injection Inject 400 mg into the muscle every 14 days.           STOP taking these medications       aspirin EC 81 MG EC tablet Comments:   Reason for Stopping:                 Signed:  Lyanne Barthel, PA, PAHollyC  6/15/2022  7:35 AM

## 2022-06-16 LAB
ABO + RH BLD: NORMAL
BLD PROD TYP BPU: NORMAL
BLOOD BANK CMNT PATIENT-IMP: NORMAL
BLOOD BANK DISPENSE STATUS: NORMAL
BLOOD GROUP ANTIBODIES SERPL: NORMAL
BPU ID: NORMAL
CROSSMATCH RESULT: NORMAL
SPECIMEN EXP DATE BLD: NORMAL
UNIT DIVISION: 0

## 2022-06-20 ENCOUNTER — TELEPHONE (OUTPATIENT)
Dept: CARDIOLOGY CLINIC | Age: 72
End: 2022-06-20

## 2022-06-20 NOTE — TELEPHONE ENCOUNTER
Notified pt that samples of Eliquis 5 mg are available for pu at [facility] . Understanding voiced.

## 2022-07-20 NOTE — PROGRESS NOTES
Pre-procedure call completed. Instructed to arrive at 0830 for BILL. Instructed to take all am prescribed medication in the am or wait until discharged home. Instructed to remain NPO after MN.

## 2022-07-21 ENCOUNTER — HOSPITAL ENCOUNTER (OUTPATIENT)
Dept: CARDIAC CATH/INVASIVE PROCEDURES | Age: 72
Discharge: HOME | End: 2022-07-21
Attending: INTERNAL MEDICINE | Admitting: INTERNAL MEDICINE
Payer: MEDICARE

## 2022-07-21 VITALS
OXYGEN SATURATION: 98 % | HEART RATE: 69 BPM | HEIGHT: 66 IN | SYSTOLIC BLOOD PRESSURE: 107 MMHG | BODY MASS INDEX: 28.77 KG/M2 | DIASTOLIC BLOOD PRESSURE: 73 MMHG | RESPIRATION RATE: 16 BRPM | WEIGHT: 179 LBS

## 2022-07-21 DIAGNOSIS — I48.19 PERSISTENT ATRIAL FIBRILLATION (HCC): ICD-10-CM

## 2022-07-21 LAB
ANION GAP SERPL CALC-SCNC: 1 MMOL/L (ref 7–16)
BUN SERPL-MCNC: 17 MG/DL (ref 8–23)
CALCIUM SERPL-MCNC: 8.8 MG/DL (ref 8.3–10.4)
CHLORIDE SERPL-SCNC: 108 MMOL/L (ref 98–107)
CO2 SERPL-SCNC: 31 MMOL/L (ref 21–32)
CREAT SERPL-MCNC: 1.2 MG/DL (ref 0.8–1.5)
ECHO BSA: 1.94 M2
ERYTHROCYTE [DISTWIDTH] IN BLOOD BY AUTOMATED COUNT: 14.5 % (ref 11.9–14.6)
GLUCOSE SERPL-MCNC: 100 MG/DL (ref 65–100)
HCT VFR BLD AUTO: 46.1 % (ref 41.1–50.3)
HGB BLD-MCNC: 14.3 G/DL (ref 13.6–17.2)
LV EF: 58 %
LVEF MODALITY: NORMAL
MCH RBC QN AUTO: 28.8 PG (ref 26.1–32.9)
MCHC RBC AUTO-ENTMCNC: 31 G/DL (ref 31.4–35)
MCV RBC AUTO: 92.8 FL (ref 79.6–97.8)
NRBC # BLD: 0 K/UL (ref 0–0.2)
PLATELET # BLD AUTO: 151 K/UL (ref 150–450)
PMV BLD AUTO: 9.8 FL (ref 9.4–12.3)
POTASSIUM SERPL-SCNC: 4.2 MMOL/L (ref 3.5–5.1)
RBC # BLD AUTO: 4.97 M/UL (ref 4.23–5.6)
SODIUM SERPL-SCNC: 140 MMOL/L (ref 138–145)
WBC # BLD AUTO: 5.9 K/UL (ref 4.3–11.1)

## 2022-07-21 PROCEDURE — 85027 COMPLETE CBC AUTOMATED: CPT

## 2022-07-21 PROCEDURE — 93319 3D ECHO IMG CGEN CAR ANOMAL: CPT | Performed by: INTERNAL MEDICINE

## 2022-07-21 PROCEDURE — 93312 ECHO TRANSESOPHAGEAL: CPT

## 2022-07-21 PROCEDURE — 93320 DOPPLER ECHO COMPLETE: CPT | Performed by: INTERNAL MEDICINE

## 2022-07-21 PROCEDURE — 76376 3D RENDER W/INTRP POSTPROCES: CPT

## 2022-07-21 PROCEDURE — 99152 MOD SED SAME PHYS/QHP 5/>YRS: CPT | Performed by: INTERNAL MEDICINE

## 2022-07-21 PROCEDURE — 80048 BASIC METABOLIC PNL TOTAL CA: CPT

## 2022-07-21 PROCEDURE — 93312 ECHO TRANSESOPHAGEAL: CPT | Performed by: INTERNAL MEDICINE

## 2022-07-21 PROCEDURE — 6360000002 HC RX W HCPCS: Performed by: INTERNAL MEDICINE

## 2022-07-21 PROCEDURE — 99152 MOD SED SAME PHYS/QHP 5/>YRS: CPT

## 2022-07-21 PROCEDURE — 93005 ELECTROCARDIOGRAM TRACING: CPT | Performed by: INTERNAL MEDICINE

## 2022-07-21 PROCEDURE — 6370000000 HC RX 637 (ALT 250 FOR IP): Performed by: INTERNAL MEDICINE

## 2022-07-21 RX ORDER — SODIUM CHLORIDE 9 MG/ML
INJECTION, SOLUTION INTRAVENOUS CONTINUOUS
Status: DISCONTINUED | OUTPATIENT
Start: 2022-07-21 | End: 2022-07-21 | Stop reason: HOSPADM

## 2022-07-21 RX ORDER — LIDOCAINE HYDROCHLORIDE 20 MG/ML
SOLUTION OROPHARYNGEAL
Status: COMPLETED | OUTPATIENT
Start: 2022-07-21 | End: 2022-07-21

## 2022-07-21 RX ORDER — ASPIRIN 81 MG/1
81 TABLET ORAL DAILY
Qty: 30 TABLET | Refills: 3 | Status: SHIPPED | OUTPATIENT
Start: 2022-07-21

## 2022-07-21 RX ORDER — CLOPIDOGREL BISULFATE 75 MG/1
75 TABLET ORAL DAILY
Qty: 30 TABLET | Refills: 3 | Status: SHIPPED | OUTPATIENT
Start: 2022-07-21 | End: 2022-09-12 | Stop reason: SINTOL

## 2022-07-21 RX ORDER — FENTANYL CITRATE 50 UG/ML
INJECTION, SOLUTION INTRAMUSCULAR; INTRAVENOUS
Status: COMPLETED | OUTPATIENT
Start: 2022-07-21 | End: 2022-07-21

## 2022-07-21 RX ORDER — MIDAZOLAM HYDROCHLORIDE 2 MG/2ML
INJECTION, SOLUTION INTRAMUSCULAR; INTRAVENOUS
Status: COMPLETED | OUTPATIENT
Start: 2022-07-21 | End: 2022-07-21

## 2022-07-21 RX ADMIN — MIDAZOLAM HYDROCHLORIDE 2 MG: 1 INJECTION, SOLUTION INTRAMUSCULAR; INTRAVENOUS at 10:47

## 2022-07-21 RX ADMIN — FENTANYL CITRATE 25 MCG: 50 INJECTION INTRAMUSCULAR; INTRAVENOUS at 10:47

## 2022-07-21 RX ADMIN — MIDAZOLAM HYDROCHLORIDE 2 MG: 1 INJECTION, SOLUTION INTRAMUSCULAR; INTRAVENOUS at 10:48

## 2022-07-21 RX ADMIN — LIDOCAINE HYDROCHLORIDE 15 ML: 20 SOLUTION ORAL at 10:43

## 2022-07-21 RX ADMIN — MIDAZOLAM HYDROCHLORIDE 1 MG: 1 INJECTION, SOLUTION INTRAMUSCULAR; INTRAVENOUS at 10:51

## 2022-07-21 RX ADMIN — FENTANYL CITRATE 25 MCG: 50 INJECTION INTRAMUSCULAR; INTRAVENOUS at 10:51

## 2022-07-21 ASSESSMENT — ENCOUNTER SYMPTOMS
WHEEZING: 0
SHORTNESS OF BREATH: 0
GASTROINTESTINAL NEGATIVE: 1
COUGH: 0

## 2022-07-21 NOTE — H&P
[address], [facility] [place], [address]     Radha Ana Lilia, [address] [place] [address]      Patient:  Darrell Feng  1950       SUBJECTIVE:  Darrell Feng is a  81 y.o. male seen for the following:     No chief complaint on file. CC: atrial fibrillation     HPI:     Hospital Course: Patient was seen at the office of [facility] by Dr. Skye Hodge for atrial fibrillation with h/o DVT/PE w rectus sheath hematoma. The patient was felt to be an appropriate candidate for Watchman device. The patient presented for procedure. BILL was performed directly prior to procedure that was negative for KEE thrombus. The patient underwent successful implantation of a 24 mm Watchman device. The patient tolerated the procedure well and was monitored closely. The morning of 6/15/22, patient was up feeling well without any complaints of chest pain or shortness of breath. Patient's labs were stable. Patient was seen and examined by Dr. Jerson Corral and determined stable and ready for discharge. Patient was instructed on the importance of medication compliance. He/She will remain on eliquis for at least 45 days. Patient denies chest pain, dyspnea, difficulty swallowing food / liquids. Past medical history, past surgical history, family history, social history, and medications were all reviewed with the patient today and updated as necessary.          Patient Active Problem List   Diagnosis    Chronic nonseasonal allergic rhinitis due to pollen    Lymphopenia    Vitamin D deficiency    PMR (polymyalgia rheumatica) ([facility])    COVID-19    Chronic left shoulder pain    H/O diagnostic tests    DVT (deep venous thrombosis) ([facility])    Pulmonary emboli ([facility])    Moderate persistent asthma without complication    Microalbuminuria    Unsteady gait    Elevated creatine kinase    Afib ([facility])    Essential hypertension, benign    DDD (degenerative disc disease), cervical    Axonal polyneuropathy    Babinski reflex Chest pain    Routine health maintenance    Lung nodule, solitary    Acute pulmonary embolus (HCC)    Hypogonadism, testicular    Acute blood loss anemia    Abdominal wall hematoma    Acute asthma exacerbation     Family History   Problem Relation Age of Onset    Cancer Father         leukemia    Hypertension Mother     Cancer Mother         breast    Cancer Sister         colon     Social History     Tobacco Use    Smoking status: Former     Types: Cigarettes     Quit date: 1985     Years since quittin.5    Smokeless tobacco: Never    Tobacco comments:     Quit smoking: smoked cigerattes from age 27 to 47   Substance Use Topics    Alcohol use: Yes     Alcohol/week: 1.0 standard drink     Types: 1 Glasses of wine per week       Review of Systems   Cardiovascular:  Negative for chest pain, dyspnea on exertion and irregular heartbeat. Respiratory:  Negative for cough, shortness of breath and wheezing. Gastrointestinal: Negative. Denies difficulty swallowing food or liquids. All other systems reviewed and are negative. PHYSICAL EXAM:    /83   Pulse 74   Resp 16   Ht 5' 6\" (1.676 m)   Wt 179 lb (81.2 kg)   SpO2 98%   BMI 28.89 kg/m²     Physical Exam  Vitals reviewed. Constitutional:       General: He is not in acute distress. Appearance: He is not toxic-appearing. HENT:      Head: Normocephalic and atraumatic. Right Ear: External ear normal.      Left Ear: External ear normal.      Nose: Nose normal.   Eyes:      General: No scleral icterus. Right eye: No discharge. Left eye: No discharge. Cardiovascular:      Rate and Rhythm: Normal rate and regular rhythm. Heart sounds: Normal heart sounds. No murmur heard. No friction rub. No gallop. Pulmonary:      Effort: Pulmonary effort is normal. No respiratory distress. Breath sounds: Normal breath sounds. No stridor. No wheezing, rhonchi or rales. Abdominal:      General: Abdomen is flat. Transesophageal echocardiogram (BILL) with contrast and 3D PRN        Atrial fibrillation  - patient is post Watchman (left atrial occluder device) implantation  - NPO  - consent on chart, risks benefits, alternatives have been discussed with the patient/patient's family   - questions answered  - appropriate to proceed to transesophageal echocardiogram    Cierra Freeman DO  7/21/2022

## 2022-07-21 NOTE — PROGRESS NOTES
Transesophageal Echo Note  - Indication: post Watchman, atrial fibrillation  - pt underwent successful BILL today in cath holding  - start 1046  - stop 1059  - sedation: 5 mg IV Midazolam, 50 mcg Fentanyl IV given by Derek Barclay RN under my direct supervision. Direct monitoring of vital signs and respiratory status throughout the procedure. - An independent trained observer pushed medications at my direction. We monitored the patient's level of consciousness and vital signs/physiologic status throughout the procedure duration (see start and stop times above). 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 3D images of LA , mitral valve obtained. - OK for oral intake at 1259  - No driving or heavy machine operation today. - Results and medication changes discussed with patient and family at bedside   - OK to Stop Eliquis  - start aspirin 81 mg oral once daily  - start Plavix 75 mg oral once daily.

## 2022-07-21 NOTE — PROGRESS NOTES
TRANSFER - OUT REPORT:    Verbal report given to RN(name) on Zee Breach being transferred to CPRU(unit) for routine progression of patient care       Report consisted of patient's Situation, Background, Assessment and   Recommendations(SBAR). Information from the following report(s) Nurse Handoff Report was reviewed with the receiving nurse. Opportunity for questions and clarification was provided.       BILL with [place] SYSTEM @ 1043  5 mg Versed  50 mcg Fentanyl

## 2022-07-21 NOTE — PROGRESS NOTES
TRANSFER - IN REPORT:    Verbal report received from RN(name) on Jasmeet Waller  being received from CPRU(unit) for routine progression of patient care      Report consisted of patients Situation, Background, Assessment and   Recommendations(SBAR). Information from the following report(s) Nurse Handoff Report was reviewed with the receiving nurse. Opportunity for questions and clarification was provided.       Assessment completed upon patients arrival to unit and care assume

## 2022-07-21 NOTE — PROGRESS NOTES
Discharge instructions given per orders, voiced good understanding post BILL care, medications & follow up care. Denies any questions. Patient family verbalized understanding. Patient wheeled out to private vehicle via RN.

## 2022-07-21 NOTE — PROGRESS NOTES
TRANSFER - IN REPORT:    Verbal report received from OUMAR Fuentes on Kostas Combs  being received from [address] [place] [address] for routine progression of patient care      Report consisted of patient's Situation, Background, Assessment and   Recommendations(SBAR). Information from the following report(s) Nurse Handoff Report was reviewed with the receiving nurse. Opportunity for questions and clarification was provided. Assessment completed upon patient's arrival to unit and care assumed.

## 2022-07-21 NOTE — DISCHARGE INSTRUCTIONS
Transesophageal Echocardiogram: What to Expect at [address] [facility]  A transesophageal echocardiogram is a test to help your doctor look at the inside of your heart. A small device called a transducer directs sound waves toward your heart. The sound waves make a picture of the heart's valves andchambers. Before the test, your throat was sprayed with medicine to numb it. Your throatmay be sore for a few days. You may have had a sedative to help you relax. You may be unsteady after having sedation. It can take a few hours for the medicine's effects to wear off. Common side effects include nausea, vomiting, and feeling sleepy or tired. This care sheet gives you a general idea about how long it will take for you to recover. But each person recovers at a different pace. Follow the steps belowto feel better as quickly as possible. How can you care for yourself at home? Activity    If a sedative was used, your doctor will tell you when it is safe for you to do your normal activities. For your safety, do not drive or operate any machinery that could be dangerous. Wait until the medicine wears off and you can think clearly and react easily. Diet    Do not eat or drink until the numbness in your throat wears off. When the numbness is gone, you can eat your normal diet. Throat lozenges and warm saltwater gargles can help relieve throat soreness. Throat lozenges can be used by people age 3 or older. And most people can gargle at age 6 and older. Do not drink alcohol for 24 hours. Follow-up care is a key part of your treatment and safety. Be sure to make and go to all appointments, and call your doctor if you are having problems. It's also a good idea to know your test results and keep alist of the medicines you take. When should you call for help? Call 911 anytime you think you may need emergency care. For example, call if:    Your stools are maroon or very bloody.      You vomit blood or what looks like coffee grounds. Call your doctor now or seek immediate medical care if:    You have pain in your chest, belly, or back. You have new or worse trouble swallowing. You have trouble breathing. Watch closely for changes in your health, and be sure to contact your doctor ifyou have any problems. Current as of: January 10, 2022               Content Version: 13.3  © 2006-2022 [employer]. Care instructions adapted under license by [facility] ([facility]). If you have questions about a medical condition or this instruction, always ask your healthcare professional. [address] any warranty or liability for your use of this information.

## 2022-07-22 LAB
EKG ATRIAL RATE: 70 BPM
EKG DIAGNOSIS: NORMAL
EKG P AXIS: 46 DEGREES
EKG P-R INTERVAL: 150 MS
EKG Q-T INTERVAL: 384 MS
EKG QRS DURATION: 86 MS
EKG QTC CALCULATION (BAZETT): 414 MS
EKG R AXIS: -1 DEGREES
EKG T AXIS: 31 DEGREES
EKG VENTRICULAR RATE: 70 BPM

## 2022-08-08 ENCOUNTER — OFFICE VISIT (OUTPATIENT)
Dept: CARDIOLOGY CLINIC | Age: 72
End: 2022-08-08
Payer: MEDICARE

## 2022-08-08 VITALS
SYSTOLIC BLOOD PRESSURE: 130 MMHG | BODY MASS INDEX: 28.61 KG/M2 | HEIGHT: 66 IN | HEART RATE: 72 BPM | DIASTOLIC BLOOD PRESSURE: 78 MMHG | WEIGHT: 178 LBS

## 2022-08-08 DIAGNOSIS — I48.19 PERSISTENT ATRIAL FIBRILLATION (HCC): Primary | ICD-10-CM

## 2022-08-08 DIAGNOSIS — I10 ESSENTIAL HYPERTENSION, BENIGN: ICD-10-CM

## 2022-08-08 DIAGNOSIS — J45.40 MODERATE PERSISTENT ASTHMA WITHOUT COMPLICATION: ICD-10-CM

## 2022-08-08 DIAGNOSIS — I26.99 ACUTE PULMONARY EMBOLISM, UNSPECIFIED PULMONARY EMBOLISM TYPE, UNSPECIFIED WHETHER ACUTE COR PULMONALE PRESENT (HCC): ICD-10-CM

## 2022-08-08 PROCEDURE — 93000 ELECTROCARDIOGRAM COMPLETE: CPT | Performed by: INTERNAL MEDICINE

## 2022-08-08 PROCEDURE — 99214 OFFICE O/P EST MOD 30 MIN: CPT | Performed by: INTERNAL MEDICINE

## 2022-08-08 PROCEDURE — 3017F COLORECTAL CA SCREEN DOC REV: CPT | Performed by: INTERNAL MEDICINE

## 2022-08-08 PROCEDURE — G8417 CALC BMI ABV UP PARAM F/U: HCPCS | Performed by: INTERNAL MEDICINE

## 2022-08-08 PROCEDURE — 1123F ACP DISCUSS/DSCN MKR DOCD: CPT | Performed by: INTERNAL MEDICINE

## 2022-08-08 PROCEDURE — G8428 CUR MEDS NOT DOCUMENT: HCPCS | Performed by: INTERNAL MEDICINE

## 2022-08-08 PROCEDURE — 1036F TOBACCO NON-USER: CPT | Performed by: INTERNAL MEDICINE

## 2022-08-08 RX ORDER — PREGABALIN 150 MG/1
CAPSULE ORAL
COMMUNITY
Start: 2022-07-21

## 2022-08-08 ASSESSMENT — ENCOUNTER SYMPTOMS
GASTROINTESTINAL NEGATIVE: 1
EYES NEGATIVE: 1
ALLERGIC/IMMUNOLOGIC NEGATIVE: 1
RESPIRATORY NEGATIVE: 1

## 2022-08-08 NOTE — PROGRESS NOTES
[facility] CARDIOLOGY  [address] [place] [address], [address] [facility], [address] [place] [address]  PHONE: [phone]        22      NAME:  Raymundo Lora  : 1950  MRN: [id number]     Referring Cardiologist: Lisa Sosa MD     Reason for Consultation: Atrial fibrillation-flutter      ASSESSMENT and PLAN:  Diagnoses and all orders for this visit:      1. Atrial fibrillation, [id number] = 2+, on Eliquis, previous AF/AFL ablation by Dr. Leslee Landry, 2018, s/p AFL ablation on 2019 and repeat AF ablation in 2019 at the [facility] with Dr. Ravi Doyle and another AF ablation on 2020 with termination of LA roof flutter. On Tikosyn    2. Atypical atrial flutter (HCC)   3. Essential hypertension, benign   4. 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 Moderate persistent asthma without complication   6. PMR (polymyalgia rheumatica) [facility])     79-year-old male with a history of persistent atrial fibrillation and typical appearing atrial flutter although it could be very atypical as well. He has a history of AF/AFL ablation in 2018 by Dr. Leslee Landry with recurrent AFL s/p ablation. He underwent  an AFL ablation and then an AF ablation (cryo with RF anterior line) at [facility]. He has now undergone a 3rd AF ablation which has been successful. He had a LA roof flutter which terminated with ablation along the anterior LA. He was admitted in 2022 with PEs and a DVT and also had a rectal sheath hematoma. He is now s/p Watchman and is off Eliquis. -AF/stroke ppx - on ASA/plavix. Off Eliquis. Follow up with Dr. Cuca Christianson. -DVT/PE - off [address] due to recent rectal sheath bleed. -AF - S/p AF ablation in 2020, remains in NSR. Off Tikosyn after ablation. -ILR - Brief pAT/pAF. On ASA/plavix currently/off Eliquis.    -Continue remaining medicines. -EP follow up in 6 months.     Thank you for allowing me to participate in the electrophysiologic care of Mr. Antonia Booth Marla. Please contact me if any questions or concerns were to arise. Ashlee Ashton. Deuce PORTILLO, MS  Clinical Cardiac Electrophysiology  [facility] Cardiology  08/08/22  11:18 AM    ===================================================================  Chief Complant:  No chief complaint on file. Consultation is requested by Sanchez Badillo MD for evaluation of No chief complaint on file. History:  Bridgette Guerin is a most pleasant 81 y.o. male with a past medical and cardiac history significant for  persistent atrial fibrillation and flutter. He underwent a catheter ablation procedure  by Dr. Perla Aguilar in February 2018. He is referred by Dr. Corby Miner as he is in recurrent typical versus atypical flutter. The patient lives an active lifestyle and has recurrent symptoms and is been in flutter for the majority of time over the last several  months. He is less than interested in going on further medicines and is interested in a catheter ablation procedure. He does have some mild chest pain on the left side of his chest which at times could be worse with exercise. He was recently diagnosed  with a foot neuropathy and underwent an MRI of his brain yesterday which demonstrated small little subtle changes in the white matter but no overt strokes. He comes in for follow up. He did undergo a Watchman procedure summer 2022. He is now on ASA/plavix. He does have some bruising on his arms. The patient otherwise denies chest pain, dyspnea, presyncope, syncope or lateralizing symptoms. Cardiac PMH: (Old records have been reviewed and summarized below)    EKG 01/19/2017, supraventricular tachycardia,  . Telemetry 2017 revealed atrial fibrillation and atrial flutter. Consultation with Dr. Kaylah Thompson. Patient with atypical flutter, placed on Multaq 02/2017. Echocardiogram - normal left ventricular systolic function. 11/09/2017 increased symptom burden of arrhythmia.   Fast heart rates detected. Dronedarone continued. The patient is on Eliquis therapy. Cardiac CT mild plaque in LAD.     06/01/2018 Amiodarone, Diltiazem discontinued. EKG:  (EKG has been independently visualized by me with interpretation below): NSR, normal axis, no ischemia. Stable QTc. Echo: 5/2020, EF WNL, mild NEISHA, mild AI. ECHO: 4/2019   -  Left ventricle: Systolic function was normal. Ejection fraction was  estimated in the range of 55 % to 60 %. There were no regional wall motion   abnormalities. Wall thickness was mildly increased. Doppler parameters were  consistent with diastolic dysfunction. Avg E/e': 15.6.    -  Left atrium: The atrium was moderately dilated. -  Inferior vena cava, hepatic veins: The respirophasic change in diameter less than 50%. -  Mitral valve: There was mild annular calcification. There was mild  regurgitation.    -  Tricuspid valve: There was mild regurgitation. DEVICE INTERROGATION: 3/2021, [facility] ILR, stable rhythm. No significant AF/AFL. Past Medical History, Past Surgical History, Family history, Social History, and Medications were all reviewed with the patient today and updated as necessary. Current Outpatient Medications   Medication Sig Dispense Refill    pregabalin (LYRICA) 150 MG capsule       clopidogrel (PLAVIX) 75 MG tablet Take 1 tablet by mouth in the morning. 30 tablet 3    aspirin EC 81 MG EC tablet Take 1 tablet by mouth in the morning.  30 tablet 3    montelukast (SINGULAIR) 10 MG tablet Take 10 mg by mouth nightly      furosemide (LASIX) 40 MG tablet furosemide 40 mg tablet   TAKE 1 TABLET BY MOUTH ONCE DAILY AS NEEDED FOR SWELLING      metoprolol succinate (TOPROL XL) 50 MG extended release tablet 50 mg in the morning and at bedtime      pantoprazole (PROTONIX) 40 MG tablet pantoprazole 40 mg tablet,delayed release   TAKE 1 TABLET BY MOUTH ONCE DAILY 30 MINUTES BEFORE FIRST MEAL OF THE DAY      potassium chloride (KLOR-CON) 10 MEQ extended release tablet potassium chloride ER 10 mEq tablet,extended release   TAKE 1 TABLET BY MOUTH ONCE DAILY WHEN TAKES FUROSEMIDE      traZODone (DESYREL) 100 MG tablet trazodone 100 mg tablet   TAKE 1/2 TO 1 (ONE-HALF TO ONE) TABLET BY MOUTH AT BEDTIME AS NEEDED FOR INSOMNIA      albuterol sulfate  (90 Base) MCG/ACT inhaler Inhale 2 puffs into the lungs every 6 hours as needed      Calcium Citrate 1040 MG TABS Take 1,000 mg by mouth daily      fluticasone (FLONASE) 50 MCG/ACT nasal spray 2 sprays by Nasal route daily      fluticasone-salmeterol (ADVAIR) 250-50 MCG/ACT AEPB diskus inhaler INHALE 1 DOSE BY MOUTH TWICE DAILY. RINSE MOUTH AFTER USE      predniSONE (DELTASONE) 1 MG tablet Take 5 mg by mouth daily (with breakfast)      sildenafil (REVATIO) 20 MG tablet Take 20 mg by mouth daily as needed      tamsulosin (FLOMAX) 0.4 MG capsule Take 0.4 mg by mouth 2 times daily      testosterone cypionate (DEPOTESTOTERONE CYPIONATE) 200 MG/ML injection Inject 400 mg into the muscle every 14 days. No current facility-administered medications for this visit. Allergies   Allergen Reactions    Lisinopril Other (See Comments)       Past Medical History:   Diagnosis Date    Acute pulmonary embolus ([address] 12/30/2021    Arrhythmia     A fib     Asthma, moderate persistent, well-controlled 5/30/2013    Axonal polyneuropathy 1/15/2019    Babinski reflex 1/15/2019    Chronic left shoulder pain 5/30/2013    Pain radiates from his neck    DDD (degenerative disc disease), cervical 1/18/2018    MRI: 1/2018 - multilevel DDD and facet arthritis w/ moderate bilateral foraminal narrowing at C6-7 and moderate right at C5-6.     Essential hypertension, benign 5/30/2013    Hypercholesteremia 12/7/2015    Hypogonadism, testicular 5/30/2013    Removal of right testicle on 11/1/2010; urologist Rinku Salazar of [facility], gives testosterone injections    Lumbosacral radiculopathy at L5 4/17/2016    Lung nodule, solitary 2017    Chronic - benign w/u    Lymphopenia 2017    Microalbuminuria 2017    PMR (polymyalgia rheumatica) ([facility] 2018    Followed by [facility] rheumatology    Prediabetes 2016    Unsteady gait 1/15/2019    Vitamin D deficiency 2015     Past Surgical History:   Procedure Laterality Date    ABLATION OF DYSRHYTHMIC FOCUS  2018    BACK SURGERY      CHOLECYSTECTOMY      COLONOSCOPY      normal    EP DEVICE PROCEDURE N/A 2022    WATCHMAN KEE CLOSURE DEVICE performed by Kate Causey MD at [facility] MAIN OR    EYE SURGERY  2022    bilateral cataract removal    LUMBAR DISCECTOMY      L4    LUMBAR DISCECTOMY      L5 discectomy    OTHER SURGICAL HISTORY      ablation for Afib     LA CARDIAC SURG PROCEDURE UNLIST      UROLOGICAL SURGERY  2010    testicular, right removed d/t  severe infection     Family History   Problem Relation Age of Onset    Cancer Father         leukemia    Hypertension Mother     Cancer Mother         breast    Cancer Sister         colon     Social History     Tobacco Use    Smoking status: Former     Types: Cigarettes     Quit date: 1985     Years since quittin.6    Smokeless tobacco: Never    Tobacco comments:     Quit smoking: smoked cigerattes from age 27 to 47   Substance Use Topics    Alcohol use: Yes     Alcohol/week: 1.0 standard drink     Types: 1 Glasses of wine per week       ROS:  A comprehensive review of systems was performed with the pertinent positives and negatives as noted in the HPI in addition to:  Review of Systems   Constitutional: Negative. HENT: Negative. Eyes: Negative. Respiratory: Negative. Cardiovascular: Negative. Gastrointestinal: Negative. Endocrine: Negative. Genitourinary: Negative. Musculoskeletal: Negative. Skin: Negative. Allergic/Immunologic: Negative. Neurological: Negative. Hematological: Negative. Psychiatric/Behavioral: Negative.      All other systems reviewed and are negative. PHYSICAL EXAM:   /78   Pulse 72   Ht 5' 6\" (1.676 m)   Wt 178 lb (80.7 kg)   BMI 28.73 kg/m²      Wt Readings from Last 3 Encounters:   08/08/22 178 lb (80.7 kg)   07/21/22 179 lb (81.2 kg)   06/15/22 176 lb 9.6 oz (80.1 kg)     BP Readings from Last 3 Encounters:   08/08/22 130/78   07/21/22 107/73   06/15/22 117/83     Gen: Well appearing, well developed, no acute distress  Eyes: Pupils equal, round. Extraocular movements are intact  ENT: Oropharynx clear, no oral lesions, normal dentition  CV: S1S2, regular rate and rhythm, no murmurs, rubs or gallops, normal JVD, no carotid bruits, normal distal pulses, no FERNANDO  Pulm: Clear to auscultation bilaterally, no accessory muscle uses, no wheezes or rales  GI: Soft, NT, ND, +BS  Neuro: Alert and oriented, nonfocal  Psych: Appropriate affect  Skin: Normal color and skin turgor  MSK: Normal muscle bulk and tone    Medical problems and test results were reviewed with the patient today. No results found for any visits on 08/08/22.

## 2022-09-12 ENCOUNTER — TELEPHONE (OUTPATIENT)
Dept: CASE MANAGEMENT | Age: 72
End: 2022-09-12

## 2022-09-12 NOTE — TELEPHONE ENCOUNTER
Mr. Lamberto Chawla phoned re: a bleed over the weekend while taking plavix and asa 81 mg.  WM device was placed  6/14/22. Evens was dc'd at the 45 day post BILL on 7/21/22. He developed a hematoma of a buttock cheek over the weekend. He saw his PCP Dr. Rebecca Kemp today. The hematoma has resolved by about 20%. He may stop taking plavix 75 mg daily per Dr. Cuca Christianson (cardiology). He has WM coordinator contact information for any questions or concerns.

## 2022-10-10 DIAGNOSIS — I48.91 AFIB (HCC): Primary | ICD-10-CM

## 2022-10-10 DIAGNOSIS — Z95.818 IMPLANTABLE LOOP RECORDER PRESENT: ICD-10-CM

## 2022-12-08 ENCOUNTER — TELEPHONE (OUTPATIENT)
Dept: CARDIOLOGY CLINIC | Age: 72
End: 2022-12-08

## 2022-12-08 NOTE — LETTER
[address] [place], [place]  [address] [place] [address]  [phone]  _____________________________________      PRE-OP RISK ASSESSMENT    Ethel Aragon  1950    Ethel Aragon is scheduled for Back Surgery  with Dr. Elvin Catherine on TBD. Beck Gutiérrez Low to moderate CV risk for moderate to severe risk surgery.      Thank you,    Ivon Delong MD                      12/8/2022  3:53 PM

## 2022-12-08 NOTE — TELEPHONE ENCOUNTER
Pt requested surgery clearance to be sent to Dr Cyril Pretty, at [place] [facility]. Per Dr Rosa M Sumner to moderate CV risk for moderate to severe risk surgery. Please send to the practice he requested us to send to. Will send via electronic fax to confirmed facility [phone]-[facility].

## 2023-01-10 ENCOUNTER — TELEPHONE (OUTPATIENT)
Dept: CARDIOLOGY CLINIC | Age: 73
End: 2023-01-10

## 2023-01-10 NOTE — TELEPHONE ENCOUNTER
----- Message from Kim Galarza sent at 1/6/2023  4:20 PM EST -----  Regarding: FW: Back Surgery    ----- Message -----  From: Anabelle Almodovar  Sent: 1/6/2023   4:00 PM EST  To: [facility] Clinical Staff  Subject: Back Surgery                                     Dr. Risa Oneal,     I was just contacted by Dr Jair Justin, who stated she never received the letter of cardiac clearance. Would you please send another to her fax, [phone], and her attention?       Thank you

## 2023-01-10 NOTE — LETTER
[address] [place], [place]  [address]  [phone]  _____________________________________      PRE-OP RISK ASSESSMENT    Harris Perez  1950    Harris Perez is scheduled for surgery  with Dr. Antonia Reese of [facility]. Office is asking for a cardiac clearance. Harris Perez is considered LOW-MODERATE Cardiovascular risk for this MODERATE-SEVERE Risk procedure.     Thank you,     Lexi Iverson MD              1/10/2023  1:04 PM

## 2023-01-16 DIAGNOSIS — Z95.818 IMPLANTABLE LOOP RECORDER PRESENT: ICD-10-CM

## 2023-01-16 DIAGNOSIS — I48.91 AFIB (HCC): ICD-10-CM

## 2023-02-20 ENCOUNTER — OFFICE VISIT (OUTPATIENT)
Dept: CARDIOLOGY CLINIC | Age: 73
End: 2023-02-20

## 2023-02-20 VITALS
HEIGHT: 66 IN | WEIGHT: 162 LBS | DIASTOLIC BLOOD PRESSURE: 70 MMHG | HEART RATE: 78 BPM | BODY MASS INDEX: 26.03 KG/M2 | SYSTOLIC BLOOD PRESSURE: 138 MMHG

## 2023-02-20 DIAGNOSIS — I48.19 PERSISTENT ATRIAL FIBRILLATION (HCC): Primary | ICD-10-CM

## 2023-02-20 RX ORDER — HYDROCODONE BITARTRATE AND ACETAMINOPHEN 10; 325 MG/1; MG/1
1 TABLET ORAL EVERY 6 HOURS PRN
COMMUNITY

## 2023-02-20 NOTE — PROGRESS NOTES
[facility] CARDIOLOGY  [address] [place] [address], [address] [facility], [address] [place] [address]  PHONE: [phone]        23      NAME:  Cassy Stallings  : 1950  MRN: [id number]     Referring Cardiologist: Jessica Campbell MD     Reason for Consultation: Atrial fibrillation-flutter      ASSESSMENT and PLAN:  Diagnoses and all orders for this visit:      1. Atrial fibrillation, [id number] = 2+, on Eliquis, previous AF/AFL ablation by Dr. Jailene Carranza, 2018, s/p AFL ablation on 2019 and repeat AF ablation in 2019 at the [facility] with Dr. Deon Ramírez and another AF ablation on 2020 with termination of LA roof flutter. On Tikosyn    2. Atypical atrial flutter (HCC)   3. Essential hypertension, benign   4. 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 Moderate persistent asthma without complication   6. PMR (polymyalgia rheumatica) [facility])     81-year-old male with a history of persistent atrial fibrillation and typical appearing atrial flutter although it could be very atypical as well. He has a history of AF/AFL ablation in 2018 by Dr. Jailene Carranza with recurrent AFL s/p ablation. He underwent  an AFL ablation and then an AF ablation (cryo with RF anterior line) at [facility]. He has now undergone a 3rd AF ablation which has been successful. He had a LA roof flutter which terminated with ablation along the anterior LA. He was admitted in 2022 with PEs and a DVT and also had a rectal sheath hematoma. He is now s/p Watchman and is off Eliquis. -AF/stroke ppx - on ASA/plavix. Off Eliquis. S/p Watchman, doing well. -DVT/PE - off [address] due to recent rectal sheath bleed. S/p Watchman. -AF - S/p AF ablation in 2020, remains in NSR. Off Tikosyn after ablation. Reduce metoprolol to 25 mg po qHS. -Disc disease - ruptured L2, s/p NSGY, 2023. Performed by Dr. Krystina Hazel. Continue PT. -ILR - Brief pAT/pAF. Continue ASA if able.     -EP follow up in 6 months. Thank you for allowing me to participate in the electrophysiologic care of Mr. Aldo Shannon. Please contact me if any questions or concerns were to arise. Vannesa West. Deuce PORTILLO, MS  Clinical Cardiac Electrophysiology  [facility] Cardiology  02/20/23  10:10 AM    ===================================================================  Chief Complant:    Chief Complaint   Patient presents with    Atrial Fibrillation    6 Month Follow-Up        Consultation is requested by No ref. provider found for evaluation of Atrial Fibrillation and 6 Month Follow-Up    History:  Aldo Shannon is a most pleasant 72 y.o. male with a past medical and cardiac history significant for  persistent atrial fibrillation and flutter. He underwent a catheter ablation procedure  by Dr. Rosemarie Kumar in February 2018. He is referred by Dr. Daniella Ruiz as he is in recurrent typical versus atypical flutter. The patient lives an active lifestyle and has recurrent symptoms and is been in flutter for the majority of time over the last several months. He is less than interested in going on further medicines and is interested in a catheter ablation procedure. He does have some mild chest pain on the left side of his chest which at times could be worse with exercise. He was recently diagnosed  with a foot neuropathy and underwent an MRI of his brain yesterday which demonstrated small little subtle changes in the white matter but no overt strokes. He comes in for follow up. He did undergo a Watchman procedure summer 2022. Had been on ASA only, but recently has not been on anything. He did have back surgery again this year. The patient otherwise denies chest pain, dyspnea, presyncope, syncope or lateralizing symptoms. Cardiac PMH: (Old records have been reviewed and summarized below)    EKG 01/19/2017, supraventricular tachycardia,  . Telemetry 2017 revealed atrial fibrillation and atrial flutter.       Consultation with Dr. Erwin Guevara.  Patient with atypical flutter, placed on Multaq 02/2017.      Echocardiogram - normal left ventricular systolic function.      11/09/2017 increased symptom burden of arrhythmia.  Fast heart rates detected.      Dronedarone continued.  The patient is on Eliquis therapy.      Cardiac CT mild plaque in LAD.     06/01/2018 Amiodarone, Diltiazem discontinued.        EKG:  (EKG has been independently visualized by me with interpretation below): NSR, normal axis, no ischemia. Stable QTc.       Echo: 5/2020, EF WNL, mild NEISHA, mild AI.       ECHO: 4/2019   -  Left ventricle: Systolic function was normal. Ejection fraction was  estimated in the range of 55 % to 60 %. There were no regional wall motion   abnormalities. Wall thickness was mildly increased. Doppler parameters were  consistent with diastolic dysfunction. Avg E/e': 15.6.    -  Left atrium: The atrium was moderately dilated.    -  Inferior vena cava, hepatic veins:  The respirophasic change in diameter less than 50%.    -  Mitral valve: There was mild annular calcification. There was mild  regurgitation.    -  Tricuspid valve: There was mild regurgitation.    DEVICE INTERROGATION: 3/2021, [facility] [place], stable rhythm. No significant AF/AFL.     Past Medical History, Past Surgical History, Family history, Social History, and Medications were all reviewed with the patient today and updated as necessary.     Current Outpatient Medications   Medication Sig Dispense Refill    HYDROcodone-acetaminophen (NORCO)  MG per tablet Take 1 tablet by mouth every 6 hours as needed for Pain.      pregabalin (LYRICA) 150 MG capsule in the morning, at noon, and at bedtime.      montelukast (SINGULAIR) 10 MG tablet Take 10 mg by mouth nightly      furosemide (LASIX) 40 MG tablet furosemide 40 mg tablet   TAKE 1 TABLET BY MOUTH ONCE DAILY AS NEEDED FOR SWELLING      metoprolol succinate (TOPROL XL) 50 MG extended release tablet 50 mg in the morning and at  bedtime      pantoprazole (PROTONIX) 20 MG tablet Take 20 mg by mouth      potassium chloride (KLOR-CON) 10 MEQ extended release tablet potassium chloride ER 10 mEq tablet,extended release   TAKE 1 TABLET BY MOUTH ONCE DAILY WHEN TAKES FUROSEMIDE      albuterol sulfate  (90 Base) MCG/ACT inhaler Inhale 2 puffs into the lungs every 6 hours as needed      Calcium Citrate 1040 MG TABS Take 1,000 mg by mouth daily      fluticasone (FLONASE) 50 MCG/ACT nasal spray 2 sprays by Nasal route as needed      fluticasone-salmeterol (ADVAIR) 250-50 MCG/ACT AEPB diskus inhaler INHALE 1 DOSE BY MOUTH TWICE DAILY. RINSE MOUTH AFTER USE      sildenafil (REVATIO) 20 MG tablet Take 20 mg by mouth daily as needed      tamsulosin (FLOMAX) 0.4 MG capsule Take 0.4 mg by mouth 2 times daily      testosterone cypionate (DEPOTESTOTERONE CYPIONATE) 200 MG/ML injection Inject 400 mg into the muscle every 14 days. No current facility-administered medications for this visit. Allergies   Allergen Reactions    Lisinopril Other (See Comments)       Past Medical History:   Diagnosis Date    Acute pulmonary embolus ([facility] [address] 12/30/2021    Arrhythmia     A fib     Asthma, moderate persistent, well-controlled 5/30/2013    Axonal polyneuropathy 1/15/2019    Babinski reflex 1/15/2019    Chronic left shoulder pain 5/30/2013    Pain radiates from his neck    DDD (degenerative disc disease), cervical 1/18/2018    MRI: 1/2018 - multilevel DDD and facet arthritis w/ moderate bilateral foraminal narrowing at C6-7 and moderate right at C5-6.     Essential hypertension, benign 5/30/2013    Hypercholesteremia 12/7/2015    Hypogonadism, testicular 5/30/2013    Removal of right testicle on 11/1/2010; urologist Deloris Ward of [facility], gives testosterone injections    Lumbosacral radiculopathy at L5 4/17/2016    Lung nodule, solitary 1/19/2017    Chronic - benign w/u    Lymphopenia 8/6/2017    Microalbuminuria 9/1/2017    PMR (polymyalgia rheumatica) ([facility] [address] 2018    Followed by [facility] rheumatology    Prediabetes 2016    Unsteady gait 1/15/2019    Vitamin D deficiency 2015     Past Surgical History:   Procedure Laterality Date    ABLATION OF DYSRHYTHMIC FOCUS  2018    BACK SURGERY      CHOLECYSTECTOMY      COLONOSCOPY      normal    EP DEVICE PROCEDURE N/A 2022    WATCHMAN KEE CLOSURE DEVICE performed by Gisselle Moise MD at [facility] MAIN OR    EYE SURGERY  2022    bilateral cataract removal    LUMBAR DISCECTOMY      L4    LUMBAR DISCECTOMY      L5 discectomy    OTHER SURGICAL HISTORY      ablation for Afib     AR UNLISTED PROCEDURE CARDIAC SURGERY      UROLOGICAL SURGERY  2010    testicular, right removed d/t  severe infection     Family History   Problem Relation Age of Onset    Cancer Father         leukemia    Hypertension Mother     Cancer Mother         breast    Cancer Sister         colon     Social History     Tobacco Use    Smoking status: Former     Types: Cigarettes     Quit date: 1985     Years since quittin.1    Smokeless tobacco: Never    Tobacco comments:     Quit smoking: smoked cigerattes from age 27 to 47   Substance Use Topics    Alcohol use: Yes     Alcohol/week: 1.0 standard drink     Types: 1 Glasses of wine per week     ROS:  A comprehensive review of systems was performed with the pertinent positives and negatives as noted in the HPI in addition to:  Review of Systems   Constitutional: Negative. HENT: Negative. Eyes: Negative. Respiratory: Negative. Cardiovascular: Negative. Gastrointestinal: Negative. Endocrine: Negative. Genitourinary: Negative. Musculoskeletal: Negative. Skin: Negative. Allergic/Immunologic: Negative. Neurological: Negative. Hematological: Negative. Psychiatric/Behavioral: Negative. All other systems reviewed and are negative.     PHYSICAL EXAM:   /70   Pulse 78   Ht 5' 6\" (1.676 m)   Wt 162 lb (73.5 kg) BMI 26.15 kg/m²      Wt Readings from Last 3 Encounters:   02/20/23 162 lb (73.5 kg)   08/08/22 178 lb (80.7 kg)   07/21/22 179 lb (81.2 kg)     BP Readings from Last 3 Encounters:   02/20/23 138/70   08/08/22 130/78   07/21/22 107/73     Gen: Well appearing, well developed, no acute distress  Eyes: Pupils equal, round. Extraocular movements are intact  ENT: Oropharynx clear, no oral lesions, normal dentition  CV: S1S2, regular rate and rhythm, no murmurs, rubs or gallops, normal JVD, no carotid bruits, normal distal pulses, no FERNANDO  Pulm: Clear to auscultation bilaterally, no accessory muscle uses, no wheezes or rales  GI: Soft, NT, ND, +BS  Neuro: Alert and oriented, nonfocal  Psych: Appropriate affect  Skin: Normal color and skin turgor  MSK: Normal muscle bulk and tone    Medical problems and test results were reviewed with the patient today. No results found for any visits on 02/20/23. Yes

## 2023-03-27 PROCEDURE — G2066 INTER DEVC REMOTE 30D: HCPCS | Performed by: INTERNAL MEDICINE

## 2023-03-27 PROCEDURE — 93298 REM INTERROG DEV EVAL SCRMS: CPT | Performed by: INTERNAL MEDICINE

## 2023-05-10 DIAGNOSIS — Z95.818 IMPLANTABLE LOOP RECORDER PRESENT: ICD-10-CM

## 2023-05-10 DIAGNOSIS — I48.91 AFIB (HCC): ICD-10-CM

## 2023-08-25 ENCOUNTER — OFFICE VISIT (OUTPATIENT)
Age: 73
End: 2023-08-25

## 2023-08-25 VITALS
DIASTOLIC BLOOD PRESSURE: 80 MMHG | BODY MASS INDEX: 26.53 KG/M2 | WEIGHT: 169 LBS | SYSTOLIC BLOOD PRESSURE: 108 MMHG | HEART RATE: 72 BPM | HEIGHT: 67 IN

## 2023-08-25 DIAGNOSIS — I26.99 ACUTE PULMONARY EMBOLISM, UNSPECIFIED PULMONARY EMBOLISM TYPE, UNSPECIFIED WHETHER ACUTE COR PULMONALE PRESENT (HCC): ICD-10-CM

## 2023-08-25 DIAGNOSIS — I48.19 PERSISTENT ATRIAL FIBRILLATION (HCC): Primary | ICD-10-CM

## 2023-08-25 DIAGNOSIS — I10 ESSENTIAL HYPERTENSION, BENIGN: ICD-10-CM

## 2023-08-25 RX ORDER — METOPROLOL SUCCINATE 50 MG/1
50 TABLET, EXTENDED RELEASE ORAL DAILY
Qty: 90 TABLET | Refills: 3 | Status: SHIPPED | OUTPATIENT
Start: 2023-08-25

## 2023-08-25 RX ORDER — CEPHALEXIN 500 MG/1
CAPSULE ORAL
COMMUNITY
Start: 2023-08-24

## 2023-08-25 RX ORDER — ASPIRIN 81 MG/1
1 TABLET ORAL EVERY MORNING
COMMUNITY

## 2023-08-25 RX ORDER — PREDNISONE 5 MG/1
TABLET ORAL
COMMUNITY
Start: 2023-08-09

## 2023-08-25 RX ORDER — METHOCARBAMOL 750 MG/1
750 TABLET, FILM COATED ORAL 3 TIMES DAILY PRN
COMMUNITY
Start: 2023-07-19

## 2023-08-25 RX ORDER — OXYBUTYNIN CHLORIDE 5 MG/1
5 TABLET ORAL 2 TIMES DAILY
COMMUNITY
Start: 2023-08-20

## 2023-08-25 RX ORDER — ROSUVASTATIN CALCIUM 5 MG/1
1 TABLET, COATED ORAL DAILY
COMMUNITY

## 2023-08-25 RX ORDER — HYDROXYCHLOROQUINE SULFATE 200 MG/1
400 TABLET, FILM COATED ORAL DAILY
COMMUNITY
Start: 2023-08-12

## 2023-08-25 ASSESSMENT — ENCOUNTER SYMPTOMS
EYES NEGATIVE: 1
ALLERGIC/IMMUNOLOGIC NEGATIVE: 1
RESPIRATORY NEGATIVE: 1
GASTROINTESTINAL NEGATIVE: 1

## 2023-08-25 NOTE — PROGRESS NOTES
from his neck    DDD (degenerative disc disease), cervical 2018    MRI: 2018 - multilevel DDD and facet arthritis w/ moderate bilateral foraminal narrowing at C6-7 and moderate right at C5-6. Essential hypertension, benign 2013    Hypercholesteremia 2015    Hypogonadism, testicular 2013    Removal of right testicle on 2010; urologist Lidia Damon of [facility], gives testosterone injections    Lumbosacral radiculopathy at L5 2016    Lung nodule, solitary 2017    Chronic - benign w/u    Lymphopenia 2017    Microalbuminuria 2017    PMR (polymyalgia rheumatica) ([address]) 2018    Followed by [facility] rheumatology    Prediabetes 2016    Unsteady gait 1/15/2019    Vitamin D deficiency 2015     Past Surgical History:   Procedure Laterality Date    ABLATION OF DYSRHYTHMIC FOCUS  2018    BACK SURGERY      CHOLECYSTECTOMY      COLONOSCOPY      normal    EP DEVICE PROCEDURE N/A 2022    WATCHMAN KEE CLOSURE DEVICE performed by Rachael Pike MD at [facility] MA[facility] OR    EYE SURGERY  2022    bilateral cataract removal    LUMBAR DISCECTOMY      L4    LUMBAR DISCECTOMY      L5 discectomy    OTHER SURGICAL HISTORY      ablation for Afib     NM UNLISTED PROCEDURE CARDIAC SURGERY      UROLOGICAL SURGERY  2010    testicular, right removed d/t  severe infection     Family History   Problem Relation Age of Onset    Cancer Father         leukemia    Hypertension Mother     Cancer Mother         breast    Cancer Sister         colon     Social History     Tobacco Use    Smoking status: Former     Types: Cigarettes     Quit date: 1985     Years since quittin.6     Passive exposure: Past    Smokeless tobacco: Never    Tobacco comments:     Quit smoking: smoked cigerattes from age 27 to 47   Substance Use Topics    Alcohol use:  Yes     Alcohol/week: 1.0 standard drink     Types: 1 Glasses of wine per week     ROS:  A comprehensive review of

## 2023-09-05 PROCEDURE — 93298 REM INTERROG DEV EVAL SCRMS: CPT | Performed by: INTERNAL MEDICINE

## 2023-09-05 PROCEDURE — G2066 INTER DEVC REMOTE 30D: HCPCS | Performed by: INTERNAL MEDICINE

## 2023-11-13 PROCEDURE — 93298 REM INTERROG DEV EVAL SCRMS: CPT | Performed by: INTERNAL MEDICINE

## 2023-11-13 PROCEDURE — G2066 INTER DEVC REMOTE 30D: HCPCS | Performed by: INTERNAL MEDICINE

## 2024-01-25 ENCOUNTER — TELEPHONE (OUTPATIENT)
Age: 74
End: 2024-01-25

## 2024-02-26 ENCOUNTER — OFFICE VISIT (OUTPATIENT)
Age: 74
End: 2024-02-26
Payer: MEDICARE

## 2024-02-26 VITALS
SYSTOLIC BLOOD PRESSURE: 110 MMHG | WEIGHT: 172 LBS | DIASTOLIC BLOOD PRESSURE: 76 MMHG | BODY MASS INDEX: 27.64 KG/M2 | HEIGHT: 66 IN | HEART RATE: 91 BPM

## 2024-02-26 DIAGNOSIS — I48.19 PERSISTENT ATRIAL FIBRILLATION (HCC): Primary | ICD-10-CM

## 2024-02-26 DIAGNOSIS — I10 ESSENTIAL HYPERTENSION, BENIGN: ICD-10-CM

## 2024-02-26 PROCEDURE — G8484 FLU IMMUNIZE NO ADMIN: HCPCS | Performed by: INTERNAL MEDICINE

## 2024-02-26 PROCEDURE — 1036F TOBACCO NON-USER: CPT | Performed by: INTERNAL MEDICINE

## 2024-02-26 PROCEDURE — 93000 ELECTROCARDIOGRAM COMPLETE: CPT | Performed by: INTERNAL MEDICINE

## 2024-02-26 PROCEDURE — 3074F SYST BP LT 130 MM HG: CPT | Performed by: INTERNAL MEDICINE

## 2024-02-26 PROCEDURE — G8417 CALC BMI ABV UP PARAM F/U: HCPCS | Performed by: INTERNAL MEDICINE

## 2024-02-26 PROCEDURE — 3017F COLORECTAL CA SCREEN DOC REV: CPT | Performed by: INTERNAL MEDICINE

## 2024-02-26 PROCEDURE — 3078F DIAST BP <80 MM HG: CPT | Performed by: INTERNAL MEDICINE

## 2024-02-26 PROCEDURE — 1123F ACP DISCUSS/DSCN MKR DOCD: CPT | Performed by: INTERNAL MEDICINE

## 2024-02-26 PROCEDURE — G8427 DOCREV CUR MEDS BY ELIG CLIN: HCPCS | Performed by: INTERNAL MEDICINE

## 2024-02-26 PROCEDURE — 99214 OFFICE O/P EST MOD 30 MIN: CPT | Performed by: INTERNAL MEDICINE

## 2024-02-26 RX ORDER — CYCLOSPORINE 0.5 MG/ML
EMULSION OPHTHALMIC
COMMUNITY
Start: 2024-01-17

## 2024-02-26 RX ORDER — AMITRIPTYLINE HYDROCHLORIDE 25 MG/1
TABLET, FILM COATED ORAL
COMMUNITY

## 2024-02-26 ASSESSMENT — ENCOUNTER SYMPTOMS
ALLERGIC/IMMUNOLOGIC NEGATIVE: 1
RESPIRATORY NEGATIVE: 1
GASTROINTESTINAL NEGATIVE: 1
EYES NEGATIVE: 1

## 2024-02-26 NOTE — PROGRESS NOTES
tachycardia,  .       Telemetry 2017 revealed atrial fibrillation and atrial flutter.      Consultation with Dr. Erwin Guevara.  Patient with atypical flutter, placed on Multaq 02/2017.      Echocardiogram - normal left ventricular systolic function.      11/09/2017 increased symptom burden of arrhythmia.  Fast heart rates detected.      Dronedarone continued.  The patient is on Eliquis therapy.      Cardiac CT mild plaque in LAD.     06/01/2018 Amiodarone, Diltiazem discontinued.        EKG:  (EKG has been independently visualized by me with interpretation below): NSR, normal axis, no ischemia. Stable QTc.       Echo: 5/2020, EF WNL, mild NEISHA, mild AI.       ECHO: 4/2019   -  Left ventricle: Systolic function was normal. Ejection fraction was  estimated in the range of 55 % to 60 %. There were no regional wall motion   abnormalities. Wall thickness was mildly increased. Doppler parameters were  consistent with diastolic dysfunction. Avg E/e': 15.6.    -  Left atrium: The atrium was moderately dilated.    -  Inferior vena cava, hepatic veins:  The respirophasic change in diameter less than 50%.    -  Mitral valve: There was mild annular calcification. There was mild  regurgitation.    -  Tricuspid valve: There was mild regurgitation.    DEVICE INTERROGATION: 3/2021, [facility], stable rhythm. No significant AF/AFL.     Past Medical History, Past Surgical History, Family history, Social History, and Medications were all reviewed with the patient today and updated as necessary.     Current Outpatient Medications   Medication Sig Dispense Refill    amitriptyline (ELAVIL) 25 MG tablet TAKE 1 TO 2 TABLETS BY MOUTH AT BEDTIME      cycloSPORINE (RESTASIS) 0.05 % ophthalmic emulsion INSTILL 1 DROP INTO AFFECTED EYE(S) EVERY 12 HOURS      aspirin 81 MG EC tablet Take 1 tablet by mouth every morning      hydroxychloroquine (PLAQUENIL) 200 MG tablet Take 2 tablets by mouth daily      methocarbamol (ROBAXIN) 750 MG tablet Take 1

## 2024-03-18 ENCOUNTER — TELEPHONE (OUTPATIENT)
Age: 74
End: 2024-03-18

## 2024-03-18 DIAGNOSIS — Z45.09 ENCOUNTER FOR LOOP RECORDER AT END OF BATTERY LIFE: ICD-10-CM

## 2024-03-18 DIAGNOSIS — I48.91 AFIB (HCC): Primary | ICD-10-CM

## 2024-03-18 NOTE — TELEPHONE ENCOUNTER
Alert for ILR at [facility]. Per last OV note:    -ILR - Brief pAT/pAF. Continue ASA if able. Will likely go EOS in 2024, ideally implant new device. Discussed today and pt would like a new device when battery runs out on current device.      Will schedule ILR removal and new placement.

## 2024-04-11 RX ORDER — SODIUM CHLORIDE 9 MG/ML
INJECTION, SOLUTION INTRAVENOUS PRN
Status: CANCELLED | OUTPATIENT
Start: 2024-04-11

## 2024-04-11 RX ORDER — SODIUM CHLORIDE 0.9 % (FLUSH) 0.9 %
5-40 SYRINGE (ML) INJECTION PRN
Status: CANCELLED | OUTPATIENT
Start: 2024-04-11

## 2024-04-11 RX ORDER — NALOXONE HYDROCHLORIDE 0.4 MG/ML
INJECTION, SOLUTION INTRAMUSCULAR; INTRAVENOUS; SUBCUTANEOUS PRN
Status: CANCELLED | OUTPATIENT
Start: 2024-04-11

## 2024-04-11 RX ORDER — SODIUM CHLORIDE, SODIUM LACTATE, POTASSIUM CHLORIDE, CALCIUM CHLORIDE 600; 310; 30; 20 MG/100ML; MG/100ML; MG/100ML; MG/100ML
INJECTION, SOLUTION INTRAVENOUS CONTINUOUS
Status: CANCELLED | OUTPATIENT
Start: 2024-04-11

## 2024-04-11 RX ORDER — PROCHLORPERAZINE EDISYLATE 5 MG/ML
5 INJECTION INTRAMUSCULAR; INTRAVENOUS
Status: CANCELLED | OUTPATIENT
Start: 2024-04-11 | End: 2024-04-12

## 2024-04-11 RX ORDER — LIDOCAINE HYDROCHLORIDE 10 MG/ML
1 INJECTION, SOLUTION INFILTRATION; PERINEURAL
Status: CANCELLED | OUTPATIENT
Start: 2024-04-11 | End: 2024-04-12

## 2024-04-11 RX ORDER — ONDANSETRON 2 MG/ML
4 INJECTION INTRAMUSCULAR; INTRAVENOUS
Status: CANCELLED | OUTPATIENT
Start: 2024-04-11 | End: 2024-04-12

## 2024-04-11 RX ORDER — SODIUM CHLORIDE 0.9 % (FLUSH) 0.9 %
5-40 SYRINGE (ML) INJECTION EVERY 12 HOURS SCHEDULED
Status: CANCELLED | OUTPATIENT
Start: 2024-04-11

## 2024-04-11 NOTE — PROGRESS NOTES
Patient pre-assessment complete for Loop Removal and Implant with Dr. Tripathi scheduled for 24 at 1:30, arrival time 12:00pm. Patient verified using . Patient instructed to bring a list of home medications on the day of procedure. NPO status reinforced. Patient instructed to follow EP Information Sheet given at appointment. Patient verbalizes understanding of all instructions & denies any questions at this time.

## 2024-04-12 ENCOUNTER — HOSPITAL ENCOUNTER (OUTPATIENT)
Age: 74
Setting detail: OUTPATIENT SURGERY
Discharge: HOME | End: 2024-04-12
Attending: INTERNAL MEDICINE | Admitting: INTERNAL MEDICINE
Payer: MEDICARE

## 2024-04-12 VITALS
HEIGHT: 66 IN | WEIGHT: 172 LBS | OXYGEN SATURATION: 95 % | HEART RATE: 84 BPM | BODY MASS INDEX: 27.64 KG/M2 | SYSTOLIC BLOOD PRESSURE: 121 MMHG | RESPIRATION RATE: 18 BRPM | TEMPERATURE: 98 F | DIASTOLIC BLOOD PRESSURE: 70 MMHG

## 2024-04-12 DIAGNOSIS — Z45.09 ENCOUNTER FOR LOOP RECORDER AT END OF BATTERY LIFE: ICD-10-CM

## 2024-04-12 LAB — ECHO BSA: 1.91 M2

## 2024-04-12 PROCEDURE — 33286 RMVL SUBQ CAR RHYTHM MNTR: CPT | Performed by: INTERNAL MEDICINE

## 2024-04-12 PROCEDURE — 6360000002 HC RX W HCPCS: Performed by: INTERNAL MEDICINE

## 2024-04-12 PROCEDURE — 2720000010 HC SURG SUPPLY STERILE: Performed by: INTERNAL MEDICINE

## 2024-04-12 PROCEDURE — C1764 EVENT RECORDER, CARDIAC: HCPCS | Performed by: INTERNAL MEDICINE

## 2024-04-12 PROCEDURE — 33285 INSJ SUBQ CAR RHYTHM MNTR: CPT | Performed by: INTERNAL MEDICINE

## 2024-04-12 PROCEDURE — 2500000003 HC RX 250 WO HCPCS: Performed by: INTERNAL MEDICINE

## 2024-04-12 PROCEDURE — 2709999900 HC NON-CHARGEABLE SUPPLY: Performed by: INTERNAL MEDICINE

## 2024-04-12 DEVICE — INSERTABLE CARDIAC MONITOR
Type: IMPLANTABLE DEVICE | Status: FUNCTIONAL
Brand: LUX-DX II+™

## 2024-04-12 RX ORDER — FENTANYL CITRATE 50 UG/ML
INJECTION, SOLUTION INTRAMUSCULAR; INTRAVENOUS PRN
Status: DISCONTINUED | OUTPATIENT
Start: 2024-04-12 | End: 2024-04-12 | Stop reason: HOSPADM

## 2024-04-12 RX ORDER — MIDAZOLAM HYDROCHLORIDE 1 MG/ML
INJECTION INTRAMUSCULAR; INTRAVENOUS PRN
Status: DISCONTINUED | OUTPATIENT
Start: 2024-04-12 | End: 2024-04-12 | Stop reason: HOSPADM

## 2024-04-12 RX ORDER — LIDOCAINE HYDROCHLORIDE 10 MG/ML
INJECTION, SOLUTION INFILTRATION; PERINEURAL PRN
Status: DISCONTINUED | OUTPATIENT
Start: 2024-04-12 | End: 2024-04-12 | Stop reason: HOSPADM

## 2024-04-12 RX ORDER — CEFAZOLIN SODIUM 1 G/3ML
INJECTION, POWDER, FOR SOLUTION INTRAMUSCULAR; INTRAVENOUS PRN
Status: DISCONTINUED | OUTPATIENT
Start: 2024-04-12 | End: 2024-04-12 | Stop reason: HOSPADM

## 2024-04-12 NOTE — PROGRESS NOTES
Loop Explant  [employer]    Loop Implant  [employer]    L chest incision, closed w/ sutures/dermabond, covered w/ primaseal  No s/sxs of bleeding/hematoma to L chest incision site    Ancef 2g IV  Versed 1mg IV  Fentanyl 25mcg IV    TRANSFER - OUT REPORT:    Verbal report given to OUMAR Merirtt on Bora Gutiérrez  being transferred to CPRU for routine progression of patient care       Report consisted of patient's Situation, Background, Assessment and   Recommendations(SBAR).     Information from the following report(s) Nurse Handoff Report and MAR was reviewed with the receiving nurse.    Opportunity for questions and clarification was provided.      Patient transported with:  Tech

## 2024-04-12 NOTE — H&P
The patient has been examined and the previous clinic note dated, 2024, has been reviewed and changes have been noted below.     74 year old male with a history of persistent AF/AFL s/p ILR which is at [facility]. He presents for ILR changeout. He has undergone informed consent and will proceed with planned procedure under moderate sedation.    ASA: II    Mallampati: II    The risks and benefits of ILR implant were discussed with patient including j luis risks of bleeding and infection, <1% in the context of ability to monitor cardiac arrhythmias in a careful monitor.     Werner Tripathi MD, MS  Clinical Cardiac Electrophysiology  [facility] Cardiology      NAME:  Bora Gutiérrez  : 1950  MRN: [id number]      Referring Cardiologist: Kishor Ramirez MD     Reason for Consultation: Atrial fibrillation-flutter      ASSESSMENT and PLAN:  Diagnoses and all orders for this visit:      1. Atrial fibrillation, [id number] = 2+, on Eliquis, previous AF/AFL ablation by Dr. Guevara, 2018, s/p AFL ablation on 2019 and repeat AF ablation in 2019 at the [facility] with Dr. Kenneth Huston and another AF ablation on 2020 with termination of LA roof flutter. On Tikosyn    2. Atypical atrial flutter (HCC)   3. Essential hypertension, benign   4. 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 Moderate persistent asthma without complication   6. PMR (polymyalgia rheumatica) ([facility])     74-year-old male with a history of persistent atrial fibrillation and typical appearing atrial flutter although it could be very atypical as well.  He has a history of AF/AFL ablation in 2018 by Dr. Guevara with recurrent AFL s/p ablation. He underwent  an AFL ablation and then an AF ablation (cryo with RF anterior line) at [facility]. He has now undergone a 3rd AF ablation which has been successful. He had a LA roof flutter which terminated with ablation along the anterior LA.      He was admitted in

## 2024-04-12 NOTE — PROGRESS NOTES
Report received from  Cath Lab RN. Procedural finding communicated. Intra procedural medication administration reviewed. Progression of care discussed.    Patient received into CPRU room 7, Post loop removal and insertion    Access site without bleeding or swelling. Mid chest        Routine post procedural vital signs & site assessment initiated.

## 2024-04-12 NOTE — PROGRESS NOTES
Patient received to CPRU room # 10.  Ambulatory from [facility]. Patient scheduled for Loop insert today with Dr Tripathi. Procedure reviewed & questions answered, voiced good understanding consent obtained & placed on chart. All medications and medical history reviewed. Will prep patient per orders. Patient & family updated on plan of care.      The patient has a fraility score of 3-MANAGING WELL, based on pt ability to ambulate independently and to complete own ADLs.

## 2024-04-12 NOTE — DISCHARGE INSTRUCTIONS
LOOP MONITOR INSTRUCTIONS SHEET      Activity  As tolerated and directed by your doctor  Bathe or shower as directed by your doctor    Diet  Resume your previous diet     Pain   Call your doctor if pain is NOT relieved by OTC medication    Dressing Care: Leave dressing on the incision  . If dressing becomes loose,  You may remove it. Keep area Clean & dry, Do not get incision wet or  let water run over incision. Do not apply any lotions, creams or powder to incision.    Follow-Up Phone Calls  Will be made nursing staff  If you have any problems, call your doctor as needed    Call your doctor if  Excessive bleeding that does not stop after holding mild pressure over the area  Temperature of 101 degrees F or above  Redness,excessive swelling or bruising, and/or green or yellow, smelly discharge from incision    After Anesthesia  For the first 24 hours: DO NOT Drive, Drink alcoholic beverages, or Make important decisions  Be aware of dizziness following anesthesia and while taking pain medication    Medications - Continue home medications as previously prescribed     Other Instructions- Always show the doctor or dentist your loop recorder identification card.      Appointment date/time - April 26 @ 2:30pm    Your doctor's phone number - [phone]

## 2024-04-26 ENCOUNTER — NURSE ONLY (OUTPATIENT)
Age: 74
End: 2024-04-26

## 2024-04-26 DIAGNOSIS — I48.91 AFIB (HCC): Primary | ICD-10-CM

## 2024-07-23 ENCOUNTER — HOSPITAL ENCOUNTER (OUTPATIENT)
Dept: ULTRASOUND IMAGING | Age: 74
Discharge: HOME | End: 2024-07-25
Payer: MEDICARE

## 2024-07-23 ENCOUNTER — TRANSCRIBE ORDERS (OUTPATIENT)
Dept: SCHEDULING | Age: 74
End: 2024-07-23

## 2024-07-23 DIAGNOSIS — M19.071 PRIMARY LOCALIZED OSTEOARTHROSIS OF RIGHT ANKLE AND FOOT: Primary | ICD-10-CM

## 2024-07-23 DIAGNOSIS — M19.071 PRIMARY LOCALIZED OSTEOARTHROSIS OF RIGHT ANKLE AND FOOT: ICD-10-CM

## 2024-07-23 DIAGNOSIS — M19.079 LOCALIZED OSTEOARTHROSIS OF ANKLE AND FOOT, UNSPECIFIED LATERALITY: Primary | ICD-10-CM

## 2024-07-23 PROCEDURE — 93926 LOWER EXTREMITY STUDY: CPT

## 2024-07-25 ENCOUNTER — HOSPITAL ENCOUNTER (OUTPATIENT)
Dept: ULTRASOUND IMAGING | Age: 74
Discharge: HOME | End: 2024-07-27
Payer: MEDICARE

## 2024-07-25 ENCOUNTER — TRANSCRIBE ORDERS (OUTPATIENT)
Dept: SCHEDULING | Age: 74
End: 2024-07-25

## 2024-07-25 DIAGNOSIS — I82.411 DEEP VEIN THROMBOSIS (DVT) OF FEMORAL VEIN OF RIGHT LOWER EXTREMITY, UNSPECIFIED CHRONICITY (HCC): ICD-10-CM

## 2024-07-25 DIAGNOSIS — I82.411 DEEP VEIN THROMBOSIS (DVT) OF FEMORAL VEIN OF RIGHT LOWER EXTREMITY, UNSPECIFIED CHRONICITY (HCC): Primary | ICD-10-CM

## 2024-07-25 PROCEDURE — 93971 EXTREMITY STUDY: CPT

## 2024-08-06 ENCOUNTER — TRANSCRIBE ORDERS (OUTPATIENT)
Dept: SCHEDULING | Age: 74
End: 2024-08-06

## 2024-08-06 DIAGNOSIS — I82.4Z1 ACUTE DEEP VEIN THROMBOSIS (DVT) OF DISTAL VEIN OF RIGHT LOWER EXTREMITY (HCC): Primary | ICD-10-CM

## 2024-08-26 ENCOUNTER — OFFICE VISIT (OUTPATIENT)
Age: 74
End: 2024-08-26
Payer: MEDICARE

## 2024-08-26 VITALS
WEIGHT: 178 LBS | SYSTOLIC BLOOD PRESSURE: 138 MMHG | DIASTOLIC BLOOD PRESSURE: 80 MMHG | HEIGHT: 66 IN | BODY MASS INDEX: 28.61 KG/M2 | HEART RATE: 82 BPM

## 2024-08-26 DIAGNOSIS — M50.30 DDD (DEGENERATIVE DISC DISEASE), CERVICAL: ICD-10-CM

## 2024-08-26 DIAGNOSIS — I48.19 PERSISTENT ATRIAL FIBRILLATION (HCC): Primary | ICD-10-CM

## 2024-08-26 DIAGNOSIS — I82.491 ACUTE DEEP VEIN THROMBOSIS (DVT) OF OTHER SPECIFIED VEIN OF RIGHT LOWER EXTREMITY (HCC): ICD-10-CM

## 2024-08-26 DIAGNOSIS — M35.3 PMR (POLYMYALGIA RHEUMATICA) (HCC): ICD-10-CM

## 2024-08-26 PROCEDURE — G8428 CUR MEDS NOT DOCUMENT: HCPCS | Performed by: NURSE PRACTITIONER

## 2024-08-26 PROCEDURE — 1036F TOBACCO NON-USER: CPT | Performed by: NURSE PRACTITIONER

## 2024-08-26 PROCEDURE — 3017F COLORECTAL CA SCREEN DOC REV: CPT | Performed by: NURSE PRACTITIONER

## 2024-08-26 PROCEDURE — G8417 CALC BMI ABV UP PARAM F/U: HCPCS | Performed by: NURSE PRACTITIONER

## 2024-08-26 PROCEDURE — 1123F ACP DISCUSS/DSCN MKR DOCD: CPT | Performed by: NURSE PRACTITIONER

## 2024-08-26 PROCEDURE — 3075F SYST BP GE 130 - 139MM HG: CPT | Performed by: NURSE PRACTITIONER

## 2024-08-26 PROCEDURE — 99214 OFFICE O/P EST MOD 30 MIN: CPT | Performed by: NURSE PRACTITIONER

## 2024-08-26 PROCEDURE — 3079F DIAST BP 80-89 MM HG: CPT | Performed by: NURSE PRACTITIONER

## 2024-08-26 PROCEDURE — 93000 ELECTROCARDIOGRAM COMPLETE: CPT | Performed by: NURSE PRACTITIONER

## 2024-08-26 RX ORDER — TORSEMIDE 20 MG/1
TABLET ORAL
COMMUNITY
End: 2024-08-26

## 2024-08-26 RX ORDER — OXYCODONE AND ACETAMINOPHEN 10; 325 MG/1; MG/1
TABLET ORAL
COMMUNITY
Start: 2024-08-10

## 2024-08-26 RX ORDER — DULOXETIN HYDROCHLORIDE 30 MG/1
30 CAPSULE, DELAYED RELEASE ORAL 2 TIMES DAILY
COMMUNITY
Start: 2024-07-30

## 2024-08-26 RX ORDER — CELECOXIB 100 MG/1
CAPSULE ORAL
COMMUNITY
End: 2024-08-26

## 2024-08-26 RX ORDER — CEPHALEXIN 500 MG/1
CAPSULE ORAL
COMMUNITY

## 2024-08-26 RX ORDER — SUVOREXANT 10 MG/1
TABLET, FILM COATED ORAL
COMMUNITY
End: 2024-08-26

## 2024-08-26 RX ORDER — RIVAROXABAN 20 MG/1
1 TABLET, FILM COATED ORAL DAILY
COMMUNITY

## 2024-08-26 NOTE — PROGRESS NOTES
[address], [address]  [place] [address]  PHONE: [phone]  1950    SUBJECTIVE:   Bora Gutiérrez is a 74 y.o. male seen for a follow up visit regarding the following:     Chief Complaint   Patient presents with    Atrial Fibrillation       HPI:    Bora Gutiérrez is a very pleasant 74 y.o. male with a past medical and cardiac history significant for AF/AFL ablation in 2018 by Dr. Guevara with recurrent AFL s/p ablation. He underwent an AFL ablation and then an AF ablation (cryo with RF anterior line) at [facility]. He has now undergone a 3rd AF ablation which has been successful. He had a LA roof flutter which terminated with ablation along the anterior LA, s/p WM, DVT/PE, HTN, PMR, and moderate persistent asthma, who presents for 6 month follow up.  Pt underwent ILR removal and insertion of new ILR in April 2024.  He is feeling well from a cardiac standpoint, no c/o chest pain, shortness of breath, or palpitations.  Pt did have another DVT in R upper thigh and is now on Xarelto.           Cardiac PMH: (Old records have been reviewed and summarized below)    EKG 01/19/2017, supraventricular tachycardia,  .       Telemetry 2017 revealed atrial fibrillation and atrial flutter.      Consultation with Dr. Erwin Guevara.  Patient with atypical flutter, placed on Multaq 02/2017.      Echocardiogram - normal left ventricular systolic function.      11/09/2017 increased symptom burden of arrhythmia.  Fast heart rates detected.      Dronedarone continued.  The patient is on Eliquis therapy.      Cardiac CT mild plaque in LAD.     06/01/2018 Amiodarone, Diltiazem discontinued.       Echo 7/2022 EF 55-60%    Reviewed office note Dr. Tripathi 2/26/2024      Past Medical History, Past Surgical History, Family history, Social History, and Medications were all reviewed with the patient today and updated as necessary.     Current Outpatient Medications   Medication Sig Dispense  REMEDIOS Garland - CNP    8/26/2024  10:19 AM

## 2024-10-02 ENCOUNTER — HOSPITAL ENCOUNTER (OUTPATIENT)
Age: 74
Discharge: HOME | End: 2024-10-04
Payer: MEDICARE

## 2024-10-02 DIAGNOSIS — S86.111A STRAIN OF RIGHT GASTROCNEMIUS MUSCLE, INITIAL ENCOUNTER: ICD-10-CM

## 2024-10-02 PROCEDURE — 73721 MRI JNT OF LWR EXTRE W/O DYE: CPT

## 2024-10-05 PROCEDURE — 93298 REM INTERROG DEV EVAL SCRMS: CPT | Performed by: INTERNAL MEDICINE

## 2024-11-04 ENCOUNTER — TELEPHONE (OUTPATIENT)
Age: 74
End: 2024-11-04

## 2024-11-04 NOTE — TELEPHONE ENCOUNTER
The patient has a mild to moderate CV risk for a low risk surgery/procedure. Hold the blood thinner for 2-3 days and restart when able.     REG

## 2024-11-04 NOTE — TELEPHONE ENCOUNTER
Cardiac Clearance        Physician or Practice Requesting:[facility]   :    Contact Phone Number: [phone]  Fax Number: [phone]  Date of Surgery/Procedure: 11/21/24  Type of Surgery or Procedure: Colonoscopy   Type of Anesthesia: Propofol   Type of Clearance Requested: risk assessment and any medication hold   Medication to Hold:Eliquis   Days to Hold: 2 day hold

## 2025-02-28 ENCOUNTER — OFFICE VISIT (OUTPATIENT)
Age: 75
End: 2025-02-28

## 2025-02-28 VITALS
SYSTOLIC BLOOD PRESSURE: 102 MMHG | DIASTOLIC BLOOD PRESSURE: 60 MMHG | WEIGHT: 174.8 LBS | HEIGHT: 66 IN | BODY MASS INDEX: 28.09 KG/M2 | HEART RATE: 84 BPM

## 2025-02-28 DIAGNOSIS — I48.19 PERSISTENT ATRIAL FIBRILLATION (HCC): Primary | ICD-10-CM

## 2025-02-28 RX ORDER — OXYCODONE HYDROCHLORIDE 5 MG/1
TABLET ORAL
COMMUNITY

## 2025-02-28 RX ORDER — CARBOXYMETHYLCELLULOSE SODIUM 10 MG/ML
1 GEL OPHTHALMIC 2 TIMES DAILY
COMMUNITY

## 2025-02-28 RX ORDER — METOPROLOL SUCCINATE 50 MG/1
50 TABLET, EXTENDED RELEASE ORAL DAILY
Qty: 90 TABLET | Refills: 3 | Status: SHIPPED | OUTPATIENT
Start: 2025-02-28

## 2025-02-28 RX ORDER — METHOTREXATE 2.5 MG/1
TABLET ORAL
COMMUNITY
Start: 2025-02-22

## 2025-02-28 RX ORDER — FOLIC ACID 1 MG/1
TABLET ORAL
COMMUNITY
Start: 2025-02-22

## 2025-02-28 ASSESSMENT — ENCOUNTER SYMPTOMS
ALLERGIC/IMMUNOLOGIC NEGATIVE: 1
EYES NEGATIVE: 1
GASTROINTESTINAL NEGATIVE: 1
RESPIRATORY NEGATIVE: 1

## 2025-02-28 NOTE — PROGRESS NOTES
[facility] CARDIOLOGY  [address] [place] [address], [address]  [place] [address]  PHONE: [phone]        27      NAME:  Bora Gutiérrez  : 1950  MRN: [id number]     Referring Cardiologist: Kishor Ramirez MD     Reason for Consultation: Atrial fibrillation-flutter      ASSESSMENT and PLAN:  Diagnoses and all orders for this visit:      1. Atrial fibrillation, [id number] = 2+, on Eliquis, previous AF/AFL ablation by Dr. Guevara, 2018, s/p AFL ablation on 2019 and repeat AF ablation in 2019 at the [facility] with Dr. Kenneth Huston and another AF ablation on 2020 with termination of LA roof flutter. On Tikosyn    2. Atypical atrial flutter (HCC)   3. Essential hypertension, benign   4. 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 Moderate persistent asthma without complication   6. PMR (polymyalgia rheumatica) (HCC)     75-year-old male with a history of persistent atrial fibrillation and typical appearing atrial flutter although it could be very atypical as well.  He has a history of AF/AFL ablation in  by Dr. Guevara with recurrent AFL s/p ablation. He underwent  an AFL ablation and then an AF ablation (cryo with RF anterior line) at [facility]. He has now undergone a 3rd AF ablation which has been successful. He had a LA roof flutter which terminated with ablation along the anterior LA.      He was admitted in 2022 with PEs and a DVT and also had a rectal sheath hematoma. He is now s/p Watchman and is off Eliquis.       -AF/stroke ppx - on ASA. Off Eliquis. S/p Watchman, doing well. Has ILR and has had stable rhythm, no AF/AFL.  -Check echo.       -DVT/PE - off OAC due to rectal sheath bleed in 2022. S/p Watchman.      -AF - S/p AF ablation in 2020, remains in NSR. Stopped Tikosyn after ablation. Continue BB.      -Disc disease - ruptured L2, s/p NSGY, 2023. S/p spinal cord stimulator which has helped a great deal with his back pain.

## 2025-06-04 ENCOUNTER — HOSPITAL ENCOUNTER (OUTPATIENT)
Dept: GENERAL RADIOLOGY | Age: 75
Discharge: HOME | End: 2025-06-06
Payer: MEDICARE

## 2025-06-04 DIAGNOSIS — M25.541 PAIN IN JOINT OF RIGHT HAND: ICD-10-CM

## 2025-06-04 DIAGNOSIS — M25.572 PAIN IN JOINT, ANKLE AND FOOT, LEFT: ICD-10-CM

## 2025-06-04 DIAGNOSIS — M25.571 PAIN, JOINT, ANKLE AND FOOT, RIGHT: ICD-10-CM

## 2025-06-04 DIAGNOSIS — M25.542 PAIN IN JOINT OF LEFT HAND: ICD-10-CM

## 2025-06-04 PROCEDURE — 73120 X-RAY EXAM OF HAND: CPT

## 2025-06-04 PROCEDURE — 73620 X-RAY EXAM OF FOOT: CPT

## 2025-06-04 PROCEDURE — 73600 X-RAY EXAM OF ANKLE: CPT

## 2025-07-22 PROCEDURE — 93298 REM INTERROG DEV EVAL SCRMS: CPT | Performed by: INTERNAL MEDICINE

## (undated) DEVICE — CHECK-FLO PERFORMER INTRODUCER: Brand: PERFORMER

## (undated) DEVICE — ACCESS SHEATH WITH DILATOR: Brand: WATCHMAN™ TRUSEAL™ ACCESS SYSTEM

## (undated) DEVICE — GOWN,SIRUS,NONRNF,SETINSLV,XL,20/CS: Brand: MEDLINE

## (undated) DEVICE — ELECTRODE,CUTTING,STERILE.24FR: Brand: N.A.

## (undated) DEVICE — REM POLYHESIVE ADULT PATIENT RETURN ELECTRODE: Brand: VALLEYLAB

## (undated) DEVICE — LIQUIBAND RAPID ADHESIVE 36/CS 0.8ML: Brand: MEDLINE

## (undated) DEVICE — 18G NG KIT WITH 96IN PROBE COVER (10 PK): Brand: SITE-RITE

## (undated) DEVICE — Z DISCONTINUED USE 2220190 SUTURE VICRYL SZ 3-0 L27IN ABSRB UD L26MM SH 1/2 CIR J416H

## (undated) DEVICE — SYSTEM CLOSURE 6-12 FR VEN VASC VASCADE MVP

## (undated) DEVICE — PACK SURGICAL PROCEDURE KIT CYSTOSCOPY TOTE

## (undated) DEVICE — SOLUTION IRRIG 3000ML H2O STRL BAG

## (undated) DEVICE — CATHETER DIAG AD 6FR L110CM 0.038IN COR POLYUR PGTL W/ 6

## (undated) DEVICE — PRESSURE MONITORING SET: Brand: TRUWAVE

## (undated) DEVICE — PERCLOSE PROGLIDE™ SUTURE-MEDIATED CLOSURE SYSTEM: Brand: PERCLOSE PROGLIDE™

## (undated) DEVICE — GUIDEWIRE VASC L260CM DIA0.035IN L7CM DIA3MM J TIP PTFE S

## (undated) DEVICE — Device: Brand: NRG TRANSSEPTAL NEEDLE

## (undated) DEVICE — INTRODUCER TRANSSEPTAL GUID 8.5FR L63CM DIL 8.5FR L67CM

## (undated) DEVICE — PLASMABLADE X PS210-030S-LIGHT 3.0SL: Brand: PLASMABLADE™ X

## (undated) DEVICE — KENDALL SCD EXPRESS SLEEVES, KNEE LENGTH, MEDIUM: Brand: KENDALL SCD

## (undated) DEVICE — CYSTO/BLADDER IRRIGATION SET, REGULATING CLAMP

## (undated) DEVICE — SOL IRR SOD CL 0.9% 3000ML --

## (undated) DEVICE — TRAY PREP DRY W/ PREM GLV 2 APPL 6 SPNG 2 UNDPD 1 OVERWRAP

## (undated) DEVICE — CATHETER ULTRASOUND NAVIGATIONAL 10 FRX90 CM VIVID I ACUNAV

## (undated) DEVICE — CATH URET 5FRX70CM W/OPN END -- BX/20

## (undated) DEVICE — DRSG POSTOP PRMSL AG 3.5X4IN

## (undated) DEVICE — DRAPE TWL SURG 16X26IN BLU ORB04] ALLCARE INC]

## (undated) DEVICE — PINNACLE TIF INTRODUCER SHEATH: Brand: PINNACLE

## (undated) DEVICE — PINNACLE INTRODUCER SHEATH: Brand: PINNACLE

## (undated) DEVICE — PERCLOSE™ PROSTYLE™ SUTURE-MEDIATED CLOSURE AND REPAIR SYSTEM: Brand: PERCLOSE™ PROSTYLE™

## (undated) DEVICE — GOWN,SIRUS,NONRNF,SETINSLV,2XL,18/CS: Brand: MEDLINE